# Patient Record
Sex: FEMALE | Race: WHITE | NOT HISPANIC OR LATINO | Employment: UNEMPLOYED | URBAN - METROPOLITAN AREA
[De-identification: names, ages, dates, MRNs, and addresses within clinical notes are randomized per-mention and may not be internally consistent; named-entity substitution may affect disease eponyms.]

---

## 2017-01-28 ENCOUNTER — ALLSCRIPTS OFFICE VISIT (OUTPATIENT)
Dept: OTHER | Facility: OTHER | Age: 37
End: 2017-01-28

## 2017-01-30 ENCOUNTER — ALLSCRIPTS OFFICE VISIT (OUTPATIENT)
Dept: OTHER | Facility: OTHER | Age: 37
End: 2017-01-30

## 2017-09-25 ENCOUNTER — GENERIC CONVERSION - ENCOUNTER (OUTPATIENT)
Dept: OTHER | Facility: OTHER | Age: 37
End: 2017-09-25

## 2017-10-27 ENCOUNTER — GENERIC CONVERSION - ENCOUNTER (OUTPATIENT)
Dept: OTHER | Facility: OTHER | Age: 37
End: 2017-10-27

## 2017-11-01 ENCOUNTER — ALLSCRIPTS OFFICE VISIT (OUTPATIENT)
Dept: OTHER | Facility: OTHER | Age: 37
End: 2017-11-01

## 2017-11-01 ENCOUNTER — GENERIC CONVERSION - ENCOUNTER (OUTPATIENT)
Dept: OTHER | Facility: OTHER | Age: 37
End: 2017-11-01

## 2017-11-04 ENCOUNTER — ALLSCRIPTS OFFICE VISIT (OUTPATIENT)
Dept: OTHER | Facility: OTHER | Age: 37
End: 2017-11-04

## 2017-11-04 DIAGNOSIS — Z01.419 ENCOUNTER FOR GYNECOLOGICAL EXAMINATION WITHOUT ABNORMAL FINDING: ICD-10-CM

## 2017-11-04 PROCEDURE — 87624 HPV HI-RISK TYP POOLED RSLT: CPT | Performed by: PHYSICIAN ASSISTANT

## 2017-11-04 PROCEDURE — G0145 SCR C/V CYTO,THINLAYER,RESCR: HCPCS | Performed by: PHYSICIAN ASSISTANT

## 2017-11-07 ENCOUNTER — GENERIC CONVERSION - ENCOUNTER (OUTPATIENT)
Dept: OTHER | Facility: OTHER | Age: 37
End: 2017-11-07

## 2017-11-07 NOTE — PROGRESS NOTES
Assessment  1  Encounter for routine gynecological examination (V72 31) (Z01 419)    Plan   · (1) THIN PREP PAP WITH IMAGING; Status:Active; Requested LZO:[id number];    Perform:[facility]; TKW:43JJM0460; Ordered;For:Encounter for routine gynecological examination; Ordered By:Jadyn Guevara;  Maturation index required? : No  : 10/04/2017  HPV? : if ASCUS   · Levothyroxine Sodium 112 MCG Oral Tablet; TAKE 1 TABLET DAILY AS  DIRECTED   Rx By: Killian Foster; Dispense: 30 Days ; #:30 Tablet; Refill: 5;For: Hypothyroidism; HEIDI = N; Verified Transmission to [address] [place] [address]; Last Updated By: System, [employer]; 11/4/2017 11:53:36 AM    Discussion/Summary  healthy adult female Pap test was done today      Chief Complaint  Pt here for annual pap and pelvic  Pt's last pap was 6 years ago  Pt had history of one abnormal pap test  Pt's period are irregular  History of Present Illness  GYN HM, Adult Female [facility]: The patient is being seen for a gynecology evaluation  The last health maintenance visit was 6 year(s) ago  Social History: Household members include 1 daughter(s)  She is unmarried  Work status: working full time  She reports being in recovery from alcohol abuse  She has never used illicit drugs  General Health: The patient's health since the last visit is described as good  She has regular dental visits  -- She denies vision problems  -- She denies hearing loss  Immunizations status: up to date  Lifestyle:  She consumes a diverse and healthy diet  -- She does not have any weight concerns  -- She exercises regularly  -- She does not use tobacco -- She denies alcohol use  Reproductive health: the patient is premenopausal--   she reports no menstrual problems  -- pregnancy history: G 1P 1  Screening: cancer screening reviewed and updated  metabolic screening reviewed and updated  risk screening reviewed and updated        Review of Systems  no pelvic pain,-- no pelvic pressure,-- no vaginal pain,-- no vaginal discharge,-- no vaginal itching,-- no vaginal lump or mass,-- no vaginal odor,-- no nonmenstrual bleeding,-- no dysuria,-- no bladder pain,-- no burning sensation during urination,-- no change in urinary frequency,-- no feelings of urinary urgency,-- no flank pain,-- no abnormal urethral discharge-- and-- urine is not foul-smelling  Active Problems  1  Alcohol dependence in remission (303 93) (F10 21)   2  Allergy (995 3) (T78 40XA)   3  Anxiety (300 00) (F41 9)   4  Asthma (493 90) (J45 909)   5  Asthma flare (493 92) (J45 901)   6  Asthmatic bronchitis, unspecified asthma severity, with acute exacerbation (493 92)   (J45 901)   7  Crohn disease (555 9) (K50 90)   8  Depression (311) (F32 9)   9  Eczema (692 9) (L30 9)   10  Hypothyroidism (244 9) (E03 9)   11  Obesity (278 00) (E66 9)   12  Seasonal allergies (477 9) (J30 2)   13  URTI (acute upper respiratory infection) (465 9) (J06 9)    Family History   · Family history of Colon cancer   · Family history of hypertension (V17 49) (Z82 49)   · Family history of malignant neoplasm of kidney (V16 51) (Z80 51)   · Family history of Colon cancer   · Family history of hypertension (V17 49) (Z82 49)   · Family history of malignant neoplasm of kidney (V16 51) (Z80 51)   · Family history of Colon cancer   · Family history of malignant neoplasm of kidney (V16 51) (Z80 51)    Social History   · Former smoker (V15 82) (N57 531)  The social history was reviewed and updated today  The social history was reviewed and is unchanged  Current Meds   1  Albuterol Sulfate Powder; USE AS DIRECTED; Therapy: 11TND5376 to (Evaluate:40Omp0131); Last Rx:06Rdb7545 Ordered   2  Azithromycin 250 MG Oral Tablet; TAKE 2 TABLETS ON DAY 1 THEN TAKE 1 TABLET A   DAY FOR 4 DAYS; Therapy: 13DZJ1358 to (Last Rx:01Nov2017)  Requested for: 31GLK3930 Ordered   3  Benadryl 25 MG CAPS; Therapy: (Recorded:24Jun2016) to Recorded   4   Benzonatate 200 MG Oral Capsule; TAKE 1 CAPSULE 3 TIMES DAILY AS NEEDED; Therapy: 11HLP4539 to (Evaluate:18Nov2017)  Requested for: 07AST8080; Last   Rx:01Nov2017 Ordered   5  Betamethasone Sod Phos & Acet 6 (3-3) MG/ML Injection Suspension; INJECT 1  ML   Intramuscular I; To Be Done: 40SCR9037; Status: HOLD FOR - Administration Ordered   6  Breo Ellipta 200-25 MCG/INH Inhalation Aerosol Powder Breath Activated; one puff q   day; Therapy: 40TIE0676 to (Last Rx:01Nov2017)  Requested for: 96LFJ6192 Ordered   7  Claritin CAPS; Therapy: (Recorded:24Jun2016) to Recorded   8  Fluticasone Propionate 50 MCG/ACT Nasal Suspension; USE 2 SPRAYS IN EACH   NOSTRIL ONCE DAILY; Therapy: 10WEJ9349 to (Last Rx:29Mar2016)  Requested for: 52KCF9981 Ordered   9  Levothyroxine Sodium 112 MCG Oral Tablet; TAKE 1 TABLET DAILY AS DIRECTED; Therapy: 94GIF5224 to (Evaluate:70Otm5501)  Requested for: 98GMZ4357; Last   Rx:19Jan2017 Ordered   10  Montelukast Sodium 10 MG Oral Tablet; take 1 tablet by mouth daily as directed; Therapy: 73FHE4132 to (Evaluate:21Jan2018)  Requested for: 64YJL2267; Last    Rx:90Qsl0039 Ordered   11  PredniSONE 10 MG Oral Tablet; 60mg po q day x2, 50mg po q day x2, 40mg po q day x    2, 30mg po q day x2, 20mg po q day x 2, 10mg po q day x 2; Therapy: 83YKT2872 to (Evaluate:97Gee8628)  Requested for: 63CVZ2589; Last    Rx:01Nov2017 Ordered   12  Valerian Root CAPS; Therapy: (Recorded:24Jun2016) to Recorded   13  Ventolin  (90 Base) MCG/ACT Inhalation Aerosol Solution; inhale 1 to 2 puffs by    mouth every 4 to 6 hours if needed; Therapy: 97WDZ8860 to (Emory Jaime)  Requested for: 25Oct2016; Last    Rx:25Oct2016 Ordered   14  Viibryd 40 MG Oral Tablet; TAKE 1 TABLET DAILT; Therapy: 20Mvr5407 to (Last Rx:03Oct2017)  Requested for: 45UON6800 Ordered   15  Vivitrol 380 MG Intramuscular Suspension Reconstituted; INJECT INTRAMUSCULARLY    AS DIRECTED; Therapy: 68FWB1964 to Recorded    Allergies  1  Penicillins    Vitals   Recorded: 43ZUL9014 11:14AM   Heart Rate 76   Respiration 18   Systolic 216, LUE, Sitting   Diastolic 80, LUE, Sitting   Height 5 ft 3 in   Weight 197 lb    BMI Calculated 34 9   BSA Calculated 1 92   O2 Saturation 98     Physical Exam    Constitutional   General appearance: No acute distress, well appearing and well nourished  Pulmonary   Respiratory effort: No increased work of breathing or signs of respiratory distress  Auscultation of lungs: Clear to auscultation  Cardiovascular   Auscultation of heart: Normal rate and rhythm, normal S1 and S2, no murmurs  Peripheral vascular exam: Normal pulses Throughout  Genitourinary   External genitalia: Normal and no lesions appreciated  Vagina: Normal, no lesions or dryness appreciated  Urethra: Normal     Urethral meatus: Normal     Bladder: Normal, soft, non-tender and no prolapse or masses appreciated  Cervix: Normal, no palpable masses  Uterus: Normal, non-tender, not enlarged, and no palpable masses  Adnexa/parametria: Normal, non-tender and no fullness or masses appreciated  Chest   Breasts: Normal and no dimpling or skin changes noted  Future Appointments    Date/Time Provider Specialty Site   11/07/2017 03:30 PM CINDI Alexis   Internal Medicine [facility]     Signatures   Electronically signed by : Carla López, [facility]; Nov 5 2017  4:33PM EST                       (Author)    Electronically signed by : CINDI Lim ; Nov 6 2017  7:32AM EST                       (Author)

## 2017-11-08 ENCOUNTER — LAB REQUISITION (OUTPATIENT)
Dept: LAB | Facility: HOSPITAL | Age: 37
End: 2017-11-08
Payer: COMMERCIAL

## 2017-11-08 DIAGNOSIS — Z01.419 ENCOUNTER FOR GYNECOLOGICAL EXAMINATION WITHOUT ABNORMAL FINDING: ICD-10-CM

## 2017-11-16 LAB — HPV RRNA GENITAL QL NAA+PROBE: ABNORMAL

## 2017-11-17 LAB
LAB AP GYN PRIMARY INTERPRETATION: NORMAL
LAB AP LMP: NORMAL
Lab: NORMAL
PATH INTERP SPEC-IMP: NORMAL

## 2017-11-20 ENCOUNTER — GENERIC CONVERSION - ENCOUNTER (OUTPATIENT)
Dept: OTHER | Facility: OTHER | Age: 37
End: 2017-11-20

## 2017-11-24 ENCOUNTER — OFFICE VISIT (OUTPATIENT)
Dept: URGENT CARE | Facility: CLINIC | Age: 37
End: 2017-11-24
Payer: COMMERCIAL

## 2017-11-24 PROCEDURE — 99203 OFFICE O/P NEW LOW 30 MIN: CPT

## 2017-11-29 NOTE — PROGRESS NOTES
Assessment  1  Abdominal pain (649 87) (R10 9)    Discussion/Summary  Discussion Summary:   Patient referred to ER for further evaluation  Medication Side Effects Reviewed: Possible side effects of new medications were reviewed with the patient/guardian today  Understands and agrees with treatment plan: The treatment plan was reviewed with the patient/guardian  The patient/guardian understands and agrees with the treatment plan   Counseling Documentation With Imm: The patient was counseled regarding instructions for management  Chief Complaint    1  Abdominal Pain  Chief Complaint Free Text Note Form: C/O lower abdominal pain after eating a fruit salad yesterday  Pt states that the pain is intermittent and feels like a twisting sensation in the bowel  Pt denies any diarrhea, nausea, vomiting or fever  Pt has h/o Crohns but has not had a flare up for several years  She did have a bowel obstruction 5 years ago  History of Present Illness  HPI: Started with lower abd pain yesterday at 11 AM  Pain is getting worse  No fever, chills, N/V/D  Hx of bowel obstruction 5 yrs ago  Hospital Based Practices Required Assessment:  Pain Assessment  the patient states they have pain  The pain is located in the lower abdomen  The patient describes the pain as twisting  (on a scale of 0 to 10, the patient rates the pain at 2, at times ranging as high as 8 )  Abuse And Domestic Violence Screen   Yes, the patient is safe at home  -- The patient states no one is hurting them  Depression And Suicide Screen  No, the patient has not had thoughts of hurting themself  No, the patient has not felt depressed in the past 7 days  Abdominal Pain: Alvah Sever presents with complaints of abdominal pain  Associated symptoms include no nausea,-- no vomiting,-- no diarrhea,-- no jaundice,-- no melena,-- no bloody stool,-- no flatulence,-- no dysuria,-- no hematuria-- and-- no dark urine        Review of Systems  Focused-Female: Constitutional: no fever-- and-- no chills  ENT: no sore throat,-- no hearing loss-- and-- no hoarseness  Cardiovascular: no chest pain  Respiratory: no cough  Musculoskeletal: no myalgias  Integumentary: no rashes  Neurological: no headache-- and-- no dizziness  ROS Reviewed:   ROS reviewed  Active Problems    1  Alcohol dependence in remission (303 93) (F10 21)   2  Allergy (995 3) (T78 40XA)   3  Anxiety (300 00) (F41 9)   4  Asthma (493 90) (J45 909)   5  Asthma flare (493 92) (J45 901)   6  Asthmatic bronchitis, unspecified asthma severity, with acute exacerbation (493 92) (J45 901)   7  Crohn disease (555 9) (K50 90)   8  Depression (311) (F32 9)   9  Eczema (692 9) (L30 9)   10  Encounter for routine gynecological examination (V72 31) (Z01 419)   11  Hypothyroidism (244 9) (E03 9)   12  Obesity (278 00) (E66 9)   13  Seasonal allergies (477 9) (J30 2)   14  URTI (acute upper respiratory infection) (465 9) (J06 9)    Past Medical History  Active Problems And Past Medical History Reviewed: The active problems and past medical history were reviewed and updated today  Family History  Mother    1  Family history of Colon cancer   2  Family history of hypertension (V17 49) (Z82 49)   3  Family history of malignant neoplasm of kidney (V16 51) (Z80 51)  Father    4  Family history of Colon cancer   5  Family history of hypertension (V17 49) (Z82 49)   6  Family history of malignant neoplasm of kidney (V16 51) (Z80 51)  Grandparent    7  Family history of Colon cancer   8  Family history of malignant neoplasm of kidney (V16 51) (Z80 51)    Social History   · Always uses seat belt   · Caffeine use (V49 89) (F15 90)   · Exercise frequency (times/week)   · Former smoker (K67 22) (F35 259)   · No alcohol use  Social History Reviewed: The social history was reviewed and updated today  Surgical History    1  Denied: History Of Prior Surgery    Current Meds   1   Albuterol Sulfate Powder; USE AS DIRECTED; Therapy: 53IUE0253 to (Evaluate:20Aug2016); Last Rx:30Bys9290 Ordered   2  Benadryl 25 MG CAPS; Therapy: (Recorded:24Jun2016) to Recorded   3  Benzonatate 200 MG Oral Capsule; TAKE 1 CAPSULE 3 TIMES DAILY AS NEEDED; Therapy: 94HTU0253 to (Evaluate:18Nov2017)  Requested for: 71TSR4584; Last Rx:01Nov2017 Ordered   4  Betamethasone Sod Phos & Acet 6 (3-3) MG/ML Injection Suspension; INJECT 1  ML Intramuscular I; To Be Done: 01IPV3678; Status: HOLD FOR - Administration Ordered   5  Breo Ellipta 200-25 MCG/INH Inhalation Aerosol Powder Breath Activated; one puff q day; Therapy: 14KOK2654 to (Last Rx:01Nov2017)  Requested for: 69FFB1525 Ordered   6  Claritin CAPS; Therapy: (Recorded:24Jun2016) to Recorded   7  Fluticasone Propionate 50 MCG/ACT Nasal Suspension; USE 2 SPRAYS IN EACH NOSTRIL ONCE DAILY; Therapy: 61OZX7785 to (Last Rx:29Mar2016)  Requested for: 45QYM2759 Ordered   8  Levothyroxine Sodium 112 MCG Oral Tablet; TAKE 1 TABLET DAILY AS DIRECTED; Therapy: 46ASU5385 to ([facility])  Requested for: 44CVR5820; Last Rx:04Nov2017 Ordered   9  Montelukast Sodium 10 MG Oral Tablet; take 1 tablet by mouth daily as directed; Therapy: 88TDZ3662 to (Evaluate:21Jan2018)  Requested for: 87AMM5050; Last Rx:47Tkg3420 Ordered   10  Valerian Root CAPS; Therapy: (Recorded:24Jun2016) to Recorded   11  Ventolin  (90 Base) MCG/ACT Inhalation Aerosol Solution; inhale 1 to 2 puffs by  mouth every 4 to 6 hours if needed; Therapy: 51HFX0116 to (Iris Pisano)  Requested for: 25Oct2016; Last  Rx:25Oct2016 Ordered   12  Viibryd 40 MG Oral Tablet; TAKE 1 TABLET DAILT; Therapy: 37Nph7719 to (Last Rx:03Oct2017)  Requested for: 71JXJ2229 Ordered  Medication List Reviewed: The medication list was reviewed and updated today  Allergies    1   Penicillins    Vitals  Signs   Recorded: 18DOS8688 10:27AM   Temperature: 97 6 F  Heart Rate: 105  Respiration: 18  Systolic: 086  Diastolic: 78  Height: 5 ft 3 in  Weight: 198 lb 8 0 oz  BMI Calculated: 35 16  BSA Calculated: 1 93  O2 Saturation: 100  Pain Scale: 2    Physical Exam   Constitutional  General appearance: No acute distress, well appearing and well nourished  Eyes  Conjunctiva and lids: No swelling, erythema or discharge  Ears, Nose, Mouth, and Throat  External inspection of ears and nose: Normal    Pulmonary  Respiratory effort: No increased work of breathing or signs of respiratory distress  Abdomen lower quadrant abd pain R>L with rebound, negative psoas  Lymphatic  Palpation of lymph nodes in neck: No lymphadenopathy  Musculoskeletal  Gait and station: Normal    Skin  Skin and subcutaneous tissue: Normal without rashes or lesions     Psychiatric  Mood and affect: Normal        Signatures   Electronically signed by : ZACHARY Lewis; Nov 24 2017 10:49AM EST                       (Author)    Electronically signed by : STEVEN Mcadams ; Nov 28 2017  2:19PM EST                       (Co-author)

## 2017-11-30 ENCOUNTER — GENERIC CONVERSION - ENCOUNTER (OUTPATIENT)
Dept: FAMILY MEDICINE CLINIC | Facility: CLINIC | Age: 37
End: 2017-11-30

## 2017-12-13 ENCOUNTER — ALLSCRIPTS OFFICE VISIT (OUTPATIENT)
Dept: OTHER | Facility: OTHER | Age: 37
End: 2017-12-13

## 2017-12-29 ENCOUNTER — GENERIC CONVERSION - ENCOUNTER (OUTPATIENT)
Dept: FAMILY MEDICINE CLINIC | Facility: CLINIC | Age: 37
End: 2017-12-29

## 2018-01-09 NOTE — PROGRESS NOTES
Chief Complaint  Pt was given Vivitrol injection, left gluteal region  Active Problems    1  Alcohol dependence in remission (303 93) (F10 21)   2  Allergy (995 3) (T78 40XA)   3  Anxiety (300 00) (F41 9)   4  Asthma (493 90) (J45 909)   5  Asthmatic bronchitis, unspecified asthma severity, with acute exacerbation (493 92)   (J45 901)   6  Crohn disease (555 9) (K50 90)   7  Depression (311) (F32 9)   8  Eczema (692 9) (L30 9)   9  Hypothyroidism (244 9) (E03 9)   10  Obesity (278 00) (E66 9)   11  Seasonal allergies (477 9) (J30 2)    Current Meds   1  Albuterol Sulfate Powder; USE AS DIRECTED; Therapy: 13KTI2578 to (Evaluate:23Szt9879); Last Rx:65Ybs0971 Ordered   2  Benadryl 25 MG CAPS; Therapy: (Recorded:24Jun2016) to Recorded   3  Claritin CAPS; Therapy: (Recorded:24Jun2016) to Recorded   4  Disulfiram 250 MG Oral Tablet; TAKE 1 TABLET DAILY AS DIRECTED; Therapy: 43RKF9013 to (Evaluate:61Xhb4523)  Requested for: 48YYA8867; Last   Rx:64Pyi3200 Ordered   5  Fluticasone Propionate 50 MCG/ACT Nasal Suspension; USE 2 SPRAYS IN EACH   NOSTRIL ONCE DAILY; Therapy: 24ITX4792 to (Last Rx:29Mar2016)  Requested for: 95AJD7843 Ordered   6  HydrOXYzine Pamoate 25 MG Oral Capsule; TAKE 1 CAPSULE 3 TIMES DAILY AS   NEEDED FOR ANXIETY; Therapy: 65Swi0397 to (Meghan Quezada)  Requested for: 97Zlh8663; Last   Rx:61Wml2387; Status: ACTIVE - Transmit to Pharmacy - Awaiting Verification   Ordered   7  Levothyroxine Sodium 112 MCG Oral Tablet; TAKE 1 TABLET DAILY AS DIRECTED; Therapy: 94FLH9493 to (Evaluate:92Nxq3066)  Requested for: 60BMW1876; Last   Rx:19Jan2017 Ordered   8  Montelukast Sodium 10 MG Oral Tablet; TAKE 1 TABLET DAILY AS DIRECTED; Therapy: 53Zqq8846 to (Evaluate:76Vtm6270)  Requested for: 28Jun2016; Last   Rx:28Jun2016 Ordered   9  ProAir  (90 Base) MCG/ACT Inhalation Aerosol Solution; INHALE 1 TO 2 PUFFS   EVERY 4 TO 6 HOURS AS NEEDED;    Therapy: 35Vjo2858 to (Last UF:59KWQ7013) Requested for: 71QHF4332 Ordered   10  Symbicort 160-4 5 MCG/ACT Inhalation Aerosol; INHALE 2 PUFFS TWICE DAILY  RINSE MOUTH AFTER USE; Therapy: 02Qzm6484 to (Last Rx:19Jan2017)  Requested for: 31IEM2400 Ordered   11  Valerian Root CAPS; Therapy: (Recorded:24Jun2016) to Recorded   12  Ventolin  (90 Base) MCG/ACT Inhalation Aerosol Solution; inhale 1 to 2 puffs by    mouth every 4 to 6 hours if needed; Therapy: 47SES2139 to (Annia Doss)  Requested for: 25Oct2016; Last    Rx:25Oct2016 Ordered   13  Viibryd 40 MG Oral Tablet; TAKE 1 TABLET DAILT; Therapy: 45Lxu0443 to (Last BR:14ETG2197)  Requested for: 76PFG1978 Ordered    Allergies    1   Penicillins    Signatures   Electronically signed by : Elizabeth Curtis, [facility]; Feb 1 2017  3:37PM EST                       (Author)    Electronically signed by : CINDI Diez ; Feb 1 2017  3:49PM EST                       (Author)

## 2018-01-12 NOTE — MISCELLANEOUS
Message  Pt was given Phenergan with Codeine for cough and cold symptoms  Active Problems   1  Alcohol dependence in remission (303 93) (F10 21)  2  Allergy (995 3) (T78 40XA)  3  Anxiety (300 00) (F41 9)  4  Asthma (493 90) (J45 909)  5  Asthmatic bronchitis, unspecified asthma severity, with acute exacerbation (493 92)   (J45 901)  6  Crohn disease (555 9) (K50 90)  7  Depression (311) (F32 9)  8  Eczema (692 9) (L30 9)  9  Hypothyroidism (244 9) (E03 9)  10  Obesity (278 00) (E66 9)  11  Seasonal allergies (477 9) (J30 2)  12  URTI (acute upper respiratory infection) (465 9) (J06 9)    Current Meds  1  Advair Diskus 250-50 MCG/DOSE Inhalation Aerosol Powder Breath Activated; INHALE   1 PUFF TWICE DAILY  RINSE MOUTH AFTER USE; Therapy: 69MJK9095 to (Evaluate:18Mku9283)  Requested for: 38EXZ1740; Last   Rx:96Zsp4970 Ordered  2  Albuterol Sulfate Powder; USE AS DIRECTED; Therapy: 35BSZ0281 to (Evaluate:85Das9390); Last Rx:60Icp8947 Ordered  3  Azithromycin 250 MG Oral Tablet; TAKE 2 TABLETS ON DAY 1 THEN TAKE 1 TABLET A   DAY FOR 4 DAYS; Therapy: 30TRK2457 to (Last Rx:02Oct2017)  Requested for: 96SNJ5927 Ordered  4  Benadryl 25 MG CAPS (DiphenhydrAMINE HCl); Therapy: (Recorded:79Vcv6018) to Recorded  5  Claritin CAPS; Therapy: (Recorded:07Eqf2870) to Recorded  6  Disulfiram 250 MG Oral Tablet (Antabuse); TAKE 1 TABLET DAILY AS DIRECTED; Therapy: 10XEY8191 to (Evaluate:37Don5780)  Requested for: 31HBZ8450; Last   Rx:94Kcs2046 Ordered  7  Fluticasone Propionate 50 MCG/ACT Nasal Suspension (Flonase); USE 2 SPRAYS IN   EACH NOSTRIL ONCE DAILY; Therapy: 46BTJ6953 to (Last Rx:29Mar2016)  Requested for: 48PRN0719 Ordered  8  HydrOXYzine Pamoate 25 MG Oral Capsule; TAKE 1 CAPSULE 3 TIMES DAILY AS   NEEDED FOR ANXIETY; Therapy: 74Xyk3607 to (Brandy Landers)  Requested for: 05Ajg4631; Last   Rx:01Auw3383; Status: ACTIVE - Transmit to [place] Verification   Ordered  9   Levothyroxine Sodium 112 MCG Oral Tablet; TAKE 1 TABLET DAILY AS DIRECTED; Therapy: 90PUL3533 to (Evaluate:28Eqf1823)  Requested for: 74TGW6196; Last   Rx:19Jan2017 Ordered  10  Montelukast Sodium 10 MG Oral Tablet (Singulair); take 1 tablet by mouth daily as    directed; Therapy: 71SZJ9559 to (Evaluate:21Jan2018)  Requested for: 00KCY9701; Last    Rx:60Nba9086 Ordered  11  ProAir  (90 Base) MCG/ACT Inhalation Aerosol Solution; INHALE 1 TO 2 PUFFS    EVERY 4 TO 6 HOURS AS NEEDED; Therapy: 08Zok7049 to (Last Rx:85Uwb5619)  Requested for: 99Xaj4918 Ordered  12  Symbicort 160-4 5 MCG/ACT Inhalation Aerosol; INHALE 2 PUFFS TWICE DAILY  RINSE MOUTH AFTER USE; Therapy: 49Zsk9069 to (Last Rx:59Oqu6302)  Requested for: 62Tmb8775 Ordered  13  Valerian Root CAPS; Therapy: (Recorded:24Jun2016) to Recorded  14  Ventolin  (90 Base) MCG/ACT Inhalation Aerosol Solution; inhale 1 to 2 puffs by    mouth every 4 to 6 hours if needed; Therapy: 56JIM2015 to (Juan Carlos Yoo)  Requested for: 25Oct2016; Last    Rx:25Oct2016 Ordered  15  Viibryd 40 MG Oral Tablet; TAKE 1 TABLET DAILT; Therapy: 84Mdt8561 to (Last Rx:03Oct2017)  Requested for: 03Oct2017 Ordered    Allergies   1  Penicillins    Signatures   Electronically signed by :  Suzie Almodovar, ; Oct 27 2017 12:03PM EST                       (Author)    Electronically signed by : Khari Coker, [facility]; Oct 27 2017 12:17PM EST                       (Author)

## 2018-01-12 NOTE — RESULT NOTES
Verified Results  (1) THIN PREP PAP WITH IMAGING 60AVT9694 03:04PM Heath Chowdhury     Test Name Result Flag Reference   LAB AP CASE REPORT (Report)     Gynecologic Cytology Report            Case: [id number]                  Authorizing Provider: Melvina Lee PA-C   Collected:      11/04/2017           First Screen:     JO Villalobos Received:      11/09/2017 5427        Pathologist:      Monserrat Bedoya MD                             Specimen:  LIQUID-BASED PAP, SCREENING, Cervix   HPV HIGH RISK RESULT (Report)     HPV, High Risk: HPV POS, HPV16 NEG, HPV18 NEG      Other High Risk HPV Positive, HPV 16 Negative, HPV 18 Negative  Specimen is positive for the DNA of any one of, or combination of the following high risk HPV types: 36,69,97,81,90,42,80,28,85,03,90,83  HPV types 16 and 18 DNA were undetectable or below the pre-set threshold  deskwolf FDA approved Joey 4800 is utilized with strict adherence to the manufacturers instruction  manual to test for the presence of High-Risk HPV DNA, as well as HPV 16 and HPV 18  This instrument  has been validated by our laboratory and/or by the   A negative result does not preclude the presence of HPV infection because results depend on adequate  specimen collection, absence of inhibitors and sufficient DNA to be detected  Additionally, HPV negative  results are not intended to prevent women from proceeding to colposcopy if clinically warranted  Positive HPV test results indicate the presence of any one or more of the high risk types, but since patients  are often co-infected with low-risk types it does not rule out the presence of low-risk types in patients  with mixed infections     LAB AP GYN PRIMARY INTERPRETATION      Epithelial cell abnormality  Electronically signed by Monserrat Bedoya MD on 11/17/2017 at 3:04 PM   LAB AP GYN INTERPRETATION      Atypical squamous cells of undetermined significance   LAB AP GYN SPECIMEN ADEQUACY Satisfactory for evaluation  Endocervical/transformation zone component present  LAB AP GYN NOTE      Interpretation performed at [facility], [address], [address] [facility]  LAB AP GYN ADDITIONAL INFORMATION (Report)     [employer]'s FDA approved ,  and ThinPrep Imaging System are   utilized with strict adherence to the 's instruction manual to   prepare gynecologic and non-gynecologic cytology specimens for the   production of ThinPrep slides as well as for gynecologic ThinPrep imaging  These processes have been validated by our laboratory and/or by the     The Pap test is not a diagnostic procedure and should not be used as the   sole means to detect cervical cancer  It is only a screening procedure to   aid in the detection of cervical cancer and its precursors  Both   false-negative and false-positive results have been experienced  Your   patient's test result should be interpreted in this context together with   the history and clinical findings     LAB AP LMP 10/4/2017

## 2018-01-12 NOTE — MISCELLANEOUS
ATTN:  Efrain Boone        Dear Ms Frankey Chantal,        I am writing to inform you that Nena Holland was precribed Phenergan with Codeine for cough and cold symptoms as needed  Sincerely,        Mack Morales PA-C      Electronically signed by: Jerod Mendez   Oct 27 2017 12:21PM EST

## 2018-01-13 VITALS
RESPIRATION RATE: 18 BRPM | TEMPERATURE: 98.8 F | HEART RATE: 94 BPM | WEIGHT: 200 LBS | BODY MASS INDEX: 35.44 KG/M2 | HEIGHT: 63 IN | DIASTOLIC BLOOD PRESSURE: 90 MMHG | SYSTOLIC BLOOD PRESSURE: 140 MMHG

## 2018-01-13 VITALS
OXYGEN SATURATION: 98 % | HEIGHT: 63 IN | SYSTOLIC BLOOD PRESSURE: 114 MMHG | RESPIRATION RATE: 18 BRPM | BODY MASS INDEX: 34.91 KG/M2 | HEART RATE: 76 BPM | WEIGHT: 197 LBS | DIASTOLIC BLOOD PRESSURE: 80 MMHG

## 2018-01-13 NOTE — MISCELLANEOUS
September 25, 2017    ATTN: Rob Reese    Dear Ms Gabe Myers am writing to inform you that Mey Darryl is taking over the counter Mucinex as needed      Sincerely,      Saskia Michel PA-C      Electronically signed by:Leida  [facility]   Sep 25 2017  1:20PM EST

## 2018-01-22 VITALS
OXYGEN SATURATION: 98 % | DIASTOLIC BLOOD PRESSURE: 68 MMHG | RESPIRATION RATE: 18 BRPM | HEART RATE: 97 BPM | BODY MASS INDEX: 35.17 KG/M2 | HEIGHT: 63 IN | WEIGHT: 198.5 LBS | SYSTOLIC BLOOD PRESSURE: 110 MMHG | TEMPERATURE: 98 F

## 2018-01-22 VITALS
BODY MASS INDEX: 34.02 KG/M2 | SYSTOLIC BLOOD PRESSURE: 124 MMHG | DIASTOLIC BLOOD PRESSURE: 80 MMHG | OXYGEN SATURATION: 98 % | WEIGHT: 192 LBS | RESPIRATION RATE: 18 BRPM | TEMPERATURE: 98.4 F | HEART RATE: 89 BPM | HEIGHT: 63 IN

## 2018-01-23 NOTE — MISCELLANEOUS
Assessment    1  Bowel obstruction (560 9) (K56 609)   2  Crohn disease (555 9) (K50 90)   3  Depression (311) (F32 9)   4  Stricture of bowel (560 9) (K56 699)   5  Weight loss (783 21) (R63 4)    Plan  Anxiety, Depression    · Viibryd 40 MG Oral Tablet; TAKE 1 TABLET DAILT   Rx By: Leanne Vance; Dispense: 0 Days ; #:30 Tablet; Refill: 1; For: Anxiety, Depression; HEIDI = N; Verified Transmission to [address] [place] [address]; Last Updated By: SystemLaFourchette; 12/13/2017 12:09:10 PM    Discussion/Summary  Discussion Summary:   Doing better  Continue current meds and keep f/u with sx and GI  Recommend pt adding supplements to build up her strength for sx  Keep scheduled f/u  Medication SE Review and Pt Understands Tx: Possible side effects of new medications were reviewed with the patient/guardian today  The treatment plan was reviewed with the patient/guardian  The patient/guardian understands and agrees with the treatment plan      Chief Complaint  Chief Complaint Free Text Note Form: Patient is being seen today for a SHALA  History of Present Illness  TCM Communication [facility]: The patient is being contacted for follow-up after hospitalization and 12-7-2017  She was hospitalized [facility]  The date of admission: 11-24-17, date of discharge: 11-26-17  Diagnosis: PARTIAL SMALL BOWEL OBSTRUCTION  She was discharged to home  Communication performed and completed by Jamil Payton   HPI: WF with known Crohn's for several years, presents for brief admission due to SBO/Ileitis and similar portion of the bowel as previous obstruction years ago  Improved with conservative treatment and coarse uncomplicated  D/c and had OP colonoscopy and reports having a stricture along with a colon polyp at the same site  Bx done and results pending  Doing ok  Not back to prior baseline  Able to eat at times and other times, has loss of appetite  No n/v and passing stool and gas  Intermittent low grade pain  No fever or chills  No new issues  Awaiting decision about colonoscopy and polyp removal vs open colectomy  Review of Systems  Complete-Female:   Constitutional: feeling poorly and feeling tired, but no fever and no chills  Cardiovascular: no chest pain, no intermittent leg claudication, no palpitations and no lower extremity edema  Respiratory: no shortness of breath, no cough, no orthopnea, no wheezing, no shortness of breath during exertion and no PND  Gastrointestinal: no abdominal pain, no nausea, no constipation and no diarrhea  Genitourinary: no dysuria  Musculoskeletal: No complaints of arthralgias, no myalgias, no joint swelling or stiffness, no limb pain or swelling  Integumentary: No complaints of skin rash or lesions, no itching, no skin wounds, no breast pain or lump  Neurological: no headache, no confusion and no convulsions  Psychiatric: anxiety and depression, but not suicidal and no sleep disturbances  Endocrine: No complaints of proptosis, no hot flashes, no muscle weakness, no deepening of the voice, no feelings of weakness  Hematologic/Lymphatic: No complaints of swollen glands, no swollen glands in the neck, does not bleed easily, does not bruise easily  Active Problems    1  Alcohol dependence in remission (303 93) (F10 21)   2  Allergy (995 3) (T78 40XA)   3  Anxiety (300 00) (F41 9)   4  Asthma (493 90) (J45 909)   5  Asthma flare (493 92) (J45 901)   6  Asthmatic bronchitis, unspecified asthma severity, with acute exacerbation (493 92)   (J45 901)   7  Crohn disease (555 9) (K50 90)   8  Depression (311) (F32 9)   9  Eczema (692 9) (L30 9)   10  Encounter for routine gynecological examination (V72 31) (Z01 419)   11  Hypothyroidism (244 9) (E03 9)   12  Obesity (278 00) (E66 9)   13  Seasonal allergies (477 9) (J30 2)   14  URTI (acute upper respiratory infection) (465 9) (J06 9)    Past Medical History    1   History of abdominal pain (V13 89) (A43 389)    Surgical History    1  Denied: History Of Prior Surgery  Surgical History Reviewed: The surgical history was reviewed and updated today  Family History  Mother    1  Family history of Colon cancer   2  Family history of hypertension (V17 49) (Z82 49)   3  Family history of malignant neoplasm of kidney (V16 51) (Z80 51)  Father    4  Family history of Colon cancer   5  Family history of hypertension (V17 49) (Z82 49)   6  Family history of malignant neoplasm of kidney (V16 51) (Z80 51)  Grandparent    7  Family history of Colon cancer   8  Family history of malignant neoplasm of kidney (V16 51) (Z80 51)  Family History Reviewed: The family history was reviewed and updated today  Social History    · Always uses seat belt   · Caffeine use (V49 89) (F15 90)   · Exercise frequency (times/week)   · Former smoker (O47 28) (F19 849)   · No alcohol use  Social History Reviewed: The social history was reviewed and updated today  The social history was reviewed and is unchanged  Current Meds   1  Albuterol Sulfate Powder; USE AS DIRECTED; Therapy: 29PBQ7062 to (Evaluate:60Jeh0202); Last Rx:31Kfn8473 Ordered   2  Benadryl 25 MG CAPS; Therapy: (Recorded:24Jun2016) to Recorded   3  Betamethasone Sod Phos & Acet 6 (3-3) MG/ML Injection Suspension; INJECT 1  ML   Intramuscular I; To Be Done: 65IMF0586; Status: HOLD FOR - Administration Ordered   4  Breo Ellipta 200-25 MCG/INH Inhalation Aerosol Powder Breath Activated; one puff q day; Therapy: 63XHS6375 to (Last Rx:01Nov2017)  Requested for: 81JME7715 Ordered   5  Claritin CAPS; Therapy: (Recorded:24Jun2016) to Recorded   6  Fluticasone Propionate 50 MCG/ACT Nasal Suspension; USE 2 SPRAYS IN EACH   NOSTRIL ONCE DAILY; Therapy: 68BZY5340 to (Last Rx:29Mar2016)  Requested for: 29Mar2016 Ordered   7  Levothyroxine Sodium 112 MCG Oral Tablet; TAKE 1 TABLET DAILY AS DIRECTED;    Therapy: 38COQ9542 to (Alyssa Peppers) Requested for: 66LSL4180; Last   FZ:53BPN6166 Ordered   8  Montelukast Sodium 10 MG Oral Tablet; take 1 tablet by mouth daily as directed; Therapy: 76ASH8328 to (Evaluate:21Jan2018)  Requested for: 54NTR9309; Last   Rx:90Lqw2948 Ordered   9  Valerian Root CAPS; Therapy: (Recorded:24Jun2016) to Recorded   10  Ventolin  (90 Base) MCG/ACT Inhalation Aerosol Solution; inhale 1 to 2 puffs by    mouth every 4 to 6 hours if needed; Therapy: 27VPZ8763 to (Oral Hale)  Requested for: 25Oct2016; Last    Rx:25Oct2016 Ordered   11  Viibryd 40 MG Oral Tablet; TAKE 1 TABLET DAILT; Therapy: 87Nbc6158 to (Last Rx:03Oct2017)  Requested for: 14PPE8028 Ordered  Medication List Reviewed: The medication list was reviewed and updated today  Allergies    1  Penicillins    Vitals  Signs   Recorded: 45Vyx7616 11:26AM   Temperature: 97 8 F, Tympanic  Heart Rate: 87  Respiration: 16  Systolic: 274, Sitting  Diastolic: 82, Sitting  Height: 5 ft 3 in  Weight: 193 lb 4 0 oz  BMI Calculated: 34 23  BSA Calculated: 1 91  O2 Saturation: 99    Physical Exam    Constitutional   General appearance: No acute distress, well appearing and well nourished  Eyes   Conjunctiva and lids: No swelling, erythema or discharge  Pupils and irises: Equal, round and reactive to light  Ears, Nose, Mouth, and Throat   Oropharynx: Normal with no erythema, edema, exudate or lesions  Pulmonary   Respiratory effort: No increased work of breathing or signs of respiratory distress  Auscultation of lungs: Clear to auscultation  Cardiovascular   Auscultation of heart: Normal rate and rhythm, normal S1 and S2, without murmurs  Examination of extremities for edema and/or varicosities: Normal     Abdomen   Abdomen: Abnormal   Soft/ND with decrease BS  No bruits and thrills  No guarding or rebound  Liver and spleen: No hepatomegaly or splenomegaly  Lymphatic   Palpation of lymph nodes in neck: No lymphadenopathy  Musculoskeletal   Gait and station: Normal     Psychiatric   Orientation to person, place, and time: Normal     Mood and affect: Normal          Signatures   Electronically signed by : CINDI Titus ; Dec 13 2017 12:26PM EST                       (Author)

## 2018-01-24 VITALS
HEIGHT: 63 IN | OXYGEN SATURATION: 99 % | SYSTOLIC BLOOD PRESSURE: 118 MMHG | TEMPERATURE: 97.8 F | WEIGHT: 193.25 LBS | BODY MASS INDEX: 34.24 KG/M2 | DIASTOLIC BLOOD PRESSURE: 82 MMHG | HEART RATE: 87 BPM | RESPIRATION RATE: 16 BRPM

## 2018-03-05 DIAGNOSIS — J45.909 UNCOMPLICATED ASTHMA, UNSPECIFIED ASTHMA SEVERITY, UNSPECIFIED WHETHER PERSISTENT: Primary | ICD-10-CM

## 2018-03-05 RX ORDER — ALBUTEROL SULFATE 90 UG/1
1-2 AEROSOL, METERED RESPIRATORY (INHALATION) EVERY 4 HOURS PRN
Qty: 1 INHALER | Refills: 5 | Status: SHIPPED | OUTPATIENT
Start: 2018-03-05 | End: 2018-07-27 | Stop reason: SDUPTHER

## 2018-03-05 RX ORDER — MONTELUKAST SODIUM 10 MG/1
10 TABLET ORAL DAILY
Qty: 30 TABLET | Refills: 5 | Status: SHIPPED | OUTPATIENT
Start: 2018-03-05 | End: 2018-08-19 | Stop reason: SDUPTHER

## 2018-03-05 RX ORDER — MONTELUKAST SODIUM 10 MG/1
1 TABLET ORAL DAILY
COMMUNITY
Start: 2015-08-27 | End: 2018-03-05 | Stop reason: SDUPTHER

## 2018-03-05 RX ORDER — ALBUTEROL SULFATE 90 UG/1
1-2 AEROSOL, METERED RESPIRATORY (INHALATION)
COMMUNITY
Start: 2016-10-25 | End: 2018-03-05 | Stop reason: SDUPTHER

## 2018-03-07 NOTE — PROGRESS NOTES
To whom it may concern,   Please be aware that the pt was seen in the office today for an acute exercebation of her asthma  She previously took phernegan elixir that was left over from a prior incidence  Today, she received  a Duoneb treatment and  betamethsone injection in the office  She was prescribed oral abx, prednisone, and tessalon pereles  A script was sent in for a change over to [facility]  Pt is to return in one week  Sincerely,  MIKI Baldwin DO      Electronically signed Rubio HARRIS    Nov 1 2017  3:57PM EST

## 2018-03-10 DIAGNOSIS — F33.41 RECURRENT MAJOR DEPRESSIVE DISORDER, IN PARTIAL REMISSION (HCC): Primary | ICD-10-CM

## 2018-03-12 DIAGNOSIS — F33.41 RECURRENT MAJOR DEPRESSIVE DISORDER, IN PARTIAL REMISSION (HCC): ICD-10-CM

## 2018-03-12 RX ORDER — VILAZODONE HYDROCHLORIDE 40 MG/1
40 TABLET ORAL DAILY
Qty: 30 TABLET | Refills: 0 | Status: SHIPPED | OUTPATIENT
Start: 2018-03-12 | End: 2018-05-28 | Stop reason: SDUPTHER

## 2018-03-12 RX ORDER — VILAZODONE HYDROCHLORIDE 40 MG/1
TABLET ORAL
Qty: 30 TABLET | Refills: 1 | Status: SHIPPED | OUTPATIENT
Start: 2018-03-12 | End: 2018-03-12 | Stop reason: SDUPTHER

## 2018-04-24 LAB
ADEQUACY: (HISTORICAL): ABNORMAL
DIAGNOSIS (HISTORICAL): ABNORMAL
ELECTRONICALLY SIGNED BY (HISTORICAL): ABNORMAL
HPV HIGH RISK (HISTORICAL): POSITIVE
NOTE: (HISTORICAL): ABNORMAL
NOTE: (HISTORICAL): ABNORMAL
PATHOLOGIST PROVIDED ICD10 (HISTORICAL): ABNORMAL
PERF. INST. (HISTORICAL): ABNORMAL
RECOMMENDATION (HISTORICAL): ABNORMAL

## 2018-05-20 LAB
INTERPRETATION (HISTORICAL): NORMAL
QAUNTIFERON NIL VALUE (HISTORICAL): 0.02 IU/ML
QFT TB AG MINUS NIL VALUE (HISTORICAL): 0.02 IU/ML
QUANTIFERON CRITERIA (HISTORICAL): NORMAL
QUANTIFERON MITOGEN VALUE (HISTORICAL): 7.14 IU/ML
QUANTIFERON TB AG VALUE (HISTORICAL): 0.04 IU/ML
QUANTIFERON TB GOLD (HISTORICAL): NEGATIVE
QUANTIFERON TB GOLD (HISTORICAL): NORMAL

## 2018-05-28 DIAGNOSIS — F33.41 RECURRENT MAJOR DEPRESSIVE DISORDER, IN PARTIAL REMISSION (HCC): ICD-10-CM

## 2018-05-28 DIAGNOSIS — J45.40 MODERATE PERSISTENT ASTHMA WITHOUT COMPLICATION: Primary | ICD-10-CM

## 2018-05-29 RX ORDER — VILAZODONE HYDROCHLORIDE 40 MG/1
TABLET ORAL
Qty: 30 TABLET | Refills: 0 | Status: SHIPPED | OUTPATIENT
Start: 2018-05-29 | End: 2018-06-27 | Stop reason: SDUPTHER

## 2018-06-27 DIAGNOSIS — F33.41 RECURRENT MAJOR DEPRESSIVE DISORDER, IN PARTIAL REMISSION (HCC): ICD-10-CM

## 2018-06-27 DIAGNOSIS — E03.9 HYPOTHYROIDISM, UNSPECIFIED TYPE: Primary | ICD-10-CM

## 2018-06-27 RX ORDER — VILAZODONE HYDROCHLORIDE 40 MG/1
40 TABLET ORAL DAILY
Qty: 30 TABLET | Refills: 0 | Status: SHIPPED | OUTPATIENT
Start: 2018-06-27 | End: 2018-07-27 | Stop reason: SDUPTHER

## 2018-06-27 RX ORDER — LEVOTHYROXINE SODIUM 112 UG/1
112 TABLET ORAL DAILY
Qty: 30 TABLET | Refills: 5 | Status: SHIPPED | OUTPATIENT
Start: 2018-06-27 | End: 2018-12-19 | Stop reason: SDUPTHER

## 2018-06-27 RX ORDER — LEVOTHYROXINE SODIUM 112 UG/1
1 TABLET ORAL DAILY
COMMUNITY
Start: 2015-03-26 | End: 2018-06-27 | Stop reason: SDUPTHER

## 2018-07-27 DIAGNOSIS — F33.41 RECURRENT MAJOR DEPRESSIVE DISORDER, IN PARTIAL REMISSION (HCC): ICD-10-CM

## 2018-07-27 DIAGNOSIS — J45.40 MODERATE PERSISTENT ASTHMA WITHOUT COMPLICATION: ICD-10-CM

## 2018-07-27 DIAGNOSIS — J45.909 UNCOMPLICATED ASTHMA, UNSPECIFIED ASTHMA SEVERITY, UNSPECIFIED WHETHER PERSISTENT: ICD-10-CM

## 2018-07-27 RX ORDER — FLUTICASONE FUROATE AND VILANTEROL 200; 25 UG/1; UG/1
1 POWDER RESPIRATORY (INHALATION) DAILY
Qty: 1 INHALER | Refills: 2 | Status: SHIPPED | OUTPATIENT
Start: 2018-07-27 | End: 2018-11-25 | Stop reason: SDUPTHER

## 2018-07-27 RX ORDER — VILAZODONE HYDROCHLORIDE 40 MG/1
40 TABLET ORAL DAILY
Qty: 30 TABLET | Refills: 2 | Status: SHIPPED | OUTPATIENT
Start: 2018-07-27 | End: 2018-08-27 | Stop reason: SDUPTHER

## 2018-07-27 RX ORDER — ALBUTEROL SULFATE 90 UG/1
1-2 AEROSOL, METERED RESPIRATORY (INHALATION) EVERY 4 HOURS PRN
Qty: 1 INHALER | Refills: 2 | Status: SHIPPED | OUTPATIENT
Start: 2018-07-27 | End: 2018-11-25 | Stop reason: SDUPTHER

## 2018-07-27 RX ORDER — VILAZODONE HYDROCHLORIDE 40 MG/1
TABLET ORAL
Qty: 30 TABLET | Refills: 0 | Status: SHIPPED | OUTPATIENT
Start: 2018-07-27 | End: 2018-07-27 | Stop reason: SDUPTHER

## 2018-08-19 DIAGNOSIS — J45.909 UNCOMPLICATED ASTHMA, UNSPECIFIED ASTHMA SEVERITY, UNSPECIFIED WHETHER PERSISTENT: ICD-10-CM

## 2018-08-20 RX ORDER — MONTELUKAST SODIUM 10 MG/1
TABLET ORAL
Qty: 30 TABLET | Refills: 5 | Status: SHIPPED | OUTPATIENT
Start: 2018-08-20 | End: 2019-02-28 | Stop reason: SDUPTHER

## 2018-08-27 DIAGNOSIS — F33.41 RECURRENT MAJOR DEPRESSIVE DISORDER, IN PARTIAL REMISSION (HCC): ICD-10-CM

## 2018-08-27 RX ORDER — VILAZODONE HYDROCHLORIDE 40 MG/1
40 TABLET ORAL DAILY
Qty: 30 TABLET | Refills: 3 | Status: SHIPPED | OUTPATIENT
Start: 2018-08-27 | End: 2018-12-19 | Stop reason: SDUPTHER

## 2018-11-25 DIAGNOSIS — J45.40 MODERATE PERSISTENT ASTHMA WITHOUT COMPLICATION: ICD-10-CM

## 2018-11-25 DIAGNOSIS — J45.909 UNCOMPLICATED ASTHMA, UNSPECIFIED ASTHMA SEVERITY, UNSPECIFIED WHETHER PERSISTENT: ICD-10-CM

## 2018-12-19 DIAGNOSIS — F33.41 RECURRENT MAJOR DEPRESSIVE DISORDER, IN PARTIAL REMISSION (HCC): ICD-10-CM

## 2018-12-19 DIAGNOSIS — E03.9 HYPOTHYROIDISM, UNSPECIFIED TYPE: ICD-10-CM

## 2018-12-19 RX ORDER — VILAZODONE HYDROCHLORIDE 40 MG/1
TABLET ORAL
Qty: 30 TABLET | Refills: 3 | Status: SHIPPED | OUTPATIENT
Start: 2018-12-19 | End: 2019-05-08 | Stop reason: SDUPTHER

## 2018-12-19 RX ORDER — LEVOTHYROXINE SODIUM 112 MCG
TABLET ORAL
Qty: 30 TABLET | Refills: 5 | Status: SHIPPED | OUTPATIENT
Start: 2018-12-19 | End: 2019-07-14 | Stop reason: SDUPTHER

## 2019-01-23 DIAGNOSIS — R05.9 COUGH: Primary | ICD-10-CM

## 2019-01-23 RX ORDER — GUAIFENESIN AND CODEINE PHOSPHATE 100; 10 MG/5ML; MG/5ML
5 SOLUTION ORAL 3 TIMES DAILY PRN
Qty: 120 ML | Refills: 0 | Status: SHIPPED | OUTPATIENT
Start: 2019-01-23 | End: 2019-10-04

## 2019-02-01 ENCOUNTER — TELEPHONE (OUTPATIENT)
Dept: INTERNAL MEDICINE CLINIC | Facility: CLINIC | Age: 39
End: 2019-02-01

## 2019-02-01 ENCOUNTER — HOSPITAL ENCOUNTER (OUTPATIENT)
Dept: RADIOLOGY | Facility: HOSPITAL | Age: 39
Discharge: HOME | End: 2019-02-01
Payer: COMMERCIAL

## 2019-02-01 DIAGNOSIS — R05.3 PERSISTENT COUGH FOR 3 WEEKS OR LONGER: Primary | ICD-10-CM

## 2019-02-01 DIAGNOSIS — R05.3 PERSISTENT COUGH FOR 3 WEEKS OR LONGER: ICD-10-CM

## 2019-02-01 PROCEDURE — 71046 X-RAY EXAM CHEST 2 VIEWS: CPT

## 2019-02-01 NOTE — TELEPHONE ENCOUNTER
Pt called, c/o constant cough, up at night, using rescue inhaler, not helping, asking for c-xray, will you order?

## 2019-02-28 DIAGNOSIS — J45.909 UNCOMPLICATED ASTHMA, UNSPECIFIED ASTHMA SEVERITY, UNSPECIFIED WHETHER PERSISTENT: ICD-10-CM

## 2019-02-28 RX ORDER — MONTELUKAST SODIUM 10 MG/1
TABLET ORAL
Qty: 30 TABLET | Refills: 5 | Status: SHIPPED | OUTPATIENT
Start: 2019-02-28 | End: 2019-09-16 | Stop reason: SDUPTHER

## 2019-05-08 DIAGNOSIS — F33.41 RECURRENT MAJOR DEPRESSIVE DISORDER, IN PARTIAL REMISSION (HCC): ICD-10-CM

## 2019-05-08 RX ORDER — VILAZODONE HYDROCHLORIDE 40 MG/1
TABLET ORAL
Qty: 30 TABLET | Refills: 3 | Status: SHIPPED | OUTPATIENT
Start: 2019-05-08 | End: 2019-10-04 | Stop reason: SDUPTHER

## 2019-07-14 DIAGNOSIS — E03.9 HYPOTHYROIDISM, UNSPECIFIED TYPE: ICD-10-CM

## 2019-07-15 RX ORDER — LEVOTHYROXINE SODIUM 112 UG/1
TABLET ORAL
Qty: 30 TABLET | Refills: 5 | Status: SHIPPED | OUTPATIENT
Start: 2019-07-15 | End: 2020-01-16 | Stop reason: SDUPTHER

## 2019-08-20 ENCOUNTER — TELEPHONE (OUTPATIENT)
Dept: INTERNAL MEDICINE CLINIC | Facility: CLINIC | Age: 39
End: 2019-08-20

## 2019-09-09 ENCOUNTER — OFFICE VISIT (OUTPATIENT)
Dept: SURGERY | Facility: CLINIC | Age: 39
End: 2019-09-09
Payer: COMMERCIAL

## 2019-09-09 VITALS
HEART RATE: 90 BPM | TEMPERATURE: 98.8 F | HEIGHT: 63 IN | SYSTOLIC BLOOD PRESSURE: 130 MMHG | RESPIRATION RATE: 18 BRPM | DIASTOLIC BLOOD PRESSURE: 84 MMHG | BODY MASS INDEX: 36.14 KG/M2 | WEIGHT: 204 LBS

## 2019-09-09 DIAGNOSIS — K42.9 UMBILICAL HERNIA WITHOUT OBSTRUCTION AND WITHOUT GANGRENE: Primary | ICD-10-CM

## 2019-09-09 PROCEDURE — 99202 OFFICE O/P NEW SF 15 MIN: CPT | Performed by: SPECIALIST

## 2019-09-09 RX ORDER — FLUTICASONE PROPIONATE 50 MCG
2 SPRAY, SUSPENSION (ML) NASAL DAILY
COMMUNITY
Start: 2016-03-29 | End: 2020-11-10

## 2019-09-09 RX ORDER — BACITRACIN 500 UNIT/G
OINTMENT (GRAM) TOPICAL
COMMUNITY
End: 2019-10-04

## 2019-09-09 RX ORDER — CETIRIZINE HYDROCHLORIDE 10 MG/1
10 TABLET ORAL DAILY
COMMUNITY
End: 2022-02-11 | Stop reason: HOSPADM

## 2019-09-09 RX ORDER — DIPHENHYDRAMINE HCL 25 MG
CAPSULE ORAL
COMMUNITY
End: 2022-02-11 | Stop reason: HOSPADM

## 2019-09-09 NOTE — ASSESSMENT & PLAN NOTE
The patient is a 58-year-old white female with underlying issues with seasonal allergies, who noted pain and a small lump at her umbilicus during a fit of coughing and sneezing  The patient denies any change in her bowel habits  She is having discomfort related to this small bulge and which intermittently appears with straining, and she seeks an opinion regarding the repair of this newly diagnosed umbilical hernia  On exam a small pea-sized lump is noted with cough at the umbilical ring, this reduces spontaneously  The fascial defect is small area and then the tip of the examining finger  She does have reproducible tenderness with examination  As discussed with the patient, this symptomatic umbilical hernia is not at this point dangerous, as it appears quite unlikely to be large enough to admit any bowel  However, it is likely to continue to be tender, and will over time and large  The patient is entertaining having an umbilical hernia repair, however, as she is symptomatic with seasonal allergies at this point, he it might be better to wait until the winter to pursue this  The patient appears comfortable with watching and waiting, and will return to schedule surgery for a time that is convenient for her

## 2019-09-09 NOTE — PROGRESS NOTES
Assessment/Plan:    Umbilical hernia without obstruction and without gangrene  The patient is a 24-year-old white female with underlying issues with seasonal allergies, who noted pain and a small lump at her umbilicus during a fit of coughing and sneezing  The patient denies any change in her bowel habits  She is having discomfort related to this small bulge and which intermittently appears with straining, and she seeks an opinion regarding the repair of this newly diagnosed umbilical hernia  On exam a small pea-sized lump is noted with cough at the umbilical ring, this reduces spontaneously  The fascial defect is small area and then the tip of the examining finger  She does have reproducible tenderness with examination  As discussed with the patient, this symptomatic umbilical hernia is not at this point dangerous, as it appears quite unlikely to be large enough to admit any bowel  However, it is likely to continue to be tender, and will over time and large  The patient is entertaining having an umbilical hernia repair, however, as she is symptomatic with seasonal allergies at this point, he it might be better to wait until the winter to pursue this  The patient appears comfortable with watching and waiting, and will return to schedule surgery for a time that is convenient for her  There are no diagnoses linked to this encounter  Subjective:      Patient ID: Kevin Pennington is a 35 y o  female  The patient is a 24-year-old white female with a newly diagnosed umbilical hernia, seeking an opinion regarding the timing of an elective repair  The following portions of the patient's history were reviewed and updated as appropriate: allergies, current medications, past family history, past medical history, past social history, past surgical history and problem list     Review of Systems   Constitutional: Negative for chills and fever  HENT: Positive for postnasal drip and sneezing  Negative for trouble swallowing  Eyes:        Symptoms related to seasonal allergies   Respiratory: Positive for cough  Negative for shortness of breath and wheezing  Cardiovascular: Negative for chest pain and palpitations  Gastrointestinal: Negative for abdominal distention, constipation, diarrhea and nausea  Has a diagnosis of ileocecal stricture due to Crohn's disease   Genitourinary: Negative for difficulty urinating  Musculoskeletal: Negative  Neurological: Negative  Hematological: Negative  Psychiatric/Behavioral: The patient is nervous/anxious  All other systems reviewed and are negative  Objective:      /84 (BP Location: Left arm, Patient Position: Sitting, Cuff Size: Standard)   Pulse 90   Temp 98 8 °F (37 1 °C) (Tympanic)   Resp 18   Ht 5' 3" (1 6 m)   Wt 92 5 kg (204 lb)   BMI 36 14 kg/m²          Physical Exam   Constitutional: She is oriented to person, place, and time  She appears well-developed and well-nourished  HENT:   Head: Normocephalic and atraumatic  Eyes: Pupils are equal, round, and reactive to light  Left eye exhibits no discharge  No scleral icterus  Neck: Neck supple  Cardiovascular: Normal rate, regular rhythm and normal heart sounds  Pulmonary/Chest: Effort normal  She has no wheezes  She has no rales  Abdominal: Soft  She exhibits no mass  Tenderness: Tender at umbilical ring  A hernia (Small umbilical hernia noted with cough) is present  Genitourinary:   Genitourinary Comments: Deferred   Musculoskeletal: She exhibits no edema  Neurological: She is alert and oriented to person, place, and time  Skin: Skin is warm and dry  Psychiatric: She has a normal mood and affect

## 2019-09-16 DIAGNOSIS — J45.909 UNCOMPLICATED ASTHMA, UNSPECIFIED ASTHMA SEVERITY, UNSPECIFIED WHETHER PERSISTENT: ICD-10-CM

## 2019-09-16 RX ORDER — MONTELUKAST SODIUM 10 MG/1
TABLET ORAL
Qty: 30 TABLET | Refills: 5 | Status: SHIPPED | OUTPATIENT
Start: 2019-09-16 | End: 2020-02-26 | Stop reason: SDUPTHER

## 2019-10-04 ENCOUNTER — OFFICE VISIT (OUTPATIENT)
Dept: INTERNAL MEDICINE CLINIC | Facility: CLINIC | Age: 39
End: 2019-10-04
Payer: COMMERCIAL

## 2019-10-04 VITALS
DIASTOLIC BLOOD PRESSURE: 80 MMHG | BODY MASS INDEX: 36.75 KG/M2 | HEIGHT: 63 IN | HEART RATE: 96 BPM | SYSTOLIC BLOOD PRESSURE: 130 MMHG | OXYGEN SATURATION: 97 % | WEIGHT: 207.44 LBS | TEMPERATURE: 98.4 F

## 2019-10-04 DIAGNOSIS — F33.41 RECURRENT MAJOR DEPRESSIVE DISORDER, IN PARTIAL REMISSION (HCC): Primary | ICD-10-CM

## 2019-10-04 DIAGNOSIS — Z13.220 SCREENING FOR HYPERLIPIDEMIA: ICD-10-CM

## 2019-10-04 DIAGNOSIS — J45.40 MODERATE PERSISTENT ASTHMA WITHOUT COMPLICATION: ICD-10-CM

## 2019-10-04 DIAGNOSIS — E55.9 VITAMIN D DEFICIENCY: ICD-10-CM

## 2019-10-04 DIAGNOSIS — E03.9 HYPOTHYROIDISM, UNSPECIFIED TYPE: ICD-10-CM

## 2019-10-04 DIAGNOSIS — Z13.1 SCREENING FOR DIABETES MELLITUS: ICD-10-CM

## 2019-10-04 DIAGNOSIS — Z12.31 ENCOUNTER FOR SCREENING MAMMOGRAM FOR MALIGNANT NEOPLASM OF BREAST: ICD-10-CM

## 2019-10-04 DIAGNOSIS — Z13.0 SCREENING FOR DEFICIENCY ANEMIA: ICD-10-CM

## 2019-10-04 DIAGNOSIS — F41.1 GAD (GENERALIZED ANXIETY DISORDER): ICD-10-CM

## 2019-10-04 PROCEDURE — 99214 OFFICE O/P EST MOD 30 MIN: CPT | Performed by: PHYSICIAN ASSISTANT

## 2019-10-04 RX ORDER — VILAZODONE HYDROCHLORIDE 40 MG/1
40 TABLET ORAL DAILY
Qty: 30 TABLET | Refills: 3 | Status: SHIPPED | OUTPATIENT
Start: 2019-10-04 | End: 2020-03-24 | Stop reason: SDUPTHER

## 2019-10-04 RX ORDER — CLONAZEPAM 0.5 MG/1
0.5 TABLET ORAL 2 TIMES DAILY
Qty: 60 TABLET | Refills: 0 | Status: SHIPPED | OUTPATIENT
Start: 2019-10-04 | End: 2020-01-16 | Stop reason: SDUPTHER

## 2019-10-04 RX ORDER — BUDESONIDE AND FORMOTEROL FUMARATE DIHYDRATE 160; 4.5 UG/1; UG/1
2 AEROSOL RESPIRATORY (INHALATION) 2 TIMES DAILY
Qty: 1 INHALER | Refills: 2 | Status: SHIPPED | OUTPATIENT
Start: 2019-10-04 | End: 2020-04-03 | Stop reason: SDUPTHER

## 2019-10-04 NOTE — PROGRESS NOTES
Assessment/Plan:  Problem List Items Addressed This Visit        Respiratory    Moderate persistent asthma without complication     Will try symbicort in place of breo as this is likely more cost effective  Relevant Medications    budesonide-formoterol (SYMBICORT) 160-4 5 mcg/act inhaler       Other    Recurrent major depressive disorder, in partial remission (HCC) - Primary    Relevant Medications    vilazodone (VIIBRYD) 40 mg tablet    JOSE (generalized anxiety disorder)     Start low dose klonopin  Patient was informed that this medicine has an abuse potential and may be habit forming  We discussed that this may also cause drowsiness  The medication should not be combined with alcohol  The patient was informed not to operate machinery while taking this medication and should not drive until they know how they respond to the medication  The patient verbalized understanding of this  Relevant Medications    vilazodone (VIIBRYD) 40 mg tablet    clonazePAM (KlonoPIN) 0 5 mg tablet      Other Visit Diagnoses     Vitamin D deficiency        Relevant Orders    Vitamin D 25 hydroxy    Hypothyroidism, unspecified type        Relevant Orders    TSH, 3rd generation    Screening for hyperlipidemia        Relevant Orders    Lipid Panel with Direct LDL reflex    Screening for diabetes mellitus        Relevant Orders    Comprehensive metabolic panel    Screening for deficiency anemia        Relevant Orders    CBC and differential    Encounter for screening mammogram for malignant neoplasm of breast        Relevant Orders    Mammo screening bilateral w 3d & cad           Diagnoses and all orders for this visit:    Recurrent major depressive disorder, in partial remission (HCC)  -     vilazodone (VIIBRYD) 40 mg tablet;  Take 1 tablet (40 mg total) by mouth daily    Vitamin D deficiency  -     Vitamin D 25 hydroxy    Hypothyroidism, unspecified type  -     TSH, 3rd generation    Screening for hyperlipidemia  - Lipid Panel with Direct LDL reflex    Screening for diabetes mellitus  -     Comprehensive metabolic panel    Screening for deficiency anemia  -     CBC and differential    Encounter for screening mammogram for malignant neoplasm of breast  -     Mammo screening bilateral w 3d & cad; Future    JOSE (generalized anxiety disorder)  -     clonazePAM (KlonoPIN) 0 5 mg tablet; Take 1 tablet (0 5 mg total) by mouth 2 (two) times a day    Moderate persistent asthma without complication  -     budesonide-formoterol (SYMBICORT) 160-4 5 mcg/act inhaler; Inhale 2 puffs 2 (two) times a day Rinse mouth after use  Other orders  -     Cancel: Lipid Panel with Direct LDL reflex        JOSE (generalized anxiety disorder)  Start low dose klonopin  Patient was informed that this medicine has an abuse potential and may be habit forming  We discussed that this may also cause drowsiness  The medication should not be combined with alcohol  The patient was informed not to operate machinery while taking this medication and should not drive until they know how they respond to the medication  The patient verbalized understanding of this  Moderate persistent asthma without complication  Will try symbicort in place of breo as this is likely more cost effective  Subjective:      Patient ID: Vidal Garcia is a 35 y o  female  Pt presents for routine visit  She is doing well overall  She notes a slight worsening of her asthma symptoms  She had been on breo but this was too expensive so she has not had any maintenance inhalers  She was on symbicort previously with good success  She is noting increased anxiety related to stressors at work  She previously tried buspar and hydroxyzine without relief  I am agreeable to trying low dose benzodiazepine  She does have a hx of alcohol abuse  We discussed the risk for switching addictions  We discussed that these cannot be used with alcohol  She has been sober nearly 1 year     She continues to feel Viibryd is beneficial for her depressive symptoms  She is due for labs and mammogram        The following portions of the patient's history were reviewed and updated as appropriate:   She has no past medical history on file  ,  does not have any pertinent problems on file  ,   has a past surgical history that includes No past surgeries  ,  family history includes Colon cancer in her family, father, and mother; Hypertension in her father and mother; Kidney cancer in her family, father, and mother  ,   reports that she has quit smoking  She has never used smokeless tobacco  She reports that she does not drink alcohol  Her drug history is not on file  ,  is allergic to penicillins     Current Outpatient Medications   Medication Sig Dispense Refill    cetirizine (ZyrTEC) 10 mg tablet Take 10 mg by mouth daily      diphenhydrAMINE (BENADRYL ALLERGY) 25 mg capsule Take by mouth      fluticasone (FLONASE) 50 mcg/act nasal spray 2 sprays into each nostril daily      levothyroxine 112 mcg tablet take 1 tablet by mouth once daily 30 tablet 5    montelukast (SINGULAIR) 10 mg tablet take 1 tablet by mouth once daily 30 tablet 5    VENTOLIN  (90 Base) MCG/ACT inhaler INHALE 1-2 PUFFS EVERY 4 HOURS AS NEEDED FOR WHEEZING 18 g 2    vilazodone (VIIBRYD) 40 mg tablet Take 1 tablet (40 mg total) by mouth daily 30 tablet 3    budesonide-formoterol (SYMBICORT) 160-4 5 mcg/act inhaler Inhale 2 puffs 2 (two) times a day Rinse mouth after use  1 Inhaler 2    clonazePAM (KlonoPIN) 0 5 mg tablet Take 1 tablet (0 5 mg total) by mouth 2 (two) times a day 60 tablet 0     No current facility-administered medications for this visit  Review of Systems   Constitutional: Negative for chills and fever  HENT: Negative for congestion, ear pain, hearing loss, postnasal drip, rhinorrhea, sinus pressure, sinus pain, sore throat and trouble swallowing  Eyes: Negative for pain and visual disturbance     Respiratory: Negative for cough, chest tightness, shortness of breath and wheezing  Cardiovascular: Negative  Negative for chest pain, palpitations and leg swelling  Gastrointestinal: Negative for abdominal pain, blood in stool, constipation, diarrhea, nausea and vomiting  Endocrine: Negative for cold intolerance, heat intolerance, polydipsia, polyphagia and polyuria  Genitourinary: Negative for difficulty urinating, dysuria, flank pain and urgency  Musculoskeletal: Negative for arthralgias, back pain, gait problem and myalgias  Skin: Negative for rash  Allergic/Immunologic: Negative  Neurological: Negative for dizziness, weakness, light-headedness and headaches  Hematological: Negative  Psychiatric/Behavioral: Negative for behavioral problems, dysphoric mood and sleep disturbance  The patient is not nervous/anxious  PHQ-9 Depression Screening    PHQ-9:    Frequency of the following problems over the past two weeks:       Little interest or pleasure in doing things:  0 - not at all  Feeling down, depressed, or hopeless:  0 - not at all  PHQ-2 Score:  0          Objective:  Vitals:    10/04/19 1548   BP: 130/80   Pulse: 96   Temp: 98 4 °F (36 9 °C)   SpO2: 97%   Weight: 94 1 kg (207 lb 7 oz)   Height: 5' 3" (1 6 m)     Body mass index is 36 75 kg/m²  Physical Exam   Constitutional: She is oriented to person, place, and time  She appears well-developed and well-nourished  No distress  HENT:   Head: Normocephalic and atraumatic  Right Ear: External ear normal    Left Ear: External ear normal    Nose: Nose normal    Mouth/Throat: Oropharynx is clear and moist  No oropharyngeal exudate  Eyes: Pupils are equal, round, and reactive to light  Conjunctivae and EOM are normal  Right eye exhibits no discharge  Left eye exhibits no discharge  No scleral icterus  Neck: Normal range of motion  Neck supple  No thyromegaly present     Cardiovascular: Normal rate, regular rhythm and normal heart sounds  Exam reveals no gallop and no friction rub  No murmur heard  Pulmonary/Chest: Effort normal and breath sounds normal  No respiratory distress  She has no wheezes  She has no rales  Abdominal: Soft  Bowel sounds are normal  She exhibits no distension  There is no tenderness  Musculoskeletal: Normal range of motion  She exhibits no edema, tenderness or deformity  Neurological: She is alert and oriented to person, place, and time  No cranial nerve deficit  Skin: Skin is warm and dry  She is not diaphoretic  Psychiatric: She has a normal mood and affect  Her behavior is normal  Judgment and thought content normal        BMI Counseling: Body mass index is 36 75 kg/m²  The BMI is above normal  Nutrition recommendations include reducing portion sizes

## 2019-10-04 NOTE — ASSESSMENT & PLAN NOTE
Start low dose klonopin  Patient was informed that this medicine has an abuse potential and may be habit forming  We discussed that this may also cause drowsiness  The medication should not be combined with alcohol  The patient was informed not to operate machinery while taking this medication and should not drive until they know how they respond to the medication  The patient verbalized understanding of this

## 2019-10-23 ENCOUNTER — HOSPITAL ENCOUNTER (EMERGENCY)
Facility: HOSPITAL | Age: 39
Discharge: HOME | End: 2019-10-23
Attending: EMERGENCY MEDICINE
Payer: COMMERCIAL

## 2019-10-23 VITALS
OXYGEN SATURATION: 97 % | RESPIRATION RATE: 18 BRPM | BODY MASS INDEX: 35.77 KG/M2 | HEART RATE: 86 BPM | DIASTOLIC BLOOD PRESSURE: 82 MMHG | WEIGHT: 201.94 LBS | SYSTOLIC BLOOD PRESSURE: 133 MMHG

## 2019-10-23 DIAGNOSIS — R19.7 DIARRHEA: ICD-10-CM

## 2019-10-23 DIAGNOSIS — R11.2 NAUSEA AND VOMITING: Primary | ICD-10-CM

## 2019-10-23 DIAGNOSIS — R25.2 MUSCLE CRAMPING: ICD-10-CM

## 2019-10-23 DIAGNOSIS — R79.89 ELEVATED TSH: ICD-10-CM

## 2019-10-23 LAB
ANION GAP SERPL CALCULATED.3IONS-SCNC: 9 MMOL/L (ref 4–13)
ATRIAL RATE: 82 BPM
BASOPHILS # BLD AUTO: 0.05 THOUSANDS/ΜL (ref 0–0.1)
BASOPHILS NFR BLD AUTO: 1 % (ref 0–1)
BUN SERPL-MCNC: 10 MG/DL (ref 5–25)
CA-I BLD-SCNC: 1.11 MMOL/L (ref 1.12–1.32)
CALCIUM SERPL-MCNC: 8.9 MG/DL (ref 8.3–10.1)
CHLORIDE SERPL-SCNC: 96 MMOL/L (ref 100–108)
CO2 SERPL-SCNC: 29 MMOL/L (ref 21–32)
CREAT SERPL-MCNC: 0.89 MG/DL (ref 0.6–1.3)
EOSINOPHIL # BLD AUTO: 0.16 THOUSAND/ΜL (ref 0–0.61)
EOSINOPHIL NFR BLD AUTO: 2 % (ref 0–6)
ERYTHROCYTE [DISTWIDTH] IN BLOOD BY AUTOMATED COUNT: 11.4 % (ref 11.6–15.1)
EXT PREG TEST URINE: NEGATIVE
EXT. CONTROL ED NAV: NORMAL
GFR SERPL CREATININE-BSD FRML MDRD: 81 ML/MIN/1.73SQ M
GLUCOSE SERPL-MCNC: 109 MG/DL (ref 65–140)
HCT VFR BLD AUTO: 39.8 % (ref 34.8–46.1)
HGB BLD-MCNC: 13.9 G/DL (ref 11.5–15.4)
IMM GRANULOCYTES # BLD AUTO: 0.03 THOUSAND/UL (ref 0–0.2)
IMM GRANULOCYTES NFR BLD AUTO: 0 % (ref 0–2)
LYMPHOCYTES # BLD AUTO: 1.31 THOUSANDS/ΜL (ref 0.6–4.47)
LYMPHOCYTES NFR BLD AUTO: 17 % (ref 14–44)
MAGNESIUM SERPL-MCNC: 1.5 MG/DL (ref 1.6–2.6)
MCH RBC QN AUTO: 36.4 PG (ref 26.8–34.3)
MCHC RBC AUTO-ENTMCNC: 34.9 G/DL (ref 31.4–37.4)
MCV RBC AUTO: 104 FL (ref 82–98)
MONOCYTES # BLD AUTO: 0.46 THOUSAND/ΜL (ref 0.17–1.22)
MONOCYTES NFR BLD AUTO: 6 % (ref 4–12)
NEUTROPHILS # BLD AUTO: 5.79 THOUSANDS/ΜL (ref 1.85–7.62)
NEUTS SEG NFR BLD AUTO: 74 % (ref 43–75)
NRBC BLD AUTO-RTO: 0 /100 WBCS
P AXIS: 38 DEGREES
PLATELET # BLD AUTO: 267 THOUSANDS/UL (ref 149–390)
PMV BLD AUTO: 8.6 FL (ref 8.9–12.7)
POTASSIUM SERPL-SCNC: 3.5 MMOL/L (ref 3.5–5.3)
PR INTERVAL: 142 MS
QRS AXIS: 4 DEGREES
QRSD INTERVAL: 96 MS
QT INTERVAL: 430 MS
QTC INTERVAL: 502 MS
RBC # BLD AUTO: 3.82 MILLION/UL (ref 3.81–5.12)
SODIUM SERPL-SCNC: 134 MMOL/L (ref 136–145)
T WAVE AXIS: 22 DEGREES
T4 FREE SERPL-MCNC: 1.07 NG/DL (ref 0.76–1.46)
TSH SERPL DL<=0.05 MIU/L-ACNC: 5.67 UIU/ML (ref 0.36–3.74)
VENTRICULAR RATE: 82 BPM
WBC # BLD AUTO: 7.8 THOUSAND/UL (ref 4.31–10.16)

## 2019-10-23 PROCEDURE — 80048 BASIC METABOLIC PNL TOTAL CA: CPT | Performed by: EMERGENCY MEDICINE

## 2019-10-23 PROCEDURE — 82330 ASSAY OF CALCIUM: CPT | Performed by: EMERGENCY MEDICINE

## 2019-10-23 PROCEDURE — 93010 ELECTROCARDIOGRAM REPORT: CPT | Performed by: INTERNAL MEDICINE

## 2019-10-23 PROCEDURE — 99282 EMERGENCY DEPT VISIT SF MDM: CPT | Performed by: EMERGENCY MEDICINE

## 2019-10-23 PROCEDURE — 85025 COMPLETE CBC W/AUTO DIFF WBC: CPT | Performed by: EMERGENCY MEDICINE

## 2019-10-23 PROCEDURE — 84439 ASSAY OF FREE THYROXINE: CPT | Performed by: EMERGENCY MEDICINE

## 2019-10-23 PROCEDURE — 36415 COLL VENOUS BLD VENIPUNCTURE: CPT | Performed by: EMERGENCY MEDICINE

## 2019-10-23 PROCEDURE — 96361 HYDRATE IV INFUSION ADD-ON: CPT

## 2019-10-23 PROCEDURE — 96360 HYDRATION IV INFUSION INIT: CPT

## 2019-10-23 PROCEDURE — 83735 ASSAY OF MAGNESIUM: CPT | Performed by: EMERGENCY MEDICINE

## 2019-10-23 PROCEDURE — 81025 URINE PREGNANCY TEST: CPT | Performed by: EMERGENCY MEDICINE

## 2019-10-23 PROCEDURE — 93005 ELECTROCARDIOGRAM TRACING: CPT

## 2019-10-23 PROCEDURE — 99284 EMERGENCY DEPT VISIT MOD MDM: CPT

## 2019-10-23 PROCEDURE — 84443 ASSAY THYROID STIM HORMONE: CPT | Performed by: EMERGENCY MEDICINE

## 2019-10-23 RX ADMIN — SODIUM CHLORIDE 1000 ML: 0.9 INJECTION, SOLUTION INTRAVENOUS at 14:57

## 2019-10-23 NOTE — ED NOTES
Patient states that her jaw still hurts but believes that her abdominal cramping was due to dehydration  Patient states that she feels much better with the liter of fluids  Provider notified       Claudis Moritz, RN  10/23/19 7675

## 2019-10-23 NOTE — ED NOTES
Patient encouraged to provide urine  Urine specimen cup at bedside  Fluids running        Nick Ruiz RN  10/23/19 9307

## 2019-10-23 NOTE — ED PROVIDER NOTES
History  Chief Complaint   Patient presents with    Jaw Pain     pt reports having the stomach flu over the weekend consisting of nvd  pt  now c/o having lock jaw and cramping throughout her body (neck and stomach)     This is a 50-year-old female with a history of asthma who presents with cramping  The patient states that on Monday, she started experiencing profuse nonbloody, nonbilious vomiting throughout the day  She did have 1 episode yesterday  She also had 2 episodes of nonbloody, nonmelanotic diarrhea this morning  The patient works as a nurse anesthetist but denies any sick contacts with patient's with nausea/vomiting and diarrhea  The patient states that she was attempting to drink plenty of fluids and Gatorade, but was having difficulty due to the vomiting  Today, she noticed intermittent cramping in her jaw, tongue, and muscles throughout her body  She presents the emergency department for evaluation  Denies fever/chills, lightheadedness/dizziness, numbness/weakness, headache, change in vision, URI symptoms, neck pain, chest pain, palpitations, shortness of breath, cough, back pain, flank pain, abdominal pain,  hematochezia, melena, dysuria, hematuria, abnormal vaginal discharge/bleeding  Prior to Admission Medications   Prescriptions Last Dose Informant Patient Reported? Taking? VENTOLIN  (90 Base) MCG/ACT inhaler  Self No Yes   Sig: INHALE 1-2 PUFFS EVERY 4 HOURS AS NEEDED FOR WHEEZING   budesonide-formoterol (SYMBICORT) 160-4 5 mcg/act inhaler   No Yes   Sig: Inhale 2 puffs 2 (two) times a day Rinse mouth after use     cetirizine (ZyrTEC) 10 mg tablet  Self Yes Yes   Sig: Take 10 mg by mouth daily   clonazePAM (KlonoPIN) 0 5 mg tablet   No Yes   Sig: Take 1 tablet (0 5 mg total) by mouth 2 (two) times a day   diphenhydrAMINE (BENADRYL ALLERGY) 25 mg capsule   Yes Yes   Sig: Take by mouth   fluticasone (FLONASE) 50 mcg/act nasal spray   Yes Yes   Si sprays into each nostril daily   levothyroxine 112 mcg tablet  Self No Yes   Sig: take 1 tablet by mouth once daily   montelukast (SINGULAIR) 10 mg tablet   No Yes   Sig: take 1 tablet by mouth once daily   vilazodone (VIIBRYD) 40 mg tablet   No Yes   Sig: Take 1 tablet (40 mg total) by mouth daily      Facility-Administered Medications: None       Past Medical History:   Diagnosis Date    Anxiety     Asthma     Hypothyroidism        Past Surgical History:   Procedure Laterality Date    NO PAST SURGERIES         Family History   Problem Relation Age of Onset    Colon cancer Mother     Kidney cancer Mother     Hypertension Mother     Colon cancer Father     Kidney cancer Father     Hypertension Father     Colon cancer Family     Kidney cancer Family      I have reviewed and agree with the history as documented  Social History     Tobacco Use    Smoking status: Former Smoker    Smokeless tobacco: Never Used   Substance Use Topics    Alcohol use: Never     Frequency: Never     Comment: No alcohol use - As per Allscripts     Drug use: Not on file        Review of Systems   Constitutional: Negative for chills and fever  HENT: Negative for rhinorrhea, sore throat and trouble swallowing  Eyes: Negative for photophobia and visual disturbance  Respiratory: Negative for cough, chest tightness and shortness of breath  Cardiovascular: Negative for chest pain, palpitations and leg swelling  Gastrointestinal: Positive for diarrhea, nausea and vomiting  Negative for abdominal pain and blood in stool  Endocrine: Negative for polyuria  Genitourinary: Negative for dysuria, flank pain, hematuria, vaginal bleeding and vaginal discharge  Musculoskeletal: Negative for back pain and neck pain  Cramping   Skin: Negative for color change and rash  Allergic/Immunologic: Negative for immunocompromised state  Neurological: Negative for dizziness, weakness, light-headedness, numbness and headaches     All other systems reviewed and are negative  Physical Exam  Physical Exam   Constitutional: Vital signs are normal  She appears well-developed  She is cooperative  No distress  HENT:   Mouth/Throat: Uvula is midline, oropharynx is clear and moist and mucous membranes are normal    Eyes: Pupils are equal, round, and reactive to light  Conjunctivae and EOM are normal    Neck: Trachea normal  No thyroid mass and no thyromegaly present  Cardiovascular: Normal rate, regular rhythm, normal heart sounds, intact distal pulses and normal pulses  No murmur heard  Pulmonary/Chest: Effort normal and breath sounds normal    Abdominal: Soft  Normal appearance and bowel sounds are normal  There is no tenderness  There is no rebound, no guarding and no CVA tenderness  Neurological: She is alert  Skin: Skin is warm, dry and intact  Psychiatric: She has a normal mood and affect   Her speech is normal and behavior is normal  Thought content normal        Vital Signs  ED Triage Vitals [10/23/19 1427]   Temp Pulse Respirations Blood Pressure SpO2   -- 93 16 129/84 97 %      Temp src Heart Rate Source Patient Position - Orthostatic VS BP Location FiO2 (%)   -- Monitor Sitting Right arm --      Pain Score       --           Vitals:    10/23/19 1530 10/23/19 1545 10/23/19 1600 10/23/19 1657   BP: 130/86 147/97 140/90 133/82   Pulse: 91 90 87 86   Patient Position - Orthostatic VS: Sitting Sitting Sitting Sitting         Visual Acuity      ED Medications  Medications   sodium chloride 0 9 % bolus 1,000 mL (0 mL Intravenous Stopped 10/23/19 1656)       Diagnostic Studies  Results Reviewed     Procedure Component Value Units Date/Time    POCT pregnancy, urine [[id number]]  (Normal) Resulted:  10/23/19 1545    Lab Status:  Final result Updated:  10/23/19 1545     EXT PREG TEST UR (Ref: Negative) negative     Control valid    Basic metabolic panel [[id number]]  (Abnormal) Collected:  10/23/19 1449    Lab Status:  Final result Specimen:  Blood from Arm, Right Updated:  10/23/19 1521     Sodium 134 mmol/L      Potassium 3 5 mmol/L      Chloride 96 mmol/L      CO2 29 mmol/L      ANION GAP 9 mmol/L      BUN 10 mg/dL      Creatinine 0 89 mg/dL      Glucose 109 mg/dL      Calcium 8 9 mg/dL      eGFR 81 ml/min/1 73sq m     Narrative:       [place] guidelines for Chronic Kidney Disease (CKD):     Stage 1 with normal or high GFR (GFR > 90 mL/min/1 73 square meters)    Stage 2 Mild CKD (GFR = 60-89 mL/min/1 73 square meters)    Stage 3A Moderate CKD (GFR = 45-59 mL/min/1 73 square meters)    Stage 3B Moderate CKD (GFR = 30-44 mL/min/1 73 square meters)    Stage 4 Severe CKD (GFR = 15-29 mL/min/1 73 square meters)    Stage 5 End Stage CKD (GFR <15 mL/min/1 73 square meters)  Note: GFR calculation is accurate only with a steady state creatinine    Magnesium [[id number]]  (Abnormal) Collected:  10/23/19 1449    Lab Status:  Final result Specimen:  Blood from Arm, Right Updated:  10/23/19 1521     Magnesium 1 5 mg/dL     TSH, 3rd generation with Free T4 reflex [[id number]]  (Abnormal) Collected:  10/23/19 1449    Lab Status:  Final result Specimen:  Blood from Arm, Right Updated:  10/23/19 1521     TSH 3RD GENERATON 5 671 uIU/mL     Narrative:       Patients undergoing fluorescein dye angiography may retain small amounts of fluorescein in the body for 48-72 hours post procedure  Samples containing fluorescein can produce falsely depressed TSH values  If the patient had this procedure,a specimen should be resubmitted post fluorescein clearance  T4, free [id number] Collected:  10/23/19 1449    Lab Status:   In process Specimen:  Blood from Arm, Right Updated:  10/23/19 1521    CBC and differential [[id number]]  (Abnormal) Collected:  10/23/19 1449    Lab Status:  Final result Specimen:  Blood from Arm, Right Updated:  10/23/19 1456     WBC 7 80 Thousand/uL      RBC 3 82 Million/uL      Hemoglobin 13 9 g/dL      Hematocrit 39 8 %      MCV 104 fL      MCH 36 4 pg      MCHC 34 9 g/dL      RDW 11 4 %      MPV 8 6 fL      Platelets 494 Thousands/uL      nRBC 0 /100 WBCs      Neutrophils Relative 74 %      Immat GRANS % 0 %      Lymphocytes Relative 17 %      Monocytes Relative 6 %      Eosinophils Relative 2 %      Basophils Relative 1 %      Neutrophils Absolute 5 79 Thousands/µL      Immature Grans Absolute 0 03 Thousand/uL      Lymphocytes Absolute 1 31 Thousands/µL      Monocytes Absolute 0 46 Thousand/µL      Eosinophils Absolute 0 16 Thousand/µL      Basophils Absolute 0 05 Thousands/µL     Calcium, ionized [[id number]] Collected:  10/23/19 1449    Lab Status: In process Specimen:  Blood from Arm, Right Updated:  10/23/19 1452                 No orders to display              Procedures  ECG 12 Lead Documentation Only  Date/Time: 10/23/2019 3:22 PM  Performed by: Alka Darnell MD  Authorized by: Alka Darnell MD     ECG reviewed by me, the ED Provider: yes    Patient location:  ED  Previous ECG:     Previous ECG:  Compared to current    Comparison ECG info:  12/12/15    Similarity:  Changes noted    Comparison to cardiac monitor: Yes    Interpretation:     Interpretation: abnormal    Rate:     ECG rate:  82    ECG rate assessment: normal    Rhythm:     Rhythm: sinus rhythm    Ectopy:     Ectopy: none    QRS:     QRS axis:  Normal    QRS intervals:  Normal  Conduction:     Conduction: normal    ST segments:     ST segments:  Normal  T waves:     T waves: normal    Other findings:     Other findings: prolonged qTc interval    Comments:      Axis normalized  QT still prolonged as seen in previous  ED Course                               MDM  Number of Diagnoses or Management Options  Diagnosis management comments: This is a well-appearing 44-year-old female presents with cramping after episodes of nausea/vomiting and diarrhea  Plan to replete fluids  Check electrolytes  EKG  Urine pregnancy    Disposition pending results  Disposition  Final diagnoses:   Nausea and vomiting   Diarrhea   Elevated TSH   Muscle cramping     Time reflects when diagnosis was documented in both MDM as applicable and the Disposition within this note     Time User Action Codes Description Comment    10/23/2019  4:35 PM Behzadramsey Davis P Add [R11 2] Nausea and vomiting     10/23/2019  4:35 PM Iveth Soulier Add [R19 7] Diarrhea     10/23/2019  4:35 PM Iveth Soulier Add [R79 89] Elevated TSH     10/23/2019  4:36 PM Iveth Soulier Add [R25 2] Muscle cramping       ED Disposition     ED Disposition Condition Date/Time Comment    Discharge Stable Wed Oct 23, 2019  4:35 PM Rosy Albrecht discharge to home/self care              Follow-up Information     Follow up With Specialties Details Why Contact Info Additional Information    Joss Mazariegos DO Internal Medicine Schedule an appointment as soon as possible for a visit  for follow up of tsh [address] [facility] [address] [place] [address] Emergency Department Emergency Medicine Go to  If symptoms worsen [facility] [address] [phone] [place] [facility], [address], [place], [address] [facility], [address]          Discharge Medication List as of 10/23/2019  4:36 PM      CONTINUE these medications which have NOT CHANGED    Details   budesonide-formoterol (SYMBICORT) 160-4 5 mcg/act inhaler Inhale 2 puffs 2 (two) times a day Rinse mouth after use , Starting Fri 10/4/2019, Normal      cetirizine (ZyrTEC) 10 mg tablet Take 10 mg by mouth daily, Historical Med      clonazePAM (KlonoPIN) 0 5 mg tablet Take 1 tablet (0 5 mg total) by mouth 2 (two) times a day, Starting Fri 10/4/2019, Normal      diphenhydrAMINE (BENADRYL ALLERGY) 25 mg capsule Take by mouth, Historical Med      fluticasone (FLONASE) 50 mcg/act nasal spray 2 sprays into each nostril daily, Starting Tue 3/29/2016, Historical Med      levothyroxine 112 mcg tablet take 1 tablet by mouth once daily, Normal      montelukast (SINGULAIR) 10 mg tablet take 1 tablet by mouth once daily, Normal      VENTOLIN  (90 Base) MCG/ACT inhaler INHALE 1-2 PUFFS EVERY 4 HOURS AS NEEDED FOR WHEEZING, Normal      vilazodone (VIIBRYD) 40 mg tablet Take 1 tablet (40 mg total) by mouth daily, Starting Fri 10/4/2019, Normal           No discharge procedures on file      ED Provider  Electronically Signed by           Coni Bray MD  10/23/19 8647

## 2020-01-13 ENCOUNTER — HOSPITAL ENCOUNTER (EMERGENCY)
Facility: HOSPITAL | Age: 40
Discharge: HOME | End: 2020-01-13
Attending: EMERGENCY MEDICINE
Payer: COMMERCIAL

## 2020-01-13 VITALS
HEART RATE: 98 BPM | DIASTOLIC BLOOD PRESSURE: 94 MMHG | BODY MASS INDEX: 34.72 KG/M2 | RESPIRATION RATE: 18 BRPM | TEMPERATURE: 97.4 F | SYSTOLIC BLOOD PRESSURE: 161 MMHG | OXYGEN SATURATION: 95 % | WEIGHT: 196 LBS

## 2020-01-13 DIAGNOSIS — S61.451A DOG BITE OF RIGHT HAND, INITIAL ENCOUNTER: ICD-10-CM

## 2020-01-13 DIAGNOSIS — S16.1XXA STRAIN OF NECK MUSCLE, INITIAL ENCOUNTER: Primary | ICD-10-CM
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
## 2020-01-13 PROCEDURE — 99283 EMERGENCY DEPT VISIT LOW MDM: CPT

## 2020-01-13 PROCEDURE — 99284 EMERGENCY DEPT VISIT MOD MDM: CPT | Performed by: PHYSICIAN ASSISTANT

## 2020-01-13 RX ORDER — CYCLOBENZAPRINE HCL 10 MG
10 TABLET ORAL 2 TIMES DAILY PRN
Qty: 10 TABLET | Refills: 0 | Status: SHIPPED | OUTPATIENT
Start: 2020-01-13 | End: 2020-07-13

## 2020-01-13 RX ORDER — NAPROXEN 500 MG/1
500 TABLET ORAL 2 TIMES DAILY WITH MEALS
Qty: 14 TABLET | Refills: 0 | Status: SHIPPED | OUTPATIENT
Start: 2020-01-13 | End: 2020-07-13

## 2020-01-13 NOTE — ED PROVIDER NOTES
History  Chief Complaint   Patient presents with    Dog Bite    Neck Pain     DF is a 34 y/o female with PMH significant for anxiety who presents to the emergency department for complaint of neck pain and dog bite occurring a few days ago  Patient states she was outside walking when she noticed a dog that was on least and loose, she when out to pet the dog and the dog bit her on the right thumb  She states that when the dog bit her she suddenly turned her head to the right and experienced pain  She states that the dog's owner then came out of the house and began yelling at her and threatening her for touching his dogs  She states that she has been having increased anxiety since this incident  She has not taken any over-the-counter medications for pain  She has applied warm compresses to the neck area without relief  She denies other trauma or injury  Patient states she was bit by a dog in the past when she was a child however does not know if she received the rabies vaccination series  She states that the owner told her he believes the dog has been vaccinated against rabies  She states that the police are scheduled to follow-up with the owner to definitively find out about the dog's vaccination status  She admits to accompanying nausea  She denies chest pain, shortness of breath, abdominal pain, lightheadedness/dizziness, headache, weakness, fatigue, fever, chills, neck stiffness, back pain  She denies erythema, edema, or pain around bite wound  Prior to Admission Medications   Prescriptions Last Dose Informant Patient Reported? Taking? VENTOLIN  (90 Base) MCG/ACT inhaler  Self No No   Sig: INHALE 1-2 PUFFS EVERY 4 HOURS AS NEEDED FOR WHEEZING   budesonide-formoterol (SYMBICORT) 160-4 5 mcg/act inhaler   No No   Sig: Inhale 2 puffs 2 (two) times a day Rinse mouth after use     cetirizine (ZyrTEC) 10 mg tablet  Self Yes No   Sig: Take 10 mg by mouth daily   clonazePAM (KlonoPIN) 0 5 mg tablet More than a month at Unknown time  No No   Sig: Take 1 tablet (0 5 mg total) by mouth 2 (two) times a day   diphenhydrAMINE (BENADRYL ALLERGY) 25 mg capsule   Yes No   Sig: Take by mouth   fluticasone (FLONASE) 50 mcg/act nasal spray   Yes No   Si sprays into each nostril daily   levothyroxine 112 mcg tablet  Self No No   Sig: take 1 tablet by mouth once daily   montelukast (SINGULAIR) 10 mg tablet   No No   Sig: take 1 tablet by mouth once daily   vilazodone (VIIBRYD) 40 mg tablet   No No   Sig: Take 1 tablet (40 mg total) by mouth daily      Facility-Administered Medications: None       Past Medical History:   Diagnosis Date    Anxiety     Asthma     Hypothyroidism        Past Surgical History:   Procedure Laterality Date    NO PAST SURGERIES         Family History   Problem Relation Age of Onset    Colon cancer Mother     Kidney cancer Mother     Hypertension Mother     Colon cancer Father     Kidney cancer Father     Hypertension Father     Colon cancer Family     Kidney cancer Family      I have reviewed and agree with the history as documented  Social History     Tobacco Use    Smoking status: Former Smoker    Smokeless tobacco: Never Used   Substance Use Topics    Alcohol use: Never     Frequency: Never     Comment: No alcohol use - As per Allscripts     Drug use: Not on file        Review of Systems   Constitutional: Negative for activity change, appetite change, chills, fatigue and fever  Eyes: Negative for photophobia and visual disturbance  Respiratory: Negative for cough, choking, chest tightness, shortness of breath, wheezing and stridor  Cardiovascular: Negative for chest pain and palpitations  Gastrointestinal: Positive for nausea  Negative for abdominal distention, abdominal pain, constipation, diarrhea and vomiting  Musculoskeletal: Positive for neck pain  Negative for arthralgias, back pain, gait problem, joint swelling, myalgias and neck stiffness     Skin: Positive for wound  Negative for color change, pallor and rash  Allergic/Immunologic: Negative for immunocompromised state  Neurological: Negative for dizziness, tremors, seizures, syncope, weakness, light-headedness, numbness and headaches  Hematological: Negative for adenopathy  All other systems reviewed and are negative  Physical Exam  Physical Exam   Constitutional: She is oriented to person, place, and time  She appears well-developed and well-nourished  She is cooperative  Non-toxic appearance  No distress  HENT:   Head: Normocephalic and atraumatic  Eyes: Pupils are equal, round, and reactive to light  Conjunctivae, EOM and lids are normal    Neck: Neck supple  No spinous process tenderness and no muscular tenderness present  No neck rigidity  Decreased range of motion present  No edema and no erythema present  Restricted ROM with right and left rotation, flexion and extension    Cardiovascular: Normal rate, regular rhythm, S1 normal, S2 normal, normal heart sounds and intact distal pulses  Exam reveals no decreased pulses  No murmur heard  Pulmonary/Chest: Effort normal and breath sounds normal  No stridor  No tachypnea  No respiratory distress  She has no decreased breath sounds  She has no wheezes  She has no rhonchi  Musculoskeletal: She exhibits no edema, tenderness or deformity  Right hand: She exhibits laceration  She exhibits normal range of motion, no tenderness, no bony tenderness, normal capillary refill and no deformity  Normal sensation noted  Normal strength noted  Hands:  0 5cm superficial laceration to right thumb with surrounding erythema, edema, induration, fluctuance   Lymphadenopathy:     She has no cervical adenopathy  Neurological: She is alert and oriented to person, place, and time  She has normal strength  She displays no tremor  No cranial nerve deficit or sensory deficit  She displays a negative Romberg sign   She displays no seizure activity  Coordination and gait normal  GCS eye subscore is 4  GCS verbal subscore is 5  GCS motor subscore is 6  Skin: Skin is warm  Capillary refill takes less than 2 seconds  No abrasion, no bruising, no lesion, no petechiae and no rash noted  No erythema  No pallor  Vitals reviewed  Vital Signs  ED Triage Vitals [01/13/20 0904]   Temperature Pulse Respirations Blood Pressure SpO2   (!) 97 4 °F (36 3 °C) 98 18 161/94 95 %      Temp Source Heart Rate Source Patient Position - Orthostatic VS BP Location FiO2 (%)   Temporal Monitor Sitting Left arm --      Pain Score       7           Vitals:    01/13/20 0904   BP: 161/94   Pulse: 98   Patient Position - Orthostatic VS: Sitting         Visual Acuity      ED Medications  Medications - No data to display    Diagnostic Studies  Results Reviewed     None                 No orders to display              Procedures  Procedures         ED Course                               MDM  Number of Diagnoses or Management Options  Dog bite of right hand, initial encounter:   Strain of neck muscle, initial encounter:   Diagnosis management comments: 34 y/o female with past medical history significant for anxiety who presents for complaint of neck pain and dog bite occurring a few days ago  On exam, well-appearing female, no acute distress, nontoxic appearance, afebrile, BP elevated but vital signs otherwise stable, AAOx3, small 0 5 cm superficial linear laceration located on the right thumb without surrounding erythema, edema, fluctuance, or induration; range of motion, sensation, capillary refill, and pulses intact in bilateral upper extremities; decreased ROM with neck flexion, extension, left and right rotation, and lateral flexion; no spinous process tenderness or muscular tenderness in the cervical region; no decreased adventitious lung sounds, exam otherwise unremarkable    Consider acute cervical strain due to mechanism of injury and timing of symptoms   Will rx naproxen and flexeril  Discussed adverse s/e of flexeril, no driving or operation of machinery with medication  Discussed other supportive care measures including application of heating pad, ice, gentle manual massage, rest, no heavy lifting, hydration  Patient has hx of anxiety and denies having medications at home for prn use  Instructed f/u with PCP for further eval if symptoms due not improve  Patient states police are investigating dog's vaccination status against rabies  Instructed patient to return to ER for initiation of rabies vaccination series if she finds out that dog is not vaccinated for rabies  I discussed emergency department return parameters  I answered any and all questions the patient had regarding emergency department course of evaluation and treatment  The patient verbalized understanding of and agreement with plan  Amount and/or Complexity of Data Reviewed  Decide to obtain previous medical records or to obtain history from someone other than the patient: yes  Obtain history from someone other than the patient: yes  Review and summarize past medical records: yes  Discuss the patient with other providers: yes    Patient Progress  Patient progress: stable        Disposition  Final diagnoses:   Strain of neck muscle, initial encounter   Dog bite of right hand, initial encounter     Time reflects when diagnosis was documented in both MDM as applicable and the Disposition within this note     Time User Action Codes Description Comment    1/13/2020  9:19 AM Alverta Linen Add [S16  1XXA] Strain of neck muscle, initial encounter     1/13/2020  9:19 AM Alverta Linen Add [C80 496Y,  W54  0XXA] Dog bite of right hand, initial encounter       ED Disposition     ED Disposition Condition Date/Time Comment    Discharge Stable Mon Jan 13, 2020  9:19 AM Gulshan Burrell discharge to home/self care              Follow-up Information     Follow up With Specialties Details Why Contact Info Marcos Alaniz,  Internal Medicine Schedule an appointment as soon as possible for a visit in 3 days For further evaluation [address] [facility] [address]  Emergency Department Emergency Medicine Go to  If symptoms worsen [address] [facility] [address] [phone]          Discharge Medication List as of 1/13/2020  9:21 AM      START taking these medications    Details   cyclobenzaprine (FLEXERIL) 10 mg tablet Take 1 tablet (10 mg total) by mouth 2 (two) times a day as needed for muscle spasms for up to 5 days, Starting Mon 1/13/2020, Until Sat 1/18/2020, Print      naproxen (NAPROSYN) 500 mg tablet Take 1 tablet (500 mg total) by mouth 2 (two) times a day with meals for 7 days, Starting Mon 1/13/2020, Until Mon 1/20/2020, Print         CONTINUE these medications which have NOT CHANGED    Details   budesonide-formoterol (SYMBICORT) 160-4 5 mcg/act inhaler Inhale 2 puffs 2 (two) times a day Rinse mouth after use , Starting Fri 10/4/2019, Normal      cetirizine (ZyrTEC) 10 mg tablet Take 10 mg by mouth daily, Historical Med      clonazePAM (KlonoPIN) 0 5 mg tablet Take 1 tablet (0 5 mg total) by mouth 2 (two) times a day, Starting Fri 10/4/2019, Normal      diphenhydrAMINE (BENADRYL ALLERGY) 25 mg capsule Take by mouth, Historical Med      fluticasone (FLONASE) 50 mcg/act nasal spray 2 sprays into each nostril daily, Starting Tue 3/29/2016, Historical Med      levothyroxine 112 mcg tablet take 1 tablet by mouth once daily, Normal      montelukast (SINGULAIR) 10 mg tablet take 1 tablet by mouth once daily, Normal      VENTOLIN  (90 Base) MCG/ACT inhaler INHALE 1-2 PUFFS EVERY 4 HOURS AS NEEDED FOR WHEEZING, Normal      vilazodone (VIIBRYD) 40 mg tablet Take 1 tablet (40 mg total) by mouth daily, Starting Fri 10/4/2019, Normal           No discharge procedures on file      ED Provider  Electronically Signed by Bessy Olivares PA-C  01/13/20 6773

## 2020-01-16 ENCOUNTER — OFFICE VISIT (OUTPATIENT)
Dept: INTERNAL MEDICINE CLINIC | Facility: CLINIC | Age: 40
End: 2020-01-16
Payer: COMMERCIAL

## 2020-01-16 VITALS
WEIGHT: 198 LBS | SYSTOLIC BLOOD PRESSURE: 118 MMHG | DIASTOLIC BLOOD PRESSURE: 78 MMHG | HEART RATE: 86 BPM | TEMPERATURE: 97.9 F | RESPIRATION RATE: 16 BRPM | BODY MASS INDEX: 35.08 KG/M2 | OXYGEN SATURATION: 99 % | HEIGHT: 63 IN

## 2020-01-16 DIAGNOSIS — E03.9 HYPOTHYROIDISM, UNSPECIFIED TYPE: ICD-10-CM

## 2020-01-16 DIAGNOSIS — F41.1 GAD (GENERALIZED ANXIETY DISORDER): ICD-10-CM

## 2020-01-16 DIAGNOSIS — T14.8XXA ANIMAL BITE: ICD-10-CM

## 2020-01-16 DIAGNOSIS — F33.41 RECURRENT MAJOR DEPRESSIVE DISORDER, IN PARTIAL REMISSION (HCC): Primary | ICD-10-CM

## 2020-01-16 PROCEDURE — 99214 OFFICE O/P EST MOD 30 MIN: CPT | Performed by: PHYSICIAN ASSISTANT

## 2020-01-16 PROCEDURE — 1036F TOBACCO NON-USER: CPT | Performed by: PHYSICIAN ASSISTANT

## 2020-01-16 PROCEDURE — 90471 IMMUNIZATION ADMIN: CPT | Performed by: PHYSICIAN ASSISTANT

## 2020-01-16 PROCEDURE — 90715 TDAP VACCINE 7 YRS/> IM: CPT | Performed by: PHYSICIAN ASSISTANT

## 2020-01-16 PROCEDURE — 3008F BODY MASS INDEX DOCD: CPT | Performed by: PHYSICIAN ASSISTANT

## 2020-01-16 RX ORDER — ARIPIPRAZOLE 5 MG/1
5 TABLET ORAL DAILY
Qty: 30 TABLET | Refills: 2 | Status: SHIPPED | OUTPATIENT
Start: 2020-01-16 | End: 2020-07-13

## 2020-01-16 RX ORDER — LEVOTHYROXINE SODIUM 112 UG/1
112 TABLET ORAL DAILY
Qty: 30 TABLET | Refills: 5 | Status: SHIPPED | OUTPATIENT
Start: 2020-01-16 | End: 2020-01-29 | Stop reason: SDUPTHER

## 2020-01-16 RX ORDER — CLONAZEPAM 0.5 MG/1
0.5 TABLET ORAL 2 TIMES DAILY
Qty: 60 TABLET | Refills: 0 | Status: SHIPPED | OUTPATIENT
Start: 2020-01-16 | End: 2020-04-03 | Stop reason: SDUPTHER

## 2020-01-16 NOTE — ASSESSMENT & PLAN NOTE
Refill klonopin  Patient was informed that this medicine has an abuse potential and may be habit forming  We discussed that this may also cause drowsiness  The medication should not be combined with alcohol  The patient was informed not to operate machinery while taking this medication and should not drive until they know how they respond to the medication  The patient verbalized understanding of this

## 2020-01-16 NOTE — PROGRESS NOTES
Assessment/Plan:  Problem List Items Addressed This Visit        Other    JOSE (generalized anxiety disorder)    Relevant Medications    ARIPiprazole (ABILIFY) 5 mg tablet    clonazePAM (KlonoPIN) 0 5 mg tablet      Other Visit Diagnoses     Animal bite    -  Primary    Relevant Orders    TDAP VACCINE GREATER THAN OR EQUAL TO 6YO IM (Completed)    Hypothyroidism, unspecified type        Relevant Orders    TSH, 3rd generation with Free T4 reflex           Diagnoses and all orders for this visit:    Animal bite  -     TDAP VACCINE GREATER THAN OR EQUAL TO 6YO IM    JOSE (generalized anxiety disorder)  -     ARIPiprazole (ABILIFY) 5 mg tablet; Take 1 tablet (5 mg total) by mouth daily  -     clonazePAM (KlonoPIN) 0 5 mg tablet; Take 1 tablet (0 5 mg total) by mouth 2 (two) times a day    Hypothyroidism, unspecified type  -     TSH, 3rd generation with Free T4 reflex; Future        No problem-specific Assessment & Plan notes found for this encounter  Subjective:      Patient ID: Maty Roy is a 35 y o  female  Pt presents for routine visit  She was walking her dog this week and was bit by another neighborhood dog and needs an updated tetanus vaccine  She notes increased anxiety surrounding the holidays  She has had little interest or motivation and is also short fused and irritable  She has been compliant with her medications  She admits to drinking over gricelda but has not had any since  The following portions of the patient's history were reviewed and updated as appropriate:   She has a past medical history of Anxiety, Asthma, and Hypothyroidism  ,  does not have any pertinent problems on file  ,   has a past surgical history that includes No past surgeries  ,  family history includes Colon cancer in her family, father, and mother; Hypertension in her father and mother; Kidney cancer in her family, father, and mother  ,   reports that she has quit smoking   She has never used smokeless tobacco  She reports that she does not drink alcohol  Her drug history is not on file  ,  is allergic to penicillins     Current Outpatient Medications   Medication Sig Dispense Refill    budesonide-formoterol (SYMBICORT) 160-4 5 mcg/act inhaler Inhale 2 puffs 2 (two) times a day Rinse mouth after use  1 Inhaler 2    cetirizine (ZyrTEC) 10 mg tablet Take 10 mg by mouth daily      diphenhydrAMINE (BENADRYL ALLERGY) 25 mg capsule Take by mouth      fluticasone (FLONASE) 50 mcg/act nasal spray 2 sprays into each nostril daily      levothyroxine 112 mcg tablet Take 1 tablet (112 mcg total) by mouth daily 30 tablet 5    montelukast (SINGULAIR) 10 mg tablet take 1 tablet by mouth once daily 30 tablet 5    naproxen (NAPROSYN) 500 mg tablet Take 1 tablet (500 mg total) by mouth 2 (two) times a day with meals for 7 days 14 tablet 0    VENTOLIN  (90 Base) MCG/ACT inhaler INHALE 1-2 PUFFS EVERY 4 HOURS AS NEEDED FOR WHEEZING 18 g 2    vilazodone (VIIBRYD) 40 mg tablet Take 1 tablet (40 mg total) by mouth daily 30 tablet 3    ARIPiprazole (ABILIFY) 5 mg tablet Take 1 tablet (5 mg total) by mouth daily 30 tablet 2    clonazePAM (KlonoPIN) 0 5 mg tablet Take 1 tablet (0 5 mg total) by mouth 2 (two) times a day 60 tablet 0    cyclobenzaprine (FLEXERIL) 10 mg tablet Take 1 tablet (10 mg total) by mouth 2 (two) times a day as needed for muscle spasms for up to 5 days (Patient not taking: Reported on 1/16/2020) 10 tablet 0     No current facility-administered medications for this visit  Review of Systems   Constitutional: Negative for chills and fever  HENT: Negative for congestion, ear pain, hearing loss, postnasal drip, rhinorrhea, sinus pressure, sinus pain, sore throat and trouble swallowing  Eyes: Negative for pain and visual disturbance  Respiratory: Negative for cough, chest tightness, shortness of breath and wheezing  Cardiovascular: Negative  Negative for chest pain, palpitations and leg swelling  Gastrointestinal: Negative for abdominal pain, blood in stool, constipation, diarrhea, nausea and vomiting  Endocrine: Negative for cold intolerance, heat intolerance, polydipsia, polyphagia and polyuria  Genitourinary: Negative for difficulty urinating, dysuria, flank pain and urgency  Musculoskeletal: Negative for arthralgias, back pain, gait problem and myalgias  Skin: Negative for rash  Allergic/Immunologic: Negative  Neurological: Negative for dizziness, weakness, light-headedness and headaches  Hematological: Negative  Psychiatric/Behavioral: Positive for dysphoric mood  Negative for behavioral problems and sleep disturbance  The patient is not nervous/anxious  PHQ-9 Depression Screening    PHQ-9:    Frequency of the following problems over the past two weeks:               Objective:  Vitals:    01/16/20 1531   BP: 118/78   Pulse: 86   Resp: 16   Temp: 97 9 °F (36 6 °C)   TempSrc: Tympanic   SpO2: 99%   Weight: 89 8 kg (198 lb)   Height: 5' 3" (1 6 m)     Body mass index is 35 07 kg/m²  Physical Exam   Constitutional: She is oriented to person, place, and time  She appears well-developed and well-nourished  No distress  HENT:   Head: Normocephalic and atraumatic  Right Ear: External ear normal    Left Ear: External ear normal    Nose: Nose normal    Mouth/Throat: Oropharynx is clear and moist  No oropharyngeal exudate  Eyes: Pupils are equal, round, and reactive to light  Conjunctivae and EOM are normal  Right eye exhibits no discharge  Left eye exhibits no discharge  No scleral icterus  Neck: Normal range of motion  Neck supple  No thyromegaly present  Cardiovascular: Normal rate, regular rhythm and normal heart sounds  Exam reveals no gallop and no friction rub  No murmur heard  Pulmonary/Chest: Effort normal and breath sounds normal  No respiratory distress  She has no wheezes  She has no rales  Abdominal: Soft   Bowel sounds are normal  She exhibits no distension  There is no tenderness  Musculoskeletal: Normal range of motion  She exhibits no edema, tenderness or deformity  Neurological: She is alert and oriented to person, place, and time  No cranial nerve deficit  Skin: Skin is warm and dry  She is not diaphoretic  Psychiatric: Her behavior is normal  Judgment and thought content normal  Her mood appears anxious  Her affect is labile  She exhibits a depressed mood  BMI Counseling: Body mass index is 35 07 kg/m²  The BMI is above normal  Nutrition recommendations include decreasing portion sizes, consuming healthier snacks and limiting drinks that contain sugar

## 2020-01-16 NOTE — ASSESSMENT & PLAN NOTE
Continue viibryd at present dose  Augment with abilify  Directions for use and possible side effects discussed and patient verbalized understanding of these

## 2020-01-28 ENCOUNTER — APPOINTMENT (OUTPATIENT)
Dept: LAB | Facility: HOSPITAL | Age: 40
End: 2020-01-28
Payer: COMMERCIAL

## 2020-01-28 DIAGNOSIS — E55.9 VITAMIN D DEFICIENCY: Primary | ICD-10-CM

## 2020-01-28 DIAGNOSIS — E03.9 HYPOTHYROIDISM, UNSPECIFIED TYPE: ICD-10-CM

## 2020-01-28 LAB
25(OH)D3 SERPL-MCNC: 8.4 NG/ML (ref 30–100)
ALBUMIN SERPL BCP-MCNC: 4.5 G/DL (ref 3.5–5.7)
ALP SERPL-CCNC: 52 U/L (ref 40–150)
ALT SERPL W P-5'-P-CCNC: 43 U/L (ref 7–52)
ANION GAP SERPL CALCULATED.3IONS-SCNC: 15 MMOL/L (ref 4–13)
AST SERPL W P-5'-P-CCNC: 53 U/L (ref 13–39)
BASOPHILS # BLD AUTO: 0.1 THOUSANDS/ΜL (ref 0–0.1)
BASOPHILS NFR BLD AUTO: 2 % (ref 0–2)
BILIRUB SERPL-MCNC: 1 MG/DL (ref 0.2–1)
BUN SERPL-MCNC: 6 MG/DL (ref 7–25)
CALCIUM SERPL-MCNC: 8.6 MG/DL (ref 8.6–10.5)
CHLORIDE SERPL-SCNC: 99 MMOL/L (ref 98–107)
CHOLEST SERPL-MCNC: 148 MG/DL (ref 0–200)
CO2 SERPL-SCNC: 24 MMOL/L (ref 21–31)
CREAT SERPL-MCNC: 0.69 MG/DL (ref 0.6–1.2)
EOSINOPHIL # BLD AUTO: 0.1 THOUSAND/ΜL (ref 0–0.61)
EOSINOPHIL NFR BLD AUTO: 2 % (ref 0–5)
ERYTHROCYTE [DISTWIDTH] IN BLOOD BY AUTOMATED COUNT: 14.1 % (ref 11.5–14.5)
GFR SERPL CREATININE-BSD FRML MDRD: 109 ML/MIN/1.73SQ M
GLUCOSE P FAST SERPL-MCNC: 119 MG/DL (ref 65–99)
HCT VFR BLD AUTO: 42.5 % (ref 42–47)
HDLC SERPL-MCNC: 62 MG/DL
HGB BLD-MCNC: 14.1 G/DL (ref 12–16)
LDLC SERPL CALC-MCNC: 22 MG/DL (ref 0–100)
LYMPHOCYTES # BLD AUTO: 1.5 THOUSANDS/ΜL (ref 0.6–4.47)
LYMPHOCYTES NFR BLD AUTO: 28 % (ref 21–51)
MCH RBC QN AUTO: 34.4 PG (ref 26–34)
MCHC RBC AUTO-ENTMCNC: 33.2 G/DL (ref 31–37)
MCV RBC AUTO: 104 FL (ref 81–99)
MONOCYTES # BLD AUTO: 0.4 THOUSAND/ΜL (ref 0.17–1.22)
MONOCYTES NFR BLD AUTO: 8 % (ref 2–12)
NEUTROPHILS # BLD AUTO: 3.1 THOUSANDS/ΜL (ref 1.4–6.5)
NEUTS SEG NFR BLD AUTO: 59 % (ref 42–75)
PLATELET # BLD AUTO: 331 THOUSANDS/UL (ref 149–390)
PMV BLD AUTO: 6.7 FL (ref 8.6–11.7)
POTASSIUM SERPL-SCNC: 3.4 MMOL/L (ref 3.5–5.5)
PROT SERPL-MCNC: 7.5 G/DL (ref 6.4–8.9)
RBC # BLD AUTO: 4.1 MILLION/UL (ref 3.9–5.2)
SODIUM SERPL-SCNC: 138 MMOL/L (ref 134–143)
T4 FREE SERPL-MCNC: 1.24 NG/DL (ref 0.76–1.46)
TRIGL SERPL-MCNC: 322 MG/DL (ref 44–166)
TSH SERPL DL<=0.05 MIU/L-ACNC: 0.21 UIU/ML (ref 0.45–5.33)
WBC # BLD AUTO: 5.3 THOUSAND/UL (ref 4.8–10.8)

## 2020-01-28 PROCEDURE — 84439 ASSAY OF FREE THYROXINE: CPT

## 2020-01-28 PROCEDURE — 85025 COMPLETE CBC W/AUTO DIFF WBC: CPT | Performed by: PHYSICIAN ASSISTANT

## 2020-01-28 PROCEDURE — 36415 COLL VENOUS BLD VENIPUNCTURE: CPT

## 2020-01-28 PROCEDURE — 80061 LIPID PANEL: CPT | Performed by: PHYSICIAN ASSISTANT

## 2020-01-28 PROCEDURE — 80053 COMPREHEN METABOLIC PANEL: CPT | Performed by: PHYSICIAN ASSISTANT

## 2020-01-28 PROCEDURE — 82306 VITAMIN D 25 HYDROXY: CPT | Performed by: PHYSICIAN ASSISTANT

## 2020-01-28 PROCEDURE — 84443 ASSAY THYROID STIM HORMONE: CPT

## 2020-01-28 RX ORDER — ERGOCALCIFEROL 1.25 MG/1
50000 CAPSULE ORAL WEEKLY
Qty: 4 CAPSULE | Refills: 3 | Status: SHIPPED | OUTPATIENT
Start: 2020-01-28 | End: 2020-05-05 | Stop reason: SDUPTHER

## 2020-01-29 DIAGNOSIS — E03.9 HYPOTHYROIDISM, UNSPECIFIED TYPE: ICD-10-CM

## 2020-01-29 RX ORDER — LEVOTHYROXINE SODIUM 0.1 MG/1
100 TABLET ORAL DAILY
Qty: 30 TABLET | Refills: 5 | Status: SHIPPED | OUTPATIENT
Start: 2020-01-29 | End: 2020-07-16 | Stop reason: SDUPTHER

## 2020-02-12 ENCOUNTER — TELEPHONE (OUTPATIENT)
Dept: PSYCHIATRY | Facility: CLINIC | Age: 40
End: 2020-02-12

## 2020-02-12 ENCOUNTER — SOCIAL WORK (OUTPATIENT)
Dept: BEHAVIORAL/MENTAL HEALTH CLINIC | Facility: CLINIC | Age: 40
End: 2020-02-12
Payer: COMMERCIAL

## 2020-02-12 DIAGNOSIS — F33.41 RECURRENT MAJOR DEPRESSIVE DISORDER, IN PARTIAL REMISSION (HCC): ICD-10-CM

## 2020-02-12 DIAGNOSIS — F10.21 ALCOHOL DEPENDENCE IN REMISSION (HCC): ICD-10-CM

## 2020-02-12 DIAGNOSIS — F41.1 GAD (GENERALIZED ANXIETY DISORDER): Primary | ICD-10-CM

## 2020-02-12 PROCEDURE — 90791 PSYCH DIAGNOSTIC EVALUATION: CPT | Performed by: SOCIAL WORKER
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
## 2020-02-12 NOTE — TELEPHONE ENCOUNTER
Behavorial Health Outpatient Intake Questions    Referred by:    Please advised interviewee that they need to answer all questions truthfully to allow for best care and any misrepresentations of information may affect their ability to be seen at this clinic   => Was this discussed? Yes     [facility] -     Presenting Problem (in patient's words): HX ALCOHOLISM, PAST HX REHAB 2013    Has the patient ever seen or is currently seeing a psychiatrist? Yes   If yes who/when? 2015 MAY RELAPSED, REHAB AGAIN SEPT 2015 (SOBER DATE)  [address] [place] [id number]    If seen as outpatient, have they been seen here (and by whom)? If not seen here, which provider(s) did the patient see and for how long? Has the patient ever seen or currently see a therapist? Yes If yes who/when? 2019    Has a member of the patient's family been in therapy here? No  If yes, with whom? Has the patient been hospitalized for mental health? No   If yes, how long ago was last hospitalization and where was it? Substance Abuse: There is a documented history of alcohol abuse confirmed by the patient  Does the patient have ICM or CTT? No    Is the patient taking injectable psychiatric medications? No    => If yes, patient cannot be seen here  Communications  Are there any developmental disabilities? No    Does the patient have hearing impairment? No       History-    Has the patient served in the [place] [address]? No    If yes, have you had combat services? No    Was the patient activated into federal active duty as a member of the [employer] or reserve? No    Legal History-     Does the patient have any history of arrests, senior care/[facility] time, or DUIs? No  If Yes-  1) What types of charges? 2) When were they last incarcerated? 3) Are they currently on parole or probation? Minor Child-    Who has custody of the child? Is there a custody agreement?      If there is a custody agreement remind parent that they must bring a copy to the first appt or they will not be seen  Intake Team, please check with provider before scheduling if flags come up such as:  - complex case  - legal history (other than DUI)  - communication barrier concerns are present  - if, in your judgment, this needs further review    ACCEPTED as a patient Yes  => Appointment Date: 02/12/2020 W/ [facility]    Referred Elsewhere? No    Name of Insurance Co:  Insurance ID#  [employer] #  If ins is primary or secondary  If patient is a minor, parents information such as Name, D  O B of guarantor

## 2020-02-24 ENCOUNTER — TELEPHONE (OUTPATIENT)
Dept: INTERNAL MEDICINE CLINIC | Facility: CLINIC | Age: 40
End: 2020-02-24

## 2020-02-24 NOTE — TELEPHONE ENCOUNTER
Pt called  Pt is feeling very sick since taking Abilify  Pt has lost weight,  vomiting all the time, she can't eat and not sleeping   Please advise

## 2020-02-24 NOTE — TELEPHONE ENCOUNTER
Please have her discontinue  Does she want to try an alternative or take a break until she feels better?

## 2020-02-26 DIAGNOSIS — J45.909 UNCOMPLICATED ASTHMA, UNSPECIFIED ASTHMA SEVERITY, UNSPECIFIED WHETHER PERSISTENT: ICD-10-CM

## 2020-02-26 RX ORDER — MONTELUKAST SODIUM 10 MG/1
10 TABLET ORAL DAILY
Qty: 30 TABLET | Refills: 5 | Status: SHIPPED | OUTPATIENT
Start: 2020-02-26 | End: 2020-07-16 | Stop reason: SDUPTHER

## 2020-02-28 ENCOUNTER — SOCIAL WORK (OUTPATIENT)
Dept: BEHAVIORAL/MENTAL HEALTH CLINIC | Facility: CLINIC | Age: 40
End: 2020-02-28
Payer: COMMERCIAL

## 2020-02-28 DIAGNOSIS — F41.1 GAD (GENERALIZED ANXIETY DISORDER): Primary | ICD-10-CM

## 2020-02-28 DIAGNOSIS — F33.41 RECURRENT MAJOR DEPRESSIVE DISORDER, IN PARTIAL REMISSION (HCC): ICD-10-CM

## 2020-02-28 DIAGNOSIS — F10.21 ALCOHOL DEPENDENCE IN REMISSION (HCC): ICD-10-CM

## 2020-02-28 PROCEDURE — 90834 PSYTX W PT 45 MINUTES: CPT | Performed by: SOCIAL WORKER

## 2020-02-28 NOTE — BH TREATMENT PLAN
Arlet Magallanes  1980       Date of Initial Treatment Plan: 02/28/2020   Date of Current Treatment Plan: 02/28/20    Treatment Plan Number 1     Strengths/Personal Resources for Self Care: Strong 12-step support; Willingness to work on herself and her anxiety; Stable employment that she loves; Reliable (always on time or early); Being a good mother is a priorty  Diagnosis:   1  JOSE (generalized anxiety disorder)     2  Recurrent major depressive disorder, in partial remission ([facility] )     3  Alcohol dependence in remission [facility]         Area of Needs: Anxiety management and continued sobriety  Long Term Goal 1: I want to manage my anxiety     Target Date: June 27, 2020  Treatment Plan Expiration Date: August 26, 2020  Completion Date: to be determined         Short Term Objectives for Goal 1:      1  Geraldine Hassan will identify triggers and prompting events that increase symptoms of anxiety  2  Marina will maintain continued recovery from alcohol dependence    * She will continue to use 12-step supports  * Marina will develop and maintain a relapse prevention plan  3  Marina will learn and exhibit understanding of a minimum of three distress tolerance skills to assist with symptom reduction    * In addition to distraction, Marina will increase her physical activity and healthy nutrition   4  Geraldine Hassan will learn and exhibit understanding of effective communication skills (using a DBT-informed perspective)  * Marina has identified that she tends to vacillate between being "too nice" and being terse  She will practice DBT skills for communication  5  Marina will identify and maintain personal limits and boundaries in relationships with coworkers, family, and friends  Any boundary crossings will be discussed in therapy sessions  6  Geraldine Hassan will maintain a level of anxiety that does not surpass a 3/10 on most days  Incidents that surpass this limit will be process in therapy sessions         GOAL 1: Modality: Individual 1-2x per month   Completion Date to be determined and The person(s) responsible for carrying out the plan is  Marina (client) and Zaira Can (clinician)    Clinician will use client-centered therapy, mindfulness-based strategies, DBT-informed skills and solution-focused therapy to address Marina's symptoms of anxiety  Dottie Reyna will practice skills between sessions and will report back, during subsequent sessions regarding successes and barriers  Behavioral Health Treatment Plan [facility]: Diagnosis and Treatment Plan explained to Latricia Kennedy relates understanding diagnosis and is agreeable to Treatment Plan         Client Comments : Please share your thoughts, feelings, need and/or experiences regarding your treatment plan: "I'm all in "

## 2020-02-28 NOTE — PSYCH
Psychotherapy Provided: Individual Psychotherapy 45 minutes     Length of time in session: 45 minutes, follow up in May (Elliott Cornell will remain on the cancellation wait list for a sooner appointment)     Goals addressed in session: Goal 1     Pain:      moderate to severe-- anxiety related to recent loss of friend    Current suicide risk : Low     DATA: Met with Marina for scheduled individual session  Marina spent time talking about her reaction to a friend of hers who recently completed suicide  She states that she went to meetings with this person  She states that a friend of hers called her to tell her about his death  She states that she called out of work, because she was very distraught about the death  She states that she was nervous to go back to work, because she was afraid that she would be reprimanded about being off of work  Instead, she states that she got a call from her boss, who expressed support for her and asked what she needed  She told him that she has support from friends and from her sponsor  She will be attending the viewing tomorrow, with a group of friends  She reports that she did have thoughts of drinking but did not entertain the thought  She acknowledges that, if she started to drink, she would completely binge  She states she has no desire to return to using alcohol, as she knows that she is not able to manage her drinking and does not "want to live that life "    We spent some time talking about the days that Elliott Cornell was out of work and the concern that her boss had for her wellbeing  She states that the anxiety about needing to come to therapy is difficult for her, and she states that she has some feelings of resentment and anger about them accusing her of drinking  She states that she is open to having them test her, and we discussed this as being one of the recommendations that will come from this assessment  We spoke about Marina's goals for ongoing therapy   We spent some time during this session creating a new treatment plan  She plans to return for regular sessions to help her learn skills to manage her anxiety  She is open to practicing mindfulness-based strategies and using DBT-informed techniques/skills to help her learn how to better manage her moods and her relationships  ASSESSMENT: Yancy Smith presents with a somewhat anxious mood  Her affect is normal and mood-congruent  Yancy mSith exhibits early signs of positive therapeutic rapport with this clinician  Marina appears to have normal insight and judgment  Yancy Smith continues to exhibit willingness to work on treatment goals and objectives  PLAN: Yancy Smith will return in May for the next scheduled session; however, she will remain on the cancellation wait list for a sooner appointment  Between sessions, Yancy Smith will have a conversation with her boss about expectations regarding time off at work, continue to maintain her sobriety with 12-step support, and will report back during the next session re: successes and barriers  At the next session, this clinician will use client-centered therapy, mindfulness-based strategies, DBT-informed skills, Motivational Interviewing and solution-focused therapy to address her mood regulation and anxiety management, in an effort to assist Marina with meeting treatment goals  Behavioral Health Treatment Plan [facility]: Diagnosis and Treatment Plan explained to Sanjukristal Clark relates understanding diagnosis and is agreeable to Treatment Plan   Yes

## 2020-03-24 DIAGNOSIS — F33.41 RECURRENT MAJOR DEPRESSIVE DISORDER, IN PARTIAL REMISSION (HCC): ICD-10-CM

## 2020-03-24 RX ORDER — VILAZODONE HYDROCHLORIDE 40 MG/1
40 TABLET ORAL DAILY
Qty: 30 TABLET | Refills: 5 | Status: SHIPPED | OUTPATIENT
Start: 2020-03-24 | End: 2020-10-07

## 2020-04-01 ENCOUNTER — TELEMEDICINE (OUTPATIENT)
Dept: BEHAVIORAL/MENTAL HEALTH CLINIC | Facility: CLINIC | Age: 40
End: 2020-04-01
Payer: COMMERCIAL

## 2020-04-01 DIAGNOSIS — F33.41 RECURRENT MAJOR DEPRESSIVE DISORDER, IN PARTIAL REMISSION (HCC): Primary | ICD-10-CM

## 2020-04-01 DIAGNOSIS — F41.1 GAD (GENERALIZED ANXIETY DISORDER): ICD-10-CM

## 2020-04-01 DIAGNOSIS — F10.21 ALCOHOL DEPENDENCE IN REMISSION (HCC): ICD-10-CM

## 2020-04-01 PROCEDURE — 90832 PSYTX W PT 30 MINUTES: CPT | Performed by: SOCIAL WORKER

## 2020-04-03 DIAGNOSIS — F41.1 GAD (GENERALIZED ANXIETY DISORDER): ICD-10-CM

## 2020-04-03 DIAGNOSIS — J45.40 MODERATE PERSISTENT ASTHMA WITHOUT COMPLICATION: ICD-10-CM

## 2020-04-03 RX ORDER — BUDESONIDE AND FORMOTEROL FUMARATE DIHYDRATE 160; 4.5 UG/1; UG/1
2 AEROSOL RESPIRATORY (INHALATION) 2 TIMES DAILY
Qty: 1 INHALER | Refills: 2 | Status: SHIPPED | OUTPATIENT
Start: 2020-04-03 | End: 2020-11-10 | Stop reason: SDUPTHER

## 2020-04-03 RX ORDER — CLONAZEPAM 0.5 MG/1
0.5 TABLET ORAL 2 TIMES DAILY
Qty: 60 TABLET | Refills: 0 | Status: SHIPPED | OUTPATIENT
Start: 2020-04-03 | End: 2020-05-04 | Stop reason: SDUPTHER

## 2020-04-03 RX ORDER — BUDESONIDE AND FORMOTEROL FUMARATE DIHYDRATE 160; 4.5 UG/1; UG/1
2 AEROSOL RESPIRATORY (INHALATION) 2 TIMES DAILY
Qty: 1 INHALER | Refills: 2 | Status: CANCELLED | OUTPATIENT
Start: 2020-04-03

## 2020-04-03 RX ORDER — CLONAZEPAM 0.5 MG/1
0.5 TABLET ORAL 2 TIMES DAILY
Qty: 60 TABLET | Refills: 0 | Status: CANCELLED | OUTPATIENT
Start: 2020-04-03

## 2020-05-04 DIAGNOSIS — F41.1 GAD (GENERALIZED ANXIETY DISORDER): ICD-10-CM

## 2020-05-04 RX ORDER — CLONAZEPAM 0.5 MG/1
0.5 TABLET ORAL 2 TIMES DAILY
Qty: 60 TABLET | Refills: 0 | Status: SHIPPED | OUTPATIENT
Start: 2020-05-04 | End: 2020-06-16 | Stop reason: SDUPTHER

## 2020-05-05 DIAGNOSIS — E55.9 VITAMIN D DEFICIENCY: ICD-10-CM

## 2020-05-05 RX ORDER — ERGOCALCIFEROL 1.25 MG/1
50000 CAPSULE ORAL WEEKLY
Qty: 4 CAPSULE | Refills: 5 | Status: SHIPPED | OUTPATIENT
Start: 2020-05-05 | End: 2022-02-11 | Stop reason: HOSPADM

## 2020-05-26 ENCOUNTER — TELEMEDICINE (OUTPATIENT)
Dept: BEHAVIORAL/MENTAL HEALTH CLINIC | Facility: CLINIC | Age: 40
End: 2020-05-26
Payer: COMMERCIAL

## 2020-05-26 DIAGNOSIS — F10.21 ALCOHOL DEPENDENCE IN REMISSION (HCC): ICD-10-CM

## 2020-05-26 DIAGNOSIS — F41.1 GAD (GENERALIZED ANXIETY DISORDER): Primary | ICD-10-CM

## 2020-05-26 DIAGNOSIS — F33.41 RECURRENT MAJOR DEPRESSIVE DISORDER, IN PARTIAL REMISSION (HCC): ICD-10-CM

## 2020-05-26 PROCEDURE — 90834 PSYTX W PT 45 MINUTES: CPT | Performed by: SOCIAL WORKER

## 2020-06-16 DIAGNOSIS — F41.1 GAD (GENERALIZED ANXIETY DISORDER): ICD-10-CM

## 2020-06-16 RX ORDER — CLONAZEPAM 0.5 MG/1
0.5 TABLET ORAL 2 TIMES DAILY
Qty: 60 TABLET | Refills: 0 | Status: SHIPPED | OUTPATIENT
Start: 2020-06-16 | End: 2020-07-16 | Stop reason: SDUPTHER

## 2020-07-13 ENCOUNTER — APPOINTMENT (EMERGENCY)
Dept: RADIOLOGY | Facility: HOSPITAL | Age: 40
DRG: 387 | End: 2020-07-13
Payer: COMMERCIAL

## 2020-07-13 ENCOUNTER — APPOINTMENT (INPATIENT)
Dept: CT IMAGING | Facility: HOSPITAL | Age: 40
DRG: 387 | End: 2020-07-13
Payer: COMMERCIAL

## 2020-07-13 ENCOUNTER — HOSPITAL ENCOUNTER (INPATIENT)
Facility: HOSPITAL | Age: 40
LOS: 1 days | Discharge: HOME | DRG: 387 | End: 2020-07-14
Attending: EMERGENCY MEDICINE | Admitting: INTERNAL MEDICINE
Payer: COMMERCIAL

## 2020-07-13 DIAGNOSIS — A41.9 SEPSIS (HCC): Primary | ICD-10-CM

## 2020-07-13 DIAGNOSIS — K50.119 CROHN'S DISEASE OF COLON WITH COMPLICATION (HCC): ICD-10-CM

## 2020-07-13 DIAGNOSIS — R19.7 DIARRHEA: ICD-10-CM

## 2020-07-13 DIAGNOSIS — E86.0 DEHYDRATION: ICD-10-CM

## 2020-07-13 PROBLEM — K50.00 TERMINAL ILEITIS WITHOUT COMPLICATION (HCC): Status: ACTIVE | Noted: 2020-07-13

## 2020-07-13 LAB
ALBUMIN SERPL BCP-MCNC: 4.4 G/DL (ref 3.5–5.7)
ALP SERPL-CCNC: 39 U/L (ref 40–150)
ALT SERPL W P-5'-P-CCNC: 15 U/L (ref 7–52)
ANION GAP SERPL CALCULATED.3IONS-SCNC: 16 MMOL/L (ref 4–13)
AST SERPL W P-5'-P-CCNC: 24 U/L (ref 13–39)
BACTERIA UR QL AUTO: ABNORMAL /HPF
BASOPHILS # BLD AUTO: 0.1 THOUSANDS/ΜL (ref 0–0.1)
BASOPHILS NFR BLD AUTO: 1 % (ref 0–2)
BILIRUB SERPL-MCNC: 1.6 MG/DL (ref 0.2–1)
BILIRUB UR QL STRIP: NEGATIVE
BUN SERPL-MCNC: 15 MG/DL (ref 7–25)
CALCIUM SERPL-MCNC: 8.2 MG/DL (ref 8.6–10.5)
CHLORIDE SERPL-SCNC: 94 MMOL/L (ref 98–107)
CLARITY UR: CLEAR
CO2 SERPL-SCNC: 24 MMOL/L (ref 21–31)
COLOR UR: ABNORMAL
CREAT SERPL-MCNC: 0.93 MG/DL (ref 0.6–1.2)
CRP SERPL QL: <5 MG/L
EOSINOPHIL # BLD AUTO: 0 THOUSAND/ΜL (ref 0–0.61)
EOSINOPHIL NFR BLD AUTO: 0 % (ref 0–5)
ERYTHROCYTE [DISTWIDTH] IN BLOOD BY AUTOMATED COUNT: 14.8 % (ref 11.5–14.5)
ERYTHROCYTE [SEDIMENTATION RATE] IN BLOOD: 2 MM/HOUR (ref 0–20)
FLUAV RNA NPH QL NAA+PROBE: NORMAL
FLUBV RNA NPH QL NAA+PROBE: NORMAL
GFR SERPL CREATININE-BSD FRML MDRD: 77 ML/MIN/1.73SQ M
GLUCOSE SERPL-MCNC: 133 MG/DL (ref 65–99)
GLUCOSE UR STRIP-MCNC: NEGATIVE MG/DL
HCT VFR BLD AUTO: 44.3 % (ref 42–47)
HGB BLD-MCNC: 15.6 G/DL (ref 12–16)
HGB UR QL STRIP.AUTO: ABNORMAL
KETONES UR STRIP-MCNC: NEGATIVE MG/DL
LACTATE SERPL-SCNC: 4.7 MMOL/L (ref 0.5–2)
LACTATE SERPL-SCNC: 5.1 MMOL/L (ref 0.5–2)
LEUKOCYTE ESTERASE UR QL STRIP: NEGATIVE
LIPASE SERPL-CCNC: 55 U/L (ref 11–82)
LYMPHOCYTES # BLD AUTO: 2.4 THOUSANDS/ΜL (ref 0.6–4.47)
LYMPHOCYTES NFR BLD AUTO: 28 % (ref 21–51)
MAGNESIUM SERPL-MCNC: 1.9 MG/DL (ref 1.9–2.7)
MCH RBC QN AUTO: 35.8 PG (ref 26–34)
MCHC RBC AUTO-ENTMCNC: 35.3 G/DL (ref 31–37)
MCV RBC AUTO: 102 FL (ref 81–99)
MONOCYTES # BLD AUTO: 0.6 THOUSAND/ΜL (ref 0.17–1.22)
MONOCYTES NFR BLD AUTO: 7 % (ref 2–12)
NEUTROPHILS # BLD AUTO: 5.5 THOUSANDS/ΜL (ref 1.4–6.5)
NEUTS SEG NFR BLD AUTO: 64 % (ref 42–75)
NITRITE UR QL STRIP: NEGATIVE
NON-SQ EPI CELLS URNS QL MICRO: ABNORMAL /HPF
PH UR STRIP.AUTO: 5.5 [PH]
PLATELET # BLD AUTO: 337 THOUSANDS/UL (ref 149–390)
PMV BLD AUTO: 6.5 FL (ref 8.6–11.7)
POTASSIUM SERPL-SCNC: 3.9 MMOL/L (ref 3.5–5.5)
PROCALCITONIN SERPL-MCNC: <0.05 NG/ML
PROT SERPL-MCNC: 7.7 G/DL (ref 6.4–8.9)
PROT UR STRIP-MCNC: NEGATIVE MG/DL
RBC # BLD AUTO: 4.36 MILLION/UL (ref 3.9–5.2)
RBC #/AREA URNS AUTO: ABNORMAL /HPF
RSV RNA NPH QL NAA+PROBE: NORMAL
SARS-COV-2 RNA RESP QL NAA+PROBE: NEGATIVE
SODIUM SERPL-SCNC: 134 MMOL/L (ref 134–143)
SP GR UR STRIP.AUTO: <=1.005 (ref 1–1.03)
UROBILINOGEN UR QL STRIP.AUTO: 0.2 E.U./DL
WBC # BLD AUTO: 8.6 THOUSAND/UL (ref 4.8–10.8)
WBC #/AREA URNS AUTO: ABNORMAL /HPF

## 2020-07-13 PROCEDURE — 94640 AIRWAY INHALATION TREATMENT: CPT

## 2020-07-13 PROCEDURE — 80053 COMPREHEN METABOLIC PANEL: CPT | Performed by: EMERGENCY MEDICINE

## 2020-07-13 PROCEDURE — 96375 TX/PRO/DX INJ NEW DRUG ADDON: CPT

## 2020-07-13 PROCEDURE — 83735 ASSAY OF MAGNESIUM: CPT | Performed by: EMERGENCY MEDICINE

## 2020-07-13 PROCEDURE — 94760 N-INVAS EAR/PLS OXIMETRY 1: CPT

## 2020-07-13 PROCEDURE — 96361 HYDRATE IV INFUSION ADD-ON: CPT

## 2020-07-13 PROCEDURE — 71045 X-RAY EXAM CHEST 1 VIEW: CPT

## 2020-07-13 PROCEDURE — 81001 URINALYSIS AUTO W/SCOPE: CPT | Performed by: INTERNAL MEDICINE

## 2020-07-13 PROCEDURE — 36415 COLL VENOUS BLD VENIPUNCTURE: CPT | Performed by: EMERGENCY MEDICINE

## 2020-07-13 PROCEDURE — 99285 EMERGENCY DEPT VISIT HI MDM: CPT

## 2020-07-13 PROCEDURE — 85652 RBC SED RATE AUTOMATED: CPT | Performed by: EMERGENCY MEDICINE

## 2020-07-13 PROCEDURE — 84145 PROCALCITONIN (PCT): CPT | Performed by: EMERGENCY MEDICINE

## 2020-07-13 PROCEDURE — 99223 1ST HOSP IP/OBS HIGH 75: CPT | Performed by: INTERNAL MEDICINE

## 2020-07-13 PROCEDURE — 87040 BLOOD CULTURE FOR BACTERIA: CPT | Performed by: EMERGENCY MEDICINE

## 2020-07-13 PROCEDURE — 96374 THER/PROPH/DIAG INJ IV PUSH: CPT

## 2020-07-13 PROCEDURE — 83605 ASSAY OF LACTIC ACID: CPT | Performed by: EMERGENCY MEDICINE

## 2020-07-13 PROCEDURE — 87631 RESP VIRUS 3-5 TARGETS: CPT | Performed by: EMERGENCY MEDICINE

## 2020-07-13 PROCEDURE — 74177 CT ABD & PELVIS W/CONTRAST: CPT

## 2020-07-13 PROCEDURE — 83690 ASSAY OF LIPASE: CPT | Performed by: EMERGENCY MEDICINE

## 2020-07-13 PROCEDURE — 87493 C DIFF AMPLIFIED PROBE: CPT | Performed by: INTERNAL MEDICINE

## 2020-07-13 PROCEDURE — 86140 C-REACTIVE PROTEIN: CPT | Performed by: EMERGENCY MEDICINE

## 2020-07-13 PROCEDURE — 87635 SARS-COV-2 COVID-19 AMP PRB: CPT | Performed by: EMERGENCY MEDICINE

## 2020-07-13 PROCEDURE — 85025 COMPLETE CBC W/AUTO DIFF WBC: CPT | Performed by: EMERGENCY MEDICINE

## 2020-07-13 PROCEDURE — 99285 EMERGENCY DEPT VISIT HI MDM: CPT | Performed by: EMERGENCY MEDICINE

## 2020-07-13 PROCEDURE — 87505 NFCT AGENT DETECTION GI: CPT | Performed by: INTERNAL MEDICINE

## 2020-07-13 PROCEDURE — 71260 CT THORAX DX C+: CPT

## 2020-07-13 RX ORDER — METHYLPREDNISOLONE SODIUM SUCCINATE 40 MG/ML
20 INJECTION, POWDER, LYOPHILIZED, FOR SOLUTION INTRAMUSCULAR; INTRAVENOUS EVERY 8 HOURS SCHEDULED
Status: DISCONTINUED | OUTPATIENT
Start: 2020-07-13 | End: 2020-07-13

## 2020-07-13 RX ORDER — IPRATROPIUM BROMIDE AND ALBUTEROL SULFATE 2.5; .5 MG/3ML; MG/3ML
3 SOLUTION RESPIRATORY (INHALATION)
Status: DISCONTINUED | OUTPATIENT
Start: 2020-07-13 | End: 2020-07-13

## 2020-07-13 RX ORDER — SODIUM CHLORIDE 9 MG/ML
125 INJECTION, SOLUTION INTRAVENOUS ONCE
Status: COMPLETED | OUTPATIENT
Start: 2020-07-13 | End: 2020-07-13

## 2020-07-13 RX ORDER — BUDESONIDE AND FORMOTEROL FUMARATE DIHYDRATE 160; 4.5 UG/1; UG/1
2 AEROSOL RESPIRATORY (INHALATION) 2 TIMES DAILY
Status: DISCONTINUED | OUTPATIENT
Start: 2020-07-13 | End: 2020-07-14 | Stop reason: HOSPADM

## 2020-07-13 RX ORDER — VILAZODONE HYDROCHLORIDE 40 MG/1
40 TABLET ORAL DAILY
Status: DISCONTINUED | OUTPATIENT
Start: 2020-07-13 | End: 2020-07-14 | Stop reason: HOSPADM

## 2020-07-13 RX ORDER — METHOCARBAMOL 500 MG/1
500 TABLET, FILM COATED ORAL EVERY 6 HOURS PRN
Status: DISCONTINUED | OUTPATIENT
Start: 2020-07-13 | End: 2020-07-13

## 2020-07-13 RX ORDER — METHYLPREDNISOLONE SODIUM SUCCINATE 40 MG/ML
20 INJECTION, POWDER, LYOPHILIZED, FOR SOLUTION INTRAMUSCULAR; INTRAVENOUS EVERY 8 HOURS SCHEDULED
Status: DISCONTINUED | OUTPATIENT
Start: 2020-07-14 | End: 2020-07-14 | Stop reason: HOSPADM

## 2020-07-13 RX ORDER — CIPROFLOXACIN 2 MG/ML
400 INJECTION, SOLUTION INTRAVENOUS EVERY 12 HOURS
Status: DISCONTINUED | OUTPATIENT
Start: 2020-07-13 | End: 2020-07-14 | Stop reason: HOSPADM

## 2020-07-13 RX ORDER — ONDANSETRON 2 MG/ML
4 INJECTION INTRAMUSCULAR; INTRAVENOUS EVERY 6 HOURS PRN
Status: DISCONTINUED | OUTPATIENT
Start: 2020-07-13 | End: 2020-07-14 | Stop reason: HOSPADM

## 2020-07-13 RX ORDER — DEXAMETHASONE SODIUM PHOSPHATE 10 MG/ML
10 INJECTION, SOLUTION INTRAMUSCULAR; INTRAVENOUS ONCE
Status: COMPLETED | OUTPATIENT
Start: 2020-07-13 | End: 2020-07-13

## 2020-07-13 RX ORDER — CEFEPIME HYDROCHLORIDE 2 G/50ML
2000 INJECTION, SOLUTION INTRAVENOUS ONCE
Status: COMPLETED | OUTPATIENT
Start: 2020-07-13 | End: 2020-07-13

## 2020-07-13 RX ORDER — ALBUTEROL SULFATE 90 UG/1
2 AEROSOL, METERED RESPIRATORY (INHALATION) EVERY 4 HOURS PRN
Status: DISCONTINUED | OUTPATIENT
Start: 2020-07-13 | End: 2020-07-14 | Stop reason: HOSPADM

## 2020-07-13 RX ORDER — ONDANSETRON 2 MG/ML
4 INJECTION INTRAMUSCULAR; INTRAVENOUS ONCE
Status: COMPLETED | OUTPATIENT
Start: 2020-07-13 | End: 2020-07-13

## 2020-07-13 RX ORDER — LEVOTHYROXINE SODIUM 0.1 MG/1
100 TABLET ORAL DAILY
Status: DISCONTINUED | OUTPATIENT
Start: 2020-07-13 | End: 2020-07-14

## 2020-07-13 RX ORDER — LEVALBUTEROL 1.25 MG/.5ML
1.25 SOLUTION, CONCENTRATE RESPIRATORY (INHALATION)
Status: DISCONTINUED | OUTPATIENT
Start: 2020-07-13 | End: 2020-07-13

## 2020-07-13 RX ORDER — ALBUTEROL SULFATE 2.5 MG/3ML
2.5 SOLUTION RESPIRATORY (INHALATION) EVERY 4 HOURS PRN
Status: DISCONTINUED | OUTPATIENT
Start: 2020-07-13 | End: 2020-07-13

## 2020-07-13 RX ORDER — HYDROCODONE BITARTRATE AND ACETAMINOPHEN 5; 325 MG/1; MG/1
1 TABLET ORAL EVERY 6 HOURS PRN
Status: DISCONTINUED | OUTPATIENT
Start: 2020-07-13 | End: 2020-07-14 | Stop reason: HOSPADM

## 2020-07-13 RX ORDER — CLONAZEPAM 0.5 MG/1
0.5 TABLET ORAL 2 TIMES DAILY PRN
Status: DISCONTINUED | OUTPATIENT
Start: 2020-07-13 | End: 2020-07-14 | Stop reason: HOSPADM

## 2020-07-13 RX ORDER — MONTELUKAST SODIUM 10 MG/1
10 TABLET ORAL DAILY
Status: DISCONTINUED | OUTPATIENT
Start: 2020-07-13 | End: 2020-07-14 | Stop reason: HOSPADM

## 2020-07-13 RX ADMIN — SODIUM CHLORIDE 1000 ML: 0.9 INJECTION, SOLUTION INTRAVENOUS at 09:07

## 2020-07-13 RX ADMIN — VILAZODONE HYDROCHLORIDE 40 MG: 40 TABLET ORAL at 16:27

## 2020-07-13 RX ADMIN — SODIUM CHLORIDE 125 ML/HR: 0.9 INJECTION, SOLUTION INTRAVENOUS at 16:28

## 2020-07-13 RX ADMIN — CEFEPIME HYDROCHLORIDE 2000 MG: 2 INJECTION, SOLUTION INTRAVENOUS at 10:27

## 2020-07-13 RX ADMIN — METRONIDAZOLE 500 MG: 500 INJECTION, SOLUTION INTRAVENOUS at 21:28

## 2020-07-13 RX ADMIN — ONDANSETRON 4 MG: 2 INJECTION INTRAMUSCULAR; INTRAVENOUS at 09:04

## 2020-07-13 RX ADMIN — IOHEXOL 100 ML: 350 INJECTION, SOLUTION INTRAVENOUS at 12:10

## 2020-07-13 RX ADMIN — METRONIDAZOLE 500 MG: 500 INJECTION, SOLUTION INTRAVENOUS at 14:27

## 2020-07-13 RX ADMIN — SODIUM CHLORIDE, SODIUM LACTATE, POTASSIUM CHLORIDE, AND CALCIUM CHLORIDE 1000 ML: .6; .31; .03; .02 INJECTION, SOLUTION INTRAVENOUS at 10:26

## 2020-07-13 RX ADMIN — MONTELUKAST SODIUM 10 MG: 10 TABLET, COATED ORAL at 19:51

## 2020-07-13 RX ADMIN — DEXAMETHASONE SODIUM PHOSPHATE 10 MG: 10 INJECTION, SOLUTION INTRAMUSCULAR; INTRAVENOUS at 10:01

## 2020-07-13 RX ADMIN — IOHEXOL 50 ML: 240 INJECTION, SOLUTION INTRATHECAL; INTRAVASCULAR; INTRAVENOUS; ORAL at 10:30

## 2020-07-13 RX ADMIN — HYDROCODONE BITARTRATE AND ACETAMINOPHEN 1 TABLET: 5; 325 TABLET ORAL at 19:51

## 2020-07-13 RX ADMIN — CIPROFLOXACIN 400 MG: 2 INJECTION, SOLUTION INTRAVENOUS at 16:24

## 2020-07-13 RX ADMIN — IPRATROPIUM BROMIDE AND ALBUTEROL SULFATE 3 ML: 2.5; .5 SOLUTION RESPIRATORY (INHALATION) at 10:01

## 2020-07-13 RX ADMIN — IPRATROPIUM BROMIDE AND ALBUTEROL SULFATE 3 ML: 2.5; .5 SOLUTION RESPIRATORY (INHALATION) at 14:14

## 2020-07-13 RX ADMIN — BUDESONIDE AND FORMOTEROL FUMARATE DIHYDRATE 2 PUFF: 160; 4.5 AEROSOL RESPIRATORY (INHALATION) at 16:25

## 2020-07-13 NOTE — H&P
H&P- Salma Gates 1980, 38 y o  female MRN: [id number]    Unit/Bed#: -01 Encounter: [id number]    Primary Care Provider: En Carson DO   Date and time admitted to hospital: 7/13/2020  8:09 AM        * Terminal ileitis without complication ([facility] )  Assessment & Plan  · CT scan of the abdomen/pelvis shows findings consistent with colitis/ileitis  · Placed on clear liquid diet  · IV antibiotics with Cipro/Flagyl  · Will check stool studies including C diff  · IV fluids  · Gastroenterology consult  · Monitor labs    Crohn's disease of colon with complication [facility])  Assessment & Plan  · Patient with history of Crohn's disease  · Currently with colitis/ileitis  · Continue treatment as above  · Gastroenterology consult    Hypothyroidism  Assessment & Plan  · Continue levothyroxine  · Will follow-up TSH level    Moderate persistent asthma without complication  Assessment & Plan  · DuoNebs  · Albuterol p r n  JOSE (generalized anxiety disorder)  Assessment & Plan  · Klonopin p r n  VTE Prophylaxis: Early aggressive ambulation  Code Status:  Full code  POLST: There is no POLST form on file for this patient (pre-hospital)  Discussion with family:  No family available at bedside during my evaluation    Anticipated Length of Stay:  Patient will be admitted on an Inpatient basis with an anticipated length of stay of  > 2 midnights  Justification for Hospital Stay:  Colitis    Chief Complaint:   Nausea/vomiting/diarrhea    History of Present Illness:    Salma Gates is a 38 y o  female who presents with vomiting  Patient states that she has a history of Crohn's disease and has chronic diarrhea  Over the last few days patient has noticed worsening diarrhea  Also with nausea/vomiting  Vomit is nonbloody  No blood in stool  Denies any fever  Does have chills  Patient does work in the hospital and therefore could potentially be at risk for C diff  Patient denies any recent antibiotic use    Last colonoscopy was approximately 3 years ago  Mild abdominal pain mainly in right lower quadrant which is about 4/10 at worse  Nonradiating  Worse with vomiting  Review of Systems:  Review of Systems   Constitutional: Positive for appetite change, chills and fatigue  Gastrointestinal: Positive for abdominal pain, diarrhea, nausea and vomiting  Neurological: Positive for weakness  All other systems reviewed and are negative  Past Medical and Surgical History:   Past Medical History:   Diagnosis Date    Anxiety     Asthma     Crohn disease [facility]     Depression     Hypothyroidism        Past Surgical History:   Procedure Laterality Date    NO PAST SURGERIES         Meds/Allergies:  Prior to Admission medications    Medication Sig Start Date End Date Taking? Authorizing Provider   budesonide-formoterol (SYMBICORT) 160-4 5 mcg/act inhaler Inhale 2 puffs 2 (two) times a day Rinse mouth after use   4/3/20   Jadyn Guevara PA-C   cetirizine (ZyrTEC) 10 mg tablet Take 10 mg by mouth daily    Historical Provider, MD   clonazePAM (KlonoPIN) 0 5 mg tablet Take 1 tablet (0 5 mg total) by mouth 2 (two) times a day 6/16/20   Estefanía Gutierrez DO   diphenhydrAMINE (BENADRYL ALLERGY) 25 mg capsule Take by mouth    Historical Provider, MD   ergocalciferol (VITAMIN D2) 50,000 units Take 1 capsule (50,000 Units total) by mouth once a week 5/5/20   Darinel Munson PA-C   fluticasone (FLONASE) 50 mcg/act nasal spray 2 sprays into each nostril daily 3/29/16   Historical Provider, MD   levothyroxine 100 mcg tablet Take 1 tablet (100 mcg total) by mouth daily 1/29/20   Jadyn Guevara PA-C   montelukast (SINGULAIR) 10 mg tablet Take 1 tablet (10 mg total) by mouth daily 2/26/20   Estefanía Gutierrez DO   VENTOLIN  (90 Base) MCG/ACT inhaler INHALE 1-2 PUFFS EVERY 4 HOURS AS NEEDED FOR WHEEZING 11/26/18   Jadyn Guevara PA-C   vilazodone (Viibryd) 40 mg tablet Take 1 tablet (40 mg total) by mouth daily 3/24/20   Jadyn GLORIA Guevara   ARIPiprazole (ABILIFY) 5 mg tablet Take 1 tablet (5 mg total) by mouth daily 20  Jadyn GLORIA Guevara   cyclobenzaprine (FLEXERIL) 10 mg tablet Take 1 tablet (10 mg total) by mouth 2 (two) times a day as needed for muscle spasms for up to 5 days  Patient not taking: Reported on 2020  Randolph David PA-C   naproxen (NAPROSYN) 500 mg tablet Take 1 tablet (500 mg total) by mouth 2 (two) times a day with meals for 7 days 20  Randolph David PA-C     I have reviewed home medications with patient personally  Allergies: Allergies   Allergen Reactions    Penicillins Hives and Rash     HIVES, SWELLS, riley ancef x 1 dose         Social History:  Marital Status:    Occupation:  Works as a nurse anesthetist  Patient Pre-hospital Living Situation:  Lives with family  Patient Pre-hospital Level of Mobility:  Ambulatory  Patient Pre-hospital Diet Restrictions:  None  Substance Use History:     Social History     Substance and Sexual Activity   Alcohol Use Never    Frequency: Never    Comment: No alcohol use - As per [employer]      Social History     Tobacco Use   Smoking Status Former Smoker    Last attempt to quit: 2018    Years since quittin 0   Smokeless Tobacco Never Used     Social History     Substance and Sexual Activity   Drug Use Never       Family History:  I have reviewed the patients family history    Physical Exam:   Vitals:   Blood Pressure: (!) 143/101 (20 1138)  Pulse: 104 (20 1138)  Temperature: 97 5 °F (36 4 °C) (20 113)  Temp Source: Temporal (20 113)  Respirations: 16 (20 113)  Height: 5' 3" (160 cm) (20 113)  Weight - Scale: 88 6 kg (195 lb 5 2 oz) (20 113)  SpO2: 97 % (20 113)    Physical Exam   Constitutional: She appears well-developed  No distress  HENT:   Head: Normocephalic and atraumatic     Eyes: Conjunctivae and EOM are normal    Neck: Normal range of motion  Neck supple  Cardiovascular: Normal rate and regular rhythm  Pulmonary/Chest: Effort normal  No respiratory distress  Abdominal: Soft  She exhibits no distension  There is tenderness  Musculoskeletal: Normal range of motion  She exhibits no edema  Neurological: She is alert  No cranial nerve deficit  Skin: Skin is warm and dry  Psychiatric: She has a normal mood and affect  Her behavior is normal        Additional Data:   Lab Results: I have personally reviewed pertinent reports  Results from last 7 days   Lab Units 07/13/20  0854   WBC Thousand/uL 8 60   RBC Million/uL 4 36   HEMOGLOBIN g/dL 15 6   HEMATOCRIT % 44 3   PLATELETS Thousands/uL 337   NEUTROS PCT % 64   LYMPHS PCT % 28   MONOS PCT % 7   EOS PCT % 0     Results from last 7 days   Lab Units 07/13/20  0854   SODIUM mmol/L 134   POTASSIUM mmol/L 3 9   CHLORIDE mmol/L 94*   CO2 mmol/L 24   BUN mg/dL 15   CREATININE mg/dL 0 93   CALCIUM mg/dL 8 2*   ALK PHOS U/L 39*   ALT U/L 15   AST U/L 24                     Imaging: I have personally reviewed pertinent reports  CT chest abdomen pelvis w contrast   Final Result by Nick Parikh MD (07/13 3287)   1  No evidence of acute intrathoracic inflammatory process  2   Mild circumferential bowel wall thickening of the terminal ileum and throughout the colon suspicious for terminal ileitis/colitis  No evidence of abscess formation or fistula  3   Hepatic steatosis  Workstation performed: [id number]         XR chest 1 view portable   Final Result by Radha Luna MD (07/13 5865)      No acute cardiopulmonary disease  Workstation performed: [id number]             Epic Records Reviewed: Yes     ** Please Note: This note has been constructed using a voice recognition system   **

## 2020-07-13 NOTE — ED PROVIDER NOTES
History  Chief Complaint   Patient presents with    Shortness of Breath     states it started this morning, has asthma used inhaler multiple times today, had relief without mask on     Diarrhea     states that this started on friday, 6x every day, no blood or mucus, abdominal cramping- feels like her crohns     Weakness - Generalized     3 days of malaise, chills  2 days of vomiting nbnb and diarrhea non-bloody  No abdo pain, doesn't feel like a crohn's flare  This morning felt sob  No chest pain  No sore throat, cogh, rhinorrhea, rash, headache, neck stiffness, urinary symp  Works in healthcare  Prior to Admission Medications   Prescriptions Last Dose Informant Patient Reported? Taking? VENTOLIN  (90 Base) MCG/ACT inhaler  Self No No   Sig: INHALE 1-2 PUFFS EVERY 4 HOURS AS NEEDED FOR WHEEZING   budesonide-formoterol (SYMBICORT) 160-4 5 mcg/act inhaler   No No   Sig: Inhale 2 puffs 2 (two) times a day Rinse mouth after use     cetirizine (ZyrTEC) 10 mg tablet  Self Yes No   Sig: Take 10 mg by mouth daily   diphenhydrAMINE (BENADRYL ALLERGY) 25 mg capsule   Yes No   Sig: Take by mouth   ergocalciferol (VITAMIN D2) 50,000 units   No No   Sig: Take 1 capsule (50,000 Units total) by mouth once a week   fluticasone (FLONASE) 50 mcg/act nasal spray   Yes No   Si sprays into each nostril daily   vilazodone (Viibryd) 40 mg tablet   No No   Sig: Take 1 tablet (40 mg total) by mouth daily      Facility-Administered Medications: None       Past Medical History:   Diagnosis Date    Anxiety     Asthma     Crohn disease ([address] )     Depression     Hypothyroidism        Past Surgical History:   Procedure Laterality Date    NO PAST SURGERIES         Family History   Problem Relation Age of Onset    Colon cancer Mother     Kidney cancer Mother     Hypertension Mother     Colon cancer Father     Kidney cancer Father     Hypertension Father     Colon cancer Family     Kidney cancer Family      I have reviewed and agree with the history as documented  E-Cigarette/Vaping     E-Cigarette/Vaping Substances     Social History     Tobacco Use    Smoking status: Former Smoker     Last attempt to quit: 2018     Years since quittin 0    Smokeless tobacco: Never Used   Substance Use Topics    Alcohol use: Never     Frequency: Never     Comment: No alcohol use - As per Allscripts     Drug use: Never       Review of Systems   Constitutional: Positive for chills and fatigue  Negative for fever  HENT: Negative for rhinorrhea  Eyes: Negative for visual disturbance  Respiratory: Positive for shortness of breath  Cardiovascular: Negative for chest pain  Gastrointestinal: Positive for diarrhea, nausea and vomiting  Negative for abdominal distention, abdominal pain, blood in stool, constipation and rectal pain  Endocrine: Negative for polydipsia  Genitourinary: Negative for dysuria, frequency and hematuria  Musculoskeletal: Negative for neck stiffness  Skin: Negative for rash  Allergic/Immunologic: Negative for immunocompromised state  Neurological: Negative for speech difficulty, weakness and numbness  Psychiatric/Behavioral: Negative for suicidal ideas  Physical Exam  Physical Exam   Constitutional: She is oriented to person, place, and time  She appears well-nourished  No distress  HENT:   Head: Normocephalic and atraumatic  Mouth/Throat: No oropharyngeal exudate  Coated tongue   Eyes: Conjunctivae and EOM are normal    Neck: Normal range of motion  Neck supple  Cardiovascular: Regular rhythm and normal heart sounds  Pulmonary/Chest: Effort normal and breath sounds normal    Abdominal: Soft  Bowel sounds are normal  She exhibits no mass  There is no tenderness  There is no guarding  Musculoskeletal: She exhibits no edema  Neurological: She is alert and oriented to person, place, and time  Skin: Skin is warm and dry     Psychiatric: She has a normal mood and affect  Vital Signs  ED Triage Vitals [07/13/20 0811]   Temperature Pulse Respirations Blood Pressure SpO2   (!) 97 °F (36 1 °C) 102 16 (!) 147/105 98 %      Temp Source Heart Rate Source Patient Position - Orthostatic VS BP Location FiO2 (%)   Temporal Monitor Sitting Left arm --      Pain Score       No Pain           Vitals:    07/13/20 1414 07/13/20 1459 07/14/20 0018 07/14/20 0735   BP:  137/91 (!) 144/102 144/90   Pulse: 98 103 97 89   Patient Position - Orthostatic VS:  Lying Lying Lying         Visual Acuity      ED Medications  Medications   sodium chloride 0 9 % bolus 1,000 mL (1,000 mL Intravenous New Bag 7/13/20 0907)   ondansetron (ZOFRAN) injection 4 mg (4 mg Intravenous Given 7/13/20 0904)   dexamethasone (PF) (DECADRON) injection 10 mg (10 mg Intravenous Given 7/13/20 1001)   lactated ringers bolus 1,000 mL (1,000 mL Intravenous New Bag 7/13/20 1026)   cefepime (MAXIPIME) IVPB (premix) 2,000 mg 50 mL (2,000 mg Intravenous New Bag 7/13/20 1027)   iohexol (OMNIPAQUE) 240 MG/ML solution 50 mL (50 mL Oral Given 7/13/20 1030)   iohexol (OMNIPAQUE) 350 MG/ML injection (MULTI-DOSE) 100 mL (100 mL Intravenous Given 7/13/20 1210)   sodium chloride 0 9 % infusion (125 mL/hr Intravenous New Bag 7/13/20 1628)       Diagnostic Studies  Results Reviewed     Procedure Component Value Units Date/Time    Blood culture #1 [[id number]] Collected:  07/13/20 0859    Lab Status:  Final result Specimen:  Blood from Arm, Left Updated:  07/18/20 1301     Blood Culture No Growth After 5 Days  Blood culture #2 [[id number]] Collected:  07/13/20 0854    Lab Status:  Final result Specimen:  Blood from Arm, Right Updated:  07/18/20 1301     Blood Culture No Growth After 5 Days      UA w Reflex to Microscopic w Reflex to Culture [[id number]]  (Abnormal) Collected:  07/13/20 1603    Lab Status:  Final result Specimen:  Urine, Clean Catch Updated:  07/13/20 1632     Color, UA Straw     Clarity, UA Clear     Specific [place], UA <=1 005     pH, UA 5 5     Leukocytes, UA Negative     Nitrite, UA Negative     Protein, UA Negative mg/dl      Glucose, UA Negative mg/dl      Ketones, UA Negative mg/dl      Urobilinogen, UA 0 2 E U /dl      Bilirubin, UA Negative     Blood, UA Trace-Intact    Procalcitonin [[id number]]  (Normal) Collected:  07/13/20 1021    Lab Status:  Final result Specimen:  Blood from Arm, Right Updated:  07/13/20 1605     Procalcitonin <0 05 ng/ml     Lactic acid 2 Hours [[id number]]  (Abnormal) Collected:  07/13/20 1102    Lab Status:  Final result Specimen:  Blood from Arm, Left Updated:  07/13/20 1131     LACTIC ACID 4 7 mmol/L     Narrative:       Result may be elevated if tourniquet was used during collection  Sedimentation rate, automated [[id number]]  (Normal) Collected:  07/13/20 0854    Lab Status:  Final result Specimen:  Blood from Arm, Right Updated:  07/13/20 0951     Sed Rate 2 mm/hour     Influenza A/B and RSV PCR [[id number]]  (Normal) Collected:  07/13/20 0859    Lab Status:  Final result Specimen:  Nasopharyngeal Swab Updated:  07/13/20 0943     INFLUENZA A PCR None Detected     INFLUENZA B PCR None Detected     RSV PCR None Detected    Lactic acid [[id number]]  (Abnormal) Collected:  07/13/20 0854    Lab Status:  Final result Specimen:  Blood from Arm, Right Updated:  07/13/20 0938     LACTIC ACID 5 1 mmol/L     Narrative:       Result may be elevated if tourniquet was used during collection      Comprehensive metabolic panel [[id number]]  (Abnormal) Collected:  07/13/20 0854    Lab Status:  Final result Specimen:  Blood from Arm, Right Updated:  07/13/20 0934     Sodium 134 mmol/L      Potassium 3 9 mmol/L      Chloride 94 mmol/L      CO2 24 mmol/L      ANION GAP 16 mmol/L      BUN 15 mg/dL      Creatinine 0 93 mg/dL      Glucose 133 mg/dL      Calcium 8 2 mg/dL      AST 24 U/L      ALT 15 U/L      Alkaline Phosphatase 39 U/L      Total Protein 7 7 g/dL      Albumin 4 4 g/dL      Total Bilirubin 1 60 mg/dL eGFR 77 ml/min/1 73sq m     Narrative:       [place] guidelines for Chronic Kidney Disease (CKD):     Stage 1 with normal or high GFR (GFR > 90 mL/min/1 73 square meters)    Stage 2 Mild CKD (GFR = 60-89 mL/min/1 73 square meters)    Stage 3A Moderate CKD (GFR = 45-59 mL/min/1 73 square meters)    Stage 3B Moderate CKD (GFR = 30-44 mL/min/1 73 square meters)    Stage 4 Severe CKD (GFR = 15-29 mL/min/1 73 square meters)    Stage 5 End Stage CKD (GFR <15 mL/min/1 73 square meters)  Note: GFR calculation is accurate only with a steady state creatinine    C-reactive protein [[id number]]  (Normal) Collected:  07/13/20 0854    Lab Status:  Final result Specimen:  Blood from Arm, Right Updated:  07/13/20 0934     CRP <5 0 mg/L     Lipase [[id number]]  (Normal) Collected:  07/13/20 0854    Lab Status:  Final result Specimen:  Blood from Arm, Right Updated:  07/13/20 0934     Lipase 55 u/L     Magnesium [[id number]]  (Normal) Collected:  07/13/20 0854    Lab Status:  Final result Specimen:  Blood from Arm, Right Updated:  07/13/20 0934     Magnesium 1 9 mg/dL     Novel Coronavirus (Covid-19),PCR [facility] [[id number]]  (Normal) Collected:  07/13/20 0825    Lab Status:  Final result Specimen:  Nares from Nose Updated:  07/13/20 0929     SARS-CoV-2 Negative    Narrative: The specimen collection materials, transport medium, and/or testing methodology utilized in the production of these test results have been proven to be reliable in a limited validation with an abbreviated program under the Emergency Utilization Authorization provided by the FDA  Testing reported as "Presumptive positive" will be confirmed with secondary testing with a reference laboratory to ensure result accuracy  Clinical caution and judgement should be used with the interpretation of these results with consideration of the clinical impression and other laboratory testing    Testing reported as "Positive" or "Negative" has been proven to be accurate according to standard laboratory validation requirements  All testing is performed with control materials showing appropriate reactivity at standard intervals  CBC and differential [[id number]]  (Abnormal) Collected:  07/13/20 0854    Lab Status:  Final result Specimen:  Blood from Arm, Right Updated:  07/13/20 0912     WBC 8 60 Thousand/uL      RBC 4 36 Million/uL      Hemoglobin 15 6 g/dL      Hematocrit 44 3 %       fL      MCH 35 8 pg      MCHC 35 3 g/dL      RDW 14 8 %      MPV 6 5 fL      Platelets 905 Thousands/uL      Neutrophils Relative 64 %      Lymphocytes Relative 28 %      Monocytes Relative 7 %      Eosinophils Relative 0 %      Basophils Relative 1 %      Neutrophils Absolute 5 50 Thousands/µL      Lymphocytes Absolute 2 40 Thousands/µL      Monocytes Absolute 0 60 Thousand/µL      Eosinophils Absolute 0 00 Thousand/µL      Basophils Absolute 0 10 Thousands/µL                  CT chest abdomen pelvis w contrast   Final Result by Kade King MD (07/13 7374)   1  No evidence of acute intrathoracic inflammatory process  2   Mild circumferential bowel wall thickening of the terminal ileum and throughout the colon suspicious for terminal ileitis/colitis  No evidence of abscess formation or fistula  3   Hepatic steatosis  Workstation performed: [id number]         XR chest 1 view portable   Final Result by Refugio Lozano MD (07/13 0827)      No acute cardiopulmonary disease  Workstation performed: [id number]                    Procedures  Procedures         ED Course                                             MDM  Number of Diagnoses or Management Options  Dehydration:   Diarrhea:   Sepsis [facility]):   Diagnosis management comments: Malaise, chills, nausea vomiting diarrhea  Will hydrate, check labs, check covid/flu   Abdo exam nontender, less likely a crohn's flare      ------------    Note elevated lactate no focus yet, ua and ctap still pending accepted fany cefepime and 30 bolus        Disposition  Final diagnoses:   Sepsis ([facility] )   Dehydration   Diarrhea     Time reflects when diagnosis was documented in both MDM as applicable and the Disposition within this note     Time User Action Codes Description Comment    7/13/2020 10:34 AM Cornelia Mora Add [A41 9] Sepsis ([facility] )     7/13/2020 10:34 AM Cornelia Mora Add [E86 0] Dehydration     7/13/2020 10:34 AM Cornelia Mora Add [R19 7] Diarrhea     7/14/2020 12:28 PM Greg Frances Add [P49 884] Crohn's disease of colon with complication [facility])       ED Disposition     ED Disposition Condition Date/Time Comment    Admit Stable Mon Jul 13, 2020 10:33 AM Case was discussed with rita and the patient's admission status was agreed to be Admission Status: inpatient status to the service of Dr Wes Pardo   Follow-up Information     Follow up With Specialties Details Why Ho Matthews MD Gastroenterology Follow up in 2 week(s)  [address] [facility]  [phone]            Discharge Medication List as of 7/14/2020  1:19 PM      START taking these medications    Details   predniSONE 10 mg tablet Multiple Dosages:Starting Tue 7/14/2020, Last dose on Thu 7/16/2020, THEN Starting Fri 7/17/2020, Last dose on Sun 7/19/2020, THEN Starting Mon 7/20/2020, Last dose on Wed 7/22/2020, THEN Starting Thu 7/23/2020, Last dose on Sat 7/25/2020, [address] Sun 7/26/2020, Last dose on Tue 7/28/2020, THEN Starting Wed 7/29/2020, Last dose on Fri 7/31/2020, THEN Starting Sat 8/1/2020, Last dose on Mon 8/3/2020Take 6 tablets (60 mg total) by mouth daily for 3 days, THEN 5 tablets (50 mg total) daily fo r 3 days, THEN 4 tablets (40 mg total) daily for 3 days, THEN 3 tablets (30 mg total) daily for 3 days, THEN 3 tablets (30 mg total) daily for 3 days, THEN 2 tablets (20 mg total) daily for 3 days, THEN 1 tablet (10 mg total) daily for 3 days  , Normal         CONTINUE these medications which have NOT CHANGED    Details   budesonide-formoterol (SYMBICORT) 160-4 5 mcg/act inhaler Inhale 2 puffs 2 (two) times a day Rinse mouth after use , Starting Fri 4/3/2020, Normal      cetirizine (ZyrTEC) 10 mg tablet Take 10 mg by mouth daily, Historical Med      diphenhydrAMINE (BENADRYL ALLERGY) 25 mg capsule Take by mouth, Historical Med      ergocalciferol (VITAMIN D2) 50,000 units Take 1 capsule (50,000 Units total) by mouth once a week, Starting Tue 5/5/2020, Normal      fluticasone (FLONASE) 50 mcg/act nasal spray 2 sprays into each nostril daily, Starting Tue 3/29/2016, Historical Med      VENTOLIN  (90 Base) MCG/ACT inhaler INHALE 1-2 PUFFS EVERY 4 HOURS AS NEEDED FOR WHEEZING, Normal      vilazodone (Viibryd) 40 mg tablet Take 1 tablet (40 mg total) by mouth daily, Starting Tue 3/24/2020, Normal      clonazePAM (KlonoPIN) 0 5 mg tablet Take 1 tablet (0 5 mg total) by mouth 2 (two) times a day, Starting Tue 6/16/2020, Normal      levothyroxine 100 mcg tablet Take 1 tablet (100 mcg total) by mouth daily, Starting Wed 1/29/2020, Normal      montelukast (SINGULAIR) 10 mg tablet Take 1 tablet (10 mg total) by mouth daily, Starting Wed 2/26/2020, Normal           Outpatient Discharge Orders   Activity as tolerated     Call provider for:  persistent nausea or vomiting     Call provider for:  severe uncontrolled pain     Call provider for:  difficulty breathing, headache or visual disturbances     Call provider for:  persistent dizziness or light-headedness       PDMP Review       Value Time User    PDMP Reviewed  Yes 7/16/2020  2:57 PM Katie Hauser DO          ED Provider  Electronically Signed by           Melvin Austin MD  07/21/20 4577

## 2020-07-13 NOTE — RESPIRATORY THERAPY NOTE
RT Protocol Note  Mercedes Varela 38 y o  female MRN: [id number]  Unit/Bed#: -01 Encounter: [id number]    Assessment    Principal Problem:    Terminal ileitis without complication ([facility] )  Active Problems:    JOSE (generalized anxiety disorder)    Moderate persistent asthma without complication    Hypothyroidism    Crohn's disease of colon with complication (HCC)      Home Pulmonary Medications:  Albuterol MDI PRN  Home Devices/Therapy: (P) Other (Comment)(Albuterol MDI PRN)    Past Medical History:   Diagnosis Date    Anxiety     Asthma     Crohn disease ([place] [address] )     Depression     Hypothyroidism      Social History     Socioeconomic History    Marital status:      Spouse name: None    Number of children: None    Years of education: None    Highest education level: None   Occupational History    None   Social Needs    Financial resource strain: None    Food insecurity:     Worry: None     Inability: None    Transportation needs:     Medical: None     Non-medical: None   Tobacco Use    Smoking status: Former Smoker     Last attempt to quit: 2018     Years since quittin 0    Smokeless tobacco: Never Used   Substance and Sexual Activity    Alcohol use: Never     Frequency: Never     Comment: No alcohol use - As per [employer]scripts     Drug use: Never    Sexual activity: Not Currently   Lifestyle    Physical activity:     Days per week: None     Minutes per session: None    Stress: None   Relationships    Social connections:     Talks on phone: None     Gets together: None     Attends Worship service: None     Active member of club or organization: None     Attends meetings of clubs or organizations: None     Relationship status: None    Intimate partner violence:     Fear of current or ex partner: None     Emotionally abused: None     Physically abused: None     Forced sexual activity: None   Other Topics Concern    None   Social History Narrative    Always uses seat belt Exercise frequency (times/week)    Caffeine use       Subjective         Objective    Physical Exam:   Assessment Type: (P) Pre-treatment  General Appearance: (P) Alert, Awake  Respiratory Pattern: (P) Normal  Chest Assessment: (P) Chest expansion symmetrical  Bilateral Breath Sounds: (P) Clear  Cough: (P) None  O2 Device: (P) RA    Vitals:  Blood pressure (!) 143/101, pulse (P) 98, temperature 97 5 °F (36 4 °C), temperature source Temporal, resp  rate (P) 16, height 5' 3" (1 6 m), weight 88 6 kg (195 lb 5 2 oz), last menstrual period 06/21/2020, SpO2 98 %  Imaging and other studies: I have personally reviewed pertinent reports  O2 Device: (P) RA     Plan    Respiratory Plan: (P) Home Bronchodilator Patient pathway, Discontinue Protocol        Resp Comments: Pt  assessed  Pt  stated that she is not coughing up any sputum and is slightly SOB  Pt  does not wear O2 or wear BiPap/CPAP @ home  Pt  uses an Albuterol MDI PRN @ home  Nebulizer treatment administered, as ordered  Will change Q 6 hour UDN to a PRN MDI

## 2020-07-13 NOTE — PLAN OF CARE
Problem: Potential for Falls  Goal: Patient will remain free of falls  Description  INTERVENTIONS:  - Assess patient frequently for physical needs  -  Identify cognitive and physical deficits and behaviors that affect risk of falls  -  [place] fall precautions as indicated by assessment   - Educate patient/family on patient safety including physical limitations  - Instruct patient to call for assistance with activity based on assessment  - Modify environment to reduce risk of injury  - Consider OT/PT consult to assist with strengthening/mobility  Outcome: Progressing     Problem: INFECTION - ADULT  Goal: Absence or prevention of progression during hospitalization  Description  INTERVENTIONS:  - Assess and monitor for signs and symptoms of infection  - Monitor lab/diagnostic results  - Monitor all insertion sites, i e  indwelling lines, tubes, and drains  - Monitor endotracheal if appropriate and nasal secretions for changes in amount and color  - [place] appropriate cooling/warming therapies per order  - Administer medications as ordered  - Instruct and encourage patient and family to use good hand hygiene technique  - Identify and instruct in appropriate isolation precautions for identified infection/condition  Outcome: Progressing     Problem: SAFETY ADULT  Goal: Patient will remain free of falls  Description  INTERVENTIONS:  - Assess patient frequently for physical needs  -  Identify cognitive and physical deficits and behaviors that affect risk of falls    -  [place] fall precautions as indicated by assessment   - Educate patient/family on patient safety including physical limitations  - Instruct patient to call for assistance with activity based on assessment  - Modify environment to reduce risk of injury  - Consider OT/PT consult to assist with strengthening/mobility  Outcome: Progressing     Problem: DISCHARGE PLANNING  Goal: Discharge to home or other facility with appropriate resources  Description  INTERVENTIONS:  - Identify barriers to discharge w/patient and caregiver  - Arrange for needed discharge resources and transportation as appropriate  - Identify discharge learning needs (meds, wound care, etc )  -     - Refer to Case Management Department for coordinating discharge planning if the patient needs post-hospital services based on physician/advanced practitioner order or complex needs related to functional status, cognitive ability, or social support system   Outcome: Progressing     Problem: Knowledge Deficit  Goal: Patient/family/caregiver demonstrates understanding of disease process, treatment plan, medications, and discharge instructions  Description  Complete learning assessment and assess knowledge base    Interventions:  - Provide teaching at level of understanding  - Provide teaching via preferred learning methods  Outcome: Progressing     Problem: RESPIRATORY - ADULT  Goal: Achieves optimal ventilation and oxygenation  Description  INTERVENTIONS:  - Assess for changes in respiratory status  - Assess for changes in mentation and behavior  - Position to facilitate oxygenation and minimize respiratory effort  - Oxygen administered by appropriate delivery if ordered  - Initiate smoking cessation education as indicated  - Encourage broncho-pulmonary hygiene including cough, deep breathe, Incentive Spirometry  - Assess the need for suctioning and aspirate as needed  - Assess and instruct to report SOB or any respiratory difficulty  - Respiratory Therapy support as indicated  Outcome: Progressing     Problem: GASTROINTESTINAL - ADULT  Goal: Minimal or absence of nausea and/or vomiting  Description  INTERVENTIONS:  - Administer IV fluids if ordered to ensure adequate hydration  - Maintain NPO status until nausea and vomiting are resolved  - Nasogastric tube if ordered  - Administer ordered antiemetic medications as needed  - Provide nonpharmacologic comfort measures as appropriate  - Advance diet as tolerated, if ordered  - Consider nutrition services referral to assist patient with adequate nutrition and appropriate food choices  Outcome: Progressing  Goal: Maintains or returns to baseline bowel function  Description  INTERVENTIONS:  - Assess bowel function  - Encourage oral fluids to ensure adequate hydration  - Administer IV fluids if ordered to ensure adequate hydration  - Administer ordered medications as needed  - Encourage mobilization and activity  - Consider nutritional services referral to assist patient with adequate nutrition and appropriate food choices  Outcome: Progressing  Goal: Maintains adequate nutritional intake  Description  INTERVENTIONS:  - Monitor percentage of each meal consumed  - Identify factors contributing to decreased intake, treat as appropriate  - Assist with meals as needed  - Monitor I&O, weight, and lab values if indicated  - Obtain nutrition services referral as needed  Outcome: Progressing

## 2020-07-13 NOTE — CONSULTS
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     History of Present Illness   Physician Requesting Consult: Alia Boone MD  Reason for Consult / Principal Problem:  Nausea of vomiting and abdominal pain, history of Crohn's  Hx and PE limited by:   HPI: Kwame Hernandez is a 38y o  year old female who presents with nausea and vomiting, associated with abdominal pain and increased diarrhea over the past few weeks  The nausea and vomiting started suddenly while the patient was at work today  She works as a nurse anesthetist   Her Crohn's disease was 1st diagnosed in 2013  She had a small bowel obstruction then and had another one 3 years later  She did not require surgery  Both were treated with intravenous steroids  The patient never started a biologic  She has known Crohn's ileitis  She has no history of colitis  She quit smoking 2 years ago  She did take a course of Naprosyn approximately 3-4 months ago  She just tested COVID negative  She was given a dose of IV Decadron 10 mg in the emergency room today  Her last colonoscopy was 3 years ago  She has no history of any surgery      Consults  Current Facility-Administered Medications   Medication Dose Route Frequency Provider Last Rate Last Dose    albuterol (PROVENTIL HFA,VENTOLIN HFA) inhaler 2 puff  2 puff Inhalation Q4H PRN Alia Boone MD        budesonide-formoterol [facility]) 160-4 5 mcg/act inhaler 2 puff  2 puff Inhalation BID Darinel Ansari MD   2 puff at 07/13/20 1625    ciprofloxacin (CIPRO) IVPB (premix) 400 mg 200 mL  400 mg Intravenous Q12H Darinel Ansari  mL/hr at 07/13/20 1624 400 mg at 07/13/20 1624    clonazePAM (KlonoPIN) tablet 0 5 mg  0 5 mg Oral BID PRN Darinel Ansari MD        levothyroxine tablet 100 mcg  100 mcg Oral Daily Darinel Ansari MD        [START ON 7/14/2020] methylPREDNISolone sodium succinate (Solu-MEDROL) injection 20 mg  20 mg Intravenous [facility] Darinel Ansari MD        metroNIDAZOLE (FLAGYL) IVPB (premix) 500 mg 100 mL  500 mg Intravenous Andrey Lucero  mL/hr at 07/13/20 1427 500 mg at 07/13/20 1427    montelukast (SINGULAIR) tablet 10 mg  10 mg Oral Daily Darinel Ansari MD        ondansetron [facility]) injection 4 mg  4 mg Intravenous Q6H PRN Darinel Ansari MD        vilazodonlorena Elers) tablet 40 mg  40 mg Oral Daily Darinel Ansari MD   40 mg at 07/13/20 1627     Review of Systems   Constitutional: Positive for activity change, appetite change, fatigue and unexpected weight change  HENT: Negative  Eyes: Negative  Respiratory: Positive for wheezing  Cardiovascular: Negative  Gastrointestinal: Positive for abdominal pain, diarrhea, nausea and vomiting  Endocrine: Negative  Genitourinary: Negative  Musculoskeletal: Negative  Skin: Negative  Allergic/Immunologic: Negative  Neurological: Negative  Hematological: Negative  Psychiatric/Behavioral: Negative          Historical Information   Past Medical History:   Diagnosis Date    Anxiety     Asthma     Crohn disease ([facility] [address] )     Depression     Hypothyroidism      Past Surgical History:   Procedure Laterality Date    NO PAST SURGERIES       Social History   Social History     Substance and Sexual Activity   Alcohol Use Never    Frequency: Never    Comment: No alcohol use - As per [employer]      Social History     Substance and Sexual Activity   Drug Use Never     E-Cigarette/Vaping     E-Cigarette/Vaping Substances     Social History     Tobacco Use   Smoking Status Former Smoker    Last attempt to quit: 2018    Years since quittin 0   Smokeless Tobacco Never Used     Family History: non-contributory    Meds/Allergies   all current active meds have been reviewed    Allergies   Allergen Reactions    Penicillins Hives and Rash     HIVES, SWELLS, riley ancef x 1 dose       Vitals:    20 1033 20 1138 20 1414 20 1459   BP: 134/89 (!) 143/101  137/91   BP Location: Left arm Left arm  Left arm   Pulse: 104 104 98 103   Resp: 16 16 16 18   Temp: 97 6 °F (36 4 °C) 97 5 °F (36 4 °C)  (!) 97 °F (36 1 °C)   TempSrc: Temporal Temporal  Temporal   SpO2: 98% 97% 98% 98%   Weight:  88 6 kg (195 lb 5 2 oz)     Height:  5' 3" (1 6 m)         Objective     No intake or output data in the 24 hours ending 20 1742    Invasive Devices:   Peripheral IV 20 Right Antecubital (Active)   Site Assessment Clean;Dry; Intact 2020 12:00 PM   Dressing Type Transparent 2020 12:00 PM   Line Status Infusing 2020 12:00 PM   Dressing Status Clean;Dry; Intact 2020 12:00 PM       Physical Exam   Constitutional: She is oriented to person, place, and time  She appears well-developed and well-nourished  HENT:   Head: Normocephalic and atraumatic  Neck: Neck supple  Cardiovascular: Normal rate and regular rhythm  Pulmonary/Chest: Effort normal and breath sounds normal    Abdominal: Soft  Bowel sounds are normal    Neurological: She is alert and oriented to person, place, and time  Skin: Skin is warm and dry  Psychiatric: She has a normal mood and affect  Her behavior is normal        Lab Results: I have personally reviewed pertinent reports  Imaging Studies: I have personally reviewed pertinent reports  EKG, Pathology, and Other Studies: I have personally reviewed pertinent reports        VTE Prophylaxis: Reason for no pharmacologic prophylaxis Ambulatory    Counseling / Coordination of Care  Total floor / unit time spent today 70 minutes  Greater than 50% of total time was spent with the patient and / or family counseling and / or coordination of care  A description of the counseling / coordination of care:  Case discussed with patient and hospital physician

## 2020-07-13 NOTE — ASSESSMENT & PLAN NOTE
· Patient with history of Crohn's disease  · Currently with colitis/ileitis  · Continue treatment as above  · Gastroenterology consult

## 2020-07-13 NOTE — ASSESSMENT & PLAN NOTE
· CT scan of the abdomen/pelvis shows findings consistent with colitis/ileitis  · Placed on clear liquid diet  · IV antibiotics with Cipro/Flagyl  · Will check stool studies including C diff  · IV fluids  · Gastroenterology consult  · Monitor labs

## 2020-07-13 NOTE — PLAN OF CARE
Problem: Potential for Falls  Goal: Patient will remain free of falls  Description  INTERVENTIONS:  - Assess patient frequently for physical needs  -  Identify cognitive and physical deficits and behaviors that affect risk of falls  -  [place] fall precautions as indicated by assessment   - Educate patient/family on patient safety including physical limitations  - Instruct patient to call for assistance with activity based on assessment  - Modify environment to reduce risk of injury  - Consider OT/PT consult to assist with strengthening/mobility  7/13/2020 1406 by Bety Wolff RN  Outcome: Progressing  7/13/2020 1406 by Bety Wolff RN  Outcome: Progressing     Problem: INFECTION - ADULT  Goal: Absence or prevention of progression during hospitalization  Description  INTERVENTIONS:  - Assess and monitor for signs and symptoms of infection  - Monitor lab/diagnostic results  - Monitor all insertion sites, i e  indwelling lines, tubes, and drains  - Monitor endotracheal if appropriate and nasal secretions for changes in amount and color  - [place] appropriate cooling/warming therapies per order  - Administer medications as ordered  - Instruct and encourage patient and family to use good hand hygiene technique  - Identify and instruct in appropriate isolation precautions for identified infection/condition  7/13/2020 1406 by Bety Wolff RN  Outcome: Progressing  7/13/2020 1406 by Bety Wolff RN  Outcome: Progressing     Problem: SAFETY ADULT  Goal: Patient will remain free of falls  Description  INTERVENTIONS:  - Assess patient frequently for physical needs  -  Identify cognitive and physical deficits and behaviors that affect risk of falls    -  [place] fall precautions as indicated by assessment   - Educate patient/family on patient safety including physical limitations  - Instruct patient to call for assistance with activity based on assessment  - Modify environment to reduce risk of injury  - Consider OT/PT consult to assist with strengthening/mobility  7/13/2020 1406 by Raymond Gomez RN  Outcome: Progressing  7/13/2020 1406 by Raymond Gomez RN  Outcome: Progressing     Problem: DISCHARGE PLANNING  Goal: Discharge to home or other facility with appropriate resources  Description  INTERVENTIONS:  - Identify barriers to discharge w/patient and caregiver  - Arrange for needed discharge resources and transportation as appropriate  - Identify discharge learning needs (meds, wound care, etc )  -     - Refer to Case Management Department for coordinating discharge planning if the patient needs post-hospital services based on physician/advanced practitioner order or complex needs related to functional status, cognitive ability, or social support system   7/13/2020 1406 by Raymond Gomez RN  Outcome: Progressing  7/13/2020 1406 by Raymond Gomez RN  Outcome: Progressing     Problem: Knowledge Deficit  Goal: Patient/family/caregiver demonstrates understanding of disease process, treatment plan, medications, and discharge instructions  Description  Complete learning assessment and assess knowledge base    Interventions:  - Provide teaching at level of understanding  - Provide teaching via preferred learning methods  7/13/2020 1406 by Raymond Gomez RN  Outcome: Progressing  7/13/2020 1406 by Raymond Gomez RN  Outcome: Progressing     Problem: RESPIRATORY - ADULT  Goal: Achieves optimal ventilation and oxygenation  Description  INTERVENTIONS:  - Assess for changes in respiratory status  - Assess for changes in mentation and behavior  - Position to facilitate oxygenation and minimize respiratory effort  - Oxygen administered by appropriate delivery if ordered  - Initiate smoking cessation education as indicated  - Encourage broncho-pulmonary hygiene including cough, deep breathe, Incentive Spirometry  - Assess the need for suctioning and aspirate as needed  - Assess and instruct to report SOB or any respiratory difficulty  - Respiratory Therapy support as indicated  7/13/2020 1406 by Sreedhar Rascon RN  Outcome: Progressing  7/13/2020 1406 by Sreedhar Rascon RN  Outcome: Progressing     Problem: GASTROINTESTINAL - ADULT  Goal: Minimal or absence of nausea and/or vomiting  Description  INTERVENTIONS:  - Administer IV fluids if ordered to ensure adequate hydration  - Maintain NPO status until nausea and vomiting are resolved  - Nasogastric tube if ordered  - Administer ordered antiemetic medications as needed  - Provide nonpharmacologic comfort measures as appropriate  - Advance diet as tolerated, if ordered  - Consider nutrition services referral to assist patient with adequate nutrition and appropriate food choices  7/13/2020 1406 by Sreedhar Rascon RN  Outcome: Progressing  7/13/2020 1406 by Sreedhar Rascon RN  Outcome: Progressing  Goal: Maintains or returns to baseline bowel function  Description  INTERVENTIONS:  - Assess bowel function  - Encourage oral fluids to ensure adequate hydration  - Administer IV fluids if ordered to ensure adequate hydration  - Administer ordered medications as needed  - Encourage mobilization and activity  - Consider nutritional services referral to assist patient with adequate nutrition and appropriate food choices  7/13/2020 1406 by Sreedhar Rascon RN  Outcome: Progressing  7/13/2020 1406 by Sreedhar Rascon RN  Outcome: Progressing  Goal: Maintains adequate nutritional intake  Description  INTERVENTIONS:  - Monitor percentage of each meal consumed  - Identify factors contributing to decreased intake, treat as appropriate  - Assist with meals as needed  - Monitor I&O, weight, and lab values if indicated  - Obtain nutrition services referral as needed  7/13/2020 1406 by Sreedhar Rascon RN  Outcome: Progressing  7/13/2020 1406 by Sreedhar Rascon RN  Outcome: Progressing

## 2020-07-14 VITALS
OXYGEN SATURATION: 97 % | RESPIRATION RATE: 18 BRPM | TEMPERATURE: 96.8 F | WEIGHT: 195.33 LBS | BODY MASS INDEX: 34.61 KG/M2 | SYSTOLIC BLOOD PRESSURE: 144 MMHG | HEIGHT: 63 IN | HEART RATE: 89 BPM | DIASTOLIC BLOOD PRESSURE: 90 MMHG

## 2020-07-14 LAB
ANION GAP SERPL CALCULATED.3IONS-SCNC: 18 MMOL/L (ref 4–13)
BASOPHILS # BLD AUTO: 0 THOUSANDS/ΜL (ref 0–0.1)
BASOPHILS NFR BLD AUTO: 0 % (ref 0–2)
BUN SERPL-MCNC: 7 MG/DL (ref 7–25)
C DIFF TOX GENS STL QL NAA+PROBE: NEGATIVE
CALCIUM SERPL-MCNC: 8 MG/DL (ref 8.6–10.5)
CAMPYLOBACTER DNA SPEC NAA+PROBE: NORMAL
CHLORIDE SERPL-SCNC: 97 MMOL/L (ref 98–107)
CO2 SERPL-SCNC: 20 MMOL/L (ref 21–31)
CREAT SERPL-MCNC: 0.87 MG/DL (ref 0.6–1.2)
EOSINOPHIL # BLD AUTO: 0 THOUSAND/ΜL (ref 0–0.61)
EOSINOPHIL NFR BLD AUTO: 0 % (ref 0–5)
ERYTHROCYTE [DISTWIDTH] IN BLOOD BY AUTOMATED COUNT: 14.8 % (ref 11.5–14.5)
GFR SERPL CREATININE-BSD FRML MDRD: 83 ML/MIN/1.73SQ M
GLUCOSE SERPL-MCNC: 133 MG/DL (ref 65–99)
HCT VFR BLD AUTO: 38.4 % (ref 42–47)
HGB BLD-MCNC: 13.6 G/DL (ref 12–16)
LYMPHOCYTES # BLD AUTO: 0.7 THOUSANDS/ΜL (ref 0.6–4.47)
LYMPHOCYTES NFR BLD AUTO: 8 % (ref 21–51)
MCH RBC QN AUTO: 35.7 PG (ref 26–34)
MCHC RBC AUTO-ENTMCNC: 35.5 G/DL (ref 31–37)
MCV RBC AUTO: 101 FL (ref 81–99)
MONOCYTES # BLD AUTO: 0.3 THOUSAND/ΜL (ref 0.17–1.22)
MONOCYTES NFR BLD AUTO: 4 % (ref 2–12)
NEUTROPHILS # BLD AUTO: 7.4 THOUSANDS/ΜL (ref 1.4–6.5)
NEUTS SEG NFR BLD AUTO: 88 % (ref 42–75)
PLATELET # BLD AUTO: 270 THOUSANDS/UL (ref 149–390)
PMV BLD AUTO: 6.6 FL (ref 8.6–11.7)
POTASSIUM SERPL-SCNC: 3.5 MMOL/L (ref 3.5–5.5)
RBC # BLD AUTO: 3.82 MILLION/UL (ref 3.9–5.2)
SALMONELLA DNA SPEC QL NAA+PROBE: NORMAL
SHIGA TOXIN STX GENE SPEC NAA+PROBE: NORMAL
SHIGELLA DNA SPEC QL NAA+PROBE: NORMAL
SODIUM SERPL-SCNC: 135 MMOL/L (ref 134–143)
TSH SERPL DL<=0.05 MIU/L-ACNC: 2.01 UIU/ML (ref 0.45–5.33)
WBC # BLD AUTO: 8.4 THOUSAND/UL (ref 4.8–10.8)

## 2020-07-14 PROCEDURE — 80048 BASIC METABOLIC PNL TOTAL CA: CPT | Performed by: INTERNAL MEDICINE

## 2020-07-14 PROCEDURE — 84443 ASSAY THYROID STIM HORMONE: CPT | Performed by: INTERNAL MEDICINE

## 2020-07-14 PROCEDURE — 86480 TB TEST CELL IMMUN MEASURE: CPT | Performed by: INTERNAL MEDICINE

## 2020-07-14 PROCEDURE — 80074 ACUTE HEPATITIS PANEL: CPT | Performed by: INTERNAL MEDICINE

## 2020-07-14 PROCEDURE — 99239 HOSP IP/OBS DSCHRG MGMT >30: CPT | Performed by: INTERNAL MEDICINE

## 2020-07-14 PROCEDURE — 85025 COMPLETE CBC W/AUTO DIFF WBC: CPT | Performed by: INTERNAL MEDICINE

## 2020-07-14 RX ORDER — LEVOTHYROXINE SODIUM 0.1 MG/1
100 TABLET ORAL DAILY
Status: DISCONTINUED | OUTPATIENT
Start: 2020-07-15 | End: 2020-07-14 | Stop reason: HOSPADM

## 2020-07-14 RX ORDER — PREDNISONE 10 MG/1
TABLET ORAL
Qty: 72 TABLET | Refills: 0 | Status: SHIPPED | OUTPATIENT
Start: 2020-07-14 | End: 2020-08-04

## 2020-07-14 RX ADMIN — CIPROFLOXACIN 400 MG: 2 INJECTION, SOLUTION INTRAVENOUS at 01:09

## 2020-07-14 RX ADMIN — HYDROCODONE BITARTRATE AND ACETAMINOPHEN 1 TABLET: 5; 325 TABLET ORAL at 06:49

## 2020-07-14 RX ADMIN — BUDESONIDE AND FORMOTEROL FUMARATE DIHYDRATE 2 PUFF: 160; 4.5 AEROSOL RESPIRATORY (INHALATION) at 11:04

## 2020-07-14 RX ADMIN — VILAZODONE HYDROCHLORIDE 40 MG: 40 TABLET ORAL at 11:04

## 2020-07-14 RX ADMIN — METRONIDAZOLE 500 MG: 500 INJECTION, SOLUTION INTRAVENOUS at 05:41

## 2020-07-14 RX ADMIN — METHYLPREDNISOLONE SODIUM SUCCINATE 20 MG: 40 INJECTION, POWDER, FOR SOLUTION INTRAMUSCULAR; INTRAVENOUS at 05:41

## 2020-07-14 RX ADMIN — LEVOTHYROXINE SODIUM 100 MCG: 100 TABLET ORAL at 10:58

## 2020-07-14 NOTE — DISCHARGE SUMMARY
Discharge- Tamiko Garcia 1980, 38 y o  female MRN: [id number]    Unit/Bed#: -01 Encounter: [id number]    Primary Care Provider: Yadiel Odonnell DO   Date and time admitted to hospital: 7/13/2020  8:09 AM      * [facility]  Assessment & Plan  · GI input appreciated  · Patient gradually improving  · Per GI recommendations will discharge patient on prednisone taper  · No need for antibiotics as patient has been afebrile  · Stool studies have been negative  · COVID negative  · Follow-up with GI as outpatient in 2 weeks    Crohn's disease of colon with complication ([facility] )  Assessment & Plan  · Will discharge on prednisone taper  · Stool studies negative  · Follow-up with GI as outpatient    Hypothyroidism  Assessment & Plan  · Continue levothyroxine  · TSH within normal limits    Moderate persistent asthma without complication  Assessment & Plan  · Continue home medications    JOSE (generalized anxiety disorder)  Assessment & Plan  · Continue home medications      Discharging Physician / Practitioner: America Boone MD  PCP: Yadiel Odonnell DO  Admission Date:   Admission Orders (From admission, onward)     Ordered        07/13/20 1034  Inpatient Admission  Once                   Discharge Date: 07/14/20    Resolved Problems  Date Reviewed: 1/16/2020    None          Consultations During Hospital Stay:  · GI    Procedures Performed:   · None    Significant Findings / Test Results:   · Crohn's flare    Incidental Findings:   · None     Test Results Pending at Discharge (will require follow up): · None     Outpatient Tests Requested:  · Routine labs with PCP    Complications:     None    Reason for Admission:  Crohn's flare    Hospital Course:     Tamiko Garcia is a 38 y o  female patient who originally presented to the hospital on 7/13/2020 due to diarrhea/vomiting  Patient was found to have terminal ileitis and after GI evaluation thought to have Crohn's flare    Patient was started on antibiotics for the possibility of infection on admission  Stool studies were negative  GI recommended starting IV steroids  Patient clinically improved and was discharged on oral steroid taper  Advised to follow up with GI as outpatient to have biologic started for her Crohn's disease  Please see above list of diagnoses and related plan for additional information  Condition at Discharge: stable     Discharge Day Visit / Exam:     Subjective:  No complaints at this time    Vitals: Blood Pressure: 144/90 (07/14/20 0735)  Pulse: 89 (07/14/20 0735)  Temperature: (!) 96 8 °F (36 °C) (07/14/20 0735)  Temp Source: Temporal (07/14/20 0735)  Respirations: 18 (07/14/20 0735)  Height: 5' 3" (160 cm) (07/13/20 1138)  Weight - Scale: 88 6 kg (195 lb 5 2 oz) (07/13/20 1138)  SpO2: 97 % (07/14/20 0735)     Exam:   Physical Exam   Constitutional: She appears well-developed  No distress  HENT:   Head: Normocephalic and atraumatic  Eyes: Conjunctivae and EOM are normal    Neck: Normal range of motion  Neck supple  Cardiovascular: Normal rate and regular rhythm  Pulmonary/Chest: Effort normal  No respiratory distress  Abdominal: Soft  She exhibits no distension  There is no tenderness  Musculoskeletal: Normal range of motion  She exhibits no edema  Neurological: She is alert  No cranial nerve deficit  Skin: Skin is warm and dry  Psychiatric: She has a normal mood and affect  Her behavior is normal        Discussion with Family:  No family at bedside during my evaluation    Discharge instructions/Information to patient and family:   See after visit summary for information provided to patient and family  Provisions for Follow-Up Care:  See after visit summary for information related to follow-up care and any pertinent home health orders        Disposition:     Home    For Discharges to [address] [facility]:   · Not Applicable to this Patient - Not Applicable to this Patient    Planned Readmission:    no     Discharge Statement:  I spent 35 minutes discharging the patient  This time was spent on the day of discharge  I had direct contact with the patient on the day of discharge  Greater than 50% of the total time was spent examining patient, answering all patient questions, arranging and discussing plan of care with patient as well as directly providing post-discharge instructions  Additional time then spent on discharge activities  Discharge Medications:  See after visit summary for reconciled discharge medications provided to patient and family        ** Please Note: This note has been constructed using a voice recognition system **

## 2020-07-14 NOTE — NURSING NOTE
Patient medically cleared to be DC home today, IV removed, DC instructions reviewed and given to patient  Patient instructed to continue to take all med's as prescribed, follow up with all MD appointment's and educated on infection prevention: proper hand hygiene, cover cough and sneeze into upper arm, avoid any one who is sick, stay home if sick and wear mask when out in public, pt verbalized understanding

## 2020-07-14 NOTE — UTILIZATION REVIEW
Initial Clinical Review    Admission: Date/Time/Statement: Admission Orders (From admission, onward)     Ordered        07/13/20 1034  Inpatient Admission  Once                   Orders Placed This Encounter   Procedures    Inpatient Admission     Standing Status:   Standing     Number of Occurrences:   1     Order Specific Question:   Admitting Physician     Answer:   Mary Jane Traylor     Order Specific Question:   Level of Care     Answer:   Med Surg [16]     Order Specific Question:   Estimated length of stay     Answer:   More than 2 Midnights     Order Specific Question:   Certification     Answer:   I certify that inpatient services are medically necessary for this patient for a duration of greater than two midnights  See H&P and MD Progress Notes for additional information about the patient's course of treatment  ED Arrival Information     Expected Arrival Acuity Means of Arrival Escorted By Service Admission Type    - 7/13/2020 08:05 Urgent Walk-In Self Hospitalist Urgent    Arrival Complaint    shortness of breath vomiting diarrhea weakness        Chief Complaint   Patient presents with    Shortness of Breath     states it started this morning, has asthma used inhaler multiple times today, had relief without mask on     Diarrhea     states that this started on friday, 6x every day, no blood or mucus, abdominal cramping- feels like her crohns     Weakness - Generalized     Assessment/Plan: 39 yo fem w/hx crohn's, hypothyroid, asthma, anxiety to ED from home admitted as inpatient due to terminal ileitis  Presented w/a few days of worsening diarrhea, chills, nausea, and nonbloody vomiting  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  C/o 4/10 RLQ abd pain  Exam reveals abd tenderness  Imaging shows ileitis/colitis  GI consulted, IVF, IV steroids, double IV antbx in progress, checking c  Diff      7/13 PM: had liquid brown stool, sent for c  Diff  IVF continued   PO pain meds given with improvement in abd pain  per GI: this is an acute crohn's flare with ileitis  Continue IV steroid and consider biologic agent in the future  ED Triage Vitals [07/13/20 0811]   Temperature Pulse Respirations Blood Pressure SpO2   (!) 97 °F (36 1 °C) 102 16 (!) 147/105 98 %      Temp Source Heart Rate Source Patient Position - Orthostatic VS BP Location FiO2 (%)   Temporal Monitor Sitting Left arm --      Pain Score       No Pain        Wt Readings from Last 1 Encounters:   07/13/20 88 6 kg (195 lb 5 2 oz)     Additional Vital Signs:   Date/Time  Temp  Pulse  Resp  BP  SpO2  O2 Device  Patient Position - Orthostatic VS   07/14/20 0735  96 8 °F (36 °C)Abnormal   89  18  144/90  97 %  None (Room air)  Lying   07/14/20 0018  97 5 °F (36 4 °C)  97  18  144/102Abnormal   97 %  None (Room air)  Lying   07/13/20 1951  --  --  --  --  --  None (Room air)  --   07/13/20 1459  97 °F (36 1 °C)Abnormal   103  18  137/91  98 %  None (Room air)  Lying   07/13/20 1414  --  98  16  --  98 %  None (Room air)  --   07/13/20 1138  97 5 °F (36 4 °C)  104  16  143/101Abnormal   97 %  None (Room air)  Sitting   07/13/20 1033  97 6 °F (36 4 °C)  104  16  134/89  98 %  None (Room air)  Sitting       Pertinent Labs/Diagnostic Test Results:   No EKg  CT chest abdomen pelvis w contrast   Final Result by Ahsan Davis MD (07/13 0017)   1  No evidence of acute intrathoracic inflammatory process  2   Mild circumferential bowel wall thickening of the terminal ileum and throughout the colon suspicious for terminal ileitis/colitis  No evidence of abscess formation or fistula  3   Hepatic steatosis  XR chest 1 view portable   Final Result by Adela Connolly MD (07/13 5106)       No acute cardiopulmonary disease         Results from last 7 days   Lab Units 07/13/20  0825   SARS-COV-2  Negative     Results from last 7 days   Lab Units 07/14/20  0627 07/13/20  0854   WBC Thousand/uL 8 40 8 60   HEMOGLOBIN g/dL 13 6 15 6   HEMATOCRIT % 38 4* 44 3   PLATELETS Thousands/uL 270 337   NEUTROS ABS Thousands/µL 7 40* 5 50         Results from last 7 days   Lab Units 07/14/20  0627 07/13/20  0854   SODIUM mmol/L 135 134   POTASSIUM mmol/L 3 5 3 9   CHLORIDE mmol/L 97* 94*   CO2 mmol/L 20* 24   ANION GAP mmol/L 18* 16*   BUN mg/dL 7 15   CREATININE mg/dL 0 87 0 93   EGFR ml/min/1 73sq m 83 77   CALCIUM mg/dL 8 0* 8 2*   MAGNESIUM mg/dL  --  1 9     Results from last 7 days   Lab Units 07/13/20  0854   AST U/L 24   ALT U/L 15   ALK PHOS U/L 39*   TOTAL PROTEIN g/dL 7 7   ALBUMIN g/dL 4 4   TOTAL BILIRUBIN mg/dL 1 60*         Results from last 7 days   Lab Units 07/14/20  0627 07/13/20  0854   GLUCOSE RANDOM mg/dL 133* 133*       Results from last 7 days   Lab Units 07/14/20  0627   TSH 3RD GENERATON uIU/mL 2 010     Results from last 7 days   Lab Units 07/13/20  1021   PROCALCITONIN ng/ml <0 05     Results from last 7 days   Lab Units 07/13/20  1102 07/13/20  0854   LACTIC ACID mmol/L 4 7* 5 1*     Results from last 7 days   Lab Units 07/13/20  0854   LIPASE u/L 55     Results from last 7 days   Lab Units 07/13/20  0854   CRP mg/L <5 0   SED RATE mm/hour 2         Results from last 7 days   Lab Units 07/13/20  1603   CLARITY UA  Clear   COLOR UA  Straw   SPEC GRAV UA  <=1 005*   PH UA  5 5   GLUCOSE UA mg/dl Negative   KETONES UA mg/dl Negative   BLOOD UA  Trace-Intact*   PROTEIN UA mg/dl Negative   NITRITE UA  Negative   BILIRUBIN UA  Negative   UROBILINOGEN UA E U /dl 0 2   LEUKOCYTES UA  Negative   WBC UA /hpf None Seen   RBC UA /hpf 0-1*   BACTERIA UA /hpf Occasional   EPITHELIAL CELLS WET PREP /hpf Occasional     Results from last 7 days   Lab Units 07/13/20  0859   INFLUENZA A PCR  None Detected   INFLUENZA B PCR  None Detected   RSV PCR  None Detected     Results from last 7 days   Lab Units 07/13/20  1840   C DIFF TOXIN B  Negative     Results from last 7 days   Lab Units 07/13/20  1840   SALMONELLA SP PCR  None Detected   SHIGELLA SP/ENTEROINVASIVE E  COLI (EIEC)  None Detected   CAMPYLOBACTER SP (JEJUNI AND COLI)  None Detected   SHIGA TOXIN 1/SHIGA TOXIN 2  None Detected         Results from last 7 days   Lab Units 07/13/20  0859 07/13/20  0854   BLOOD CULTURE  Received in Microbiology Lab  Culture in Progress  Received in Microbiology Lab  Culture in Progress       ED Treatment:   Medication Administration from 07/13/2020 0805 to 07/13/2020 1138       Date/Time Order Dose Route Action     07/13/2020 0907 sodium chloride 0 9 % bolus 1,000 mL 1,000 mL Intravenous New Bag     07/13/2020 0904 ondansetron (ZOFRAN) injection 4 mg 4 mg Intravenous Given     07/13/2020 1001 dexamethasone (PF) (DECADRON) injection 10 mg 10 mg Intravenous Given     07/13/2020 1001 ipratropium-albuterol (DUO-NEB) 0 5-2 5 mg/3 mL inhalation solution 3 mL 3 mL Nebulization Given     07/13/2020 1026 lactated ringers bolus 1,000 mL 1,000 mL Intravenous New Bag     07/13/2020 1027 cefepime (MAXIPIME) IVPB (premix) 2,000 mg 50 mL 2,000 mg Intravenous New Bag        Past Medical History:   Diagnosis Date    Anxiety     Asthma     Crohn disease ([address] )     Depression     Hypothyroidism      Present on Admission:   Moderate persistent asthma without complication   JOSE (generalized anxiety disorder)   Hypothyroidism      Admitting Diagnosis:   Crohn's flare, terminal ileitis  Shortness of breath [R06 02]  Diarrhea [R19 7]  Dehydration [E86 0]    Age/Sex: 38 y o  female  Admission Orders:  Scheduled Medications:  Medications:  budesonide-formoterol 2 puff Inhalation BID   ciprofloxacin 400 mg Intravenous Q12H   [START ON 7/15/2020] levothyroxine 100 mcg Oral Daily   methylPREDNISolone sodium succinate 20 mg Intravenous Q8H [facility]   metroNIDAZOLE 500 mg Intravenous Q8H   montelukast 10 mg Oral Daily   vilazodone 40 mg Oral Daily     Continuous IV Infusions: none   PRN Meds:  albuterol 2 puff Inhalation Q4H PRN   clonazePAM 0 5 mg Oral BID PRN   HYDROcodone-acetaminophen  X 1 7/13, x 1 7/14 1 tablet Oral Q6H PRN   ondansetron 4 mg Intravenous Q6H PRN     Check hepatitis panel, tb quantiferon, o&p  Surgical soft diet    IP CONSULT TO GASTROENTEROLOGY    Network Utilization Review Department  [email or web address]  ATTENTION: Please call with any questions or concerns to [phone] and carefully listen to the prompts so that you are directed to the right person  All voicemails are confidential   Brittaney Aly all requests for admission clinical reviews, approved or denied determinations and any other requests to dedicated fax number below belonging to the campus where the patient is receiving treatment   List of dedicated fax numbers for the Facilities:  [address] DENIALS (Administrative/Medical Necessity) [phone]   [address] (Maternity/NICU/Pediatrics) [phone]   Jone Baptise [phone]   Areta Sell [phone]   Alaina Pitch [phone]   [place] [phone]   1205 [facility] 1525 [facility] [phone]   [facility]  [phone]   [address] [facility] [address] [facility]  2401 [facility] 1000 [address] [facility] [phone]

## 2020-07-14 NOTE — ASSESSMENT & PLAN NOTE
· GI input appreciated  · Patient gradually improving  · Per GI recommendations will discharge patient on prednisone taper  · No need for antibiotics as patient has been afebrile  · Stool studies have been negative  · COVID negative  · Follow-up with GI as outpatient in 2 weeks

## 2020-07-14 NOTE — DISCHARGE INSTR - AVS FIRST PAGE
Dear Dylan Ellison,     It was our pleasure to care for you here at [facility]  It is our hope that we were always able to exceed the expected standards for your care during your stay  You were hospitalized due to Crohn's Disease flare with Ileitis  You were cared for on the Medical floor by Emory Keene MD with the [facility] who covers for your primary care physician (PCP), Radha Soto DO, while you were hospitalized  If you have any questions or concerns related to this hospitalization, you may contact us at [phone]  For follow up as well as any medication refills, we recommend that you follow up with your primary care physician  A registered nurse will reach out to you by phone within a few days after your discharge to answer any additional questions that you may have after going home  However, at this time we provide for you here, the most important instructions / recommendations at discharge:     · Notable Medication Adjustments -   · Prednisone taper  · Testing Required after Discharge -   · Routine labs with your PCP  · Important follow up information -   · Follow up with GI in 2 weeks  · Other Instructions -   · Please see discharge instructions  · Please review this entire after visit summary as additional general instructions including medication list, appointments, activity, diet, any pertinent wound care, and other additional recommendations from your care team that may be provided for you        Sincerely,     Emory Keene MD

## 2020-07-14 NOTE — PLAN OF CARE
Problem: Potential for Falls  Goal: Patient will remain free of falls  Description  INTERVENTIONS:  - Assess patient frequently for physical needs  -  Identify cognitive and physical deficits and behaviors that affect risk of falls  -  [place] fall precautions as indicated by assessment   - Educate patient/family on patient safety including physical limitations  - Instruct patient to call for assistance with activity based on assessment  - Modify environment to reduce risk of injury  - Consider OT/PT consult to assist with strengthening/mobility  Outcome: Progressing     Problem: INFECTION - ADULT  Goal: Absence or prevention of progression during hospitalization  Description  INTERVENTIONS:  - Assess and monitor for signs and symptoms of infection  - Monitor lab/diagnostic results  - Monitor all insertion sites, i e  indwelling lines, tubes, and drains  - Monitor endotracheal if appropriate and nasal secretions for changes in amount and color  - [place] appropriate cooling/warming therapies per order  - Administer medications as ordered  - Instruct and encourage patient and family to use good hand hygiene technique  - Identify and instruct in appropriate isolation precautions for identified infection/condition  Outcome: Progressing     Problem: SAFETY ADULT  Goal: Patient will remain free of falls  Description  INTERVENTIONS:  - Assess patient frequently for physical needs  -  Identify cognitive and physical deficits and behaviors that affect risk of falls    -  [place] fall precautions as indicated by assessment   - Educate patient/family on patient safety including physical limitations  - Instruct patient to call for assistance with activity based on assessment  - Modify environment to reduce risk of injury  - Consider OT/PT consult to assist with strengthening/mobility  Outcome: Progressing     Problem: DISCHARGE PLANNING  Goal: Discharge to home or other facility with appropriate resources  Description  INTERVENTIONS:  - Identify barriers to discharge w/patient and caregiver  - Arrange for needed discharge resources and transportation as appropriate  - Identify discharge learning needs (meds, wound care, etc )  -     - Refer to Case Management Department for coordinating discharge planning if the patient needs post-hospital services based on physician/advanced practitioner order or complex needs related to functional status, cognitive ability, or social support system   Outcome: Progressing     Problem: Knowledge Deficit  Goal: Patient/family/caregiver demonstrates understanding of disease process, treatment plan, medications, and discharge instructions  Description  Complete learning assessment and assess knowledge base    Interventions:  - Provide teaching at level of understanding  - Provide teaching via preferred learning methods  Outcome: Progressing     Problem: RESPIRATORY - ADULT  Goal: Achieves optimal ventilation and oxygenation  Description  INTERVENTIONS:  - Assess for changes in respiratory status  - Assess for changes in mentation and behavior  - Position to facilitate oxygenation and minimize respiratory effort  - Oxygen administered by appropriate delivery if ordered  - Initiate smoking cessation education as indicated  - Encourage broncho-pulmonary hygiene including cough, deep breathe, Incentive Spirometry  - Assess the need for suctioning and aspirate as needed  - Assess and instruct to report SOB or any respiratory difficulty  - Respiratory Therapy support as indicated  Outcome: Progressing     Problem: GASTROINTESTINAL - ADULT  Goal: Minimal or absence of nausea and/or vomiting  Description  INTERVENTIONS:  - Administer IV fluids if ordered to ensure adequate hydration  - Maintain NPO status until nausea and vomiting are resolved  - Nasogastric tube if ordered  - Administer ordered antiemetic medications as needed  - Provide nonpharmacologic comfort measures as appropriate  - Advance diet as tolerated, if ordered  - Consider nutrition services referral to assist patient with adequate nutrition and appropriate food choices  Outcome: Progressing  Goal: Maintains or returns to baseline bowel function  Description  INTERVENTIONS:  - Assess bowel function  - Encourage oral fluids to ensure adequate hydration  - Administer IV fluids if ordered to ensure adequate hydration  - Administer ordered medications as needed  - Encourage mobilization and activity  - Consider nutritional services referral to assist patient with adequate nutrition and appropriate food choices  Outcome: Progressing  Goal: Maintains adequate nutritional intake  Description  INTERVENTIONS:  - Monitor percentage of each meal consumed  - Identify factors contributing to decreased intake, treat as appropriate  - Assist with meals as needed  - Monitor I&O, weight, and lab values if indicated  - Obtain nutrition services referral as needed  Outcome: Progressing

## 2020-07-14 NOTE — SOCIAL WORK
Chart reviewed by cm, assessment completed, pt is independent  and drives, she lives with her 15 yr old daughter who is on vacation with her parents in a 2 story home with also a basement, 12-14 steps  br on the 1st & 2nd floor, pt has hx of depression and anxiety, pt denies smoking and alcohol, pt has rx plan at [facility], pt has hx of hhc, no Dme equipment, pt denies any d/c needs, d/c order written, pt drove her and hse plans to drive herself home, doctor and nurse are aware, pt is in agreement with the d/c and d/c plan home  Patient/caregiver received discharge checklist   Content reviewed  Patient/caregiver encouraged to participate in discharge plan of care prior to discharge home   Patient/caregiver received discharge checklist   Content reviewed  Patient/caregiver encouraged to participate in discharge plan of care prior to discharge home  CM reviewed d/c planning process including the following: identifying help at home, patient preference for d/c planning needs, availability of treatment team to discuss questions or concerns patient and/or family may have regarding understanding medications and recognizing signs and symptoms once discharged  CM also encouraged patient to follow up with all recommended appointments after discharge  Patient advised of importance for patient and family to participate in managing patients medical well being

## 2020-07-15 ENCOUNTER — TRANSITIONAL CARE MANAGEMENT (OUTPATIENT)
Dept: INTERNAL MEDICINE CLINIC | Facility: CLINIC | Age: 40
End: 2020-07-15

## 2020-07-15 LAB
GAMMA INTERFERON BACKGROUND BLD IA-ACNC: 0.02 IU/ML
HAV IGM SER QL: NORMAL
HBV CORE IGM SER QL: NORMAL
HBV SURFACE AG SER QL: NORMAL
HCV AB SER QL: NORMAL
M TB IFN-G BLD-IMP: NEGATIVE
M TB IFN-G CD4+ BCKGRND COR BLD-ACNC: -0.01 IU/ML
M TB IFN-G CD4+ BCKGRND COR BLD-ACNC: -0.01 IU/ML
MITOGEN IGNF BCKGRD COR BLD-ACNC: 1.26 IU/ML

## 2020-07-16 DIAGNOSIS — J45.909 UNCOMPLICATED ASTHMA, UNSPECIFIED ASTHMA SEVERITY, UNSPECIFIED WHETHER PERSISTENT: ICD-10-CM

## 2020-07-16 DIAGNOSIS — F41.1 GAD (GENERALIZED ANXIETY DISORDER): ICD-10-CM

## 2020-07-16 DIAGNOSIS — E03.9 HYPOTHYROIDISM, UNSPECIFIED TYPE: ICD-10-CM

## 2020-07-16 RX ORDER — CLONAZEPAM 0.5 MG/1
0.5 TABLET ORAL 2 TIMES DAILY
Qty: 60 TABLET | Refills: 0 | Status: SHIPPED | OUTPATIENT
Start: 2020-07-16 | End: 2020-09-16 | Stop reason: SDUPTHER

## 2020-07-16 RX ORDER — MONTELUKAST SODIUM 10 MG/1
10 TABLET ORAL DAILY
Qty: 30 TABLET | Refills: 5 | Status: SHIPPED | OUTPATIENT
Start: 2020-07-16 | End: 2021-01-08 | Stop reason: SDUPTHER

## 2020-07-16 RX ORDER — LEVOTHYROXINE SODIUM 0.1 MG/1
100 TABLET ORAL DAILY
Qty: 30 TABLET | Refills: 5 | Status: SHIPPED | OUTPATIENT
Start: 2020-07-16 | End: 2020-11-10

## 2020-07-16 NOTE — TELEPHONE ENCOUNTER
Scheduled Medication Review:  Pt's scheduled medication use was reviewed by myself/staff via the [employer] website  Pt's use has been found to be appropriate w/o any concerns for misuse by the patient  Pt's current conditions require continued scheduled medication use at this time  Future review for continued appropriate medication use and misuse will continue

## 2020-07-18 LAB
BACTERIA BLD CULT: NORMAL
BACTERIA BLD CULT: NORMAL

## 2020-07-20 ENCOUNTER — OFFICE VISIT (OUTPATIENT)
Dept: INTERNAL MEDICINE CLINIC | Facility: CLINIC | Age: 40
End: 2020-07-20
Payer: COMMERCIAL

## 2020-07-20 VITALS
WEIGHT: 196.25 LBS | OXYGEN SATURATION: 98 % | TEMPERATURE: 99.2 F | HEIGHT: 63 IN | SYSTOLIC BLOOD PRESSURE: 100 MMHG | BODY MASS INDEX: 34.77 KG/M2 | DIASTOLIC BLOOD PRESSURE: 60 MMHG | HEART RATE: 67 BPM

## 2020-07-20 DIAGNOSIS — R73.09 ELEVATED GLUCOSE: ICD-10-CM

## 2020-07-20 DIAGNOSIS — E83.51 HYPOCALCEMIA: ICD-10-CM

## 2020-07-20 DIAGNOSIS — K50.00 TERMINAL ILEITIS WITHOUT COMPLICATION (HCC): Primary | ICD-10-CM

## 2020-07-20 DIAGNOSIS — K50.119 CROHN'S DISEASE OF COLON WITH COMPLICATION (HCC): ICD-10-CM

## 2020-07-20 DIAGNOSIS — J45.40 MODERATE PERSISTENT ASTHMA WITHOUT COMPLICATION: ICD-10-CM

## 2020-07-20 DIAGNOSIS — D75.89 MACROCYTOSIS WITHOUT ANEMIA: ICD-10-CM

## 2020-07-20 PROCEDURE — 1111F DSCHRG MED/CURRENT MED MERGE: CPT | Performed by: INTERNAL MEDICINE

## 2020-07-20 PROCEDURE — 3008F BODY MASS INDEX DOCD: CPT | Performed by: PHYSICIAN ASSISTANT

## 2020-07-20 PROCEDURE — 99496 TRANSJ CARE MGMT HIGH F2F 7D: CPT | Performed by: INTERNAL MEDICINE

## 2020-07-20 NOTE — PROGRESS NOTES
Assessment/Plan:     No problem-specific Assessment & Plan notes found for this encounter  Diagnoses and all orders for this visit:    Terminal ileitis without complication ([facility] [address] )  -     Comprehensive metabolic panel; Future  -     Ambulatory referral to Nutrition Services; Future    Crohn's disease of colon with complication ([facility] [address] )  -     Ambulatory referral to Nutrition Services; Future    Moderate persistent asthma without complication    Elevated glucose  -     Hemoglobin A1C; Future  -     Ambulatory referral to Nutrition Services; Future    Hypocalcemia  -     Calcium; Future  -     Calcium, ionized; Future  -     Comprehensive metabolic panel; Future  -     Ambulatory referral to Nutrition Services; Future    Macrocytosis without anemia  -     Vitamin B12; Future  -     Comprehensive metabolic panel; Future  -     Folate; Future  -     Ambulatory referral to Nutrition Services; Future     A/P: Stable and pt having trouble with her diet that is contributing to her flare  Pt sticks to the diet, but over does the volumes, or goes off her diet  Discussed the labs and will recheck the b12/folate and calcium  Feel the sugars are most likely due to the steroids, but will check a HgA1c  Discussed up coming injection to start biologicals and agree that this is probably her best course of action  Will refer to dietary for some guidance for her diet  Will hold on increasing the steroids back to 60 since she will be getting the infusions in two days  BP is a little low, but pt is otherwise asymptomatic  Recommended she increase her po fluids given the diarrhea and the hot weather  RTC one month for f/u and routine  Subjective:     Patient ID: Chris Ocasio is a 38 y o  female  WF with PMH of Crohn's,  Presents for a short admission after acute onset of n/v  W/u revealed terminal ileitis and flare of her Crohn's  Treated conservatively with IV steroids and d/c   Since d/c, doing not much better, but  no more n/v, but continued diarrhea w/o blood    No fever or chills  Tolerating the meds  Diet increasing and appetite improving and trying to watch her diet, but is confused on what she can and can't eat    No new c/o's  Labs at d/c with elevated MCV, elevated sugars, and low calcium  Asthma I ok  Review of Systems   Constitutional: Positive for activity change and appetite change  Negative for chills, diaphoresis, fatigue and fever  Respiratory: Negative for cough, chest tightness, shortness of breath and wheezing  Cardiovascular: Negative for chest pain, palpitations and leg swelling  Gastrointestinal: Positive for abdominal pain and diarrhea  Negative for abdominal distention, anal bleeding, blood in stool, constipation, nausea, rectal pain and vomiting  Genitourinary: Negative for difficulty urinating, dysuria and frequency  Musculoskeletal: Negative for arthralgias, gait problem and myalgias  Neurological: Negative for dizziness, seizures, syncope, weakness, light-headedness and headaches  Psychiatric/Behavioral: Negative for confusion and dysphoric mood  The patient is not nervous/anxious  Objective:     Physical Exam   Constitutional: She is oriented to person, place, and time  She appears well-developed and well-nourished  No distress  HENT:   Head: Normocephalic and atraumatic  Mouth/Throat: Oropharynx is clear and moist    Eyes: Pupils are equal, round, and reactive to light  Conjunctivae and EOM are normal    Cardiovascular: Normal rate, regular rhythm and normal heart sounds  Pulmonary/Chest: Effort normal and breath sounds normal  No respiratory distress  She has no wheezes  She has no rales  Abdominal: Soft  She exhibits no distension and no mass  There is no guarding  Decreased and occasional tinkling  Musculoskeletal: She exhibits no edema  Neurological: She is alert and oriented to person, place, and time  Psychiatric: She has a normal mood and affect   Her behavior is normal  Judgment and thought content normal    Nursing note and vitals reviewed  Vitals:    07/20/20 1433   BP: 100/60   Pulse: 67   Temp: 99 2 °F (37 3 °C)   SpO2: 98%   Weight: 89 kg (196 lb 4 oz)   Height: 5' 3" (1 6 m)       Transitional Care Management Review:  Tunde Khoury is a 38 y o  female here for TCM follow up  During the TCM phone call patient stated:    TCM Call (since 6/19/2020)     Date and time call was made  7/15/2020  3:20 PM    Hospital care reviewed  Records reviewed    Patient was hospitialized at  [address] [place] [address]    Date of Admission  07/13/20    Date of discharge  07/14/20    Diagnosis  Terminal ileitis without complication     Disposition  Home    Were the patients medications reviewed and updated  Yes    Current Symptoms  None      TCM Call (since 6/19/2020)     Post hospital issues  None    Should patient be enrolled in anticoag monitoring? No    Scheduled for follow up? Yes    Did you obtain your prescribed medications  Yes    Do you need help managing your prescriptions or medications  No    Is transportation to your appointment needed  No    I have advised the patient to call PCP with any new or worsening symptoms  Anlge kelsey    Counseling  Patient    Counseling topics  Diagnostic results; Prognosis; Activities of daily living; Importance of RX compliance; patient and family education; instructions for management;  Risk factor reduction          Mayra Chadwick DO

## 2020-07-20 NOTE — PATIENT INSTRUCTIONS
Crohn Disease   WHAT YOU NEED TO KNOW:   What is Crohn disease? Crohn disease is an inflammatory disease of the digestive system  Crohn disease causes the lining of your intestines to become inflamed  The lining of your mouth, esophagus, or stomach may also be affected by Crohn disease  What causes Crohn disease? It is not known exactly what causes Crohn disease  A family history of Crohn disease increases your risk  Your immune system may overreact to bacteria or a virus in the digestive tract and cause inflammation and injury  Smoking also increases your risk for Crohn disease  What are the signs and symptoms of Crohn disease? You may have different symptoms at different times  Your symptoms may come and go with quiet and active periods  Over time, active periods may occur more often and symptoms may be more severe  The most common signs and symptoms include the following:  · Cramping pain on the lower right side of your abdomen    · Diarrhea that may be dark or tar-colored, or blood in your bowel movements    · Fever    · More tired than usual    · Loss of appetite, losing weight without trying, or slow growth in children    · Nausea  How is Crohn disease diagnosed? · Blood tests  may be needed to check for infection or health problems caused by Crohn disease, such as low iron levels  · A bowel movement sample  may show if bacteria are causing your illness  · A colonoscopy  is a test that is done to look at your colon  A tube with a light on the end will be put into your anus, and then moved forward into your colon  · A barium enema  is an x-ray of the colon  A tube is put into your anus, and a liquid called barium is put through the tube  Barium is used so that your healthcare provider can see your colon better  · A barium swallow  is an x-ray of your throat and esophagus  This test may also be called a barium esophagram  You will drink a thick liquid called barium   Barium helps your esophagus and stomach show up better on x-rays  Follow the instructions of your healthcare provider before and after the test     · An endoscopy  is a test that uses a scope to see the inside of your digestive tract, including the esophagus and stomach  Samples may be taken from your digestive tract and sent to a lab for tests  Bleeding may also be treated during an endoscopy  · MRI or CT  pictures may be taken of your digestive system and other organs  You may be given contrast liquid to help the pictures show up better  Tell the healthcare provider if you have ever had an allergic reaction to contrast liquid  Do not enter the MRI room with anything metal  Metal can cause serious injury  Tell the healthcare provider if you have any metal in or on your body  · An ultrasound  is a test that uses sound waves to look at pictures of your digestive system  How is Crohn disease treated? · Medicines  may be used to decrease inflammation in your digestive tract  You may need antibiotics to treat or prevent an infection and antidiarrheal medicine to decrease diarrhea  Immunosuppressants may also be given to slow your immune system  · Surgery  may be needed to decrease your symptoms or to correct problems such as blockage or bleeding  Your healthcare provider may remove the diseased part of your intestines and reconnect the healthy parts  You may also need to have a colostomy  How can I manage Crohn disease? · Do not smoke  Nicotine and other chemicals in cigarettes and cigars can cause lung damage and increase your risk for Crohn disease  Ask your healthcare provider for information if you currently smoke and need help to quit  E-cigarettes or smokeless tobacco still contain nicotine  Talk to your healthcare provider before you use these products  · Take your medicines exactly as directed  This may help to keep your disease in remission (no symptoms)       · Keep a record of everything you eat and drink   Include any symptoms the food or drink causes or makes worse  You may need to avoid certain foods  Dairy, alcohol, hot spices, and high-fiber foods are common examples of foods that may worsen your symptoms  Your healthcare provider may recommend that you take vitamins or minerals  Always ask your healthcare provider before you take vitamins or nutritional supplements  When should I seek immediate care? · You suddenly have trouble breathing  · You vomit blood, or your vomit looks like coffee grounds  · You have a fast heart rate, fast breathing, or are too dizzy to stand  · You have severe pain in your stomach  When should I contact my healthcare provider? · You have tar-colored bowel movements or you see blood in your bowel movements  · You have a fever or chills  · The pain in your abdomen does not go away or gets worse after you take medicine  · Your abdomen is swollen  · You are losing weight without trying  · You have questions or concerns about your condition or care  CARE AGREEMENT:   You have the right to help plan your care  Learn about your health condition and how it may be treated  Discuss treatment options with your caregivers to decide what care you want to receive  You always have the right to refuse treatment  The above information is an  only  It is not intended as medical advice for individual conditions or treatments  Talk to your doctor, nurse or pharmacist before following any medical regimen to see if it is safe and effective for you  © 2017 [employer] Information is for End User's use only and may not be sold, redistributed or otherwise used for commercial purposes  All illustrations and images included in CareNotes® are the copyrighted property of [employer]  or [employer]

## 2020-07-21 ENCOUNTER — DOCUMENTATION (OUTPATIENT)
Dept: BEHAVIORAL/MENTAL HEALTH CLINIC | Facility: CLINIC | Age: 40
End: 2020-07-21

## 2020-07-21 NOTE — PROGRESS NOTES
No show/No call  Attempted outreach call to client  Mailbox full  Sent SMS message with my direct office phone number  Sent letter to inform of next appointment and asking for call back to discuss in-office versus virtual session preference (see letter in Letters section of clinical record)

## 2020-07-29 ENCOUNTER — TELEPHONE (OUTPATIENT)
Dept: INTERNAL MEDICINE CLINIC | Facility: CLINIC | Age: 40
End: 2020-07-29

## 2020-07-29 NOTE — TELEPHONE ENCOUNTER
----- Message from Quyen Hamilton DO sent at 7/20/2020  2:57 PM EDT -----  Callpt in one week and get update on her condition

## 2020-08-03 ENCOUNTER — TELEPHONE (OUTPATIENT)
Dept: BEHAVIORAL/MENTAL HEALTH CLINIC | Facility: CLINIC | Age: 40
End: 2020-08-03

## 2020-08-03 NOTE — TELEPHONE ENCOUNTER
Call to client to confirm appointment for tomorrow  She states that she has been diagnosed with Crohn's Disease  She reports that she is able to attend tomorrow's session via the Teams platform  We will discuss her health-related issues during the session tomorrow  She states that she is now on a very restrictive diet

## 2020-08-18 ENCOUNTER — TELEPHONE (OUTPATIENT)
Dept: BEHAVIORAL/MENTAL HEALTH CLINIC | Facility: CLINIC | Age: 40
End: 2020-08-18

## 2020-08-18 NOTE — TELEPHONE ENCOUNTER
T/C to [facility]  [facility] on [facility]  Her appointment was not able to be confirmed yesterday, so I called again  She did not answer  I left a message for her  She did not return the call  This is a no show/no call appointment  I will send a letter to the client to inform her that she has no additional appointments scheduled  If she is interested in returning for treatment, she will need to call and speak directly with this clinician to schedule

## 2020-09-16 DIAGNOSIS — F41.1 GAD (GENERALIZED ANXIETY DISORDER): ICD-10-CM

## 2020-09-16 RX ORDER — CLONAZEPAM 0.5 MG/1
0.5 TABLET ORAL 2 TIMES DAILY
Qty: 60 TABLET | Refills: 0 | Status: SHIPPED | OUTPATIENT
Start: 2020-09-16 | End: 2020-10-26 | Stop reason: SDUPTHER

## 2020-10-07 DIAGNOSIS — F33.41 RECURRENT MAJOR DEPRESSIVE DISORDER, IN PARTIAL REMISSION (HCC): ICD-10-CM

## 2020-10-07 RX ORDER — VILAZODONE HYDROCHLORIDE 40 MG/1
TABLET ORAL
Qty: 30 TABLET | Refills: 5 | Status: SHIPPED | OUTPATIENT
Start: 2020-10-07 | End: 2020-12-01 | Stop reason: SDUPTHER

## 2020-10-26 DIAGNOSIS — F41.1 GAD (GENERALIZED ANXIETY DISORDER): ICD-10-CM

## 2020-10-26 RX ORDER — CLONAZEPAM 0.5 MG/1
0.5 TABLET ORAL 2 TIMES DAILY
Qty: 60 TABLET | Refills: 0 | Status: SHIPPED | OUTPATIENT
Start: 2020-10-26 | End: 2020-12-03 | Stop reason: SDUPTHER

## 2020-11-03 ENCOUNTER — APPOINTMENT (EMERGENCY)
Dept: CT IMAGING | Facility: HOSPITAL | Age: 40
DRG: 897 | End: 2020-11-03
Payer: COMMERCIAL

## 2020-11-03 ENCOUNTER — HOSPITAL ENCOUNTER (INPATIENT)
Facility: HOSPITAL | Age: 40
LOS: 2 days | Discharge: HOME | DRG: 897 | End: 2020-11-05
Attending: FAMILY MEDICINE | Admitting: FAMILY MEDICINE
Payer: COMMERCIAL

## 2020-11-03 DIAGNOSIS — K50.119 CROHN'S DISEASE OF COLON WITH COMPLICATION (HCC): ICD-10-CM

## 2020-11-03 DIAGNOSIS — R79.89 LACTATE BLOOD INCREASED: ICD-10-CM

## 2020-11-03 DIAGNOSIS — F10.239 ALCOHOL WITHDRAWAL (HCC): Primary | ICD-10-CM

## 2020-11-03 PROBLEM — E66.811 CLASS 1 OBESITY IN ADULT: Status: ACTIVE | Noted: 2020-11-03

## 2020-11-03 PROBLEM — R74.02 ELEVATED SERUM LACTATE DEHYDROGENASE: Status: ACTIVE | Noted: 2020-11-03

## 2020-11-03 PROBLEM — E83.42 HYPOMAGNESEMIA: Status: ACTIVE | Noted: 2020-11-03

## 2020-11-03 PROBLEM — R74.01 TRANSAMINITIS: Status: ACTIVE | Noted: 2020-11-03

## 2020-11-03 PROBLEM — E66.9 CLASS 1 OBESITY IN ADULT: Status: ACTIVE | Noted: 2020-11-03

## 2020-11-03 PROBLEM — F10.939 ALCOHOL WITHDRAWAL (HCC): Status: ACTIVE | Noted: 2020-11-03

## 2020-11-03 LAB
ALBUMIN SERPL BCP-MCNC: 4 G/DL (ref 3.5–5.7)
ALP SERPL-CCNC: 61 U/L (ref 40–150)
ALT SERPL W P-5'-P-CCNC: 93 U/L (ref 7–52)
AMMONIA PLAS-SCNC: 39 UMOL/L (ref 25–90)
ANION GAP SERPL CALCULATED.3IONS-SCNC: 17 MMOL/L (ref 4–13)
AST SERPL W P-5'-P-CCNC: 122 U/L (ref 13–39)
BACTERIA UR QL AUTO: NORMAL /HPF
BASOPHILS # BLD AUTO: 0.1 THOUSANDS/ΜL (ref 0–0.1)
BASOPHILS NFR BLD AUTO: 1 % (ref 0–2)
BILIRUB SERPL-MCNC: 1.9 MG/DL (ref 0.2–1)
BILIRUB UR QL STRIP: NEGATIVE
BUN SERPL-MCNC: 4 MG/DL (ref 7–25)
CALCIUM SERPL-MCNC: 7.6 MG/DL (ref 8.6–10.5)
CHLORIDE SERPL-SCNC: 96 MMOL/L (ref 98–107)
CLARITY UR: CLEAR
CO2 SERPL-SCNC: 22 MMOL/L (ref 21–31)
COLOR UR: YELLOW
CREAT SERPL-MCNC: 0.76 MG/DL (ref 0.6–1.2)
EOSINOPHIL # BLD AUTO: 0 THOUSAND/ΜL (ref 0–0.61)
EOSINOPHIL NFR BLD AUTO: 0 % (ref 0–5)
ERYTHROCYTE [DISTWIDTH] IN BLOOD BY AUTOMATED COUNT: 15.1 % (ref 11.5–14.5)
ETHANOL SERPL-MCNC: <10 MG/DL
GFR SERPL CREATININE-BSD FRML MDRD: 98 ML/MIN/1.73SQ M
GLUCOSE SERPL-MCNC: 101 MG/DL (ref 65–99)
GLUCOSE SERPL-MCNC: 85 MG/DL (ref 65–140)
GLUCOSE UR STRIP-MCNC: NEGATIVE MG/DL
HCT VFR BLD AUTO: 36.8 % (ref 42–47)
HGB BLD-MCNC: 12.5 G/DL (ref 12–16)
HGB UR QL STRIP.AUTO: ABNORMAL
KETONES UR STRIP-MCNC: NEGATIVE MG/DL
LACTATE SERPL-SCNC: 3.1 MMOL/L (ref 0.5–2)
LACTATE SERPL-SCNC: 3.1 MMOL/L (ref 0.5–2)
LACTATE SERPL-SCNC: 4.2 MMOL/L (ref 0.5–2)
LACTATE SERPL-SCNC: 5.7 MMOL/L (ref 0.5–2)
LACTATE SERPL-SCNC: 6.9 MMOL/L (ref 0.5–2)
LEUKOCYTE ESTERASE UR QL STRIP: NEGATIVE
LIPASE SERPL-CCNC: 28 U/L (ref 11–82)
LYMPHOCYTES # BLD AUTO: 0.9 THOUSANDS/ΜL (ref 0.6–4.47)
LYMPHOCYTES NFR BLD AUTO: 17 % (ref 21–51)
MACROCYTES BLD QL AUTO: PRESENT
MAGNESIUM SERPL-MCNC: 0.9 MG/DL (ref 1.9–2.7)
MCH RBC QN AUTO: 37.5 PG (ref 26–34)
MCHC RBC AUTO-ENTMCNC: 34 G/DL (ref 31–37)
MCV RBC AUTO: 110 FL (ref 81–99)
MONOCYTES # BLD AUTO: 0.7 THOUSAND/ΜL (ref 0.17–1.22)
MONOCYTES NFR BLD AUTO: 13 % (ref 2–12)
NEUTROPHILS # BLD AUTO: 3.6 THOUSANDS/ΜL (ref 1.4–6.5)
NEUTS SEG NFR BLD AUTO: 68 % (ref 42–75)
NITRITE UR QL STRIP: NEGATIVE
NON-SQ EPI CELLS URNS QL MICRO: NORMAL /HPF
PH UR STRIP.AUTO: 6 [PH]
PHOSPHATE SERPL-MCNC: 4.2 MG/DL (ref 3–5.5)
PLATELET # BLD AUTO: 289 THOUSANDS/UL (ref 149–390)
PLATELET # BLD AUTO: 300 THOUSANDS/UL (ref 149–390)
PLATELET BLD QL SMEAR: ADEQUATE
PMV BLD AUTO: 7 FL (ref 8.6–11.7)
POTASSIUM SERPL-SCNC: 4.1 MMOL/L (ref 3.5–5.5)
PROT SERPL-MCNC: 6.4 G/DL (ref 6.4–8.9)
PROT UR STRIP-MCNC: NEGATIVE MG/DL
RBC # BLD AUTO: 3.34 MILLION/UL (ref 3.9–5.2)
RBC #/AREA URNS AUTO: NORMAL /HPF
RBC MORPH BLD: NORMAL
SODIUM SERPL-SCNC: 135 MMOL/L (ref 134–143)
SP GR UR STRIP.AUTO: 1.01 (ref 1–1.03)
TSH SERPL DL<=0.05 MIU/L-ACNC: 6.61 UIU/ML (ref 0.45–5.33)
UROBILINOGEN UR QL STRIP.AUTO: 0.2 E.U./DL
WBC # BLD AUTO: 5.2 THOUSAND/UL (ref 4.8–10.8)
WBC #/AREA URNS AUTO: NORMAL /HPF

## 2020-11-03 PROCEDURE — 80320 DRUG SCREEN QUANTALCOHOLS: CPT | Performed by: PHYSICIAN ASSISTANT

## 2020-11-03 PROCEDURE — 82948 REAGENT STRIP/BLOOD GLUCOSE: CPT

## 2020-11-03 PROCEDURE — 81001 URINALYSIS AUTO W/SCOPE: CPT | Performed by: PHYSICIAN ASSISTANT

## 2020-11-03 PROCEDURE — 80053 COMPREHEN METABOLIC PANEL: CPT | Performed by: PHYSICIAN ASSISTANT

## 2020-11-03 PROCEDURE — 84100 ASSAY OF PHOSPHORUS: CPT | Performed by: FAMILY MEDICINE

## 2020-11-03 PROCEDURE — G0480 DRUG TEST DEF 1-7 CLASSES: HCPCS | Performed by: PHYSICIAN ASSISTANT

## 2020-11-03 PROCEDURE — 96361 HYDRATE IV INFUSION ADD-ON: CPT

## 2020-11-03 PROCEDURE — 74177 CT ABD & PELVIS W/CONTRAST: CPT

## 2020-11-03 PROCEDURE — 36415 COLL VENOUS BLD VENIPUNCTURE: CPT | Performed by: PHYSICIAN ASSISTANT

## 2020-11-03 PROCEDURE — G1004 CDSM NDSC: HCPCS

## 2020-11-03 PROCEDURE — 99223 1ST HOSP IP/OBS HIGH 75: CPT | Performed by: FAMILY MEDICINE

## 2020-11-03 PROCEDURE — 83690 ASSAY OF LIPASE: CPT | Performed by: PHYSICIAN ASSISTANT

## 2020-11-03 PROCEDURE — 85025 COMPLETE CBC W/AUTO DIFF WBC: CPT | Performed by: PHYSICIAN ASSISTANT

## 2020-11-03 PROCEDURE — 96365 THER/PROPH/DIAG IV INF INIT: CPT

## 2020-11-03 PROCEDURE — 99285 EMERGENCY DEPT VISIT HI MDM: CPT | Performed by: PHYSICIAN ASSISTANT

## 2020-11-03 PROCEDURE — 83605 ASSAY OF LACTIC ACID: CPT | Performed by: FAMILY MEDICINE

## 2020-11-03 PROCEDURE — 83605 ASSAY OF LACTIC ACID: CPT | Performed by: PHYSICIAN ASSISTANT

## 2020-11-03 PROCEDURE — 99285 EMERGENCY DEPT VISIT HI MDM: CPT

## 2020-11-03 PROCEDURE — 85049 AUTOMATED PLATELET COUNT: CPT | Performed by: FAMILY MEDICINE

## 2020-11-03 PROCEDURE — 82140 ASSAY OF AMMONIA: CPT | Performed by: PHYSICIAN ASSISTANT

## 2020-11-03 PROCEDURE — 83735 ASSAY OF MAGNESIUM: CPT | Performed by: PHYSICIAN ASSISTANT

## 2020-11-03 PROCEDURE — 84443 ASSAY THYROID STIM HORMONE: CPT | Performed by: PHYSICIAN ASSISTANT

## 2020-11-03 PROCEDURE — 96367 TX/PROPH/DG ADDL SEQ IV INF: CPT

## 2020-11-03 RX ORDER — MAGNESIUM SULFATE HEPTAHYDRATE 40 MG/ML
2 INJECTION, SOLUTION INTRAVENOUS ONCE
Status: COMPLETED | OUTPATIENT
Start: 2020-11-03 | End: 2020-11-03

## 2020-11-03 RX ORDER — VILAZODONE HYDROCHLORIDE 40 MG/1
40 TABLET ORAL DAILY
Status: DISCONTINUED | OUTPATIENT
Start: 2020-11-04 | End: 2020-11-05 | Stop reason: HOSPADM

## 2020-11-03 RX ORDER — DIPHENHYDRAMINE HCL 25 MG
25 TABLET ORAL EVERY 6 HOURS PRN
Status: DISCONTINUED | OUTPATIENT
Start: 2020-11-03 | End: 2020-11-05 | Stop reason: HOSPADM

## 2020-11-03 RX ORDER — FLUTICASONE PROPIONATE 50 MCG
2 SPRAY, SUSPENSION (ML) NASAL DAILY
Status: DISCONTINUED | OUTPATIENT
Start: 2020-11-04 | End: 2020-11-05 | Stop reason: HOSPADM

## 2020-11-03 RX ORDER — ALPRAZOLAM 0.5 MG/1
0.5 TABLET ORAL ONCE
Status: COMPLETED | OUTPATIENT
Start: 2020-11-03 | End: 2020-11-03

## 2020-11-03 RX ORDER — LORAZEPAM 1 MG/1
2 TABLET ORAL ONCE
Status: COMPLETED | OUTPATIENT
Start: 2020-11-03 | End: 2020-11-03

## 2020-11-03 RX ORDER — ALBUTEROL SULFATE 90 UG/1
2 AEROSOL, METERED RESPIRATORY (INHALATION) ONCE
Status: COMPLETED | OUTPATIENT
Start: 2020-11-03 | End: 2020-11-03

## 2020-11-03 RX ORDER — THIAMINE MONONITRATE (VIT B1) 100 MG
100 TABLET ORAL DAILY
Status: DISCONTINUED | OUTPATIENT
Start: 2020-11-04 | End: 2020-11-05 | Stop reason: HOSPADM

## 2020-11-03 RX ORDER — ONDANSETRON 2 MG/ML
1 INJECTION INTRAMUSCULAR; INTRAVENOUS ONCE
Status: COMPLETED | OUTPATIENT
Start: 2020-11-03 | End: 2020-11-03

## 2020-11-03 RX ORDER — SODIUM CHLORIDE 9 MG/ML
125 INJECTION, SOLUTION INTRAVENOUS CONTINUOUS
Status: DISCONTINUED | OUTPATIENT
Start: 2020-11-03 | End: 2020-11-05 | Stop reason: HOSPADM

## 2020-11-03 RX ORDER — CLONAZEPAM 0.5 MG/1
0.5 TABLET ORAL 2 TIMES DAILY PRN
Status: DISCONTINUED | OUTPATIENT
Start: 2020-11-03 | End: 2020-11-05 | Stop reason: HOSPADM

## 2020-11-03 RX ORDER — FOLIC ACID 1 MG/1
1 TABLET ORAL DAILY
Status: DISCONTINUED | OUTPATIENT
Start: 2020-11-04 | End: 2020-11-05 | Stop reason: HOSPADM

## 2020-11-03 RX ORDER — LEVOTHYROXINE SODIUM 0.1 MG/1
100 TABLET ORAL
Status: DISCONTINUED | OUTPATIENT
Start: 2020-11-04 | End: 2020-11-05 | Stop reason: HOSPADM

## 2020-11-03 RX ORDER — ALBUTEROL SULFATE 90 UG/1
2 AEROSOL, METERED RESPIRATORY (INHALATION) EVERY 4 HOURS PRN
Status: DISCONTINUED | OUTPATIENT
Start: 2020-11-03 | End: 2020-11-05 | Stop reason: HOSPADM

## 2020-11-03 RX ORDER — ONDANSETRON 2 MG/ML
4 INJECTION INTRAMUSCULAR; INTRAVENOUS EVERY 6 HOURS PRN
Status: DISCONTINUED | OUTPATIENT
Start: 2020-11-03 | End: 2020-11-05 | Stop reason: HOSPADM

## 2020-11-03 RX ORDER — BUDESONIDE AND FORMOTEROL FUMARATE DIHYDRATE 160; 4.5 UG/1; UG/1
2 AEROSOL RESPIRATORY (INHALATION) 2 TIMES DAILY
Status: DISCONTINUED | OUTPATIENT
Start: 2020-11-03 | End: 2020-11-05 | Stop reason: HOSPADM

## 2020-11-03 RX ORDER — SODIUM CHLORIDE, SODIUM GLUCONATE, SODIUM ACETATE, POTASSIUM CHLORIDE, MAGNESIUM CHLORIDE, SODIUM PHOSPHATE, DIBASIC, AND POTASSIUM PHOSPHATE .53; .5; .37; .037; .03; .012; .00082 G/100ML; G/100ML; G/100ML; G/100ML; G/100ML; G/100ML; G/100ML
2000 INJECTION, SOLUTION INTRAVENOUS ONCE
Status: COMPLETED | OUTPATIENT
Start: 2020-11-03 | End: 2020-11-03

## 2020-11-03 RX ORDER — MONTELUKAST SODIUM 10 MG/1
10 TABLET ORAL DAILY
Status: DISCONTINUED | OUTPATIENT
Start: 2020-11-04 | End: 2020-11-05 | Stop reason: HOSPADM

## 2020-11-03 RX ORDER — LORATADINE 10 MG/1
10 TABLET ORAL DAILY
Status: DISCONTINUED | OUTPATIENT
Start: 2020-11-04 | End: 2020-11-05 | Stop reason: HOSPADM

## 2020-11-03 RX ORDER — CALCIUM GLUCONATE 20 MG/ML
1 INJECTION, SOLUTION INTRAVENOUS ONCE
Status: COMPLETED | OUTPATIENT
Start: 2020-11-03 | End: 2020-11-03

## 2020-11-03 RX ORDER — CEFEPIME HYDROCHLORIDE 2 G/50ML
2000 INJECTION, SOLUTION INTRAVENOUS ONCE
Status: COMPLETED | OUTPATIENT
Start: 2020-11-03 | End: 2020-11-03

## 2020-11-03 RX ORDER — DIAZEPAM 5 MG/1
10 TABLET ORAL ONCE
Status: COMPLETED | OUTPATIENT
Start: 2020-11-03 | End: 2020-11-03

## 2020-11-03 RX ADMIN — LORAZEPAM 2 MG: 1 TABLET ORAL at 20:54

## 2020-11-03 RX ADMIN — CEFEPIME HYDROCHLORIDE 2000 MG: 2 INJECTION, SOLUTION INTRAVENOUS at 14:05

## 2020-11-03 RX ADMIN — ALBUTEROL SULFATE 2 PUFF: 90 AEROSOL, METERED RESPIRATORY (INHALATION) at 13:36

## 2020-11-03 RX ADMIN — IOHEXOL 100 ML: 350 INJECTION, SOLUTION INTRAVENOUS at 14:25

## 2020-11-03 RX ADMIN — ALPRAZOLAM 0.5 MG: 0.5 TABLET ORAL at 13:34

## 2020-11-03 RX ADMIN — SODIUM CHLORIDE 1000 ML: 0.9 INJECTION, SOLUTION INTRAVENOUS at 13:18

## 2020-11-03 RX ADMIN — SODIUM CHLORIDE, SODIUM GLUCONATE, SODIUM ACETATE, POTASSIUM CHLORIDE, MAGNESIUM CHLORIDE, SODIUM PHOSPHATE, DIBASIC, AND POTASSIUM PHOSPHATE 2000 ML: .53; .5; .37; .037; .03; .012; .00082 INJECTION, SOLUTION INTRAVENOUS at 14:12

## 2020-11-03 RX ADMIN — SODIUM CHLORIDE 1000 ML: 0.9 INJECTION, SOLUTION INTRAVENOUS at 21:55

## 2020-11-03 RX ADMIN — THIAMINE HYDROCHLORIDE 100 MG: 100 INJECTION, SOLUTION INTRAMUSCULAR; INTRAVENOUS at 15:56

## 2020-11-03 RX ADMIN — SODIUM CHLORIDE 1000 ML: 0.9 INJECTION, SOLUTION INTRAVENOUS at 18:53

## 2020-11-03 RX ADMIN — ONDANSETRON 4 MG: 2 INJECTION INTRAMUSCULAR; INTRAVENOUS at 20:19

## 2020-11-03 RX ADMIN — CALCIUM GLUCONATE 1 G: 20 INJECTION, SOLUTION INTRAVENOUS at 14:42

## 2020-11-03 RX ADMIN — PHENOBARBITAL SODIUM 260 MG: 65 INJECTION INTRAMUSCULAR; INTRAVENOUS at 15:16

## 2020-11-03 RX ADMIN — DIPHENHYDRAMINE HCL 25 MG: 25 TABLET ORAL at 18:41

## 2020-11-03 RX ADMIN — BUDESONIDE AND FORMOTEROL FUMARATE DIHYDRATE 2 PUFF: 160; 4.5 AEROSOL RESPIRATORY (INHALATION) at 18:41

## 2020-11-03 RX ADMIN — SODIUM CHLORIDE 125 ML/HR: 0.9 INJECTION, SOLUTION INTRAVENOUS at 17:22

## 2020-11-03 RX ADMIN — SODIUM CHLORIDE 125 ML/HR: 0.9 INJECTION, SOLUTION INTRAVENOUS at 20:10

## 2020-11-03 RX ADMIN — MAGNESIUM SULFATE IN WATER 2 G: 40 INJECTION, SOLUTION INTRAVENOUS at 16:01

## 2020-11-03 RX ADMIN — DIAZEPAM 10 MG: 5 TABLET ORAL at 15:21

## 2020-11-03 RX ADMIN — SODIUM CHLORIDE 1000 ML: 0.9 INJECTION, SOLUTION INTRAVENOUS at 17:23

## 2020-11-03 RX ADMIN — FOLIC ACID 1 MG: 5 INJECTION, SOLUTION INTRAMUSCULAR; INTRAVENOUS; SUBCUTANEOUS at 16:34

## 2020-11-03 RX ADMIN — MAGNESIUM SULFATE IN WATER 2 G: 40 INJECTION, SOLUTION INTRAVENOUS at 18:11

## 2020-11-04 LAB
ALBUMIN SERPL BCP-MCNC: 3.7 G/DL (ref 3.5–5.7)
ALP SERPL-CCNC: 56 U/L (ref 40–150)
ALT SERPL W P-5'-P-CCNC: 70 U/L (ref 7–52)
ANION GAP SERPL CALCULATED.3IONS-SCNC: 9 MMOL/L (ref 4–13)
ANISOCYTOSIS BLD QL SMEAR: PRESENT
AST SERPL W P-5'-P-CCNC: 98 U/L (ref 13–39)
BASOPHILS # BLD AUTO: 0.1 THOUSANDS/ΜL (ref 0–0.1)
BASOPHILS NFR BLD AUTO: 1 % (ref 0–2)
BILIRUB SERPL-MCNC: 2.8 MG/DL (ref 0.2–1)
BUN SERPL-MCNC: 3 MG/DL (ref 7–25)
CALCIUM SERPL-MCNC: 6.8 MG/DL (ref 8.6–10.5)
CHLORIDE SERPL-SCNC: 102 MMOL/L (ref 98–107)
CO2 SERPL-SCNC: 25 MMOL/L (ref 21–31)
CREAT SERPL-MCNC: 0.66 MG/DL (ref 0.6–1.2)
EOSINOPHIL # BLD AUTO: 0.2 THOUSAND/ΜL (ref 0–0.61)
EOSINOPHIL NFR BLD AUTO: 4 % (ref 0–5)
ERYTHROCYTE [DISTWIDTH] IN BLOOD BY AUTOMATED COUNT: 15.2 % (ref 11.5–14.5)
GFR SERPL CREATININE-BSD FRML MDRD: 110 ML/MIN/1.73SQ M
GLUCOSE SERPL-MCNC: 87 MG/DL (ref 65–99)
HCT VFR BLD AUTO: 35.4 % (ref 42–47)
HGB BLD-MCNC: 12.3 G/DL (ref 12–16)
INR PPP: 1.07 (ref 0.84–1.19)
LACTATE SERPL-SCNC: 1.7 MMOL/L (ref 0.5–2)
LACTATE SERPL-SCNC: 2.1 MMOL/L (ref 0.5–2)
LACTATE SERPL-SCNC: 2.5 MMOL/L (ref 0.5–2)
LACTATE SERPL-SCNC: 2.8 MMOL/L (ref 0.5–2)
LACTATE SERPL-SCNC: 3.3 MMOL/L (ref 0.5–2)
LYMPHOCYTES # BLD AUTO: 1 THOUSANDS/ΜL (ref 0.6–4.47)
LYMPHOCYTES NFR BLD AUTO: 26 % (ref 21–51)
MACROCYTES BLD QL AUTO: PRESENT
MAGNESIUM SERPL-MCNC: 1.9 MG/DL (ref 1.9–2.7)
MCH RBC QN AUTO: 38 PG (ref 26–34)
MCHC RBC AUTO-ENTMCNC: 34.6 G/DL (ref 31–37)
MCV RBC AUTO: 110 FL (ref 81–99)
MONOCYTES # BLD AUTO: 0.5 THOUSAND/ΜL (ref 0.17–1.22)
MONOCYTES NFR BLD AUTO: 13 % (ref 2–12)
NEUTROPHILS # BLD AUTO: 2.1 THOUSANDS/ΜL (ref 1.4–6.5)
NEUTS SEG NFR BLD AUTO: 56 % (ref 42–75)
PHOSPHATE SERPL-MCNC: 3.6 MG/DL (ref 3–5.5)
PLATELET # BLD AUTO: 295 THOUSANDS/UL (ref 149–390)
PLATELET BLD QL SMEAR: ADEQUATE
PMV BLD AUTO: 7.2 FL (ref 8.6–11.7)
POTASSIUM SERPL-SCNC: 3.8 MMOL/L (ref 3.5–5.5)
PROT SERPL-MCNC: 6 G/DL (ref 6.4–8.9)
PROTHROMBIN TIME: 13.8 SECONDS (ref 11.6–14.5)
RBC # BLD AUTO: 3.22 MILLION/UL (ref 3.9–5.2)
RBC MORPH BLD: NORMAL
SODIUM SERPL-SCNC: 136 MMOL/L (ref 134–143)
WBC # BLD AUTO: 3.9 THOUSAND/UL (ref 4.8–10.8)

## 2020-11-04 PROCEDURE — 97166 OT EVAL MOD COMPLEX 45 MIN: CPT

## 2020-11-04 PROCEDURE — 85610 PROTHROMBIN TIME: CPT | Performed by: FAMILY MEDICINE

## 2020-11-04 PROCEDURE — 83605 ASSAY OF LACTIC ACID: CPT | Performed by: FAMILY MEDICINE

## 2020-11-04 PROCEDURE — 97163 PT EVAL HIGH COMPLEX 45 MIN: CPT

## 2020-11-04 PROCEDURE — 99233 SBSQ HOSP IP/OBS HIGH 50: CPT | Performed by: FAMILY MEDICINE

## 2020-11-04 PROCEDURE — 85025 COMPLETE CBC W/AUTO DIFF WBC: CPT | Performed by: FAMILY MEDICINE

## 2020-11-04 PROCEDURE — 83735 ASSAY OF MAGNESIUM: CPT | Performed by: FAMILY MEDICINE

## 2020-11-04 PROCEDURE — 80053 COMPREHEN METABOLIC PANEL: CPT | Performed by: FAMILY MEDICINE

## 2020-11-04 PROCEDURE — 84100 ASSAY OF PHOSPHORUS: CPT | Performed by: FAMILY MEDICINE

## 2020-11-04 RX ORDER — MAGNESIUM SULFATE HEPTAHYDRATE 40 MG/ML
2 INJECTION, SOLUTION INTRAVENOUS ONCE
Status: COMPLETED | OUTPATIENT
Start: 2020-11-04 | End: 2020-11-04

## 2020-11-04 RX ADMIN — MAGNESIUM SULFATE IN WATER 2 G: 40 INJECTION, SOLUTION INTRAVENOUS at 09:43

## 2020-11-04 RX ADMIN — Medication 1 TABLET: at 09:40

## 2020-11-04 RX ADMIN — FOLIC ACID 1 MG: 1 TABLET ORAL at 09:40

## 2020-11-04 RX ADMIN — VILAZODONE HYDROCHLORIDE 40 MG: 40 TABLET ORAL at 09:44

## 2020-11-04 RX ADMIN — ENOXAPARIN SODIUM 40 MG: 40 INJECTION SUBCUTANEOUS at 09:41

## 2020-11-04 RX ADMIN — SODIUM CHLORIDE 1000 ML: 0.9 INJECTION, SOLUTION INTRAVENOUS at 10:25

## 2020-11-04 RX ADMIN — CLONAZEPAM 0.5 MG: 0.5 TABLET ORAL at 09:45

## 2020-11-04 RX ADMIN — FLUTICASONE PROPIONATE 2 SPRAY: 50 SPRAY, METERED NASAL at 09:46

## 2020-11-04 RX ADMIN — DIPHENHYDRAMINE HCL 25 MG: 25 TABLET ORAL at 16:20

## 2020-11-04 RX ADMIN — BUDESONIDE AND FORMOTEROL FUMARATE DIHYDRATE 2 PUFF: 160; 4.5 AEROSOL RESPIRATORY (INHALATION) at 09:45

## 2020-11-04 RX ADMIN — SODIUM CHLORIDE 125 ML/HR: 0.9 INJECTION, SOLUTION INTRAVENOUS at 05:32

## 2020-11-04 RX ADMIN — DIPHENHYDRAMINE HCL 25 MG: 25 TABLET ORAL at 07:34

## 2020-11-04 RX ADMIN — MONTELUKAST SODIUM 10 MG: 10 TABLET, COATED ORAL at 09:40

## 2020-11-04 RX ADMIN — BUDESONIDE AND FORMOTEROL FUMARATE DIHYDRATE 2 PUFF: 160; 4.5 AEROSOL RESPIRATORY (INHALATION) at 18:39

## 2020-11-04 RX ADMIN — LEVOTHYROXINE SODIUM 100 MCG: 100 TABLET ORAL at 05:33

## 2020-11-04 RX ADMIN — THIAMINE HCL TAB 100 MG 100 MG: 100 TAB at 09:40

## 2020-11-04 RX ADMIN — CLONAZEPAM 0.5 MG: 0.5 TABLET ORAL at 21:57

## 2020-11-04 RX ADMIN — LORATADINE 10 MG: 10 TABLET ORAL at 09:41

## 2020-11-04 RX ADMIN — SODIUM CHLORIDE 500 ML: 0.9 INJECTION, SOLUTION INTRAVENOUS at 03:00

## 2020-11-05 VITALS
TEMPERATURE: 97.3 F | DIASTOLIC BLOOD PRESSURE: 73 MMHG | BODY MASS INDEX: 34.36 KG/M2 | WEIGHT: 193.89 LBS | OXYGEN SATURATION: 98 % | RESPIRATION RATE: 18 BRPM | HEIGHT: 63 IN | SYSTOLIC BLOOD PRESSURE: 126 MMHG | HEART RATE: 71 BPM

## 2020-11-05 PROBLEM — E43 SEVERE PROTEIN-CALORIE MALNUTRITION (HCC): Status: ACTIVE | Noted: 2020-11-05

## 2020-11-05 PROCEDURE — 99239 HOSP IP/OBS DSCHRG MGMT >30: CPT | Performed by: FAMILY MEDICINE

## 2020-11-05 RX ADMIN — LEVOTHYROXINE SODIUM 100 MCG: 100 TABLET ORAL at 05:33

## 2020-11-05 RX ADMIN — FOLIC ACID 1 MG: 1 TABLET ORAL at 08:26

## 2020-11-05 RX ADMIN — Medication 1 TABLET: at 08:26

## 2020-11-05 RX ADMIN — MONTELUKAST SODIUM 10 MG: 10 TABLET, COATED ORAL at 08:26

## 2020-11-05 RX ADMIN — BUDESONIDE AND FORMOTEROL FUMARATE DIHYDRATE 2 PUFF: 160; 4.5 AEROSOL RESPIRATORY (INHALATION) at 08:30

## 2020-11-05 RX ADMIN — VILAZODONE HYDROCHLORIDE 40 MG: 40 TABLET ORAL at 08:30

## 2020-11-05 RX ADMIN — LORATADINE 10 MG: 10 TABLET ORAL at 08:26

## 2020-11-05 RX ADMIN — CLONAZEPAM 0.5 MG: 0.5 TABLET ORAL at 10:15

## 2020-11-05 RX ADMIN — FLUTICASONE PROPIONATE 2 SPRAY: 50 SPRAY, METERED NASAL at 08:30

## 2020-11-05 RX ADMIN — ENOXAPARIN SODIUM 40 MG: 40 INJECTION SUBCUTANEOUS at 08:26

## 2020-11-05 RX ADMIN — DIPHENHYDRAMINE HCL 25 MG: 25 TABLET ORAL at 08:30

## 2020-11-05 RX ADMIN — THIAMINE HCL TAB 100 MG 100 MG: 100 TAB at 08:26

## 2020-11-06 ENCOUNTER — TRANSITIONAL CARE MANAGEMENT (OUTPATIENT)
Dept: INTERNAL MEDICINE CLINIC | Facility: CLINIC | Age: 40
End: 2020-11-06

## 2020-11-10 ENCOUNTER — OFFICE VISIT (OUTPATIENT)
Dept: INTERNAL MEDICINE CLINIC | Facility: CLINIC | Age: 40
End: 2020-11-10
Payer: COMMERCIAL

## 2020-11-10 VITALS
HEART RATE: 96 BPM | TEMPERATURE: 98.3 F | SYSTOLIC BLOOD PRESSURE: 130 MMHG | BODY MASS INDEX: 34.73 KG/M2 | HEIGHT: 63 IN | OXYGEN SATURATION: 97 % | DIASTOLIC BLOOD PRESSURE: 98 MMHG | WEIGHT: 196 LBS | RESPIRATION RATE: 18 BRPM

## 2020-11-10 DIAGNOSIS — D75.89 MACROCYTOSIS WITHOUT ANEMIA: ICD-10-CM

## 2020-11-10 DIAGNOSIS — E03.9 HYPOTHYROIDISM, UNSPECIFIED TYPE: ICD-10-CM

## 2020-11-10 DIAGNOSIS — F33.41 RECURRENT MAJOR DEPRESSIVE DISORDER, IN PARTIAL REMISSION (HCC): ICD-10-CM

## 2020-11-10 DIAGNOSIS — E83.42 HYPOMAGNESEMIA: ICD-10-CM

## 2020-11-10 DIAGNOSIS — J45.909 UNCOMPLICATED ASTHMA, UNSPECIFIED ASTHMA SEVERITY, UNSPECIFIED WHETHER PERSISTENT: ICD-10-CM

## 2020-11-10 DIAGNOSIS — R73.09 ELEVATED GLUCOSE: ICD-10-CM

## 2020-11-10 DIAGNOSIS — Z13.31 POSITIVE DEPRESSION SCREENING: ICD-10-CM

## 2020-11-10 DIAGNOSIS — F10.239 ALCOHOL WITHDRAWAL SYNDROME WITH COMPLICATION (HCC): Primary | ICD-10-CM

## 2020-11-10 DIAGNOSIS — F41.8 INSOMNIA SECONDARY TO DEPRESSION WITH ANXIETY: ICD-10-CM

## 2020-11-10 DIAGNOSIS — R45.84 ANHEDONIA: ICD-10-CM

## 2020-11-10 DIAGNOSIS — R79.89 LACTATE BLOOD INCREASED: ICD-10-CM

## 2020-11-10 DIAGNOSIS — J45.40 MODERATE PERSISTENT ASTHMA WITHOUT COMPLICATION: ICD-10-CM

## 2020-11-10 DIAGNOSIS — Z23 ENCOUNTER FOR VACCINATION: ICD-10-CM

## 2020-11-10 DIAGNOSIS — E83.51 HYPOCALCEMIA: ICD-10-CM

## 2020-11-10 DIAGNOSIS — K70.0 FATTY LIVER, ALCOHOLIC: ICD-10-CM

## 2020-11-10 DIAGNOSIS — E43 SEVERE PROTEIN-CALORIE MALNUTRITION (HCC): ICD-10-CM

## 2020-11-10 DIAGNOSIS — Z12.31 ENCOUNTER FOR SCREENING MAMMOGRAM FOR MALIGNANT NEOPLASM OF BREAST: ICD-10-CM

## 2020-11-10 DIAGNOSIS — F32.1 MODERATE MAJOR DEPRESSION, SINGLE EPISODE (HCC): ICD-10-CM

## 2020-11-10 DIAGNOSIS — F51.05 INSOMNIA SECONDARY TO DEPRESSION WITH ANXIETY: ICD-10-CM

## 2020-11-10 DIAGNOSIS — K50.119 CROHN'S DISEASE OF COLON WITH COMPLICATION (HCC): ICD-10-CM

## 2020-11-10 DIAGNOSIS — R74.01 TRANSAMINITIS: ICD-10-CM

## 2020-11-10 DIAGNOSIS — F41.8 ANXIETY WITH DEPRESSION: ICD-10-CM

## 2020-11-10 PROBLEM — T14.8XXA ANIMAL BITE: Status: RESOLVED | Noted: 2020-01-16 | Resolved: 2020-11-10

## 2020-11-10 PROCEDURE — 90686 IIV4 VACC NO PRSV 0.5 ML IM: CPT | Performed by: INTERNAL MEDICINE

## 2020-11-10 PROCEDURE — 99496 TRANSJ CARE MGMT HIGH F2F 7D: CPT | Performed by: INTERNAL MEDICINE

## 2020-11-10 PROCEDURE — 90471 IMMUNIZATION ADMIN: CPT | Performed by: INTERNAL MEDICINE

## 2020-11-10 PROCEDURE — 1111F DSCHRG MED/CURRENT MED MERGE: CPT | Performed by: INTERNAL MEDICINE

## 2020-11-10 RX ORDER — MULTIVITAMIN
1 TABLET ORAL DAILY
COMMUNITY

## 2020-11-10 RX ORDER — DISULFIRAM 250 MG/1
250 TABLET ORAL DAILY
Qty: 90 TABLET | Refills: 0 | Status: SHIPPED | OUTPATIENT
Start: 2020-11-10 | End: 2021-04-12 | Stop reason: SDUPTHER

## 2020-11-10 RX ORDER — ALBUTEROL SULFATE 90 UG/1
AEROSOL, METERED RESPIRATORY (INHALATION)
Qty: 18 G | Refills: 2 | Status: SHIPPED | OUTPATIENT
Start: 2020-11-10 | End: 2020-12-01 | Stop reason: SDUPTHER

## 2020-11-10 RX ORDER — LEVOTHYROXINE SODIUM 0.12 MG/1
125 TABLET ORAL
Qty: 90 TABLET | Refills: 3 | Status: SHIPPED | OUTPATIENT
Start: 2020-11-10 | End: 2021-12-10

## 2020-11-10 RX ORDER — ARIPIPRAZOLE 5 MG/1
5 TABLET ORAL EVERY MORNING
Qty: 90 TABLET | Refills: 1 | Status: SHIPPED | OUTPATIENT
Start: 2020-11-10 | End: 2021-04-17 | Stop reason: SDUPTHER

## 2020-11-10 RX ORDER — BUDESONIDE AND FORMOTEROL FUMARATE DIHYDRATE 160; 4.5 UG/1; UG/1
2 AEROSOL RESPIRATORY (INHALATION) 2 TIMES DAILY
Qty: 1 INHALER | Refills: 2 | Status: SHIPPED | OUTPATIENT
Start: 2020-11-10 | End: 2021-04-12 | Stop reason: SDUPTHER

## 2020-12-01 DIAGNOSIS — F33.41 RECURRENT MAJOR DEPRESSIVE DISORDER, IN PARTIAL REMISSION (HCC): ICD-10-CM

## 2020-12-01 DIAGNOSIS — J45.909 UNCOMPLICATED ASTHMA, UNSPECIFIED ASTHMA SEVERITY, UNSPECIFIED WHETHER PERSISTENT: ICD-10-CM

## 2020-12-01 RX ORDER — ALBUTEROL SULFATE 90 UG/1
AEROSOL, METERED RESPIRATORY (INHALATION)
Qty: 18 G | Refills: 2 | Status: SHIPPED | OUTPATIENT
Start: 2020-12-01 | End: 2021-01-08 | Stop reason: SDUPTHER

## 2020-12-01 RX ORDER — VILAZODONE HYDROCHLORIDE 40 MG/1
40 TABLET ORAL DAILY
Qty: 30 TABLET | Refills: 5 | Status: SHIPPED | OUTPATIENT
Start: 2020-12-01 | End: 2021-03-08 | Stop reason: SDUPTHER

## 2020-12-03 DIAGNOSIS — F41.1 GAD (GENERALIZED ANXIETY DISORDER): ICD-10-CM

## 2020-12-03 RX ORDER — CLONAZEPAM 0.5 MG/1
0.5 TABLET ORAL 2 TIMES DAILY PRN
Qty: 60 TABLET | Refills: 0 | Status: SHIPPED | OUTPATIENT
Start: 2020-12-03 | End: 2021-01-08 | Stop reason: SDUPTHER

## 2021-01-08 DIAGNOSIS — J45.909 UNCOMPLICATED ASTHMA, UNSPECIFIED ASTHMA SEVERITY, UNSPECIFIED WHETHER PERSISTENT: ICD-10-CM

## 2021-01-08 DIAGNOSIS — F41.1 GAD (GENERALIZED ANXIETY DISORDER): ICD-10-CM

## 2021-01-08 RX ORDER — MONTELUKAST SODIUM 10 MG/1
10 TABLET ORAL DAILY
Qty: 30 TABLET | Refills: 5 | Status: SHIPPED | OUTPATIENT
Start: 2021-01-08 | End: 2022-02-27 | Stop reason: HOSPADM

## 2021-01-08 RX ORDER — ALBUTEROL SULFATE 90 UG/1
AEROSOL, METERED RESPIRATORY (INHALATION)
Qty: 18 G | Refills: 2 | Status: SHIPPED | OUTPATIENT
Start: 2021-01-08 | End: 2021-01-29 | Stop reason: SDUPTHER

## 2021-01-08 RX ORDER — CLONAZEPAM 0.5 MG/1
0.5 TABLET ORAL 2 TIMES DAILY PRN
Qty: 60 TABLET | Refills: 0 | Status: SHIPPED | OUTPATIENT
Start: 2021-01-08 | End: 2021-02-09 | Stop reason: SDUPTHER

## 2021-01-26 DIAGNOSIS — U07.1 COVID TOES: ICD-10-CM

## 2021-01-26 DIAGNOSIS — J06.9 UPPER RESPIRATORY TRACT INFECTION, UNSPECIFIED TYPE: ICD-10-CM

## 2021-01-26 DIAGNOSIS — Z20.822 CLOSE EXPOSURE TO COVID-19 VIRUS: ICD-10-CM

## 2021-01-26 DIAGNOSIS — R23.8 COVID TOES: ICD-10-CM

## 2021-01-26 DIAGNOSIS — U07.1 COVID TOES: Primary | ICD-10-CM

## 2021-01-26 DIAGNOSIS — R23.8 COVID TOES: Primary | ICD-10-CM

## 2021-01-26 LAB
FLUAV RNA RESP QL NAA+PROBE: NEGATIVE
FLUBV RNA RESP QL NAA+PROBE: NEGATIVE
RSV RNA RESP QL NAA+PROBE: NEGATIVE
SARS-COV-2 RNA RESP QL NAA+PROBE: NEGATIVE

## 2021-01-26 PROCEDURE — 0241U HB NFCT DS VIR RESP RNA 4 TRGT: CPT

## 2021-01-29 DIAGNOSIS — J45.909 UNCOMPLICATED ASTHMA, UNSPECIFIED ASTHMA SEVERITY, UNSPECIFIED WHETHER PERSISTENT: ICD-10-CM

## 2021-01-29 RX ORDER — ALBUTEROL SULFATE 90 UG/1
AEROSOL, METERED RESPIRATORY (INHALATION)
Qty: 18 G | Refills: 2 | Status: SHIPPED | OUTPATIENT
Start: 2021-01-29 | End: 2021-03-08 | Stop reason: SDUPTHER

## 2021-02-09 DIAGNOSIS — F41.1 GAD (GENERALIZED ANXIETY DISORDER): ICD-10-CM

## 2021-02-09 RX ORDER — CLONAZEPAM 0.5 MG/1
0.5 TABLET ORAL 2 TIMES DAILY PRN
Qty: 60 TABLET | Refills: 0 | Status: SHIPPED | OUTPATIENT
Start: 2021-02-09 | End: 2021-03-26 | Stop reason: SDUPTHER

## 2021-03-08 DIAGNOSIS — J45.909 UNCOMPLICATED ASTHMA, UNSPECIFIED ASTHMA SEVERITY, UNSPECIFIED WHETHER PERSISTENT: ICD-10-CM

## 2021-03-08 DIAGNOSIS — F33.41 RECURRENT MAJOR DEPRESSIVE DISORDER, IN PARTIAL REMISSION (HCC): ICD-10-CM

## 2021-03-08 RX ORDER — ALBUTEROL SULFATE 90 UG/1
AEROSOL, METERED RESPIRATORY (INHALATION)
Qty: 18 G | Refills: 5 | Status: SHIPPED | OUTPATIENT
Start: 2021-03-08 | End: 2021-04-12 | Stop reason: SDUPTHER

## 2021-03-08 RX ORDER — VILAZODONE HYDROCHLORIDE 40 MG/1
40 TABLET ORAL DAILY
Qty: 30 TABLET | Refills: 5 | Status: SHIPPED | OUTPATIENT
Start: 2021-03-08 | End: 2021-04-12

## 2021-03-26 ENCOUNTER — TELEPHONE (OUTPATIENT)
Dept: INTERNAL MEDICINE CLINIC | Facility: CLINIC | Age: 41
End: 2021-03-26

## 2021-03-26 DIAGNOSIS — F41.1 GAD (GENERALIZED ANXIETY DISORDER): ICD-10-CM

## 2021-03-26 RX ORDER — CLONAZEPAM 0.5 MG/1
0.5 TABLET ORAL 2 TIMES DAILY PRN
Qty: 60 TABLET | Refills: 0 | Status: SHIPPED | OUTPATIENT
Start: 2021-03-26 | End: 2021-05-04 | Stop reason: SDUPTHER

## 2021-04-12 ENCOUNTER — OFFICE VISIT (OUTPATIENT)
Dept: INTERNAL MEDICINE CLINIC | Facility: CLINIC | Age: 41
End: 2021-04-12
Payer: COMMERCIAL

## 2021-04-12 VITALS
HEIGHT: 63 IN | SYSTOLIC BLOOD PRESSURE: 118 MMHG | DIASTOLIC BLOOD PRESSURE: 80 MMHG | TEMPERATURE: 97.4 F | BODY MASS INDEX: 37.03 KG/M2 | OXYGEN SATURATION: 98 % | WEIGHT: 209 LBS | HEART RATE: 74 BPM

## 2021-04-12 DIAGNOSIS — Z12.31 ENCOUNTER FOR SCREENING MAMMOGRAM FOR MALIGNANT NEOPLASM OF BREAST: ICD-10-CM

## 2021-04-12 DIAGNOSIS — R73.09 ELEVATED GLUCOSE: ICD-10-CM

## 2021-04-12 DIAGNOSIS — E43 SEVERE PROTEIN-CALORIE MALNUTRITION (HCC): ICD-10-CM

## 2021-04-12 DIAGNOSIS — E83.51 HYPOCALCEMIA: ICD-10-CM

## 2021-04-12 DIAGNOSIS — Z13.31 POSITIVE DEPRESSION SCREENING: ICD-10-CM

## 2021-04-12 DIAGNOSIS — E83.42 HYPOMAGNESEMIA: ICD-10-CM

## 2021-04-12 DIAGNOSIS — K70.0 FATTY LIVER, ALCOHOLIC: ICD-10-CM

## 2021-04-12 DIAGNOSIS — E03.9 HYPOTHYROIDISM, UNSPECIFIED TYPE: ICD-10-CM

## 2021-04-12 DIAGNOSIS — F41.1 GAD (GENERALIZED ANXIETY DISORDER): ICD-10-CM

## 2021-04-12 DIAGNOSIS — Z13.31 NEGATIVE DEPRESSION SCREENING: ICD-10-CM

## 2021-04-12 DIAGNOSIS — R74.01 TRANSAMINITIS: ICD-10-CM

## 2021-04-12 DIAGNOSIS — Z11.4 SCREENING FOR HIV (HUMAN IMMUNODEFICIENCY VIRUS): ICD-10-CM

## 2021-04-12 DIAGNOSIS — F33.41 RECURRENT MAJOR DEPRESSIVE DISORDER, IN PARTIAL REMISSION (HCC): ICD-10-CM

## 2021-04-12 DIAGNOSIS — Z13.1 SCREENING FOR DIABETES MELLITUS (DM): ICD-10-CM

## 2021-04-12 DIAGNOSIS — F10.21 ALCOHOL DEPENDENCE IN REMISSION (HCC): ICD-10-CM

## 2021-04-12 DIAGNOSIS — F10.239 ALCOHOL WITHDRAWAL SYNDROME WITH COMPLICATION (HCC): ICD-10-CM

## 2021-04-12 DIAGNOSIS — F41.8 ANXIETY WITH DEPRESSION: ICD-10-CM

## 2021-04-12 DIAGNOSIS — Z13.0 SCREENING FOR DEFICIENCY ANEMIA: ICD-10-CM

## 2021-04-12 DIAGNOSIS — Z02.83 EMPLOYMENT-RELATED DRUG TESTING, ENCOUNTER FOR: ICD-10-CM

## 2021-04-12 DIAGNOSIS — Z13.220 SCREENING FOR LIPID DISORDERS: ICD-10-CM

## 2021-04-12 DIAGNOSIS — J45.909 UNCOMPLICATED ASTHMA, UNSPECIFIED ASTHMA SEVERITY, UNSPECIFIED WHETHER PERSISTENT: ICD-10-CM

## 2021-04-12 DIAGNOSIS — Z00.00 MEDICARE ANNUAL WELLNESS VISIT, INITIAL: ICD-10-CM

## 2021-04-12 DIAGNOSIS — F32.1 MODERATE MAJOR DEPRESSION, SINGLE EPISODE (HCC): ICD-10-CM

## 2021-04-12 DIAGNOSIS — J45.40 MODERATE PERSISTENT ASTHMA WITHOUT COMPLICATION: ICD-10-CM

## 2021-04-12 DIAGNOSIS — K50.00 TERMINAL ILEITIS WITHOUT COMPLICATION (HCC): ICD-10-CM

## 2021-04-12 DIAGNOSIS — F41.8 INSOMNIA SECONDARY TO DEPRESSION WITH ANXIETY: ICD-10-CM

## 2021-04-12 DIAGNOSIS — D75.89 MACROCYTOSIS WITHOUT ANEMIA: ICD-10-CM

## 2021-04-12 DIAGNOSIS — R79.89 LACTATE BLOOD INCREASED: ICD-10-CM

## 2021-04-12 DIAGNOSIS — Z23 ENCOUNTER FOR VACCINATION: ICD-10-CM

## 2021-04-12 DIAGNOSIS — R45.84 ANHEDONIA: ICD-10-CM

## 2021-04-12 DIAGNOSIS — F51.05 INSOMNIA SECONDARY TO DEPRESSION WITH ANXIETY: ICD-10-CM

## 2021-04-12 DIAGNOSIS — K50.119 CROHN'S DISEASE OF COLON WITH COMPLICATION (HCC): Primary | ICD-10-CM

## 2021-04-12 PROBLEM — F10.939 ALCOHOL WITHDRAWAL (HCC): Status: RESOLVED | Noted: 2020-11-03 | Resolved: 2021-04-12

## 2021-04-12 PROCEDURE — 3008F BODY MASS INDEX DOCD: CPT | Performed by: INTERNAL MEDICINE

## 2021-04-12 PROCEDURE — 99214 OFFICE O/P EST MOD 30 MIN: CPT | Performed by: INTERNAL MEDICINE

## 2021-04-12 RX ORDER — FLUTICASONE FUROATE AND VILANTEROL 200; 25 UG/1; UG/1
1 POWDER RESPIRATORY (INHALATION) DAILY
Qty: 3 INHALER | Refills: 1 | Status: SHIPPED | OUTPATIENT
Start: 2021-04-12 | End: 2022-02-27 | Stop reason: HOSPADM

## 2021-04-12 RX ORDER — BUDESONIDE AND FORMOTEROL FUMARATE DIHYDRATE 160; 4.5 UG/1; UG/1
2 AEROSOL RESPIRATORY (INHALATION) 2 TIMES DAILY
Qty: 1 INHALER | Refills: 2 | Status: SHIPPED | OUTPATIENT
Start: 2021-04-12 | End: 2021-04-12

## 2021-04-12 RX ORDER — DISULFIRAM 250 MG/1
250 TABLET ORAL DAILY
Qty: 90 TABLET | Refills: 0 | Status: SHIPPED | OUTPATIENT
Start: 2021-04-12 | End: 2022-04-01 | Stop reason: SDUPTHER

## 2021-04-12 RX ORDER — ALBUTEROL SULFATE 90 UG/1
AEROSOL, METERED RESPIRATORY (INHALATION)
Qty: 18 G | Refills: 5 | Status: SHIPPED | OUTPATIENT
Start: 2021-04-12 | End: 2021-10-18

## 2021-04-12 NOTE — PATIENT INSTRUCTIONS
Obesity   AMBULATORY CARE:   Obesity  is when your body mass index (BMI) is greater than 30  Your healthcare provider will use your height and weight to measure your BMI  The risks of obesity include  many health problems, such as injuries or physical disability  You may need tests to check for the following:  · Diabetes    · High blood pressure or high cholesterol    · Heart disease    · Gallbladder or liver disease    · Cancer of the colon, breast, prostate, liver, or kidney    · Sleep apnea    · Arthritis or gout    Seek care immediately if:   · You have a severe headache, confusion, or difficulty speaking  · You have weakness on one side of your body  · You have chest pain, sweating, or shortness of breath  Contact your healthcare provider if:   · You have symptoms of gallbladder or liver disease, such as pain in your upper abdomen  · You have knee or hip pain and discomfort while walking  · You have symptoms of diabetes, such as intense hunger and thirst, and frequent urination  · You have symptoms of sleep apnea, such as snoring or daytime sleepiness  · You have questions or concerns about your condition or care  Treatment for obesity  focuses on helping you lose weight to improve your health  Even a small decrease in BMI can reduce the risk for many health problems  Your healthcare provider will help you set a weight-loss goal   · Lifestyle changes  are the first step in treating obesity  These include making healthy food choices and getting regular physical activity  Your healthcare provider may suggest a weight-loss program that involves coaching, education, and therapy  · Medicine  may help you lose weight when it is used with a healthy diet and physical activity  · Surgery  can help you lose weight if you are very obese and have other health problems  There are several types of weight-loss surgery  Ask your healthcare provider for more information      Be successful losing weight:   · Set small, realistic goals  An example of a small goal is to walk for 20 minutes 5 days a week  Anther goal is to lose 5% of your body weight  · Tell friends, family members, and coworkers about your goals  and ask for their support  Ask a friend to lose weight with you, or join a weight-loss support group  · Identify foods or triggers that may cause you to overeat , and find ways to avoid them  Remove tempting high-calorie foods from your home and workplace  Place a bowl of fresh fruit on your kitchen counter  If stress causes you to eat, then find other ways to cope with stress  · Keep a diary to track what you eat and drink  Also write down how many minutes of physical activity you do each day  Weigh yourself once a week and record it in your diary  Eating changes: You will need to eat 500 to 1,000 fewer calories each day than you currently eat to lose 1 to 2 pounds a week  The following changes will help you cut calories:  · Eat smaller portions  Use small plates, no larger than 9 inches in diameter  Fill your plate half full of fruits and vegetables  Measure your food using measuring cups until you know what a serving size looks like  · Eat 3 meals and 1 or 2 snacks each day  Plan your meals in advance  Anni Modi and eat at home most of the time  Eat slowly  Do not skip meals  Skipping meals can lead to overeating later in the day  This can make it harder for you to lose weight  Talk with a dietitian to help you make a meal plan and schedule that is right for you  · Eat fruits and vegetables at every meal   They are low in calories and high in fiber, which makes you feel full  Do not add butter, margarine, or cream sauce to vegetables  Use herbs to season steamed vegetables  · Eat less fat and fewer fried foods  Eat more baked or grilled chicken and fish  These protein sources are lower in calories and fat than red meat  Limit fast food   Dress your salads with olive oil and vinegar instead of bottled dressing  · Limit the amount of sugar you eat  Do not drink sugary beverages  Limit alcohol  Activity changes:  Physical activity is good for your body in many ways  It helps you burn calories and build strong muscles  It decreases stress and depression, and improves your mood  It can also help you sleep better  Talk to your healthcare provider before you begin an exercise program   · Exercise for at least 30 minutes 5 days a week  Start slowly  Set aside time each day for physical activity that you enjoy and that is convenient for you  It is best to do both weight training and an activity that increases your heart rate, such as walking, bicycling, or swimming  · Find ways to be more active  Do yard work and housecleaning  Walk up the stairs instead of using elevators  Spend your leisure time going to events that require walking, such as outdoor festivals or fairs  This extra physical activity can help you lose weight and keep it off  Follow up with your healthcare provider as directed: You may need to meet with a dietitian  Write down your questions so you remember to ask them during your visits  © Copyright [address] [place] [address] Drive Information is for End User's use only and may not be sold, redistributed or otherwise used for commercial purposes  All illustrations and images included in CareNotes® are the copyrighted property of [employer] D A M [employer] Inc  or [facility] Elyssa estephania   The above information is an  only  It is not intended as medical advice for individual conditions or treatments  Talk to your doctor, nurse or pharmacist before following any medical regimen to see if it is safe and effective for you  Low Fat Diet   AMBULATORY CARE:   A low-fat diet  is an eating plan that is low in total fat, unhealthy fat, and cholesterol  You may need to follow a low-fat diet if you have trouble digesting or absorbing fat   You may also need to follow this diet if you have high cholesterol  You can also lower your cholesterol by increasing the amount of fiber in your diet  Soluble fiber is a type of fiber that helps to decrease cholesterol levels  Different types of fat in food:   · Limit unhealthy fats  A diet that is high in cholesterol, saturated fat, and trans fat may cause unhealthy cholesterol levels  Unhealthy cholesterol levels increase your risk of heart disease  ? Cholesterol:  Limit intake of cholesterol to less than 200 mg per day  Cholesterol is found in meat, eggs, and dairy  ? Saturated fat:  Limit saturated fat to less than 7% of your total daily calories  Ask your dietitian how many calories you need each day  Saturated fat is found in butter, cheese, ice cream, whole milk, and palm oil  Saturated fat is also found in meat, such as beef, pork, chicken skin, and processed meats  Processed meats include sausage, hot dogs, and bologna  ? Trans fat:  Avoid trans fat as much as possible  Trans fat is used in fried and baked foods  Foods that say trans fat free on the label may still have up to 0 5 grams of trans fat per serving  · Include healthy fats  Replace foods that are high in saturated and trans fat with foods high in healthy fats  This may help to decrease high cholesterol levels  ? Monounsaturated fats: These are found in avocados, nuts, and vegetable oils, such as olive, canola, and sunflower oil  ? Polyunsaturated fats: These can be found in vegetable oils, such as soybean or corn oil  Omega-3 fats can help to decrease the risk of heart disease  Omega-3 fats are found in fish, such as salmon, herring, trout, and tuna  Omega-3 fats can also be found in plant foods, such as walnuts, flaxseed, soybeans, and canola oil  Foods to limit or avoid:   · Grains:      ? Snacks that are made with partially hydrogenated oils, such as chips, regular crackers, and butter-flavored popcorn    ?  High-fat baked goods, such as biscuits, croissants, doughnuts, pies, cookies, and pastries    · Dairy:      ? Whole milk, 2% milk, and yogurt and ice cream made with whole milk    ? Half and half creamer, heavy cream, and whipping cream    ? Cheese, cream cheese, and sour cream    · Meats and proteins:      ? High-fat cuts of meat (T-bone steak, regular hamburger, and ribs)    ? Fried meat, poultry (turkey and chicken), and fish    ? Poultry (chicken and turkey) with skin    ? Cold cuts (salami or bologna), hot dogs, terrazas, and sausage    ? Whole eggs and egg yolks    · Vegetables and fruits with added fat:      ? Fried vegetables or vegetables in butter or high-fat sauces, such as cream or cheese sauces    ? Fried fruit or fruit served with butter or cream    · Fats:      ? Butter, stick margarine, and shortening    ? Coconut, palm oil, and palm kernel oil    Foods to include:   · Grains:      ? Whole-grain breads, cereals, pasta, and brown rice    ? Low-fat crackers and pretzels    · Vegetables and fruits:      ? Fresh, frozen, or canned vegetables (no salt or low-sodium)    ? Fresh, frozen, dried, or canned fruit (canned in light syrup or fruit juice)    ? Avocado    · Low-fat dairy products:      ? Nonfat (skim) or 1% milk    ? Nonfat or low-fat cheese, yogurt, and cottage cheese    · Meats and proteins:      ? Chicken or turkey with no skin    ? Baked or broiled fish    ? Lean beef and pork (loin, round, extra lean hamburger)    ? Beans and peas, unsalted nuts, soy products    ? Egg whites and substitutes    ? Seeds and nuts    · Fats:      ? Unsaturated oil, such as canola, olive, peanut, soybean, or sunflower oil    ? Soft or liquid margarine and vegetable oil spread    ? Low-fat salad dressing    Other ways to decrease fat:   · Read food labels before you buy foods  Choose foods that have less than 30% of calories from fat  Choose low-fat or fat-free dairy products  Remember that fat free does not mean calorie free   These foods still contain calories, and too many calories can lead to weight gain  · Trim fat from meat and avoid fried food  Trim all visible fat from meat before you cook it  Remove the skin from poultry  Do not storey meat, fish, or poultry  Bake, roast, boil, or broil these foods instead  Avoid fried foods  Eat a baked potato instead of [place] fries  Steam vegetables instead of sautéing them in butter  · Add less fat to foods  Use imitation terrazas bits on salads and baked potatoes instead of regular terrazas bits  Use fat-free or low-fat salad dressings instead of regular dressings  Use low-fat or nonfat butter-flavored topping instead of regular butter or margarine on popcorn and other foods  Ways to decrease fat in recipes:  Replace high-fat ingredients with low-fat or nonfat ones  This may cause baked goods to be drier than usual  You may need to use nonfat cooking spray on pans to prevent food from sticking  You also may need to change the amount of other ingredients, such as water, in the recipe  Try the following:  · Use low-fat or light margarine instead of regular margarine or shortening  · Use lean ground turkey breast or chicken, or lean ground beef (less than 5% fat) instead of hamburger  · Add 1 teaspoon of canola oil to 8 ounces of skim milk instead of using cream or half and half  · Use grated zucchini, carrots, or apples in breads instead of coconut  · Use blenderized, low-fat cottage cheese, plain tofu, or low-fat ricotta cheese instead of cream cheese  · Use 1 egg white and 1 teaspoon of canola oil, or use ¼ cup (2 ounces) of fat-free egg substitute instead of a whole egg  · Replace half of the oil that is called for in a recipe with applesauce when you bake  Use 3 tablespoons of cocoa powder and 1 tablespoon of canola oil instead of a square of baking chocolate  How to increase fiber:  Eat enough high-fiber foods to get 20 to 30 grams of fiber every day   Slowly increase your fiber intake to avoid stomach cramps, gas, and other problems  · Eat 3 ounces of whole-grain foods each day  An ounce is about 1 slice of bread  Eat whole-grain breads, such as whole-wheat bread  Whole wheat, whole-wheat flour, or other whole grains should be listed as the first ingredient on the food label  Replace white flour with whole-grain flour or use half of each in recipes  Whole-grain flour is heavier than white flour, so you may have to add more yeast or baking powder  · Eat a high-fiber cereal for breakfast   Oatmeal is a good source of soluble fiber  Look for cereals that have bran or fiber in the name  Choose whole-grain products, such as brown rice, barley, and whole-wheat pasta  · Eat more beans, peas, and lentils  For example, add beans to soups or salads  Eat at least 5 cups of fruits and vegetables each day  Eat fruits and vegetables with the peel because the peel is high in fiber  © Copyright [address] Information is for End User's use only and may not be sold, redistributed or otherwise used for commercial purposes  All illustrations and images included in CareNotes® are the copyrighted property of A D A M , Inc  or [facility]  The above information is an  only  It is not intended as medical advice for individual conditions or treatments  Talk to your doctor, nurse or pharmacist before following any medical regimen to see if it is safe and effective for you  Heart Healthy Diet   AMBULATORY CARE:   A heart healthy diet  is an eating plan low in unhealthy fats and sodium (salt)  The plan is high in healthy fats and fiber  A heart healthy diet helps improve your cholesterol levels and lowers your risk for heart disease and stroke  A dietitian will teach you how to read and understand food labels  Heart healthy diet guidelines to follow:   · Choose foods that contain healthy fats  ? Unsaturated fats  include monounsaturated and polyunsaturated fats  Unsaturated fat is found in foods such as soybean, canola, olive, corn, and safflower oils  It is also found in soft tub margarine that is made with liquid vegetable oil  ? Omega-3 fat  is found in certain fish, such as salmon, tuna, and trout, and in walnuts and flaxseed  Eat fish high in omega-3 fats at least 2 times a week  · Get 20 to 30 grams of fiber each day  Fruits, vegetables, whole-grain foods, and legumes (cooked beans) are good sources of fiber  · Limit or do not have unhealthy fats  ? Cholesterol  is found in animal foods, such as eggs and lobster, and in dairy products made from whole milk  Limit cholesterol to less than 200 mg each day  ? Saturated fat  is found in meats, such as terrazas and hamburger  It is also found in chicken or turkey skin, whole milk, and butter  Limit saturated fat to less than 7% of your total daily calories  ? Trans fat  is found in packaged foods, such as potato chips and cookies  It is also in hard margarine, some fried foods, and shortening  Do not eat foods that contain trans fats  · Limit sodium as directed  You may be told to limit sodium to 2,000 to 2,300 mg each day  Choose low-sodium or no-salt-added foods  Add little or no salt to food you prepare  Use herbs and spices in place of salt  Include the following in your heart healthy plan:  Ask your dietitian or healthcare provider how many servings to have from each of the following food groups:  · Grains:      ? Whole-wheat breads, cereals, and pastas, and brown rice    ? Low-fat, low-sodium crackers and chips    · Vegetables:      ? Broccoli, green beans, green peas, and spinach    ? Collards, kale, and lima beans    ? Carrots, sweet potatoes, tomatoes, and peppers    ? Canned vegetables with no salt added    · Fruits:      ? Bananas, peaches, pears, and pineapple    ? Grapes, raisins, and dates    ?  Oranges, tangerines, grapefruit, orange juice, and grapefruit juice    ? Apricots, mangoes, melons, and papaya    ? Raspberries and strawberries    ? Canned fruit with no added sugar    · Low-fat dairy:      ? Nonfat (skim) milk, 1% milk, and low-fat almond, cashew, or soy milks fortified with calcium    ? Low-fat cheese, regular or frozen yogurt, and cottage cheese    · Meats and proteins:      ? Lean cuts of beef and pork (loin, leg, round), skinless chicken and turkey    ? Legumes, soy products, egg whites, or nuts    Limit or do not include the following in your heart healthy plan:   · Unhealthy fats and oils:      ? Whole or 2% milk, cream cheese, sour cream, or cheese    ? High-fat cuts of beef (T-bone steaks, ribs), chicken or turkey with skin, and organ meats such as liver    ? Butter, stick margarine, shortening, and cooking oils such as coconut or palm oil    · Foods and liquids high in sodium:      ? Packaged foods, such as frozen dinners, cookies, macaroni and cheese, and cereals with more than 300 mg of sodium per serving    ? Vegetables with added sodium, such as instant potatoes, vegetables with added sauces, or regular canned vegetables    ? Cured or smoked meats, such as hot dogs, terrazas, and sausage    ? High-sodium ketchup, barbecue sauce, salad dressing, pickles, olives, soy sauce, or miso    · Foods and liquids high in sugar:      ? Candy, cake, cookies, pies, or doughnuts    ? Soft drinks (soda), sports drinks, or sweetened tea    ? Canned or dry mixes for cakes, soups, sauces, or gravies    Other healthy heart guidelines:   · Do not smoke  Nicotine and other chemicals in cigarettes and cigars can cause lung and heart damage  Ask your healthcare provider for information if you currently smoke and need help to quit  E-cigarettes or smokeless tobacco still contain nicotine  Talk to your healthcare provider before you use these products  · Limit or do not drink alcohol as directed  Alcohol can damage your heart and raise your blood pressure   Your healthcare provider may give you specific daily and weekly limits  The general recommended limit is 1 drink a day for women 21 or older and for men 72 or older  Do not have more than 3 drinks in a day or 7 in a week  The recommended limit is 2 drinks a day for men 24to 59years of age  Do not have more than 4 drinks in a day or 14 in a week  A drink of alcohol is 12 ounces of beer, 5 ounces of wine, or 1½ ounces of liquor  · Exercise regularly  Exercise can help you maintain a healthy weight and improve your blood pressure and cholesterol levels  Regular exercise can also decrease your risk for heart problems  Ask your healthcare provider about the best exercise plan for you  Do not start an exercise program without asking your healthcare provider  Follow up with your doctor or cardiologist as directed:  Write down your questions so you remember to ask them during your visits  © Copyright [facility] [address] Information is for End User's use only and may not be sold, redistributed or otherwise used for commercial purposes  All illustrations and images included in CareNotes® are the copyrighted property of [employer] M , Inc  or [facility] Elyssa Mack   The above information is an  only  It is not intended as medical advice for individual conditions or treatments  Talk to your doctor, nurse or pharmacist before following any medical regimen to see if it is safe and effective for you

## 2021-04-12 NOTE — PROGRESS NOTES
BMI Counseling: Body mass index is 37 02 kg/m²  The BMI is above normal  Nutrition recommendations include decreasing portion sizes and encouraging healthy choices of fruits and vegetables  Exercise recommendations include moderate physical activity 150 minutes/week  No pharmacotherapy was ordered         Assessment/Plan:  Problem List Items Addressed This Visit        Digestive    Terminal ileitis without complication ([facility] [address] )    [facility]'s disease of colon with complication ([facility] ) - Primary    Relevant Medications    disulfiram (ANTABUSE) 250 mg tablet    Other Relevant Orders    CBC and differential    Folate    Magnesium    Vitamin B12       Endocrine    Hypothyroidism    Relevant Medications    disulfiram (ANTABUSE) 250 mg tablet       Respiratory    Moderate persistent asthma without complication    Relevant Medications    albuterol (Ventolin HFA) 90 mcg/act inhaler    disulfiram (ANTABUSE) 250 mg tablet    umeclidinium bromide (INCRUSE ELLIPTA) 62 5 mcg/inh AEPB inhaler    fluticasone-vilanterol (BREO ELLIPTA) 200-25 MCG/INH inhaler       Other    Severe protein-calorie malnutrition (HCC)    Relevant Medications    disulfiram (ANTABUSE) 250 mg tablet    Recurrent major depressive disorder, in partial remission (HCC)    Relevant Medications    disulfiram (ANTABUSE) 250 mg tablet    Other Relevant Orders    TSH, 3rd generation    Hypomagnesemia    Relevant Medications    disulfiram (ANTABUSE) 250 mg tablet    JOSE (generalized anxiety disorder)    Relevant Medications    disulfiram (ANTABUSE) 250 mg tablet    Other Relevant Orders    TSH, 3rd generation    Hemoglobin A1C    Prolactin    Benzodiazepines Conf (GC/MS)    Toxicology screen, urine    Alcohol dependence in remission (HCC)    Relevant Medications    disulfiram (ANTABUSE) 250 mg tablet    Other Relevant Orders    Folate    Vitamin B12      Other Visit Diagnoses     Encounter for screening mammogram for malignant neoplasm of breast        Relevant Medications disulfiram (ANTABUSE) 250 mg tablet    Other Relevant Orders    Mammo screening bilateral w 3d & cad    Negative depression screening        Medicare annual wellness visit, initial        Screening for lipid disorders        Screening for diabetes mellitus (DM)        Screening for deficiency anemia        Employment-related drug testing, encounter for        Relevant Orders    Lipid Panel with Direct LDL reflex    Hemoglobin A1C    Screening for HIV (human immunodeficiency virus)        Relevant Orders    HIV 1/2 Antigen/Antibody (4th Generation) w Reflex [facility]    Uncomplicated asthma, unspecified asthma severity, unspecified whether persistent        Relevant Medications    albuterol (Ventolin HFA) 90 mcg/act inhaler    disulfiram (ANTABUSE) 250 mg tablet    umeclidinium bromide (INCRUSE ELLIPTA) 62 5 mcg/inh AEPB inhaler    fluticasone-vilanterol (BREO ELLIPTA) 200-25 MCG/INH inhaler    Alcohol withdrawal syndrome with complication (HCC)        Relevant Medications    disulfiram (ANTABUSE) 250 mg tablet    Lactate blood increased        Relevant Medications    disulfiram (ANTABUSE) 250 mg tablet    Macrocytosis without anemia        Relevant Medications    disulfiram (ANTABUSE) 250 mg tablet    Hypocalcemia        Relevant Medications    disulfiram (ANTABUSE) 250 mg tablet    Transaminitis        Relevant Medications    disulfiram (ANTABUSE) 250 mg tablet    Positive depression screening        Relevant Medications    disulfiram (ANTABUSE) 250 mg tablet    Elevated glucose        Relevant Medications    disulfiram (ANTABUSE) 250 mg tablet    Fatty liver, alcoholic        Relevant Medications    disulfiram (ANTABUSE) 250 mg tablet    Moderate major depression, single episode (HCC)        Relevant Medications    disulfiram (ANTABUSE) 250 mg tablet    Anxiety with depression        Relevant Medications    disulfiram (ANTABUSE) 250 mg tablet    Insomnia secondary to depression with anxiety        Relevant Medications disulfiram (ANTABUSE) 250 mg tablet    Encounter for vaccination        Relevant Medications    disulfiram (ANTABUSE) 250 mg tablet    Anhedonia        Relevant Medications    disulfiram (ANTABUSE) 250 mg tablet           Diagnoses and all orders for this visit:    Crohn's disease of colon with complication ([place] [address] )  -     CBC and differential; Future  -     Folate; Future  -     Magnesium; Future  -     Vitamin B12; Future  -     disulfiram (ANTABUSE) 250 mg tablet; Take 1 tablet (250 mg total) by mouth daily    Encounter for screening mammogram for malignant neoplasm of breast  -     Mammo screening bilateral w 3d & cad; Future  -     disulfiram (ANTABUSE) 250 mg tablet; Take 1 tablet (250 mg total) by mouth daily    Hypothyroidism, unspecified type  -     disulfiram (ANTABUSE) 250 mg tablet; Take 1 tablet (250 mg total) by mouth daily    Moderate persistent asthma without complication  -     Discontinue: budesonide-formoterol (SYMBICORT) 160-4 5 mcg/act inhaler; Inhale 2 puffs 2 (two) times a day Rinse mouth after use  -     disulfiram (ANTABUSE) 250 mg tablet; Take 1 tablet (250 mg total) by mouth daily  -     umeclidinium bromide (INCRUSE ELLIPTA) 62 5 mcg/inh AEPB inhaler; Inhale 1 puff daily  -     fluticasone-vilanterol (BREO ELLIPTA) 200-25 MCG/INH inhaler; Inhale 1 puff daily Rinse mouth after use  Alcohol dependence in remission ([place] [address] )  -     Folate; Future  -     Vitamin B12; Future    Recurrent major depressive disorder, in partial remission (HCC)  -     TSH, 3rd generation; Future  -     disulfiram (ANTABUSE) 250 mg tablet; Take 1 tablet (250 mg total) by mouth daily    JOSE (generalized anxiety disorder)  -     TSH, 3rd generation; Future  -     Hemoglobin A1C; Future  -     Prolactin; Future  -     Benzodiazepines Conf (GC/MS)  -     Toxicology screen, urine    Hypomagnesemia  -     disulfiram (ANTABUSE) 250 mg tablet;  Take 1 tablet (250 mg total) by mouth daily    Severe protein-calorie malnutrition ([facility] [address] )  -     disulfiram (ANTABUSE) 250 mg tablet; Take 1 tablet (250 mg total) by mouth daily    Terminal ileitis without complication ([facility] )    Negative depression screening    Medicare annual wellness visit, initial    Screening for lipid disorders    Screening for diabetes mellitus (DM)    Screening for deficiency anemia    Employment-related drug testing, encounter for  -     Lipid Panel with Direct LDL reflex; Future  -     Hemoglobin A1C; Future    Screening for HIV (human immunodeficiency virus)  -     HIV 1/2 Antigen/Antibody (4th Generation) w Reflex [facility]; Future    Uncomplicated asthma, unspecified asthma severity, unspecified whether persistent  -     albuterol (Ventolin HFA) 90 mcg/act inhaler; 1-2  Puffs q 4-6 hours PRN  -     disulfiram (ANTABUSE) 250 mg tablet; Take 1 tablet (250 mg total) by mouth daily    Alcohol withdrawal syndrome with complication (HCC)  -     disulfiram (ANTABUSE) 250 mg tablet; Take 1 tablet (250 mg total) by mouth daily    Lactate blood increased  -     disulfiram (ANTABUSE) 250 mg tablet; Take 1 tablet (250 mg total) by mouth daily    Macrocytosis without anemia  -     disulfiram (ANTABUSE) 250 mg tablet; Take 1 tablet (250 mg total) by mouth daily    Hypocalcemia  -     disulfiram (ANTABUSE) 250 mg tablet; Take 1 tablet (250 mg total) by mouth daily    Transaminitis  -     disulfiram (ANTABUSE) 250 mg tablet; Take 1 tablet (250 mg total) by mouth daily    Positive depression screening  -     disulfiram (ANTABUSE) 250 mg tablet; Take 1 tablet (250 mg total) by mouth daily    Elevated glucose  -     disulfiram (ANTABUSE) 250 mg tablet; Take 1 tablet (250 mg total) by mouth daily    Fatty liver, alcoholic  -     disulfiram (ANTABUSE) 250 mg tablet; Take 1 tablet (250 mg total) by mouth daily    Moderate major depression, single episode (HCC)  -     disulfiram (ANTABUSE) 250 mg tablet;  Take 1 tablet (250 mg total) by mouth daily    Anxiety with depression  - disulfiram (ANTABUSE) 250 mg tablet; Take 1 tablet (250 mg total) by mouth daily    Insomnia secondary to depression with anxiety  -     disulfiram (ANTABUSE) 250 mg tablet; Take 1 tablet (250 mg total) by mouth daily    Encounter for vaccination  -     disulfiram (ANTABUSE) 250 mg tablet; Take 1 tablet (250 mg total) by mouth daily    Anhedonia  -     disulfiram (ANTABUSE) 250 mg tablet; Take 1 tablet (250 mg total) by mouth daily    Other orders  -     Cancel: C-reactive protein; Future        No problem-specific Assessment & Plan notes found for this encounter  A/P: Doing better  Will check labs and mammo  RAD not as controlled as we would like  Will add LAMA  Continue current treatment and recommend AA [facility]  RTC four months for routine  Subjective:      Patient ID: Ragena Dandy is a 42 y o  female  WF RTC for f/u Crohn's, asthma, etc  Doing well and no new issues  Feeling better  Psych issues controlled despite being off the SNRI and staying off the ETOH  Still on the antabuse  Back to work   RAD is controlled, but increase  DRAKE use due to mask use  Crohn's is controlled  Due for labs and mammo  The following portions of the patient's history were reviewed and updated as appropriate:   She has a past medical history of Anxiety, Asthma, Crohn disease ([address] ), Depression, Disease of thyroid gland, Hypertension, Hypothyroidism, and Psychiatric disorder  ,  does not have any pertinent problems on file  ,   has a past surgical history that includes No past surgeries  ,  family history includes Colon cancer in her family, father, and mother; Hypertension in her father and mother; Kidney cancer in her family, father, and mother  ,   reports that she quit smoking about 2 years ago  She has never used smokeless tobacco  She reports current alcohol use of about 70 0 standard drinks of alcohol per week  She reports that she does not use drugs  ,  is allergic to penicillins     Current Outpatient Medications Medication Sig Dispense Refill    albuterol (Ventolin HFA) 90 mcg/act inhaler 1-2  Puffs q 4-6 hours PRN 18 g 5    ARIPiprazole (ABILIFY) 5 mg tablet Take 1 tablet (5 mg total) by mouth every morning 90 tablet 1    cetirizine (ZyrTEC) 10 mg tablet Take 10 mg by mouth daily      clonazePAM (KlonoPIN) 0 5 mg tablet Take 1 tablet (0 5 mg total) by mouth 2 (two) times a day as needed (prn) 60 tablet 0    diphenhydrAMINE (BENADRYL ALLERGY) 25 mg capsule Take by mouth      disulfiram (ANTABUSE) 250 mg tablet Take 1 tablet (250 mg total) by mouth daily 90 tablet 0    ergocalciferol (VITAMIN D2) 50,000 units Take 1 capsule (50,000 Units total) by mouth once a week 4 capsule 5    levothyroxine 125 mcg tablet Take 1 tablet (125 mcg total) by mouth daily in the early morning 90 tablet 3    MAGNESIUM OXIDE PO Take by mouth daily      montelukast (SINGULAIR) 10 mg tablet Take 1 tablet (10 mg total) by mouth daily 30 tablet 5    Multiple Vitamin (multivitamin) tablet Take 1 tablet by mouth daily      thiamine 250 MG tablet Take 1 tablet (250 mg total) by mouth daily 90 tablet 0    fluticasone-vilanterol (BREO ELLIPTA) 200-25 MCG/INH inhaler Inhale 1 puff daily Rinse mouth after use  3 Inhaler 1    umeclidinium bromide (INCRUSE ELLIPTA) 62 5 mcg/inh AEPB inhaler Inhale 1 puff daily 3 Inhaler 1     No current facility-administered medications for this visit  Review of Systems   Constitutional: Negative for activity change, chills, diaphoresis, fatigue and fever  HENT: Negative  Eyes: Negative for visual disturbance  Respiratory: Negative for cough, chest tightness, shortness of breath and wheezing  Cardiovascular: Negative for chest pain, palpitations and leg swelling  Gastrointestinal: Negative for abdominal pain, constipation, diarrhea, nausea and vomiting  Endocrine: Negative for cold intolerance and heat intolerance  Genitourinary: Negative for difficulty urinating, dysuria and frequency  Musculoskeletal: Negative for arthralgias, gait problem and myalgias  Neurological: Negative for dizziness, seizures, syncope, weakness, light-headedness and headaches  Psychiatric/Behavioral: Positive for dysphoric mood  Negative for confusion, sleep disturbance and suicidal ideas  The patient is nervous/anxious  PHQ-9 Depression Screening    PHQ-9:   Frequency of the following problems over the past two weeks:            Objective:  Vitals:    04/12/21 1612   BP: 118/80   Pulse: 74   Temp: (!) 97 4 °F (36 3 °C)   SpO2: 98%   Weight: 94 8 kg (209 lb)   Height: 5' 3" (1 6 m)     Body mass index is 37 02 kg/m²  Physical Exam  Vitals signs and nursing note reviewed  Constitutional:       Appearance: Normal appearance  HENT:      Head: Normocephalic and atraumatic  Mouth/Throat:      Mouth: Mucous membranes are moist    Eyes:      Extraocular Movements: Extraocular movements intact  Conjunctiva/sclera: Conjunctivae normal       Pupils: Pupils are equal, round, and reactive to light  Neck:      Musculoskeletal: Neck supple  Vascular: No carotid bruit  Cardiovascular:      Rate and Rhythm: Normal rate and regular rhythm  Heart sounds: Normal heart sounds  Pulmonary:      Effort: Pulmonary effort is normal  No respiratory distress  Breath sounds: Normal breath sounds  No wheezing or rales  Abdominal:      General: Bowel sounds are normal  There is no distension  Palpations: Abdomen is soft  Tenderness: There is no abdominal tenderness  Musculoskeletal:      Right lower leg: No edema  Left lower leg: No edema  Neurological:      General: No focal deficit present  Mental Status: She is alert and oriented to person, place, and time  Mental status is at baseline  Psychiatric:         Mood and Affect: Mood normal          Behavior: Behavior normal          Thought Content:  Thought content normal          Judgment: Judgment normal          BMI Counseling: Body mass index is 37 02 kg/m²  The BMI is above normal  Nutrition recommendations include reducing portion sizes, decreasing overall calorie intake, reducing intake of saturated fat and trans fat and reducing intake of cholesterol  Exercise recommendations include moderate aerobic physical activity for 150 minutes/week

## 2021-04-13 ENCOUNTER — TELEPHONE (OUTPATIENT)
Dept: INTERNAL MEDICINE CLINIC | Facility: CLINIC | Age: 41
End: 2021-04-13

## 2021-04-13 NOTE — TELEPHONE ENCOUNTER
----- Message from Larry Duggan, DO sent at 4/12/2021  4:42 PM EDT -----  Call pt if inhalers too expensive, let us know so we can find a substitute

## 2021-04-17 DIAGNOSIS — E83.51 HYPOCALCEMIA: ICD-10-CM

## 2021-04-17 DIAGNOSIS — R45.84 ANHEDONIA: ICD-10-CM

## 2021-04-17 DIAGNOSIS — R74.01 TRANSAMINITIS: ICD-10-CM

## 2021-04-17 DIAGNOSIS — F10.239 ALCOHOL WITHDRAWAL SYNDROME WITH COMPLICATION (HCC): ICD-10-CM

## 2021-04-17 DIAGNOSIS — Z12.31 ENCOUNTER FOR SCREENING MAMMOGRAM FOR MALIGNANT NEOPLASM OF BREAST: ICD-10-CM

## 2021-04-17 DIAGNOSIS — F32.1 MODERATE MAJOR DEPRESSION, SINGLE EPISODE (HCC): ICD-10-CM

## 2021-04-17 DIAGNOSIS — K70.0 FATTY LIVER, ALCOHOLIC: ICD-10-CM

## 2021-04-17 DIAGNOSIS — R73.09 ELEVATED GLUCOSE: ICD-10-CM

## 2021-04-17 DIAGNOSIS — D75.89 MACROCYTOSIS WITHOUT ANEMIA: ICD-10-CM

## 2021-04-17 DIAGNOSIS — E43 SEVERE PROTEIN-CALORIE MALNUTRITION (HCC): ICD-10-CM

## 2021-04-17 DIAGNOSIS — F41.8 INSOMNIA SECONDARY TO DEPRESSION WITH ANXIETY: ICD-10-CM

## 2021-04-17 DIAGNOSIS — F41.8 ANXIETY WITH DEPRESSION: ICD-10-CM

## 2021-04-17 DIAGNOSIS — Z13.31 POSITIVE DEPRESSION SCREENING: ICD-10-CM

## 2021-04-17 DIAGNOSIS — F51.05 INSOMNIA SECONDARY TO DEPRESSION WITH ANXIETY: ICD-10-CM

## 2021-04-17 DIAGNOSIS — E83.42 HYPOMAGNESEMIA: ICD-10-CM

## 2021-04-17 DIAGNOSIS — R79.89 LACTATE BLOOD INCREASED: ICD-10-CM

## 2021-04-17 DIAGNOSIS — E03.9 HYPOTHYROIDISM, UNSPECIFIED TYPE: ICD-10-CM

## 2021-04-17 DIAGNOSIS — K50.119 CROHN'S DISEASE OF COLON WITH COMPLICATION (HCC): ICD-10-CM

## 2021-04-17 DIAGNOSIS — Z23 ENCOUNTER FOR VACCINATION: ICD-10-CM

## 2021-04-17 DIAGNOSIS — J45.909 UNCOMPLICATED ASTHMA, UNSPECIFIED ASTHMA SEVERITY, UNSPECIFIED WHETHER PERSISTENT: ICD-10-CM

## 2021-04-17 DIAGNOSIS — F33.41 RECURRENT MAJOR DEPRESSIVE DISORDER, IN PARTIAL REMISSION (HCC): ICD-10-CM

## 2021-04-17 DIAGNOSIS — J45.40 MODERATE PERSISTENT ASTHMA WITHOUT COMPLICATION: ICD-10-CM

## 2021-04-17 RX ORDER — ARIPIPRAZOLE 5 MG/1
5 TABLET ORAL EVERY MORNING
Qty: 90 TABLET | Refills: 1 | Status: SHIPPED | OUTPATIENT
Start: 2021-04-17 | End: 2022-02-11 | Stop reason: HOSPADM

## 2021-05-04 DIAGNOSIS — F41.1 GAD (GENERALIZED ANXIETY DISORDER): ICD-10-CM

## 2021-05-04 RX ORDER — CLONAZEPAM 0.5 MG/1
0.5 TABLET ORAL 2 TIMES DAILY PRN
Qty: 60 TABLET | Refills: 0 | Status: SHIPPED | OUTPATIENT
Start: 2021-05-04 | End: 2021-06-04 | Stop reason: SDUPTHER

## 2021-06-04 DIAGNOSIS — F41.1 GAD (GENERALIZED ANXIETY DISORDER): ICD-10-CM

## 2021-06-04 RX ORDER — CLONAZEPAM 0.5 MG/1
0.5 TABLET ORAL 2 TIMES DAILY PRN
Qty: 60 TABLET | Refills: 0 | Status: SHIPPED | OUTPATIENT
Start: 2021-06-04 | End: 2021-07-07 | Stop reason: SDUPTHER

## 2021-07-07 DIAGNOSIS — F41.1 GAD (GENERALIZED ANXIETY DISORDER): ICD-10-CM

## 2021-07-07 RX ORDER — CLONAZEPAM 0.5 MG/1
0.5 TABLET ORAL 2 TIMES DAILY PRN
Qty: 60 TABLET | Refills: 0 | Status: SHIPPED | OUTPATIENT
Start: 2021-07-07 | End: 2021-08-18 | Stop reason: SDUPTHER

## 2021-08-18 DIAGNOSIS — F41.1 GAD (GENERALIZED ANXIETY DISORDER): ICD-10-CM

## 2021-08-18 RX ORDER — CLONAZEPAM 0.5 MG/1
0.5 TABLET ORAL 2 TIMES DAILY PRN
Qty: 60 TABLET | Refills: 0 | Status: SHIPPED | OUTPATIENT
Start: 2021-08-18 | End: 2021-09-29 | Stop reason: SDUPTHER

## 2021-09-29 DIAGNOSIS — F41.1 GAD (GENERALIZED ANXIETY DISORDER): ICD-10-CM

## 2021-09-30 RX ORDER — CLONAZEPAM 0.5 MG/1
0.5 TABLET ORAL 2 TIMES DAILY PRN
Qty: 60 TABLET | Refills: 0 | Status: SHIPPED | OUTPATIENT
Start: 2021-09-30 | End: 2021-11-16 | Stop reason: SDUPTHER

## 2021-10-16 DIAGNOSIS — J45.909 UNCOMPLICATED ASTHMA, UNSPECIFIED ASTHMA SEVERITY, UNSPECIFIED WHETHER PERSISTENT: ICD-10-CM

## 2021-10-18 RX ORDER — ALBUTEROL SULFATE 90 UG/1
AEROSOL, METERED RESPIRATORY (INHALATION)
Qty: 18 G | Refills: 3 | Status: SHIPPED | OUTPATIENT
Start: 2021-10-18 | End: 2022-02-22 | Stop reason: SDUPTHER

## 2021-11-15 DIAGNOSIS — F41.1 GAD (GENERALIZED ANXIETY DISORDER): ICD-10-CM

## 2021-11-16 RX ORDER — CLONAZEPAM 0.5 MG/1
0.5 TABLET ORAL 2 TIMES DAILY PRN
Qty: 60 TABLET | Refills: 0 | Status: SHIPPED | OUTPATIENT
Start: 2021-11-16 | End: 2021-12-22 | Stop reason: SDUPTHER

## 2021-12-10 DIAGNOSIS — J45.40 MODERATE PERSISTENT ASTHMA WITHOUT COMPLICATION: ICD-10-CM

## 2021-12-10 DIAGNOSIS — R74.01 TRANSAMINITIS: ICD-10-CM

## 2021-12-10 DIAGNOSIS — Z23 ENCOUNTER FOR VACCINATION: ICD-10-CM

## 2021-12-10 DIAGNOSIS — F33.41 RECURRENT MAJOR DEPRESSIVE DISORDER, IN PARTIAL REMISSION (HCC): ICD-10-CM

## 2021-12-10 DIAGNOSIS — Z13.31 POSITIVE DEPRESSION SCREENING: ICD-10-CM

## 2021-12-10 DIAGNOSIS — E83.42 HYPOMAGNESEMIA: ICD-10-CM

## 2021-12-10 DIAGNOSIS — J45.909 UNCOMPLICATED ASTHMA, UNSPECIFIED ASTHMA SEVERITY, UNSPECIFIED WHETHER PERSISTENT: ICD-10-CM

## 2021-12-10 DIAGNOSIS — F41.8 ANXIETY WITH DEPRESSION: ICD-10-CM

## 2021-12-10 DIAGNOSIS — R45.84 ANHEDONIA: ICD-10-CM

## 2021-12-10 DIAGNOSIS — F51.05 INSOMNIA SECONDARY TO DEPRESSION WITH ANXIETY: ICD-10-CM

## 2021-12-10 DIAGNOSIS — D75.89 MACROCYTOSIS WITHOUT ANEMIA: ICD-10-CM

## 2021-12-10 DIAGNOSIS — F32.1 MODERATE MAJOR DEPRESSION, SINGLE EPISODE (HCC): ICD-10-CM

## 2021-12-10 DIAGNOSIS — R79.89 LACTATE BLOOD INCREASED: ICD-10-CM

## 2021-12-10 DIAGNOSIS — K50.119 CROHN'S DISEASE OF COLON WITH COMPLICATION (HCC): ICD-10-CM

## 2021-12-10 DIAGNOSIS — E83.51 HYPOCALCEMIA: ICD-10-CM

## 2021-12-10 DIAGNOSIS — F10.239 ALCOHOL WITHDRAWAL SYNDROME WITH COMPLICATION (HCC): ICD-10-CM

## 2021-12-10 DIAGNOSIS — E03.9 HYPOTHYROIDISM, UNSPECIFIED TYPE: ICD-10-CM

## 2021-12-10 DIAGNOSIS — K70.0 FATTY LIVER, ALCOHOLIC: ICD-10-CM

## 2021-12-10 DIAGNOSIS — Z12.31 ENCOUNTER FOR SCREENING MAMMOGRAM FOR MALIGNANT NEOPLASM OF BREAST: ICD-10-CM

## 2021-12-10 DIAGNOSIS — F41.8 INSOMNIA SECONDARY TO DEPRESSION WITH ANXIETY: ICD-10-CM

## 2021-12-10 DIAGNOSIS — R73.09 ELEVATED GLUCOSE: ICD-10-CM

## 2021-12-10 DIAGNOSIS — E43 SEVERE PROTEIN-CALORIE MALNUTRITION (HCC): ICD-10-CM

## 2021-12-10 RX ORDER — LEVOTHYROXINE SODIUM 0.12 MG/1
TABLET ORAL
Qty: 90 TABLET | Refills: 3 | Status: SHIPPED | OUTPATIENT
Start: 2021-12-10 | End: 2022-02-15 | Stop reason: SDUPTHER

## 2021-12-21 DIAGNOSIS — F41.1 GAD (GENERALIZED ANXIETY DISORDER): ICD-10-CM

## 2021-12-22 RX ORDER — CLONAZEPAM 0.5 MG/1
0.5 TABLET ORAL 2 TIMES DAILY PRN
Qty: 60 TABLET | Refills: 0 | Status: SHIPPED | OUTPATIENT
Start: 2021-12-22 | End: 2022-01-25 | Stop reason: SDUPTHER

## 2022-01-03 ENCOUNTER — OFFICE VISIT (OUTPATIENT)
Dept: URGENT CARE | Age: 42
End: 2022-01-03
Payer: COMMERCIAL

## 2022-01-03 VITALS
TEMPERATURE: 97.9 F | BODY MASS INDEX: 40.48 KG/M2 | SYSTOLIC BLOOD PRESSURE: 158 MMHG | WEIGHT: 220 LBS | RESPIRATION RATE: 16 BRPM | HEART RATE: 108 BPM | DIASTOLIC BLOOD PRESSURE: 96 MMHG | HEIGHT: 62 IN | OXYGEN SATURATION: 99 %

## 2022-01-03 DIAGNOSIS — S61.218A LACERATION OF INDEX FINGER, INITIAL ENCOUNTER: Primary | ICD-10-CM

## 2022-01-03 PROCEDURE — G0382 LEV 3 HOSP TYPE B ED VISIT: HCPCS | Performed by: PHYSICIAN ASSISTANT

## 2022-01-03 NOTE — PATIENT INSTRUCTIONS
Keep wound clean dry and covered    In 5-7 days you may soak the wound hydrogen peroxide once or twice daily as directed    Recheck immediately for any redness, swelling, purulent discharge, increasing pain  Go to the emergency room for any worsening symptoms    Change the outer dressing daily      You may change the outer dressing more frequently if it becomes wet or dirty

## 2022-01-03 NOTE — PROGRESS NOTES
[facility]        NAME: Ghada Almeida is a 42 y o  female  : 1980    MRN: [id number]  DATE: January 3, 2022  TIME: 10:24 AM    Assessment and Plan   Laceration of index finger, initial encounter [S61 [id number]]  1  Laceration of index finger, initial encounter           Patient Instructions       Follow up with PCP in 3-5 days  Proceed to  ER if symptoms worsen  Chief Complaint     Chief Complaint   Patient presents with    Finger Laceration         History of Present Illness       Patient here for evaluation a laceration to her left index finger  Patient was using in length this morning accidentally cut the tip of her finger off  She did apply Band-Aids and it has continued to bleed  Review of Systems   Review of Systems   Constitutional: Negative  Musculoskeletal: Negative  Skin: Positive for wound           Current Medications       Current Outpatient Medications:     albuterol (PROVENTIL HFA,VENTOLIN HFA) 90 mcg/act inhaler, inhale 1 to 2 puffs by mouth and INTO THE LUNGS every 4 to 6 hours if needed, Disp: 18 g, Rfl: 3    ARIPiprazole (ABILIFY) 5 mg tablet, Take 1 tablet (5 mg total) by mouth every morning, Disp: 90 tablet, Rfl: 1    cetirizine (ZyrTEC) 10 mg tablet, Take 10 mg by mouth daily, Disp: , Rfl:     clonazePAM (KlonoPIN) 0 5 mg tablet, Take 1 tablet (0 5 mg total) by mouth 2 (two) times a day as needed (prn), Disp: 60 tablet, Rfl: 0    diphenhydrAMINE (BENADRYL ALLERGY) 25 mg capsule, Take by mouth, Disp: , Rfl:     disulfiram (ANTABUSE) 250 mg tablet, Take 1 tablet (250 mg total) by mouth daily, Disp: 90 tablet, Rfl: 0    ergocalciferol (VITAMIN D2) 50,000 units, Take 1 capsule (50,000 Units total) by mouth once a week, Disp: 4 capsule, Rfl: 5    fluticasone-vilanterol (BREO ELLIPTA) 200-25 MCG/INH inhaler, Inhale 1 puff daily Rinse mouth after use , Disp: 3 Inhaler, Rfl: 1    levothyroxine 125 mcg tablet, take 1 tablet by mouth every morning, Disp: 90 tablet, Rfl: 3    MAGNESIUM OXIDE PO, Take by mouth daily, Disp: , Rfl:     montelukast (SINGULAIR) 10 mg tablet, Take 1 tablet (10 mg total) by mouth daily, Disp: 30 tablet, Rfl: 5    Multiple Vitamin (multivitamin) tablet, Take 1 tablet by mouth daily, Disp: , Rfl:     thiamine 250 MG tablet, Take 1 tablet (250 mg total) by mouth daily, Disp: 90 tablet, Rfl: 0    umeclidinium bromide (INCRUSE ELLIPTA) 62 5 mcg/inh AEPB inhaler, Inhale 1 puff daily, Disp: 3 Inhaler, Rfl: 1    Current Allergies     Allergies as of 01/03/2022 - Reviewed 01/03/2022   Allergen Reaction Noted    Penicillins Hives and Rash 04/08/2007            The following portions of the patient's history were reviewed and updated as appropriate: allergies, current medications, past family history, past medical history, past social history, past surgical history and problem list      Past Medical History:   Diagnosis Date    Anxiety     Asthma     Crohn disease ([address] )     Depression     Disease of thyroid gland     Hypertension     Hypothyroidism     Psychiatric disorder        Past Surgical History:   Procedure Laterality Date    NO PAST SURGERIES         Family History   Problem Relation Age of Onset    Colon cancer Mother     Kidney cancer Mother     Hypertension Mother     Colon cancer Father     Kidney cancer Father     Hypertension Father     Colon cancer Family     Kidney cancer Family          Medications have been verified  Objective   /96   Pulse (!) 108   Temp 97 9 °F (36 6 °C)   Resp 16   Ht 5' 2" (1 575 m)   Wt 99 8 kg (220 lb)   SpO2 99%   BMI 40 24 kg/m²   No LMP recorded  Physical Exam     Physical Exam  Vitals and nursing note reviewed  Constitutional:       General: She is not in acute distress  Appearance: Normal appearance  She is well-developed  She is not ill-appearing, toxic-appearing or diaphoretic  HENT:      Head: Normocephalic and atraumatic     Eyes:      Extraocular Movements: Extraocular movements intact  Conjunctiva/sclera: Conjunctivae normal       Pupils: Pupils are equal, round, and reactive to light  Skin:     General: Skin is warm and dry  Findings: No rash  Comments: Avulsion laceration of the left tip of the index finger  No significant deep tissue involvement  Surgicel foam applied as well as a gauze dressing with compression  No foreign bodies  Neurological:      General: No focal deficit present  Mental Status: She is alert and oriented to person, place, and time  Sensory: No sensory deficit  Psychiatric:         Mood and Affect: Mood normal          Behavior: Behavior normal          Thought Content:  Thought content normal          Judgment: Judgment normal

## 2022-01-03 NOTE — LETTER
January 3, 2022     Patient: Cheryl Abraham   YOB: 1980   Date of Visit: 1/3/2022       To Whom It May Concern:    Please excuse Cheryl Abraham 01/03/2022 and 01/04/2022             Sincerely,        Haile Abarca PA-C    CC: No Recipients

## 2022-01-11 ENCOUNTER — NURSE TRIAGE (OUTPATIENT)
Dept: OTHER | Facility: OTHER | Age: 42
End: 2022-01-11

## 2022-01-11 DIAGNOSIS — Z20.822 ENCOUNTER FOR SCREENING LABORATORY TESTING FOR COVID-19 VIRUS: Primary | ICD-10-CM

## 2022-01-11 NOTE — TELEPHONE ENCOUNTER
Reason for Disposition   [1] COVID-19 infection suspected by caller or triager AND [2] mild symptoms (cough, fever, or others) AND [3] negative COVID-19 rapid test    Answer Assessment - Initial Assessment Questions  Were you within 6 feet or less, for up to 15 minutes or more with a person that has a confirmed COVID-19 test?  Her parent both had covid on 12/25/21  What was the date of your exposure? 12/25/21  She developed symptoms 1/6/22  Are you experiencing any symptoms attributed to the virus?  (Assess for SOB, cough, fever, difficulty breathing)   Chills, cough, fatigue, headache, runny nose, nausea,vomiting, diarrhea  HIGH RISK: Do you have any history heart or lung conditions, weakened immune system, diabetes, Asthma, CHF, HIV, COPD, Chemo, renal failure, sickle cell, etc?   Asthma-unstable  PREGNANCY: Are you pregnant or did you recently give birth? Denies  Vaccination status:  Pfizer x 2 doses  Protocols used: CORONAVIRUS (COVID-19) DIAGNOSED OR SUSPECTED-ADULT-[place]      Virtual visit schedule with Dr Debbie Yanes for 1/13/21 at 1:15 pm via [employer]  Covid swab ordered  Covid testing sites reviewed and made aware of early closure today due to inclement weather

## 2022-01-11 NOTE — TELEPHONE ENCOUNTER
Regarding: Covid Symptomatic Headache  ----- Message from [facility] sent at 1/11/2022  8:02 AM EST -----  "I have a stomach ache, nausea, dry heaving, loss of appetite , diarrhea and headache

## 2022-01-12 ENCOUNTER — TELEPHONE (OUTPATIENT)
Dept: INTERNAL MEDICINE CLINIC | Facility: CLINIC | Age: 42
End: 2022-01-12

## 2022-01-12 ENCOUNTER — TELEMEDICINE (OUTPATIENT)
Dept: INTERNAL MEDICINE CLINIC | Facility: CLINIC | Age: 42
End: 2022-01-12
Payer: COMMERCIAL

## 2022-01-12 VITALS — WEIGHT: 220 LBS | TEMPERATURE: 97.9 F | HEART RATE: 90 BPM | HEIGHT: 62 IN | BODY MASS INDEX: 40.48 KG/M2

## 2022-01-12 DIAGNOSIS — Z20.822 CLOSE EXPOSURE TO COVID-19 VIRUS: ICD-10-CM

## 2022-01-12 DIAGNOSIS — J45.40 MODERATE PERSISTENT ASTHMA WITHOUT COMPLICATION: ICD-10-CM

## 2022-01-12 DIAGNOSIS — F10.21 ALCOHOL DEPENDENCE IN REMISSION (HCC): ICD-10-CM

## 2022-01-12 DIAGNOSIS — Z71.89 EDUCATED ABOUT COVID-19 VIRUS INFECTION: ICD-10-CM

## 2022-01-12 DIAGNOSIS — J06.9 UPPER RESPIRATORY TRACT INFECTION, UNSPECIFIED TYPE: Primary | ICD-10-CM

## 2022-01-12 DIAGNOSIS — R11.2 NON-INTRACTABLE VOMITING WITH NAUSEA, UNSPECIFIED VOMITING TYPE: ICD-10-CM

## 2022-01-12 PROCEDURE — 99213 OFFICE O/P EST LOW 20 MIN: CPT | Performed by: INTERNAL MEDICINE

## 2022-01-12 PROCEDURE — 87636 SARSCOV2 & INF A&B AMP PRB: CPT | Performed by: INTERNAL MEDICINE

## 2022-01-12 PROCEDURE — 3008F BODY MASS INDEX DOCD: CPT | Performed by: INTERNAL MEDICINE

## 2022-01-12 RX ORDER — ONDANSETRON 4 MG/1
4 TABLET, FILM COATED ORAL EVERY 8 HOURS PRN
Qty: 20 TABLET | Refills: 0 | Status: SHIPPED | OUTPATIENT
Start: 2022-01-12 | End: 2022-02-27 | Stop reason: HOSPADM

## 2022-01-12 NOTE — PATIENT INSTRUCTIONS
How to Recover from COVID-19 at [address]:   What you need to know about recovering from COVID-19 at home:  COVID-19 can cause a range of symptoms, from mild to severe  If you do not need to be treated in a hospital, you will be given instructions to use at home  You will need to watch for worsening symptoms and seek immediate care if needed  You will also need to stay physically apart from others so you do not spread the virus to anyone  Information about COVID-19 is still being learned  It is not known if a person can be infected with the virus again after recovering from COVID-19  It is also not known if or for how long the virus can continue to be passed to others  If you think someone in your home may be infected,  do the following to protect others:  · If emergency care is needed,  tell the  about the possible infection, or call ahead and tell the emergency department  · Call a healthcare provider  for instructions if symptoms are mild  Anyone who may be infected should not  arrive without calling first  The provider will need to protect staff members and other patients  · The person who may be infected needs to wear a face covering  while getting medical care  This will help lower the risk of infecting others  Coverings are not used for anyone who is younger than 2 years, has breathing problems, or cannot remove it  The provider can give you instructions for anyone who cannot wear a covering  Call your local emergency number (911 in the [address] [place] [address],[address]) or an emergency department if:   · You have trouble breathing or shortness of breath at rest     · You have chest pain or pressure that lasts longer than 5 minutes  · You become confused or hard to wake  · Your lips or face are blue  · You have a fever of 104°F (40°C) or higher  Call your doctor if:   · You have new, returning, or worsening symptoms      · Someone in your home does not  have symptoms of COVID-19 but had close physical contact within 14 days with you  · You have questions or concerns about your condition or care  Self-care:  Mild symptoms may get better on their own  The following may be used to manage your symptoms:  · Decongestants  help reduce nasal congestion and help you breathe more easily  If you take decongestant pills, they may make you feel restless or cause problems with your sleep  Do not use decongestant sprays for more than a few days  · Cough suppressants  help reduce coughing  Ask your healthcare provider which type of cough medicine is best for you  · To soothe a sore throat,  gargle with warm salt water, or use throat lozenges or a throat spray  Your healthcare provider may recommend a cough medicine  Drink more liquids to thin and loosen mucus and to prevent dehydration  Use decongestants or saline drops as directed for nasal congestion  · NSAIDs , such as ibuprofen, help decrease swelling, pain, and fever  NSAIDs can cause stomach bleeding or kidney problems in certain people  If you take blood thinner medicine, always ask your healthcare provider if NSAIDs are safe for you  Always read the medicine label and follow directions  · Acetaminophen  decreases pain and fever  It is available without a doctor's order  Ask how much to take and how often to take it  Follow directions  Read the labels of all other medicines you are using to see if they also contain acetaminophen, or ask your doctor or pharmacist  Acetaminophen can cause liver damage if not taken correctly  Do not use more than 4 grams (4,000 milligrams) total of acetaminophen in one day  Keep others safe while you are recovering at home:  Healthcare providers will give you specific instructions to follow  The following are general guidelines to remind you how to keep others safe until you are well:     · Wash your hands often  Use soap and water as much as possible   You can use hand  that contains alcohol if soap and water are not available  Do not share towels with anyone  If you use paper towels, throw them away in a lined trash can kept in your room or area  Use a covered trash can, if possible  · Cover sneezes and coughs  Turn your face away and cover your mouth and nose with a tissue  Throw the tissue away  Use the bend of your arm if a tissue is not available  Then wash your hands well with soap and water or use hand   · Wear a face covering (mask) around anyone who does not live in your home  A covering will help prevent you from passing the virus to others  It is not known for sure if or for how long the virus can be passed after recovery  Do not  wear a plastic face shield instead of a covering  Use a cloth covering with at least 2 layers  You can also create layers by putting a cloth covering over a disposable non-medical mask  Cover your mouth and your nose  The covering should fit snugly against the bridge of your nose  Securely fasten it under your chin and on the sides of your face  A face covering is not a substitute for other safety measures  Continue social distancing and washing your hands often  · Do not go out of your home unless it is necessary  If possible, ask someone who is not infected to go out for groceries, medicines, and household items  Ask your healthcare provider for other ways to have appointments  Some providers offer phone, video, or other types of appointments  If you need to be seen in person, call ahead to make sure the office will be ready for you  · Do not let anyone into your home, room, or area unless it is necessary  If possible, stay in a separate area or room of your home if you live with others  No one should go into the area or room except to give you care  Only allow medical professionals or other necessary helpers in  Wear a face covering  Remind them to wear face coverings and to wash their hands   If possible, ask someone who is well to care for your baby  You can put breast milk in bottles for the person to use, if needed  Wear a clean face covering if you need to breastfeed or express or pump breast milk  Family members and friends should not visit you  You can visit with others by phone, video chat, e-mail, or similar systems  It is important to stay connected with others in your life while you recover  · Talk to your healthcare provider about your baby  Tell him or her if you have any questions or concerns about caring for or bonding with your baby  He or she will tell you when to bring your baby in for check-ups and vaccines  He or she will also tell you what to do if you think your baby was infected with the coronavirus  · Do not handle live animals unless it is necessary  Until more is known, it is best not to touch, play with, or handle live animals  Some animals, including pets, have been infected with the new coronavirus  Do not handle or care for animals until you are well  Care includes feeding, petting, and cuddling your pet  Do not let your pet lick you or share your food  Ask someone who is not infected to take care of your pet, if possible  If you must care for a pet, wear a face covering  Wash your hands before and after you give care  Talk to your healthcare provider about how to keep a service animal safe, if needed  · Follow directions from your healthcare provider for being around others after you recover  It is not known for sure if or for how long a recovered person can pass the virus to others  Your provider may give you instructions, such as continuing social distancing or wearing a face covering around others  The following are general guidelines for when you can be around others:    ? If you never developed any symptoms,  wait at least 10 days after your positive test  Your provider may want you to have 2 negative tests in a row at least 24 hours apart  This depends on how available testing is in your area  ?  If you did have symptoms,  wait at least 10 days after the symptoms first appeared  Then you will need to have no fever for 24 hours without fever medicine  Most of your symptoms will also need to be gone  A loss of taste or smell may continue for several months  It is considered okay to be around others if this is your only symptom  ? If you were hospitalized for COVID-19 and needed oxygen,  your provider will tell you how long to wait  You may need to wait until 20 days after symptoms appeared  It may be less if you have 2 negative tests in a row at least 24 hours apart  This will depend on how available testing is in your area  Follow up with your doctor as directed:  Write down your questions so you remember to ask them during your visits  For more information:   · Centers for Disease Control and Prevention  [address] , [address]  Phone: 5- 562 - 748-4907  Web Address: [place]    © [facility] 2021 Information is for End User's use only and may not be sold, redistributed or otherwise used for commercial purposes  All illustrations and images included in CareNotes® are the copyrighted property of A D A M , Inc  or [address] [place], [place] [address]estephania   The above information is an  only  It is not intended as medical advice for individual conditions or treatments  Talk to your doctor, nurse or pharmacist before following any medical regimen to see if it is safe and effective for you

## 2022-01-12 NOTE — TELEPHONE ENCOUNTER
Marina called and asked if her daughter has to quarantine from school and marina-(her daughter is not vaccinated-she wears a mask at school-she has been exposed to marina)    thanks

## 2022-01-12 NOTE — PROGRESS NOTES
COVID-19 Outpatient Progress Note    Assessment/Plan:    Problem List Items Addressed This Visit        Respiratory    Moderate persistent asthma without complication    Relevant Orders    Covid/Flu- Office Collect       Other    Alcohol dependence in remission ([facility] [address] )    Relevant Orders    Covid/Flu- Office Collect      Other Visit Diagnoses     Upper respiratory tract infection, unspecified type    -  Primary    Relevant Orders    Covid/Flu- Office Collect    Close exposure to COVID-19 virus        Relevant Orders    Covid/Flu- Office Collect    Educated about COVID-19 virus infection        Relevant Orders    Covid/Flu- Office Collect    Non-intractable vomiting with nausea, unspecified vomiting type        Relevant Medications    ondansetron (ZOFRAN) 4 mg tablet    Other Relevant Orders    Covid/Flu- Office Collect         Disposition:     I recommended the patient to come to our office to perform rapid antigen testing for COVID-19  Recommended patient to come to the office to test for COVID-19  A/P: Day # 6 since onset of COVID s/s  Covid test will be ordered  ?s/s due to viral illness  Has a h/o ETOH use, but reports abstaining    Doing ok and no fevers, but fatigue, nasal congestion, and mainly n/v/d  SLight cough, but NO SOB or wheezing  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  Start clear liquids and will send in zofran  May need labs  To sick to lift restrictions  Continue isolation/quarantine, increase fluids, PRN motrin/tylenol, and breathing exercises  RTC one day  for f/u  I have spent 20 minutes directly with the patient  Greater than 50% of this time was spent in counseling/coordination of care regarding: diagnostic results, prognosis, risks and benefits of treatment options, instructions for management, patient and family education, importance of treatment compliance, risk factor reductions and impressions        Encounter provider Katie Hauser DO     Provider located at [facility]  [address] [facility] [id number]    Recent Visits  No visits were found meeting these conditions  Showing recent visits within past 7 days and meeting all other requirements  Today's Visits  Date Type Provider Dept   01/12/22 Telemedicine DO Reza Baldwin today's visits and meeting all other requirements  Future Appointments  No visits were found meeting these conditions  Showing future appointments within next 150 days and meeting all other requirements       Patient agrees to participate in a virtual check in via telephone or video visit instead of presenting to the office to address urgent/immediate medical needs  Patient is aware this is a billable service  After connecting through [facility], the patient was identified by name and date of birth  Uri Dias was informed that this was a telemedicine visit and that the exam was being conducted confidentially over secure lines  My office door was closed  Other methods to assure confidentiality were taken: pt reports unable to navigate my chart  [employer] used  No one else was in the room  Uri Dias acknowledged consent and understanding of privacy and security of the telemedicine visit  I informed the patient that I have reviewed her record in Epic and presented the opportunity for her to ask any questions regarding the visit today  The patient agreed to participate  Verification of patient location:  Patient is located in the following state in which I hold an active license: [place]    Subjective:   Uri Dias is a 42 y o  female who is concerned about COVID-19  Patient's symptoms include fatigue, nasal congestion, rhinorrhea, cough, shortness of breath, abdominal pain, nausea, vomiting, diarrhea and headache  Patient denies fever, chills, sore throat, anosmia, loss of taste, chest tightness and myalgias       - Date of symptom onset: 1/6/2022      COVID-19 vaccination status: Fully vaccinated (primary series)    Exposure:   Contact with a person who is under investigation (PUI) for or who is positive for COVID-19 within the last 14 days?: Yes    Hospitalized recently for fever and/or lower respiratory symptoms?: No      Currently a healthcare worker that is involved in direct patient care?: No      Works in a special setting where the risk of COVID-19 transmission may be high? (this may include long-term care, correctional and senior living facilities; homeless shelters; assisted-living facilities and group homes ): No      Resident in a special setting where the risk of COVID-19 transmission may be high? (this may include long-term care, correctional and senior living facilities; homeless shelters; assisted-living facilities and group homes ): No      Lab Results   Component Value Date    [address] Negative 01/26/2021     Past Medical History:   Diagnosis Date    Anxiety     Asthma     Crohn disease ([address] )     Depression     Disease of thyroid gland     Hypertension     Hypothyroidism     Psychiatric disorder      Past Surgical History:   Procedure Laterality Date    NO PAST SURGERIES       Current Outpatient Medications   Medication Sig Dispense Refill    albuterol (PROVENTIL HFA,VENTOLIN HFA) 90 mcg/act inhaler inhale 1 to 2 puffs by mouth and INTO THE LUNGS every 4 to 6 hours if needed 18 g 3    cetirizine (ZyrTEC) 10 mg tablet Take 10 mg by mouth daily      clonazePAM (KlonoPIN) 0 5 mg tablet Take 1 tablet (0 5 mg total) by mouth 2 (two) times a day as needed (prn) 60 tablet 0    diphenhydrAMINE (BENADRYL ALLERGY) 25 mg capsule Take by mouth      levothyroxine 125 mcg tablet take 1 tablet by mouth every morning 90 tablet 3    ARIPiprazole (ABILIFY) 5 mg tablet Take 1 tablet (5 mg total) by mouth every morning (Patient not taking: Reported on 1/12/2022 ) 90 tablet 1    disulfiram (ANTABUSE) 250 mg tablet Take 1 tablet (250 mg total) by mouth daily (Patient not taking: Reported on 1/12/2022 ) 90 tablet 0    ergocalciferol (VITAMIN D2) 50,000 units Take 1 capsule (50,000 Units total) by mouth once a week (Patient not taking: Reported on 1/12/2022 ) 4 capsule 5    fluticasone-vilanterol (BREO ELLIPTA) 200-25 MCG/INH inhaler Inhale 1 puff daily Rinse mouth after use  (Patient not taking: Reported on 1/12/2022 ) 3 Inhaler 1    MAGNESIUM OXIDE PO Take by mouth daily (Patient not taking: Reported on 1/12/2022 )      montelukast (SINGULAIR) 10 mg tablet Take 1 tablet (10 mg total) by mouth daily (Patient not taking: Reported on 1/12/2022 ) 30 tablet 5    Multiple Vitamin (multivitamin) tablet Take 1 tablet by mouth daily (Patient not taking: Reported on 1/12/2022 )      ondansetron (ZOFRAN) 4 mg tablet Take 1 tablet (4 mg total) by mouth every 8 (eight) hours as needed for nausea or vomiting 20 tablet 0    thiamine 250 MG tablet Take 1 tablet (250 mg total) by mouth daily (Patient not taking: Reported on 1/12/2022 ) 90 tablet 0    umeclidinium bromide (INCRUSE ELLIPTA) 62 5 mcg/inh AEPB inhaler Inhale 1 puff daily (Patient not taking: Reported on 1/12/2022 ) 3 Inhaler 1     No current facility-administered medications for this visit  Allergies   Allergen Reactions    Penicillins Hives and Rash     HIVES, SWELLS, riley ancef x 1 dose         Review of Systems   Constitutional: Positive for activity change, appetite change and fatigue  Negative for chills, diaphoresis and fever  HENT: Positive for congestion, postnasal drip and rhinorrhea  Negative for ear discharge, ear pain, facial swelling, sinus pressure, sinus pain and sore throat  No loss of taste/smell  Respiratory: Positive for cough and shortness of breath  Negative for chest tightness and wheezing  Cardiovascular: Negative for chest pain, palpitations and leg swelling  Gastrointestinal: Positive for abdominal pain, diarrhea, nausea and vomiting   Negative for constipation  Genitourinary: Negative for difficulty urinating, dysuria and frequency  Musculoskeletal: Negative for arthralgias, gait problem and myalgias  Neurological: Positive for headaches  Negative for light-headedness  Psychiatric/Behavioral: Negative for confusion  The patient is not nervous/anxious  Objective:    Vitals:    01/12/22 1001   Pulse: 90   Temp: 97 9 °F (36 6 °C)   Weight: 99 8 kg (220 lb)   Height: 5' 2" (1 575 m)       Physical Exam  Constitutional:       General: She is not in acute distress  Appearance: Normal appearance  She is not ill-appearing  HENT:      Head: Normocephalic and atraumatic  Mouth/Throat:      Mouth: Mucous membranes are moist    Eyes:      Extraocular Movements: Extraocular movements intact  Conjunctiva/sclera: Conjunctivae normal       Pupils: Pupils are equal, round, and reactive to light  Pulmonary:      Effort: Pulmonary effort is normal  No respiratory distress  Neurological:      General: No focal deficit present  Mental Status: She is alert and oriented to person, place, and time  Mental status is at baseline  Psychiatric:         Mood and Affect: Mood normal          Behavior: Behavior normal          Thought Content: Thought content normal          Judgment: Judgment normal          VIRTUAL VISIT DISCLAIMER    Marina Hickey verbally agrees to participate in [employer]  Pt is aware that [employer] could be limited without vital signs or the ability to perform a full hands-on physical Maritza Tony understands she or the provider may request at any time to terminate the video visit and request the patient to seek care or treatment in person

## 2022-01-13 ENCOUNTER — TELEMEDICINE (OUTPATIENT)
Dept: INTERNAL MEDICINE CLINIC | Facility: CLINIC | Age: 42
End: 2022-01-13
Payer: COMMERCIAL

## 2022-01-13 DIAGNOSIS — Z71.89 EDUCATED ABOUT COVID-19 VIRUS INFECTION: ICD-10-CM

## 2022-01-13 DIAGNOSIS — J06.9 UPPER RESPIRATORY TRACT INFECTION, UNSPECIFIED TYPE: Primary | ICD-10-CM

## 2022-01-13 DIAGNOSIS — J45.40 MODERATE PERSISTENT ASTHMA WITHOUT COMPLICATION: ICD-10-CM

## 2022-01-13 DIAGNOSIS — Z20.822 CLOSE EXPOSURE TO COVID-19 VIRUS: ICD-10-CM

## 2022-01-13 PROCEDURE — 3725F SCREEN DEPRESSION PERFORMED: CPT | Performed by: INTERNAL MEDICINE

## 2022-01-13 PROCEDURE — 1036F TOBACCO NON-USER: CPT | Performed by: INTERNAL MEDICINE

## 2022-01-13 PROCEDURE — 99213 OFFICE O/P EST LOW 20 MIN: CPT | Performed by: INTERNAL MEDICINE

## 2022-01-13 NOTE — PROGRESS NOTES
COVID-19 Outpatient Progress Note    Assessment/Plan:    Problem List Items Addressed This Visit        Respiratory    Moderate persistent asthma without complication      Other Visit Diagnoses     Upper respiratory tract infection, unspecified type    -  Primary    Close exposure to COVID-19 virus        Educated about COVID-19 virus infection             Disposition:     Patient has COVID-19 infection  Based off CDC guidelines, they were recommended to isolate for 5 days from the date of the positive test  If they remain asymptomatic, isolation may be ended followed by 5 days of wearing a mask when around othes to minimize risk of infecting others  If they have a fever, continue to stay home until fever resolves for at least 24 hours  I recommended continued isolation until at least 24 hours have passed since recovery defined as resolution of fever without the use of fever-reducing medications AND improvement in COVID symptoms AND 10 days have passed since onset of symptoms (or 10 days have passed since date of first positive viral diagnostic test for asymptomatic patients)  A/P: Day # 7 since onset of COVID s/s  Covid test still pending  Doing better  Some nasal congestion, cough, but no SOB  Nausea improving with the zofran    Continue isolation/quarantine, increase fluids, PRN motrin/tylenol, and breathing exercises  May lift restrictions as of 1/14 if s/s improving and no fevers  RTC as scheduled for f/u  I have spent 15 minutes directly with the patient  Greater than 50% of this time was spent in counseling/coordination of care regarding: diagnostic results, prognosis, risks and benefits of treatment options, instructions for management, patient and family education, importance of treatment compliance, risk factor reductions and impressions        Encounter provider Quyen Hamilton DO    Provider located at [address] [place] [id number]    Recent Visits  Date Type Provider Dept   01/12/22 Telephone [facility]   01/12/22 Telemedicine DO [facility]   Showing recent visits within past 7 days and meeting all other requirements  Today's Visits  Date Type Provider Dept   01/13/22 Telemedicine Chris Patrick,  [facility] today's visits and meeting all other requirements  Future Appointments  No visits were found meeting these conditions  Showing future appointments within next 150 days and meeting all other requirements       Patient agrees to participate in a virtual check in via telephone or video visit instead of presenting to the office to address urgent/immediate medical needs  Patient is aware this is a billable service  After connecting through [facility], the patient was identified by name and date of birth  Pallavi Veloz was informed that this was a telemedicine visit and that the exam was being conducted confidentially over secure lines  My office door was closed  Other methods to assure confidentiality were taken: pt unable to navigate [facility] or my chart  D[employer] used    No one else was in the room  Pallavi Veloz acknowledged consent and understanding of privacy and security of the telemedicine visit  I informed the patient that I have reviewed her record in Epic and presented the opportunity for her to ask any questions regarding the visit today  The patient agreed to participate  Verification of patient location:  Patient is located in the following state in which I hold an active license: [place]    Subjective:   Pallavi Veloz is a 42 y o  female who has been screened for COVID-19  Symptom change since last report: improving  Patient's symptoms include nasal congestion, rhinorrhea, cough and nausea   Patient denies fever, chills, fatigue, sore throat, anosmia, loss of taste, shortness of breath, chest tightness, abdominal pain, vomiting, diarrhea, myalgias and headaches  - Date of symptom onset: 1/6/2022      COVID-19 vaccination status: Fully vaccinated (primary series)    Kevyn Mckinney has been staying home and has isolated themselves in her home  She is taking care to not share personal items and is cleaning all surfaces that are touched often, like counters, tabletops, and doorknobs using household cleaning sprays or wipes  She is wearing a mask when she leaves her room  Lab Results   Component Value Date    SARSCOV2 Negative 01/26/2021     Past Medical History:   Diagnosis Date    Anxiety     Asthma     Crohn disease ([address] )     Depression     Disease of thyroid gland     Hypertension     Hypothyroidism     Psychiatric disorder      Past Surgical History:   Procedure Laterality Date    NO PAST SURGERIES       Current Outpatient Medications   Medication Sig Dispense Refill    albuterol (PROVENTIL HFA,VENTOLIN HFA) 90 mcg/act inhaler inhale 1 to 2 puffs by mouth and INTO THE LUNGS every 4 to 6 hours if needed 18 g 3    ARIPiprazole (ABILIFY) 5 mg tablet Take 1 tablet (5 mg total) by mouth every morning 90 tablet 1    cetirizine (ZyrTEC) 10 mg tablet Take 10 mg by mouth daily      clonazePAM (KlonoPIN) 0 5 mg tablet Take 1 tablet (0 5 mg total) by mouth 2 (two) times a day as needed (prn) 60 tablet 0    diphenhydrAMINE (BENADRYL ALLERGY) 25 mg capsule Take by mouth      disulfiram (ANTABUSE) 250 mg tablet Take 1 tablet (250 mg total) by mouth daily 90 tablet 0    ergocalciferol (VITAMIN D2) 50,000 units Take 1 capsule (50,000 Units total) by mouth once a week 4 capsule 5    fluticasone-vilanterol (BREO ELLIPTA) 200-25 MCG/INH inhaler Inhale 1 puff daily Rinse mouth after use   3 Inhaler 1    levothyroxine 125 mcg tablet take 1 tablet by mouth every morning 90 tablet 3    MAGNESIUM OXIDE PO Take by mouth daily        montelukast (SINGULAIR) 10 mg tablet Take 1 tablet (10 mg total) by mouth daily 30 tablet 5    Multiple Vitamin (multivitamin) tablet Take 1 tablet by mouth daily        ondansetron (ZOFRAN) 4 mg tablet Take 1 tablet (4 mg total) by mouth every 8 (eight) hours as needed for nausea or vomiting 20 tablet 0    thiamine 250 MG tablet Take 1 tablet (250 mg total) by mouth daily 90 tablet 0    umeclidinium bromide (INCRUSE ELLIPTA) 62 5 mcg/inh AEPB inhaler Inhale 1 puff daily 3 Inhaler 1     No current facility-administered medications for this visit  Allergies   Allergen Reactions    Penicillins Hives and Rash     HIVES, SWELLS, riley ancef x 1 dose         Review of Systems   Constitutional: Negative for activity change, chills, diaphoresis, fatigue and fever  HENT: Positive for congestion, postnasal drip and rhinorrhea  Negative for ear discharge, ear pain, facial swelling, sinus pressure, sinus pain and sore throat  No loss of taste/smell  Respiratory: Positive for cough  Negative for chest tightness, shortness of breath and wheezing  Cardiovascular: Negative for chest pain, palpitations and leg swelling  Gastrointestinal: Positive for nausea  Negative for abdominal pain, constipation, diarrhea and vomiting  Genitourinary: Negative for difficulty urinating, dysuria and frequency  Musculoskeletal: Negative for arthralgias, gait problem and myalgias  Neurological: Negative for light-headedness and headaches  Psychiatric/Behavioral: Negative for confusion  The patient is not nervous/anxious  Objective:    Vitals:       Physical Exam  Vitals and nursing note reviewed  Constitutional:       General: She is not in acute distress  Appearance: Normal appearance  She is not ill-appearing  HENT:      Head: Normocephalic and atraumatic  Mouth/Throat:      Mouth: Mucous membranes are moist    Eyes:      Extraocular Movements: Extraocular movements intact  Conjunctiva/sclera: Conjunctivae normal       Pupils: Pupils are equal, round, and reactive to light     Pulmonary:      Effort: Pulmonary effort is normal  No respiratory distress  Neurological:      General: No focal deficit present  Mental Status: She is alert and oriented to person, place, and time  Mental status is at baseline  Psychiatric:         Mood and Affect: Mood normal          Behavior: Behavior normal          Thought Content: Thought content normal          Judgment: Judgment normal          VIRTUAL VISIT DISCLAIMER    Marina Hickey verbally agrees to participate in [employer]  Pt is aware that [employer] could be limited without vital signs or the ability to perform a full hands-on physical Isaac Destiney understands she or the provider may request at any time to terminate the video visit and request the patient to seek care or treatment in person

## 2022-01-13 NOTE — PATIENT INSTRUCTIONS
How to Recover from COVID-19 at [facility]:   COVID-19 can cause a range of symptoms, from mild to severe  If you do not need to be treated in a hospital, you will be given instructions to use at home  You will need to watch for worsening symptoms and seek immediate care if needed  You will also need to stay physically apart from others so you do not spread the virus to anyone  Information about COVID-19 is still being learned  It is not known if a person can be infected with the virus again after recovering from COVID-19  It is also not known if or for how long the virus can continue to be passed to others  DISCHARGE INSTRUCTIONS:   If you think someone in your home may be infected,  do the following to protect others:  · If emergency care is needed,  tell the  about the possible infection, or call ahead and tell the emergency department  · Call a healthcare provider  for instructions if symptoms are mild  Anyone who may be infected should not  arrive without calling first  The provider will need to protect staff members and other patients  · The person who may be infected needs to wear a face covering  while getting medical care  This will help lower the risk of infecting others  Coverings are not used for anyone who is younger than 2 years, has breathing problems, or cannot remove it  The provider can give you instructions for anyone who cannot wear a covering  Call your local emergency number (911 in the [address] [place] [address],[address]) or an emergency department if:   · You have trouble breathing or shortness of breath at rest     · You have chest pain or pressure that lasts longer than 5 minutes  · You become confused or hard to wake  · Your lips or face are blue  · You have a fever of 104°F (40°C) or higher  Call your doctor if:   · You have new, returning, or worsening symptoms      · Someone in your home does not  have symptoms of COVID-19 but had close physical contact within 14 days with you     · You have questions or concerns about your condition or care  Medicines:   · NSAIDs , such as ibuprofen, help decrease swelling, pain, and fever  NSAIDs can cause stomach bleeding or kidney problems in certain people  If you take blood thinner medicine, always ask your healthcare provider if NSAIDs are safe for you  Always read the medicine label and follow directions  · Acetaminophen  decreases pain and fever  It is available without a doctor's order  Ask how much to take and how often to take it  Follow directions  Read the labels of all other medicines you are using to see if they also contain acetaminophen, or ask your doctor or pharmacist  Acetaminophen can cause liver damage if not taken correctly  Do not use more than 4 grams (4,000 milligrams) total of acetaminophen in one day  · Take your medicine as directed  Contact your healthcare provider if you think your medicine is not helping or if you have side effects  Tell him or her if you are allergic to any medicine  Keep a list of the medicines, vitamins, and herbs you take  Include the amounts, and when and why you take them  Bring the list or the pill bottles to follow-up visits  Carry your medicine list with you in case of an emergency  Self-care:  Mild symptoms may get better on their own  The following may be used to manage your symptoms:  · Decongestants  help reduce nasal congestion and help you breathe more easily  If you take decongestant pills, they may make you feel restless or cause problems with your sleep  Do not use decongestant sprays for more than a few days  · Cough suppressants  help reduce coughing  Ask your healthcare provider which type of cough medicine is best for you  · To soothe a sore throat,  gargle with warm salt water, or use throat lozenges or a throat spray  Your healthcare provider may recommend a cough medicine  Drink more liquids to thin and loosen mucus and to prevent dehydration   Use decongestants or saline drops as directed for nasal congestion  Keep others safe while you are recovering at home:  Healthcare providers will give you specific instructions to follow  The following are general guidelines to remind you how to keep others safe until you are well:     · Wash your hands often  Use soap and water as much as possible  You can use hand  that contains alcohol if soap and water are not available  Do not share towels with anyone  If you use paper towels, throw them away in a lined trash can kept in your room or area  Use a covered trash can, if possible  · Cover sneezes and coughs  Turn your face away and cover your mouth and nose with a tissue  Throw the tissue away  Use the bend of your arm if a tissue is not available  Then wash your hands well with soap and water or use hand   · Wear a face covering (mask) around anyone who does not live in your home  A covering will help prevent you from passing the virus to others  It is not known for sure if or for how long the virus can be passed after recovery  Do not  wear a plastic face shield instead of a covering  Use a cloth covering with at least 2 layers  You can also create layers by putting a cloth covering over a disposable non-medical mask  Cover your mouth and your nose  The covering should fit snugly against the bridge of your nose  Securely fasten it under your chin and on the sides of your face  A face covering is not a substitute for other safety measures  Continue social distancing and washing your hands often  · Do not go out of your home unless it is necessary  If possible, ask someone who is not infected to go out for groceries, medicines, and household items  Ask your healthcare provider for other ways to have appointments  Some providers offer phone, video, or other types of appointments   If you need to be seen in person, call ahead to make sure the office will be ready for you     · Do not let anyone into your home, room, or area unless it is necessary  If possible, stay in a separate area or room of your home if you live with others  No one should go into the area or room except to give you care  Only allow medical professionals or other necessary helpers in  Wear a face covering  Remind them to wear face coverings and to wash their hands  If possible, ask someone who is well to care for your baby  You can put breast milk in bottles for the person to use, if needed  Wear a clean face covering if you need to breastfeed or express or pump breast milk  Family members and friends should not visit you  You can visit with others by phone, video chat, e-mail, or similar systems  It is important to stay connected with others in your life while you recover  · Talk to your healthcare provider about your baby  Tell him or her if you have any questions or concerns about caring for or bonding with your baby  He or she will tell you when to bring your baby in for check-ups and vaccines  He or she will also tell you what to do if you think your baby was infected with the coronavirus  · Do not handle live animals unless it is necessary  Until more is known, it is best not to touch, play with, or handle live animals  Some animals, including pets, have been infected with the new coronavirus  Do not handle or care for animals until you are well  Care includes feeding, petting, and cuddling your pet  Do not let your pet lick you or share your food  Ask someone who is not infected to take care of your pet, if possible  If you must care for a pet, wear a face covering  Wash your hands before and after you give care  Talk to your healthcare provider about how to keep a service animal safe, if needed  · Follow directions from your healthcare provider for being around others after you recover  It is not known for sure if or for how long a recovered person can pass the virus to others   Your provider may give you instructions, such as continuing social distancing or wearing a face covering around others  The following are general guidelines for when you can be around others:    ? If you never developed any symptoms,  wait at least 10 days after your positive test  Your provider may want you to have 2 negative tests in a row at least 24 hours apart  This depends on how available testing is in your area  ? If you did have symptoms,  wait at least 10 days after the symptoms first appeared  Then you will need to have no fever for 24 hours without fever medicine  Most of your symptoms will also need to be gone  A loss of taste or smell may continue for several months  It is considered okay to be around others if this is your only symptom  ? If you were hospitalized for COVID-19 and needed oxygen,  your provider will tell you how long to wait  You may need to wait until 20 days after symptoms appeared  It may be less if you have 2 negative tests in a row at least 24 hours apart  This will depend on how available testing is in your area  Follow up with your doctor as directed:  Write down your questions so you remember to ask them during your visits  For more information:   · Centers for Disease Control and Prevention  [address] , [address]  Phone: 3- 332 - [phone]  Web Address: [employer]    © [facility] 2021 Information is for End User's use only and may not be sold, redistributed or otherwise used for commercial purposes  All illustrations and images included in CareNotes® are the copyrighted property of A D A M , Inc  or [address] [place], [place] [address]estephania   The above information is an  only  It is not intended as medical advice for individual conditions or treatments  Talk to your doctor, nurse or pharmacist before following any medical regimen to see if it is safe and effective for you

## 2022-01-14 LAB
FLUAV RNA RESP QL NAA+PROBE: NEGATIVE
FLUBV RNA RESP QL NAA+PROBE: NEGATIVE
SARS-COV-2 RNA RESP QL NAA+PROBE: NEGATIVE

## 2022-01-21 ENCOUNTER — HOSPITAL ENCOUNTER (EMERGENCY)
Facility: HOSPITAL | Age: 42
Discharge: HOME | End: 2022-01-21
Attending: EMERGENCY MEDICINE
Payer: COMMERCIAL

## 2022-01-21 ENCOUNTER — APPOINTMENT (EMERGENCY)
Dept: RADIOLOGY | Facility: HOSPITAL | Age: 42
End: 2022-01-21
Payer: COMMERCIAL

## 2022-01-21 VITALS
SYSTOLIC BLOOD PRESSURE: 157 MMHG | OXYGEN SATURATION: 94 % | RESPIRATION RATE: 16 BRPM | TEMPERATURE: 98.4 F | DIASTOLIC BLOOD PRESSURE: 99 MMHG | HEART RATE: 106 BPM

## 2022-01-21 DIAGNOSIS — R10.84 GENERALIZED ABDOMINAL PAIN: Primary | ICD-10-CM

## 2022-01-21 DIAGNOSIS — R11.0 NAUSEA: ICD-10-CM

## 2022-01-21 DIAGNOSIS — R11.10 VOMITING: ICD-10-CM

## 2022-01-21 LAB
ALBUMIN SERPL BCP-MCNC: 3.6 G/DL (ref 3.5–5)
ALP SERPL-CCNC: 83 U/L (ref 46–116)
ALT SERPL W P-5'-P-CCNC: 112 U/L (ref 12–78)
ANION GAP SERPL CALCULATED.3IONS-SCNC: 7 MMOL/L (ref 4–13)
AST SERPL W P-5'-P-CCNC: 142 U/L (ref 5–45)
BASOPHILS # BLD AUTO: 0.05 THOUSANDS/ΜL (ref 0–0.1)
BASOPHILS NFR BLD AUTO: 1 % (ref 0–1)
BILIRUB SERPL-MCNC: 3.03 MG/DL (ref 0.2–1)
BUN SERPL-MCNC: 4 MG/DL (ref 5–25)
CALCIUM SERPL-MCNC: 8.9 MG/DL (ref 8.3–10.1)
CHLORIDE SERPL-SCNC: 99 MMOL/L (ref 100–108)
CO2 SERPL-SCNC: 27 MMOL/L (ref 21–32)
CREAT SERPL-MCNC: 0.75 MG/DL (ref 0.6–1.3)
EOSINOPHIL # BLD AUTO: 0.13 THOUSAND/ΜL (ref 0–0.61)
EOSINOPHIL NFR BLD AUTO: 2 % (ref 0–6)
ERYTHROCYTE [DISTWIDTH] IN BLOOD BY AUTOMATED COUNT: 13.3 % (ref 11.6–15.1)
GFR SERPL CREATININE-BSD FRML MDRD: 98 ML/MIN/1.73SQ M
GLUCOSE SERPL-MCNC: 97 MG/DL (ref 65–140)
HCT VFR BLD AUTO: 43.2 % (ref 34.8–46.1)
HGB BLD-MCNC: 15.2 G/DL (ref 11.5–15.4)
IMM GRANULOCYTES # BLD AUTO: 0.03 THOUSAND/UL (ref 0–0.2)
IMM GRANULOCYTES NFR BLD AUTO: 0 % (ref 0–2)
LIPASE SERPL-CCNC: 80 U/L (ref 73–393)
LYMPHOCYTES # BLD AUTO: 1.55 THOUSANDS/ΜL (ref 0.6–4.47)
LYMPHOCYTES NFR BLD AUTO: 19 % (ref 14–44)
MCH RBC QN AUTO: 39.3 PG (ref 26.8–34.3)
MCHC RBC AUTO-ENTMCNC: 35.2 G/DL (ref 31.4–37.4)
MCV RBC AUTO: 112 FL (ref 82–98)
MONOCYTES # BLD AUTO: 0.81 THOUSAND/ΜL (ref 0.17–1.22)
MONOCYTES NFR BLD AUTO: 10 % (ref 4–12)
NEUTROPHILS # BLD AUTO: 5.76 THOUSANDS/ΜL (ref 1.85–7.62)
NEUTS SEG NFR BLD AUTO: 68 % (ref 43–75)
NRBC BLD AUTO-RTO: 0 /100 WBCS
PLATELET # BLD AUTO: 249 THOUSANDS/UL (ref 149–390)
PMV BLD AUTO: 8.4 FL (ref 8.9–12.7)
POTASSIUM SERPL-SCNC: 3.9 MMOL/L (ref 3.5–5.3)
PROT SERPL-MCNC: 7.7 G/DL (ref 6.4–8.2)
RBC # BLD AUTO: 3.87 MILLION/UL (ref 3.81–5.12)
SODIUM SERPL-SCNC: 133 MMOL/L (ref 136–145)
WBC # BLD AUTO: 8.33 THOUSAND/UL (ref 4.31–10.16)

## 2022-01-21 PROCEDURE — 96374 THER/PROPH/DIAG INJ IV PUSH: CPT

## 2022-01-21 PROCEDURE — 74177 CT ABD & PELVIS W/CONTRAST: CPT

## 2022-01-21 PROCEDURE — G1004 CDSM NDSC: HCPCS

## 2022-01-21 PROCEDURE — 96375 TX/PRO/DX INJ NEW DRUG ADDON: CPT

## 2022-01-21 PROCEDURE — 80053 COMPREHEN METABOLIC PANEL: CPT | Performed by: EMERGENCY MEDICINE

## 2022-01-21 PROCEDURE — 96361 HYDRATE IV INFUSION ADD-ON: CPT

## 2022-01-21 PROCEDURE — 36415 COLL VENOUS BLD VENIPUNCTURE: CPT | Performed by: EMERGENCY MEDICINE

## 2022-01-21 PROCEDURE — 85025 COMPLETE CBC W/AUTO DIFF WBC: CPT | Performed by: EMERGENCY MEDICINE

## 2022-01-21 PROCEDURE — 99285 EMERGENCY DEPT VISIT HI MDM: CPT | Performed by: EMERGENCY MEDICINE

## 2022-01-21 PROCEDURE — 99284 EMERGENCY DEPT VISIT MOD MDM: CPT

## 2022-01-21 PROCEDURE — 83690 ASSAY OF LIPASE: CPT | Performed by: EMERGENCY MEDICINE

## 2022-01-21 RX ORDER — ONDANSETRON 2 MG/ML
4 INJECTION INTRAMUSCULAR; INTRAVENOUS ONCE
Status: COMPLETED | OUTPATIENT
Start: 2022-01-21 | End: 2022-01-21

## 2022-01-21 RX ORDER — HYDROMORPHONE HCL/PF 1 MG/ML
0.5 SYRINGE (ML) INJECTION ONCE
Status: COMPLETED | OUTPATIENT
Start: 2022-01-21 | End: 2022-01-21

## 2022-01-21 RX ORDER — ONDANSETRON 4 MG/1
4 TABLET, FILM COATED ORAL EVERY 6 HOURS
Qty: 12 TABLET | Refills: 0 | Status: SHIPPED | OUTPATIENT
Start: 2022-01-21 | End: 2022-02-11 | Stop reason: HOSPADM

## 2022-01-21 RX ADMIN — IOHEXOL 100 ML: 350 INJECTION, SOLUTION INTRAVENOUS at 18:36

## 2022-01-21 RX ADMIN — HYDROMORPHONE HYDROCHLORIDE 0.5 MG: 1 INJECTION, SOLUTION INTRAMUSCULAR; INTRAVENOUS; SUBCUTANEOUS at 16:47

## 2022-01-21 RX ADMIN — ONDANSETRON 4 MG: 2 INJECTION INTRAMUSCULAR; INTRAVENOUS at 16:46

## 2022-01-21 RX ADMIN — SODIUM CHLORIDE 1000 ML: 0.9 INJECTION, SOLUTION INTRAVENOUS at 16:47

## 2022-01-21 NOTE — ED ATTENDING ATTESTATION
1/21/2022  IChristian MD, saw and evaluated the patient  I have discussed the patient with the resident/non-physician practitioner and agree with the resident's/non-physician practitioner's findings, Plan of Care, and MDM as documented in the resident's/non-physician practitioner's note, except where noted  All available labs and Radiology studies were reviewed  I was present for key portions of any procedure(s) performed by the resident/non-physician practitioner and I was immediately available to provide assistance  At this point I agree with the current assessment done in the Emergency Department    I have conducted an independent evaluation of this patient a history and physical is as follows:  H/o chron dz   chrons flare   Nausea and non bilious non bloody vomiting   Loose stool mucous like No blood   No fever   No urine symptoms    No back pain    No urine symptoms pain is described as crampy and intermittent in nature she feels as though her abdomen is somewhat distended  psh no abd surgery has had bowel obstruction the past treated nonsurgically  Exam nad anicteric   Lungs clear heart rrr  No m  abd soft pos bs  Mild tenderness no r/g   neruo  Normal motor strength   Normal   Imp     abd pain concern for bowel obstruction versus Crohn's flare less likely appendicitis gallbladder disease or pancreatitis  Labs urine pain control IV fluids antiemetics   CT abdomen pelvis      ED Course         Critical Care Time  Procedures

## 2022-01-21 NOTE — ED PROVIDER NOTES
History  Chief Complaint   Patient presents with    Abdominal Pain     1 week of n/v, two days ago started iwth abd pain and bloating  hx of chrons  can only keep down clear liquids  last BM was 1 week ago   Numbness     lower abd, pelvis and top of bilat thighs that started two days ago  no incont of urine  42year old female hx of chrohns and asthma    1 week of N/V and not tolerating solids but tolerating fluids  2 days ago abdominal pain that was dull and achy  Felt similar to her chrohns flares  Diarrhea constantly  1 solid bowel movement last week  Numbness and tingling in lower abdomen  Not on any meds for her Chrohns  Denies any back pain, chest pain or shortness of breath  Prior to Admission Medications   Prescriptions Last Dose Informant Patient Reported? Taking? ARIPiprazole (ABILIFY) 5 mg tablet   No No   Sig: Take 1 tablet (5 mg total) by mouth every morning   MAGNESIUM OXIDE PO   Yes No   Sig: Take by mouth daily     Multiple Vitamin (multivitamin) tablet   Yes No   Sig: Take 1 tablet by mouth daily     albuterol (PROVENTIL HFA,VENTOLIN HFA) 90 mcg/act inhaler   No No   Sig: inhale 1 to 2 puffs by mouth and INTO THE LUNGS every 4 to 6 hours if needed   cetirizine (ZyrTEC) 10 mg tablet  Self Yes No   Sig: Take 10 mg by mouth daily   clonazePAM (KlonoPIN) 0 5 mg tablet   No No   Sig: Take 1 tablet (0 5 mg total) by mouth 2 (two) times a day as needed (prn)   diphenhydrAMINE (BENADRYL ALLERGY) 25 mg capsule   Yes No   Sig: Take by mouth   disulfiram (ANTABUSE) 250 mg tablet   No No   Sig: Take 1 tablet (250 mg total) by mouth daily   ergocalciferol (VITAMIN D2) 50,000 units   No No   Sig: Take 1 capsule (50,000 Units total) by mouth once a week   fluticasone-vilanterol (BREO ELLIPTA) 200-25 MCG/INH inhaler   No No   Sig: Inhale 1 puff daily Rinse mouth after use     levothyroxine 125 mcg tablet   No No   Sig: take 1 tablet by mouth every morning   montelukast (SINGULAIR) 10 mg tablet   No No   Sig: Take 1 tablet (10 mg total) by mouth daily   ondansetron (ZOFRAN) 4 mg tablet   No No   Sig: Take 1 tablet (4 mg total) by mouth every 8 (eight) hours as needed for nausea or vomiting   thiamine 250 MG tablet   No No   Sig: Take 1 tablet (250 mg total) by mouth daily   umeclidinium bromide (INCRUSE ELLIPTA) 62 5 mcg/inh AEPB inhaler   No No   Sig: Inhale 1 puff daily      Facility-Administered Medications: None       Past Medical History:   Diagnosis Date    Anxiety     Asthma     Crohn disease ([facility] [address] )     Depression     Disease of thyroid gland     Hypertension     Hypothyroidism     Psychiatric disorder        Past Surgical History:   Procedure Laterality Date    NO PAST SURGERIES         Family History   Problem Relation Age of Onset    Colon cancer Mother     Kidney cancer Mother     Hypertension Mother     Colon cancer Father     Kidney cancer Father     Hypertension Father     Colon cancer Family     Kidney cancer Family      I have reviewed and agree with the history as documented  E-Cigarette/Vaping    E-Cigarette Use Never User      E-Cigarette/Vaping Substances    Nicotine No     THC No     CBD No     Flavoring No     Other No     Unknown No      Social History     Tobacco Use    Smoking status: Former Smoker     Quit date: 7/13/2018     Years since quitting: 3 5    Smokeless tobacco: Never Used   Vaping Use    Vaping Use: Never used   Substance Use Topics    Alcohol use: Yes     Alcohol/week: 70 0 standard drinks     Types: 70 Glasses of wine per week    Drug use: Never        Review of Systems   Constitutional: Negative for chills and fever  HENT: Negative for hearing loss  Eyes: Negative for visual disturbance  Respiratory: Negative for shortness of breath  Cardiovascular: Negative for chest pain  Gastrointestinal: Positive for abdominal pain, nausea and vomiting  Negative for constipation and diarrhea     Genitourinary: Negative for difficulty urinating  Musculoskeletal: Negative for myalgias  Skin: Negative for color change  Neurological: Negative for dizziness and headaches  Psychiatric/Behavioral: Negative for agitation  All other systems reviewed and are negative  Physical Exam  ED Triage Vitals [01/21/22 1321]   Temperature Pulse Respirations Blood Pressure SpO2   98 4 °F (36 9 °C) (!) 110 16 (!) 141/104 95 %      Temp Source Heart Rate Source Patient Position - Orthostatic VS BP Location FiO2 (%)   Tympanic Monitor Sitting Left arm --      Pain Score       7             Orthostatic Vital Signs  Vitals:    01/21/22 1613 01/21/22 1615 01/21/22 1658 01/21/22 1736   BP: 147/91   157/99   Pulse:  96 (!) 106    Patient Position - Orthostatic VS:           Physical Exam  Vitals and nursing note reviewed  Constitutional:       General: She is not in acute distress  Appearance: Normal appearance  She is well-developed  She is not ill-appearing  HENT:      Head: Normocephalic and atraumatic  Right Ear: External ear normal       Left Ear: External ear normal       Nose: Nose normal  No congestion  Mouth/Throat:      Mouth: Mucous membranes are moist       Pharynx: Oropharynx is clear  No oropharyngeal exudate  Eyes:      General:         Right eye: No discharge  Left eye: No discharge  Extraocular Movements: Extraocular movements intact  Conjunctiva/sclera: Conjunctivae normal       Pupils: Pupils are equal, round, and reactive to light  Cardiovascular:      Rate and Rhythm: Regular rhythm  Tachycardia present  Heart sounds: Normal heart sounds  No murmur heard  No friction rub  No gallop  Pulmonary:      Effort: Pulmonary effort is normal  No respiratory distress  Breath sounds: Normal breath sounds  No stridor  No wheezing  Abdominal:      General: Bowel sounds are normal  There is distension  Palpations: Abdomen is soft  Tenderness:  There is generalized abdominal tenderness and tenderness in the right lower quadrant  There is no guarding or rebound  Musculoskeletal:         General: No swelling  Normal range of motion  Cervical back: Normal range of motion and neck supple  No rigidity  Skin:     General: Skin is warm and dry  Capillary Refill: Capillary refill takes less than 2 seconds  Neurological:      General: No focal deficit present  Mental Status: She is alert and oriented to person, place, and time  Mental status is at baseline     Psychiatric:         Mood and Affect: Mood normal          Behavior: Behavior normal          ED Medications  Medications   HYDROmorphone (DILAUDID) injection 0 5 mg (0 5 mg Intravenous Given 1/21/22 1647)   ondansetron (ZOFRAN) injection 4 mg (4 mg Intravenous Given 1/21/22 1646)   sodium chloride 0 9 % bolus 1,000 mL (0 mL Intravenous Stopped 1/21/22 1747)   iohexol (OMNIPAQUE) 350 MG/ML injection (SINGLE-DOSE) 100 mL (100 mL Intravenous Given 1/21/22 1836)       Diagnostic Studies  Results Reviewed     Procedure Component Value Units Date/Time    Comprehensive metabolic panel [[id number]]  (Abnormal) Collected: 01/21/22 1643    Lab Status: Final result Specimen: Blood from Arm, Left Updated: 01/21/22 1728     Sodium 133 mmol/L      Potassium 3 9 mmol/L      Chloride 99 mmol/L      CO2 27 mmol/L      ANION GAP 7 mmol/L      BUN 4 mg/dL      Creatinine 0 75 mg/dL      Glucose 97 mg/dL      Calcium 8 9 mg/dL       U/L       U/L      Alkaline Phosphatase 83 U/L      Total Protein 7 7 g/dL      Albumin 3 6 g/dL      Total Bilirubin 3 03 mg/dL      eGFR 98 ml/min/1 73sq m     Narrative:      [place] guidelines for Chronic Kidney Disease (CKD):     Stage 1 with normal or high GFR (GFR > 90 mL/min/1 73 square meters)    Stage 2 Mild CKD (GFR = 60-89 mL/min/1 73 square meters)    Stage 3A Moderate CKD (GFR = 45-59 mL/min/1 73 square meters)    Stage 3B Moderate CKD (GFR = 30-44 mL/min/1 73 square meters)    Stage 4 Severe CKD (GFR = 15-29 mL/min/1 73 square meters)    Stage 5 End Stage CKD (GFR <15 mL/min/1 73 square meters)  Note: GFR calculation is accurate only with a steady state creatinine    Lipase [[id number]]  (Normal) Collected: 01/21/22 1643    Lab Status: Final result Specimen: Blood from Arm, Left Updated: 01/21/22 1725     Lipase 80 u/L     CBC and differential [[id number]]  (Abnormal) Collected: 01/21/22 1643    Lab Status: Final result Specimen: Blood from Arm, Left Updated: 01/21/22 1703     WBC 8 33 Thousand/uL      RBC 3 87 Million/uL      Hemoglobin 15 2 g/dL      Hematocrit 43 2 %       fL      MCH 39 3 pg      MCHC 35 2 g/dL      RDW 13 3 %      MPV 8 4 fL      Platelets 539 Thousands/uL      nRBC 0 /100 WBCs      Neutrophils Relative 68 %      Immat GRANS % 0 %      Lymphocytes Relative 19 %      Monocytes Relative 10 %      Eosinophils Relative 2 %      Basophils Relative 1 %      Neutrophils Absolute 5 76 Thousands/µL      Immature Grans Absolute 0 03 Thousand/uL      Lymphocytes Absolute 1 55 Thousands/µL      Monocytes Absolute 0 81 Thousand/µL      Eosinophils Absolute 0 13 Thousand/µL      Basophils Absolute 0 05 Thousands/µL                  CT abdomen pelvis with contrast   Final Result by Ernestine Art MD (01/21 1924)      Mild wall thickening of the rectosigmoid could relate to mild colitis  Hepatic steatosis and hepatomegaly  Workstation performed: [id number]               Procedures  Procedures      ED Course  ED Course as of 01/21/22 2148 Fri Jan 21, 2022 1815 Patient reassessed  Feels much improved after pain and nausea medication   1928 CT abdomen pelvis with contrast  IMPRESSION:     Mild wall thickening of the rectosigmoid could relate to mild colitis      Hepatic steatosis and hepatomegaly                                         MDM  Number of Diagnoses or Management Options  Generalized abdominal pain  Nausea  Vomiting  Diagnosis management comments: 79-year-old female presenting to ED today with abdominal pain  Differential includes Crohn flare, appendicitis, small-bowel obstruction, constipation  At this time will evaluate with a CBC, CMP, CT abdomen pelvis with contrast will treat pain with IV Dilaudid and nausea with IV Zofran  Lab work and imaging unremarkable for any acute process  Will discharge with strict return to ER precautions  Encouraged outpatient follow up with GI and PCP  Disposition  Final diagnoses:   Generalized abdominal pain   Nausea   Vomiting     Time reflects when diagnosis was documented in both MDM as applicable and the Disposition within this note     Time User Action Codes Description Comment    1/21/2022  9:01 PM Benedetta Downs Add [R10 84] Generalized abdominal pain     1/21/2022  9:01 PM Benedetta Downs Add [R11 0] Nausea     1/21/2022  9:01 PM Benedetta Downs Add [R11 10] Vomiting       ED Disposition     ED Disposition Condition Date/Time Comment    Discharge Stable Fri Jan 21, 2022  9:00 PM Alexia Sharpe discharge to home/self care  Follow-up Information     Follow up With Specialties Details Why Contact Info Additional Adolph 5, DO Internal Medicine   [address] [facility]  [address] [facility]  [address] [facility] SPECIALISTS [facility] Schedule an appointment as soon as possible for a visit  for follow up [address] [phone] [address] [facility]astro[facility] Specialists [place] [address], [address], [place], [place], [address]          Discharge Medication List as of 1/21/2022  9:03 PM      START taking these medications    Details   !! ondansetron (ZOFRAN) 4 mg tablet Take 1 tablet (4 mg total) by mouth every 6 (six) hours, Starting Fri 1/21/2022, Normal       !! - Potential duplicate medications found   Please discuss with provider  CONTINUE these medications which have NOT CHANGED    Details   albuterol (PROVENTIL HFA,VENTOLIN HFA) 90 mcg/act inhaler inhale 1 to 2 puffs by mouth and INTO THE LUNGS every 4 to 6 hours if needed, Normal      ARIPiprazole (ABILIFY) 5 mg tablet Take 1 tablet (5 mg total) by mouth every morning, Starting Sat 4/17/2021, Normal      cetirizine (ZyrTEC) 10 mg tablet Take 10 mg by mouth daily, Historical Med      clonazePAM (KlonoPIN) 0 5 mg tablet Take 1 tablet (0 5 mg total) by mouth 2 (two) times a day as needed (prn), Starting Wed 12/22/2021, Normal      diphenhydrAMINE (BENADRYL ALLERGY) 25 mg capsule Take by mouth, Historical Med      disulfiram (ANTABUSE) 250 mg tablet Take 1 tablet (250 mg total) by mouth daily, Starting Mon 4/12/2021, Normal      ergocalciferol (VITAMIN D2) 50,000 units Take 1 capsule (50,000 Units total) by mouth once a week, Starting Tue 5/5/2020, Normal      fluticasone-vilanterol (BREO ELLIPTA) 200-25 MCG/INH inhaler Inhale 1 puff daily Rinse mouth after use , Starting Mon 4/12/2021, Normal      levothyroxine 125 mcg tablet take 1 tablet by mouth every morning, Normal      MAGNESIUM OXIDE PO Take by mouth daily  , Historical Med      montelukast (SINGULAIR) 10 mg tablet Take 1 tablet (10 mg total) by mouth daily, Starting Fri 1/8/2021, Normal      Multiple Vitamin (multivitamin) tablet Take 1 tablet by mouth daily  , Historical Med      !! ondansetron (ZOFRAN) 4 mg tablet Take 1 tablet (4 mg total) by mouth every 8 (eight) hours as needed for nausea or vomiting, Starting Wed 1/12/2022, Normal      thiamine 250 MG tablet Take 1 tablet (250 mg total) by mouth daily, Starting Tue 11/10/2020, Normal      umeclidinium bromide (INCRUSE ELLIPTA) 62 5 mcg/inh AEPB inhaler Inhale 1 puff daily, Starting Mon 4/12/2021, Normal       !! - Potential duplicate medications found  Please discuss with provider  No discharge procedures on file      PDMP Review       Value Time User    PDMP Reviewed  Yes 12/22/2021 10:41 AM Marisol Gunter DO           ED Provider  Attending physically available and evaluated [facility] ELOISE  I managed the patient along with the ED Attending      Electronically Signed by         Jaya Cedeno MD  01/21/22 6906

## 2022-01-22 NOTE — DISCHARGE INSTRUCTIONS
You were seen for your Abdominal pain  CT and Lab work were unremarkable  Please follow up with GI and Primary care doctor    You can take zofran one tablet every 6 hours  Return to the ED if you have any concerns

## 2022-01-25 DIAGNOSIS — F41.1 GAD (GENERALIZED ANXIETY DISORDER): ICD-10-CM

## 2022-01-25 RX ORDER — CLONAZEPAM 0.5 MG/1
0.5 TABLET ORAL 2 TIMES DAILY PRN
Qty: 60 TABLET | Refills: 0 | Status: SHIPPED | OUTPATIENT
Start: 2022-01-25 | End: 2022-02-22 | Stop reason: SDUPTHER

## 2022-02-03 ENCOUNTER — TELEMEDICINE (OUTPATIENT)
Dept: INTERNAL MEDICINE CLINIC | Facility: CLINIC | Age: 42
End: 2022-02-03
Payer: COMMERCIAL

## 2022-02-03 DIAGNOSIS — U07.1 LAB TEST POSITIVE FOR DETECTION OF COVID-19 VIRUS: Primary | ICD-10-CM

## 2022-02-03 DIAGNOSIS — J06.9 UPPER RESPIRATORY TRACT INFECTION, UNSPECIFIED TYPE: ICD-10-CM

## 2022-02-03 DIAGNOSIS — K50.119 CROHN'S DISEASE OF COLON WITH COMPLICATION (HCC): ICD-10-CM

## 2022-02-03 DIAGNOSIS — E87.1 HYPONATREMIA: ICD-10-CM

## 2022-02-03 DIAGNOSIS — J45.40 MODERATE PERSISTENT ASTHMA WITHOUT COMPLICATION: ICD-10-CM

## 2022-02-03 DIAGNOSIS — R79.89 ELEVATED LFTS: ICD-10-CM

## 2022-02-03 DIAGNOSIS — D75.89 MACROCYTOSIS WITHOUT ANEMIA: ICD-10-CM

## 2022-02-03 PROCEDURE — 99213 OFFICE O/P EST LOW 20 MIN: CPT | Performed by: INTERNAL MEDICINE

## 2022-02-03 RX ORDER — GUAIFENESIN 600 MG
600 TABLET, EXTENDED RELEASE 12 HR ORAL EVERY 12 HOURS SCHEDULED
Qty: 20 TABLET | Refills: 0 | Status: SHIPPED | OUTPATIENT
Start: 2022-02-03 | End: 2022-02-22

## 2022-02-03 RX ORDER — FLUTICASONE PROPIONATE 50 MCG
1 SPRAY, SUSPENSION (ML) NASAL DAILY
Qty: 16 G | Refills: 0 | Status: SHIPPED | OUTPATIENT
Start: 2022-02-03

## 2022-02-03 RX ORDER — LEVOFLOXACIN 500 MG/1
500 TABLET, FILM COATED ORAL EVERY 24 HOURS
Qty: 10 TABLET | Refills: 0 | Status: SHIPPED | OUTPATIENT
Start: 2022-02-03 | End: 2022-02-11 | Stop reason: HOSPADM

## 2022-02-03 NOTE — PATIENT INSTRUCTIONS
How to Recover from COVID-19 at [facility]:   COVID-19 can cause a range of symptoms, from mild to severe  If you do not need to be treated in a hospital, you will be given instructions to use at home  You will need to watch for worsening symptoms and seek immediate care if needed  You will also need to stay physically apart from others so you do not spread the virus to anyone  Information about COVID-19 is still being learned  It is not known if a person can be infected with the virus again after recovering from COVID-19  It is also not known if or for how long the virus can continue to be passed to others  DISCHARGE INSTRUCTIONS:   If you think someone in your home may be infected,  do the following to protect others:  · If emergency care is needed,  tell the  about the possible infection, or call ahead and tell the emergency department  · Call a healthcare provider  for instructions if symptoms are mild  Anyone who may be infected should not  arrive without calling first  The provider will need to protect staff members and other patients  · The person who may be infected needs to wear a face covering  while getting medical care  This will help lower the risk of infecting others  Coverings are not used for anyone who is younger than 2 years, has breathing problems, or cannot remove it  The provider can give you instructions for anyone who cannot wear a covering  Call your local emergency number (911 in the [address] [place] [address],[address]) or an emergency department if:   · You have trouble breathing or shortness of breath at rest     · You have chest pain or pressure that lasts longer than 5 minutes  · You become confused or hard to wake  · Your lips or face are blue  · You have a fever of 104°F (40°C) or higher  Call your doctor if:   · You have new, returning, or worsening symptoms      · Someone in your home does not  have symptoms of COVID-19 but had close physical contact within 14 days with you     · You have questions or concerns about your condition or care  Medicines:   · NSAIDs , such as ibuprofen, help decrease swelling, pain, and fever  NSAIDs can cause stomach bleeding or kidney problems in certain people  If you take blood thinner medicine, always ask your healthcare provider if NSAIDs are safe for you  Always read the medicine label and follow directions  · Acetaminophen  decreases pain and fever  It is available without a doctor's order  Ask how much to take and how often to take it  Follow directions  Read the labels of all other medicines you are using to see if they also contain acetaminophen, or ask your doctor or pharmacist  Acetaminophen can cause liver damage if not taken correctly  Do not use more than 4 grams (4,000 milligrams) total of acetaminophen in one day  · Take your medicine as directed  Contact your healthcare provider if you think your medicine is not helping or if you have side effects  Tell him or her if you are allergic to any medicine  Keep a list of the medicines, vitamins, and herbs you take  Include the amounts, and when and why you take them  Bring the list or the pill bottles to follow-up visits  Carry your medicine list with you in case of an emergency  Self-care:  Mild symptoms may get better on their own  The following may be used to manage your symptoms:  · Decongestants  help reduce nasal congestion and help you breathe more easily  If you take decongestant pills, they may make you feel restless or cause problems with your sleep  Do not use decongestant sprays for more than a few days  · Cough suppressants  help reduce coughing  Ask your healthcare provider which type of cough medicine is best for you  · To soothe a sore throat,  gargle with warm salt water, or use throat lozenges or a throat spray  Your healthcare provider may recommend a cough medicine  Drink more liquids to thin and loosen mucus and to prevent dehydration   Use decongestants or saline drops as directed for nasal congestion  Keep others safe while you are recovering at home:  Healthcare providers will give you specific instructions to follow  The following are general guidelines to remind you how to keep others safe until you are well:     · Wash your hands often  Use soap and water as much as possible  You can use hand  that contains alcohol if soap and water are not available  Do not share towels with anyone  If you use paper towels, throw them away in a lined trash can kept in your room or area  Use a covered trash can, if possible  · Cover sneezes and coughs  Turn your face away and cover your mouth and nose with a tissue  Throw the tissue away  Use the bend of your arm if a tissue is not available  Then wash your hands well with soap and water or use hand   · Wear a face covering (mask) around anyone who does not live in your home  A covering will help prevent you from passing the virus to others  It is not known for sure if or for how long the virus can be passed after recovery  Do not  wear a plastic face shield instead of a covering  Use a cloth covering with at least 2 layers  You can also create layers by putting a cloth covering over a disposable non-medical mask  Cover your mouth and your nose  The covering should fit snugly against the bridge of your nose  Securely fasten it under your chin and on the sides of your face  A face covering is not a substitute for other safety measures  Continue social distancing and washing your hands often  · Do not go out of your home unless it is necessary  If possible, ask someone who is not infected to go out for groceries, medicines, and household items  Ask your healthcare provider for other ways to have appointments  Some providers offer phone, video, or other types of appointments   If you need to be seen in person, call ahead to make sure the office will be ready for you     · Do not let anyone into your home, room, or area unless it is necessary  If possible, stay in a separate area or room of your home if you live with others  No one should go into the area or room except to give you care  Only allow medical professionals or other necessary helpers in  Wear a face covering  Remind them to wear face coverings and to wash their hands  If possible, ask someone who is well to care for your baby  You can put breast milk in bottles for the person to use, if needed  Wear a clean face covering if you need to breastfeed or express or pump breast milk  Family members and friends should not visit you  You can visit with others by phone, video chat, e-mail, or similar systems  It is important to stay connected with others in your life while you recover  · Talk to your healthcare provider about your baby  Tell him or her if you have any questions or concerns about caring for or bonding with your baby  He or she will tell you when to bring your baby in for check-ups and vaccines  He or she will also tell you what to do if you think your baby was infected with the coronavirus  · Do not handle live animals unless it is necessary  Until more is known, it is best not to touch, play with, or handle live animals  Some animals, including pets, have been infected with the new coronavirus  Do not handle or care for animals until you are well  Care includes feeding, petting, and cuddling your pet  Do not let your pet lick you or share your food  Ask someone who is not infected to take care of your pet, if possible  If you must care for a pet, wear a face covering  Wash your hands before and after you give care  Talk to your healthcare provider about how to keep a service animal safe, if needed  · Follow directions from your healthcare provider for being around others after you recover  It is not known for sure if or for how long a recovered person can pass the virus to others   Your provider may give you instructions, such as continuing social distancing or wearing a face covering around others  The following are general guidelines for when you can be around others:    ? If you never developed any symptoms,  wait at least 10 days after your positive test  Your provider may want you to have 2 negative tests in a row at least 24 hours apart  This depends on how available testing is in your area  ? If you did have symptoms,  wait at least 10 days after the symptoms first appeared  Then you will need to have no fever for 24 hours without fever medicine  Most of your symptoms will also need to be gone  A loss of taste or smell may continue for several months  It is considered okay to be around others if this is your only symptom  ? If you were hospitalized for COVID-19 and needed oxygen,  your provider will tell you how long to wait  You may need to wait until 20 days after symptoms appeared  It may be less if you have 2 negative tests in a row at least 24 hours apart  This will depend on how available testing is in your area  Follow up with your doctor as directed:  Write down your questions so you remember to ask them during your visits  For more information:   · Centers for Disease Control and Prevention  [address] , [address]  Phone: 2- 814 [phone]  Web Address: [employer] br    © 0693 [facility] 2021 Information is for End User's use only and may not be sold, redistributed or otherwise used for commercial purposes  All illustrations and images included in CareNotes® are the copyrighted property of A D A M , Inc  or [address] [place], [place] [address]estephania   The above information is an  only  It is not intended as medical advice for individual conditions or treatments  Talk to your doctor, nurse or pharmacist before following any medical regimen to see if it is safe and effective for you

## 2022-02-03 NOTE — PROGRESS NOTES
COVID-19 Outpatient Progress Note    Assessment/Plan:    Problem List Items Addressed This Visit        Digestive    Crohn's disease of colon with complication ([address] )       Respiratory    Moderate persistent asthma without complication      Other Visit Diagnoses     Lab test positive for detection of COVID-19 virus    -  Primary    Hyponatremia        Elevated LFTs        Macrocytosis without anemia        Upper respiratory tract infection, unspecified type        Relevant Medications    levofloxacin (LEVAQUIN) 500 mg tablet    guaiFENesin (Mucinex) 600 mg 12 hr tablet    fluticasone (FLONASE) 50 mcg/act nasal spray         Disposition:     Patient has COVID-19 infection  Based off CDC guidelines, they were recommended to isolate for 5 days from the date of the positive test  If they remain asymptomatic, isolation may be ended followed by 5 days of wearing a mask when around othes to minimize risk of infecting others  If they have a fever, continue to stay home until fever resolves for at least 24 hours  I recommended continued isolation until at least 24 hours have passed since recovery defined as resolution of fever without the use of fever-reducing medications AND improvement in COVID symptoms AND 10 days have passed since onset of symptoms (or 10 days have passed since date of first positive viral diagnostic test for asymptomatic patients)  A/P: Day # 6 since onset of COVID s/s  Covid test at home was positive    Doing poorly  Chills, fatigue, weakness, nausea and nasal s/s  Reports three weeks of joint pain and LE numbness  Some increase MDI use  ?joint pain covid or another issues or connected to her Crohn's  Either way, need to treat covid first   Start isolation/quarantine, increase fluids, PRN motrin/tylenol, and breathing exercises  Has zofran and to use her MDI sooner  Will start empiric abx, mucinex, and INS   If s/s persists, consider labs, including for CTD/lymes, b12/folate etc   RTC one day for f/u      I have spent 20 minutes directly with the patient  Greater than 50% of this time was spent in counseling/coordination of care regarding: diagnostic results, prognosis, risks and benefits of treatment options, instructions for management, patient and family education, importance of treatment compliance, risk factor reductions and impressions  Encounter provider Carleene Phalen, DO    Provider located at [address] [place] [id number]    Recent Visits  No visits were found meeting these conditions  Showing recent visits within past 7 days and meeting all other requirements  Today's Visits  Date Type Provider Dept   02/03/22 Telemedicine Carleene Phalen, DO [facility] today's visits and meeting all other requirements  Future Appointments  No visits were found meeting these conditions  Showing future appointments within next 150 days and meeting all other requirements       Patient agrees to participate in a virtual check in via telephone or video visit instead of presenting to the office to address urgent/immediate medical needs  Patient is aware this is a billable service  After connecting through [facility], the patient was identified by name and date of birth  Sunday Maeve was informed that this was a telemedicine visit and that the exam was being conducted confidentially over secure lines  My office door was closed  Other methods to assure confidentiality were taken: unable to navigate [employer]/epic  Doximity used    No one else was in the room  Sunday Maeve acknowledged consent and understanding of privacy and security of the telemedicine visit  I informed the patient that I have reviewed her record in Epic and presented the opportunity for her to ask any questions regarding the visit today  The patient agreed to participate      Verification of patient location:  Patient is located in the following [facility] in which I hold an active license: [place]    Subjective:   Cassandra Prescott is a 42 y o  female who has been screened for COVID-19  Symptom change since last report: unchanged  Patient's symptoms include chills, fatigue, nasal congestion, rhinorrhea, sore throat, cough, shortness of breath, nausea and myalgias  Patient denies fever, anosmia, loss of taste, chest tightness, abdominal pain, vomiting, diarrhea and headaches  - Date of symptom onset: 1/28/2022  - Date of positive COVID-19 test: 1/30/2022  Type of test: Home antigen  COVID-19 vaccination status: Fully vaccinated (primary series)    Marina has been staying home and has isolated themselves in her home  She is taking care to not share personal items and is cleaning all surfaces that are touched often, like counters, tabletops, and doorknobs using household cleaning sprays or wipes  She is wearing a mask when she leaves her room  WF with h/o Crohn's and ?recent flare and seen in the ER about a week ago  Those s/s have resolved, but now with URI s/s, weakness, nausea, and cough with wheezing  Reports severe joint pain, mainly the knees, but reports that has been going on for three weeks       Lab Results   Component Value Date    SARSCOV2 Negative 01/12/2022     Past Medical History:   Diagnosis Date    Anxiety     Asthma     Crohn disease ([facility] [address] )     Depression     Disease of thyroid gland     Hypertension     Hypothyroidism     Psychiatric disorder      Past Surgical History:   Procedure Laterality Date    NO PAST SURGERIES       Current Outpatient Medications   Medication Sig Dispense Refill    albuterol (PROVENTIL HFA,VENTOLIN HFA) 90 mcg/act inhaler inhale 1 to 2 puffs by mouth and INTO THE LUNGS every 4 to 6 hours if needed 18 g 3    clonazePAM (KlonoPIN) 0 5 mg tablet Take 1 tablet (0 5 mg total) by mouth 2 (two) times a day as needed (prn) 60 tablet 0    disulfiram (ANTABUSE) 250 mg tablet Take 1 tablet (250 mg total) by mouth daily 90 tablet 0    levothyroxine 125 mcg tablet take 1 tablet by mouth every morning 90 tablet 3    ondansetron (ZOFRAN) 4 mg tablet Take 1 tablet (4 mg total) by mouth every 8 (eight) hours as needed for nausea or vomiting 20 tablet 0    ARIPiprazole (ABILIFY) 5 mg tablet Take 1 tablet (5 mg total) by mouth every morning (Patient not taking: Reported on 2/3/2022 ) 90 tablet 1    cetirizine (ZyrTEC) 10 mg tablet Take 10 mg by mouth daily (Patient not taking: Reported on 2/3/2022 )      diphenhydrAMINE (BENADRYL ALLERGY) 25 mg capsule Take by mouth (Patient not taking: Reported on 2/3/2022 )      ergocalciferol (VITAMIN D2) 50,000 units Take 1 capsule (50,000 Units total) by mouth once a week (Patient not taking: Reported on 2/3/2022 ) 4 capsule 5    fluticasone (FLONASE) 50 mcg/act nasal spray 1 spray into each nostril daily 16 g 0    fluticasone-vilanterol (BREO ELLIPTA) 200-25 MCG/INH inhaler Inhale 1 puff daily Rinse mouth after use   (Patient not taking: Reported on 2/3/2022 ) 3 Inhaler 1    guaiFENesin (Mucinex) 600 mg 12 hr tablet Take 1 tablet (600 mg total) by mouth every 12 (twelve) hours 20 tablet 0    levofloxacin (LEVAQUIN) 500 mg tablet Take 1 tablet (500 mg total) by mouth every 24 hours for 10 days 10 tablet 0    MAGNESIUM OXIDE PO Take by mouth daily   (Patient not taking: Reported on 2/3/2022 )      montelukast (SINGULAIR) 10 mg tablet Take 1 tablet (10 mg total) by mouth daily (Patient not taking: Reported on 2/3/2022 ) 30 tablet 5    Multiple Vitamin (multivitamin) tablet Take 1 tablet by mouth daily   (Patient not taking: Reported on 2/3/2022 )      ondansetron (ZOFRAN) 4 mg tablet Take 1 tablet (4 mg total) by mouth every 6 (six) hours 12 tablet 0    thiamine 250 MG tablet Take 1 tablet (250 mg total) by mouth daily (Patient not taking: Reported on 2/3/2022 ) 90 tablet 0    umeclidinium bromide (INCRUSE ELLIPTA) 62 5 mcg/inh AEPB inhaler Inhale 1 puff daily (Patient not taking: Reported on 2/3/2022 ) 3 Inhaler 1     No current facility-administered medications for this visit  Allergies   Allergen Reactions    Penicillins Hives and Rash     HIVES, SWELLS, riley ancef x 1 dose         Review of Systems   Constitutional: Positive for activity change, appetite change, chills and fatigue  Negative for diaphoresis and fever  HENT: Positive for congestion, postnasal drip, rhinorrhea and sore throat  Negative for ear discharge, ear pain, sinus pressure and sinus pain  No loss of taste/smell  Respiratory: Positive for cough, shortness of breath and wheezing  Negative for chest tightness  Cardiovascular: Negative for chest pain, palpitations and leg swelling  Gastrointestinal: Positive for nausea  Negative for abdominal pain, constipation, diarrhea and vomiting  Genitourinary: Negative for difficulty urinating, dysuria and frequency  Musculoskeletal: Positive for arthralgias and myalgias  Negative for gait problem  Neurological: Positive for weakness and numbness  Negative for light-headedness and headaches  Psychiatric/Behavioral: Negative for confusion  The patient is not nervous/anxious  Objective:    Vitals:       Physical Exam  Vitals and nursing note reviewed  Constitutional:       General: She is not in acute distress  Appearance: Normal appearance  She is not ill-appearing  HENT:      Head: Normocephalic and atraumatic  Mouth/Throat:      Mouth: Mucous membranes are moist    Eyes:      Extraocular Movements: Extraocular movements intact  Conjunctiva/sclera: Conjunctivae normal       Pupils: Pupils are equal, round, and reactive to light  Pulmonary:      Effort: Pulmonary effort is normal  No respiratory distress  Neurological:      General: No focal deficit present  Mental Status: She is alert and oriented to person, place, and time  Mental status is at baseline     Psychiatric:         Mood and Affect: Mood normal          Behavior: Behavior normal  Thought Content: Thought content normal          Judgment: Judgment normal          VIRTUAL VISIT DISCLAIMER    Marina Hickey verbally agrees to participate in [employer]  Pt is aware that [employer] could be limited without vital signs or the ability to perform a full hands-on physical Trinidad Shadow understands she or the provider may request at any time to terminate the video visit and request the patient to seek care or treatment in person

## 2022-02-04 ENCOUNTER — HOSPITAL ENCOUNTER (INPATIENT)
Facility: HOSPITAL | Age: 42
LOS: 6 days | Discharge: HOME HEALTH | DRG: 092 | End: 2022-02-11
Attending: EMERGENCY MEDICINE | Admitting: INTERNAL MEDICINE
Payer: COMMERCIAL

## 2022-02-04 ENCOUNTER — APPOINTMENT (EMERGENCY)
Dept: RADIOLOGY | Facility: HOSPITAL | Age: 42
DRG: 092 | End: 2022-02-04
Payer: COMMERCIAL

## 2022-02-04 ENCOUNTER — APPOINTMENT (EMERGENCY)
Dept: MRI IMAGING | Facility: HOSPITAL | Age: 42
DRG: 092 | End: 2022-02-04
Payer: COMMERCIAL

## 2022-02-04 ENCOUNTER — TELEMEDICINE (OUTPATIENT)
Dept: INTERNAL MEDICINE CLINIC | Facility: CLINIC | Age: 42
End: 2022-02-04
Payer: COMMERCIAL

## 2022-02-04 DIAGNOSIS — M54.9 BACK PAIN: Primary | ICD-10-CM

## 2022-02-04 DIAGNOSIS — E83.42 HYPOMAGNESEMIA: ICD-10-CM

## 2022-02-04 DIAGNOSIS — D75.89 MACROCYTOSIS WITHOUT ANEMIA: ICD-10-CM

## 2022-02-04 DIAGNOSIS — K50.119 CROHN'S DISEASE OF COLON WITH COMPLICATION (HCC): ICD-10-CM

## 2022-02-04 DIAGNOSIS — J06.9 UPPER RESPIRATORY TRACT INFECTION, UNSPECIFIED TYPE: ICD-10-CM

## 2022-02-04 DIAGNOSIS — R93.7 ABNORMAL MRI, LUMBAR SPINE: ICD-10-CM

## 2022-02-04 DIAGNOSIS — E87.1 HYPONATREMIA: ICD-10-CM

## 2022-02-04 DIAGNOSIS — R79.89 ELEVATED LFTS: ICD-10-CM

## 2022-02-04 DIAGNOSIS — U07.1 LAB TEST POSITIVE FOR DETECTION OF COVID-19 VIRUS: Primary | ICD-10-CM

## 2022-02-04 DIAGNOSIS — R20.0 LOWER EXTREMITY NUMBNESS: ICD-10-CM

## 2022-02-04 DIAGNOSIS — J45.40 MODERATE PERSISTENT ASTHMA WITHOUT COMPLICATION: ICD-10-CM

## 2022-02-04 DIAGNOSIS — U07.1 COVID-19: ICD-10-CM

## 2022-02-04 LAB
ALBUMIN SERPL BCP-MCNC: 4.4 G/DL (ref 3.5–5)
ALP SERPL-CCNC: 88 U/L (ref 34–104)
ALT SERPL W P-5'-P-CCNC: 137 U/L (ref 7–52)
ANION GAP SERPL CALCULATED.3IONS-SCNC: 16 MMOL/L (ref 4–13)
APTT PPP: 27 SECONDS (ref 23–37)
AST SERPL W P-5'-P-CCNC: 156 U/L (ref 13–39)
BASOPHILS # BLD AUTO: 0.02 THOUSANDS/ΜL (ref 0–0.1)
BASOPHILS NFR BLD AUTO: 1 % (ref 0–1)
BILIRUB SERPL-MCNC: 2.51 MG/DL (ref 0.2–1)
BUN SERPL-MCNC: 5 MG/DL (ref 5–25)
CALCIUM SERPL-MCNC: 8.6 MG/DL (ref 8.4–10.2)
CHLORIDE SERPL-SCNC: 92 MMOL/L (ref 96–108)
CK MB SERPL-MCNC: 1.9 % (ref 0–2.5)
CK MB SERPL-MCNC: 5.8 NG/ML (ref 0.6–6.3)
CK SERPL-CCNC: 311 U/L (ref 26–192)
CO2 SERPL-SCNC: 26 MMOL/L (ref 21–32)
CREAT SERPL-MCNC: 0.68 MG/DL (ref 0.6–1.3)
D DIMER PPP FEU-MCNC: 0.32 UG/ML FEU
EOSINOPHIL # BLD AUTO: 0.01 THOUSAND/ΜL (ref 0–0.61)
EOSINOPHIL NFR BLD AUTO: 0 % (ref 0–6)
ERYTHROCYTE [DISTWIDTH] IN BLOOD BY AUTOMATED COUNT: 12.9 % (ref 11.6–15.1)
GFR SERPL CREATININE-BSD FRML MDRD: 108 ML/MIN/1.73SQ M
GLUCOSE SERPL-MCNC: 108 MG/DL (ref 65–140)
HCT VFR BLD AUTO: 45.9 % (ref 34.8–46.1)
HGB BLD-MCNC: 16 G/DL (ref 11.5–15.4)
IMM GRANULOCYTES # BLD AUTO: 0.02 THOUSAND/UL (ref 0–0.2)
IMM GRANULOCYTES NFR BLD AUTO: 1 % (ref 0–2)
INR PPP: 1.02 (ref 0.84–1.19)
LACTATE SERPL-SCNC: 1.7 MMOL/L (ref 0.5–2)
LYMPHOCYTES # BLD AUTO: 0.88 THOUSANDS/ΜL (ref 0.6–4.47)
LYMPHOCYTES NFR BLD AUTO: 22 % (ref 14–44)
MAGNESIUM SERPL-MCNC: 1.3 MG/DL (ref 1.9–2.7)
MCH RBC QN AUTO: 38.5 PG (ref 26.8–34.3)
MCHC RBC AUTO-ENTMCNC: 34.9 G/DL (ref 31.4–37.4)
MCV RBC AUTO: 110 FL (ref 82–98)
MONOCYTES # BLD AUTO: 0.33 THOUSAND/ΜL (ref 0.17–1.22)
MONOCYTES NFR BLD AUTO: 8 % (ref 4–12)
NEUTROPHILS # BLD AUTO: 2.73 THOUSANDS/ΜL (ref 1.85–7.62)
NEUTS SEG NFR BLD AUTO: 68 % (ref 43–75)
NRBC BLD AUTO-RTO: 0 /100 WBCS
PLATELET # BLD AUTO: 214 THOUSANDS/UL (ref 149–390)
PMV BLD AUTO: 8.7 FL (ref 8.9–12.7)
POTASSIUM SERPL-SCNC: 3.3 MMOL/L (ref 3.5–5.3)
PROCALCITONIN SERPL-MCNC: 0.06 NG/ML
PROT SERPL-MCNC: 7.6 G/DL (ref 6.4–8.4)
PROTHROMBIN TIME: 13.3 SECONDS (ref 11.6–14.5)
RBC # BLD AUTO: 4.16 MILLION/UL (ref 3.81–5.12)
SODIUM SERPL-SCNC: 134 MMOL/L (ref 135–147)
WBC # BLD AUTO: 3.99 THOUSAND/UL (ref 4.31–10.16)

## 2022-02-04 PROCEDURE — 85025 COMPLETE CBC W/AUTO DIFF WBC: CPT | Performed by: PHYSICIAN ASSISTANT

## 2022-02-04 PROCEDURE — 36415 COLL VENOUS BLD VENIPUNCTURE: CPT | Performed by: PHYSICIAN ASSISTANT

## 2022-02-04 PROCEDURE — 87040 BLOOD CULTURE FOR BACTERIA: CPT | Performed by: PHYSICIAN ASSISTANT

## 2022-02-04 PROCEDURE — 71045 X-RAY EXAM CHEST 1 VIEW: CPT

## 2022-02-04 PROCEDURE — 99442 PR PHYS/QHP TELEPHONE EVALUATION 11-20 MIN: CPT | Performed by: INTERNAL MEDICINE

## 2022-02-04 PROCEDURE — 80053 COMPREHEN METABOLIC PANEL: CPT | Performed by: PHYSICIAN ASSISTANT

## 2022-02-04 PROCEDURE — 85610 PROTHROMBIN TIME: CPT | Performed by: PHYSICIAN ASSISTANT

## 2022-02-04 PROCEDURE — 72148 MRI LUMBAR SPINE W/O DYE: CPT

## 2022-02-04 PROCEDURE — 96365 THER/PROPH/DIAG IV INF INIT: CPT

## 2022-02-04 PROCEDURE — 93005 ELECTROCARDIOGRAM TRACING: CPT

## 2022-02-04 PROCEDURE — 82553 CREATINE MB FRACTION: CPT | Performed by: PHYSICIAN ASSISTANT

## 2022-02-04 PROCEDURE — 99285 EMERGENCY DEPT VISIT HI MDM: CPT | Performed by: PHYSICIAN ASSISTANT

## 2022-02-04 PROCEDURE — 99285 EMERGENCY DEPT VISIT HI MDM: CPT

## 2022-02-04 PROCEDURE — 96375 TX/PRO/DX INJ NEW DRUG ADDON: CPT

## 2022-02-04 PROCEDURE — 84145 PROCALCITONIN (PCT): CPT | Performed by: PHYSICIAN ASSISTANT

## 2022-02-04 PROCEDURE — 85730 THROMBOPLASTIN TIME PARTIAL: CPT | Performed by: PHYSICIAN ASSISTANT

## 2022-02-04 PROCEDURE — 83605 ASSAY OF LACTIC ACID: CPT | Performed by: PHYSICIAN ASSISTANT

## 2022-02-04 PROCEDURE — 82550 ASSAY OF CK (CPK): CPT | Performed by: PHYSICIAN ASSISTANT

## 2022-02-04 PROCEDURE — 83735 ASSAY OF MAGNESIUM: CPT | Performed by: PHYSICIAN ASSISTANT

## 2022-02-04 PROCEDURE — 85379 FIBRIN DEGRADATION QUANT: CPT | Performed by: PHYSICIAN ASSISTANT

## 2022-02-04 RX ORDER — KETOROLAC TROMETHAMINE 30 MG/ML
15 INJECTION, SOLUTION INTRAMUSCULAR; INTRAVENOUS ONCE
Status: DISCONTINUED | OUTPATIENT
Start: 2022-02-04 | End: 2022-02-04

## 2022-02-04 RX ORDER — ONDANSETRON 2 MG/ML
4 INJECTION INTRAMUSCULAR; INTRAVENOUS ONCE
Status: DISCONTINUED | OUTPATIENT
Start: 2022-02-04 | End: 2022-02-04

## 2022-02-04 RX ORDER — MORPHINE SULFATE 4 MG/ML
4 INJECTION, SOLUTION INTRAMUSCULAR; INTRAVENOUS ONCE
Status: COMPLETED | OUTPATIENT
Start: 2022-02-05 | End: 2022-02-04

## 2022-02-04 RX ORDER — MAGNESIUM SULFATE HEPTAHYDRATE 40 MG/ML
2 INJECTION, SOLUTION INTRAVENOUS ONCE
Status: COMPLETED | OUTPATIENT
Start: 2022-02-04 | End: 2022-02-04

## 2022-02-04 RX ORDER — ACETAMINOPHEN 325 MG/1
975 TABLET ORAL ONCE
Status: COMPLETED | OUTPATIENT
Start: 2022-02-04 | End: 2022-02-04

## 2022-02-04 RX ORDER — DEXAMETHASONE SODIUM PHOSPHATE 4 MG/ML
8 INJECTION, SOLUTION INTRA-ARTICULAR; INTRALESIONAL; INTRAMUSCULAR; INTRAVENOUS; SOFT TISSUE ONCE
Status: COMPLETED | OUTPATIENT
Start: 2022-02-04 | End: 2022-02-04

## 2022-02-04 RX ORDER — POTASSIUM CHLORIDE 20 MEQ/1
20 TABLET, EXTENDED RELEASE ORAL ONCE
Status: COMPLETED | OUTPATIENT
Start: 2022-02-04 | End: 2022-02-04

## 2022-02-04 RX ORDER — OXYCODONE HYDROCHLORIDE 5 MG/1
5 TABLET ORAL ONCE
Status: COMPLETED | OUTPATIENT
Start: 2022-02-04 | End: 2022-02-04

## 2022-02-04 RX ADMIN — DEXAMETHASONE SODIUM PHOSPHATE 8 MG: 4 INJECTION, SOLUTION INTRAMUSCULAR; INTRAVENOUS at 16:37

## 2022-02-04 RX ADMIN — MAGNESIUM SULFATE HEPTAHYDRATE 2 G: 40 INJECTION, SOLUTION INTRAVENOUS at 14:58

## 2022-02-04 RX ADMIN — OXYCODONE HYDROCHLORIDE 5 MG: 5 TABLET ORAL at 16:37

## 2022-02-04 RX ADMIN — MORPHINE SULFATE 4 MG: 4 INJECTION INTRAVENOUS at 23:54

## 2022-02-04 RX ADMIN — SODIUM CHLORIDE 1000 ML: 0.9 INJECTION, SOLUTION INTRAVENOUS at 14:48

## 2022-02-04 RX ADMIN — POTASSIUM CHLORIDE 20 MEQ: 1500 TABLET, EXTENDED RELEASE ORAL at 16:36

## 2022-02-04 RX ADMIN — ACETAMINOPHEN 975 MG: 325 TABLET ORAL at 14:48

## 2022-02-04 NOTE — PATIENT INSTRUCTIONS
How to Recover from COVID-19 at [facility]:   COVID-19 can cause a range of symptoms, from mild to severe  If you do not need to be treated in a hospital, you will be given instructions to use at home  You will need to watch for worsening symptoms and seek immediate care if needed  You will also need to stay physically apart from others so you do not spread the virus to anyone  Information about COVID-19 is still being learned  It is not known if a person can be infected with the virus again after recovering from COVID-19  It is also not known if or for how long the virus can continue to be passed to others  DISCHARGE INSTRUCTIONS:   If you think someone in your home may be infected,  do the following to protect others:  · If emergency care is needed,  tell the  about the possible infection, or call ahead and tell the emergency department  · Call a healthcare provider  for instructions if symptoms are mild  Anyone who may be infected should not  arrive without calling first  The provider will need to protect staff members and other patients  · The person who may be infected needs to wear a face covering  while getting medical care  This will help lower the risk of infecting others  Coverings are not used for anyone who is younger than 2 years, has breathing problems, or cannot remove it  The provider can give you instructions for anyone who cannot wear a covering  Call your local emergency number (911 in the [address] [place] [address],[address]) or an emergency department if:   · You have trouble breathing or shortness of breath at rest     · You have chest pain or pressure that lasts longer than 5 minutes  · You become confused or hard to wake  · Your lips or face are blue  · You have a fever of 104°F (40°C) or higher  Call your doctor if:   · You have new, returning, or worsening symptoms      · Someone in your home does not  have symptoms of COVID-19 but had close physical contact within 14 days with you     · You have questions or concerns about your condition or care  Medicines:   · NSAIDs , such as ibuprofen, help decrease swelling, pain, and fever  NSAIDs can cause stomach bleeding or kidney problems in certain people  If you take blood thinner medicine, always ask your healthcare provider if NSAIDs are safe for you  Always read the medicine label and follow directions  · Acetaminophen  decreases pain and fever  It is available without a doctor's order  Ask how much to take and how often to take it  Follow directions  Read the labels of all other medicines you are using to see if they also contain acetaminophen, or ask your doctor or pharmacist  Acetaminophen can cause liver damage if not taken correctly  Do not use more than 4 grams (4,000 milligrams) total of acetaminophen in one day  · Take your medicine as directed  Contact your healthcare provider if you think your medicine is not helping or if you have side effects  Tell him or her if you are allergic to any medicine  Keep a list of the medicines, vitamins, and herbs you take  Include the amounts, and when and why you take them  Bring the list or the pill bottles to follow-up visits  Carry your medicine list with you in case of an emergency  Self-care:  Mild symptoms may get better on their own  The following may be used to manage your symptoms:  · Decongestants  help reduce nasal congestion and help you breathe more easily  If you take decongestant pills, they may make you feel restless or cause problems with your sleep  Do not use decongestant sprays for more than a few days  · Cough suppressants  help reduce coughing  Ask your healthcare provider which type of cough medicine is best for you  · To soothe a sore throat,  gargle with warm salt water, or use throat lozenges or a throat spray  Your healthcare provider may recommend a cough medicine  Drink more liquids to thin and loosen mucus and to prevent dehydration   Use decongestants or saline drops as directed for nasal congestion  Keep others safe while you are recovering at home:  Healthcare providers will give you specific instructions to follow  The following are general guidelines to remind you how to keep others safe until you are well:     · Wash your hands often  Use soap and water as much as possible  You can use hand  that contains alcohol if soap and water are not available  Do not share towels with anyone  If you use paper towels, throw them away in a lined trash can kept in your room or area  Use a covered trash can, if possible  · Cover sneezes and coughs  Turn your face away and cover your mouth and nose with a tissue  Throw the tissue away  Use the bend of your arm if a tissue is not available  Then wash your hands well with soap and water or use hand   · Wear a face covering (mask) around anyone who does not live in your home  A covering will help prevent you from passing the virus to others  It is not known for sure if or for how long the virus can be passed after recovery  Do not  wear a plastic face shield instead of a covering  Use a cloth covering with at least 2 layers  You can also create layers by putting a cloth covering over a disposable non-medical mask  Cover your mouth and your nose  The covering should fit snugly against the bridge of your nose  Securely fasten it under your chin and on the sides of your face  A face covering is not a substitute for other safety measures  Continue social distancing and washing your hands often  · Do not go out of your home unless it is necessary  If possible, ask someone who is not infected to go out for groceries, medicines, and household items  Ask your healthcare provider for other ways to have appointments  Some providers offer phone, video, or other types of appointments   If you need to be seen in person, call ahead to make sure the office will be ready for you     · Do not let anyone into your home, room, or area unless it is necessary  If possible, stay in a separate area or room of your home if you live with others  No one should go into the area or room except to give you care  Only allow medical professionals or other necessary helpers in  Wear a face covering  Remind them to wear face coverings and to wash their hands  If possible, ask someone who is well to care for your baby  You can put breast milk in bottles for the person to use, if needed  Wear a clean face covering if you need to breastfeed or express or pump breast milk  Family members and friends should not visit you  You can visit with others by phone, video chat, e-mail, or similar systems  It is important to stay connected with others in your life while you recover  · Talk to your healthcare provider about your baby  Tell him or her if you have any questions or concerns about caring for or bonding with your baby  He or she will tell you when to bring your baby in for check-ups and vaccines  He or she will also tell you what to do if you think your baby was infected with the coronavirus  · Do not handle live animals unless it is necessary  Until more is known, it is best not to touch, play with, or handle live animals  Some animals, including pets, have been infected with the new coronavirus  Do not handle or care for animals until you are well  Care includes feeding, petting, and cuddling your pet  Do not let your pet lick you or share your food  Ask someone who is not infected to take care of your pet, if possible  If you must care for a pet, wear a face covering  Wash your hands before and after you give care  Talk to your healthcare provider about how to keep a service animal safe, if needed  · Follow directions from your healthcare provider for being around others after you recover  It is not known for sure if or for how long a recovered person can pass the virus to others   Your provider may give you instructions, such as continuing social distancing or wearing a face covering around others  The following are general guidelines for when you can be around others:    ? If you never developed any symptoms,  wait at least 10 days after your positive test  Your provider may want you to have 2 negative tests in a row at least 24 hours apart  This depends on how available testing is in your area  ? If you did have symptoms,  wait at least 10 days after the symptoms first appeared  Then you will need to have no fever for 24 hours without fever medicine  Most of your symptoms will also need to be gone  A loss of taste or smell may continue for several months  It is considered okay to be around others if this is your only symptom  ? If you were hospitalized for COVID-19 and needed oxygen,  your provider will tell you how long to wait  You may need to wait until 20 days after symptoms appeared  It may be less if you have 2 negative tests in a row at least 24 hours apart  This will depend on how available testing is in your area  Follow up with your doctor as directed:  Write down your questions so you remember to ask them during your visits  For more information:   · Centers for Disease Control and Prevention  [address] , [address]  Phone: 8- 236 - 139-2787  Web Address: emotion.me br    © [address] [place] [address] 2021 Information is for End User's use only and may not be sold, redistributed or otherwise used for commercial purposes  All illustrations and images included in CareNotes® are the copyrighted property of A D A M , Inc  or [address] [place], [place] [address]estephania   The above information is an  only  It is not intended as medical advice for individual conditions or treatments  Talk to your doctor, nurse or pharmacist before following any medical regimen to see if it is safe and effective for you

## 2022-02-04 NOTE — LETTER
[address] [address]  Dept: [phone]    February 11, 2022     Patient: Cheryl Abraham   YOB: 1980   Date of Visit: 2/4/2022       To Whom it May Concern:    Cheryl Abraham is under my professional care  She was seen in the hospital from 2/4/2022   to 02/11/22  She may return to work on 02/21/22 without limitations  If you have any questions or concerns, please don't hesitate to call           Sincerely,          Cate English, DO

## 2022-02-04 NOTE — PROGRESS NOTES
COVID-19 Outpatient Progress Note    Assessment/Plan:    Problem List Items Addressed This Visit        Digestive    Crohn's disease of colon with complication ([facility] [address] )       Respiratory    Moderate persistent asthma without complication      Other Visit Diagnoses     Lab test positive for detection of COVID-19 virus    -  Primary    Hyponatremia        Elevated LFTs        Macrocytosis without anemia        Upper respiratory tract infection, unspecified type             Disposition:     Patient has COVID-19 infection  Based off CDC guidelines, they were recommended to isolate for 5 days from the date of the positive test  If they remain asymptomatic, isolation may be ended followed by 5 days of wearing a mask when around othes to minimize risk of infecting others  If they have a fever, continue to stay home until fever resolves for at least 24 hours  I recommended continued isolation until at least 24 hours have passed since recovery defined as resolution of fever without the use of fever-reducing medications AND improvement in COVID symptoms AND 10 days have passed since onset of symptoms (or 10 days have passed since date of first positive viral diagnostic test for asymptomatic patients)  A/P: Day # 7 since onset of COVID s/s  Covid test positive  Doing poorly  Started abx, mucinex, and INS  Deferred steroids yesterday  Still with fatigue, nasal s/s, and cough  Denies worsening SOB, but increase wheezing with increase DRAKE use  Main concern is worsening and now severe bilat LE pain  ?swelling  Discussed coming to the office for CXR, labs, and starting empiric abx for both the breathing and joint pain  ?DVT  Told pt would have to hold on w/u for Lyme's/CTD, etc due to the covid infection and possible abnormal labs  Pt requesting to go to the ER and will triage the pt to [facility]  Continue isolation/quarantine, increase fluids, PRN motrin/tylenol, and breathing exercises  RTC three days for f/u       I have spent 15 minutes directly with the patient  Greater than 50% of this time was spent in counseling/coordination of care regarding: prognosis, risks and benefits of treatment options, instructions for management, patient and family education, importance of treatment compliance, risk factor reductions and impressions  Encounter provider Gloria Wolf DO    Provider located at [address] [place] [phone]    Recent Visits  Date Type Provider Dept   02/03/22 Telemedicine Gloria Wolf DO [facility] recent visits within past 7 days and meeting all other requirements  Today's Visits  Date Type Provider Dept   02/04/22 Telemedicine Gloria Wolf, DO [facility] today's visits and meeting all other requirements  Future Appointments  No visits were found meeting these conditions  Showing future appointments within next 150 days and meeting all other requirements       Patient agrees to participate in a virtual check in via telephone or video visit instead of presenting to the office to address urgent/immediate medical needs  Patient is aware this is a billable service  After connecting through [facility], the patient was identified by name and date of birth  Lucius Ramos was informed that this was a telemedicine visit and that the exam was being conducted confidentially over secure lines  My office door was closed  No one else was in the room  Lucius Ramos acknowledged consent and understanding of privacy and security of the telemedicine visit  I informed the patient that I have reviewed her record in Epic and presented the opportunity for her to ask any questions regarding the visit today  The patient agreed to participate      Verification of patient location:  Patient is located in the following state in which I hold an active license: [place]    Subjective:   Lucius Ramos is a 42 y o  female who has been screened for COVID-19  Symptom change since last report: unchanged  Patient's symptoms include chills, fatigue, nasal congestion, rhinorrhea, anosmia, loss of taste and cough  Patient denies fever, sore throat, shortness of breath, chest tightness, abdominal pain, nausea, vomiting, diarrhea, myalgias and headaches  - Date of symptom onset: 1/28/2022  - Date of positive COVID-19 test: 1/30/2022  Type of test: Home antigen  COVID-19 vaccination status: Fully vaccinated (primary series)    Marina has been staying home and has isolated themselves in her home  She is taking care to not share personal items and is cleaning all surfaces that are touched often, like counters, tabletops, and doorknobs using household cleaning sprays or wipes  She is wearing a mask when she leaves her room       Lab Results   Component Value Date    SARSCOV2 Negative 01/12/2022     Past Medical History:   Diagnosis Date    Anxiety     Asthma     Crohn disease ([address] )     Depression     Disease of thyroid gland     Hypertension     Hypothyroidism     Psychiatric disorder      Past Surgical History:   Procedure Laterality Date    NO PAST SURGERIES       Current Outpatient Medications   Medication Sig Dispense Refill    albuterol (PROVENTIL HFA,VENTOLIN HFA) 90 mcg/act inhaler inhale 1 to 2 puffs by mouth and INTO THE LUNGS every 4 to 6 hours if needed 18 g 3    clonazePAM (KlonoPIN) 0 5 mg tablet Take 1 tablet (0 5 mg total) by mouth 2 (two) times a day as needed (prn) 60 tablet 0    disulfiram (ANTABUSE) 250 mg tablet Take 1 tablet (250 mg total) by mouth daily 90 tablet 0    fluticasone (FLONASE) 50 mcg/act nasal spray 1 spray into each nostril daily 16 g 0    guaiFENesin (Mucinex) 600 mg 12 hr tablet Take 1 tablet (600 mg total) by mouth every 12 (twelve) hours 20 tablet 0    levofloxacin (LEVAQUIN) 500 mg tablet Take 1 tablet (500 mg total) by mouth every 24 hours for 10 days 10 tablet 0    levothyroxine 125 mcg tablet take 1 tablet by mouth every morning 90 tablet 3    ondansetron (ZOFRAN) 4 mg tablet Take 1 tablet (4 mg total) by mouth every 8 (eight) hours as needed for nausea or vomiting 20 tablet 0    ARIPiprazole (ABILIFY) 5 mg tablet Take 1 tablet (5 mg total) by mouth every morning (Patient not taking: Reported on 2/3/2022 ) 90 tablet 1    cetirizine (ZyrTEC) 10 mg tablet Take 10 mg by mouth daily (Patient not taking: Reported on 2/3/2022 )      diphenhydrAMINE (BENADRYL ALLERGY) 25 mg capsule Take by mouth (Patient not taking: Reported on 2/3/2022 )      ergocalciferol (VITAMIN D2) 50,000 units Take 1 capsule (50,000 Units total) by mouth once a week (Patient not taking: Reported on 2/3/2022 ) 4 capsule 5    fluticasone-vilanterol (BREO ELLIPTA) 200-25 MCG/INH inhaler Inhale 1 puff daily Rinse mouth after use  (Patient not taking: Reported on 2/3/2022 ) 3 Inhaler 1    MAGNESIUM OXIDE PO Take by mouth daily   (Patient not taking: Reported on 2/3/2022 )      montelukast (SINGULAIR) 10 mg tablet Take 1 tablet (10 mg total) by mouth daily (Patient not taking: Reported on 2/3/2022 ) 30 tablet 5    Multiple Vitamin (multivitamin) tablet Take 1 tablet by mouth daily   (Patient not taking: Reported on 2/3/2022 )      ondansetron (ZOFRAN) 4 mg tablet Take 1 tablet (4 mg total) by mouth every 6 (six) hours (Patient not taking: Reported on 2/4/2022 ) 12 tablet 0    thiamine 250 MG tablet Take 1 tablet (250 mg total) by mouth daily (Patient not taking: Reported on 2/3/2022 ) 90 tablet 0    umeclidinium bromide (INCRUSE ELLIPTA) 62 5 mcg/inh AEPB inhaler Inhale 1 puff daily (Patient not taking: Reported on 2/3/2022 ) 3 Inhaler 1     No current facility-administered medications for this visit  Allergies   Allergen Reactions    Penicillins Hives and Rash     HIVES, SWELLS, riley ancef x 1 dose         Review of Systems   Constitutional: Positive for activity change, chills and fatigue  Negative for diaphoresis and fever     HENT: Positive for congestion, postnasal drip and rhinorrhea  Negative for ear discharge, ear pain, facial swelling, sinus pressure, sinus pain and sore throat           loss of taste/smell  Respiratory: Positive for cough and wheezing  Negative for chest tightness and shortness of breath  Cardiovascular: Negative for chest pain, palpitations and leg swelling  Gastrointestinal: Negative for abdominal pain, constipation, diarrhea, nausea and vomiting  Genitourinary: Negative for difficulty urinating, dysuria and frequency  Musculoskeletal: Positive for arthralgias and gait problem  Negative for myalgias  Neurological: Negative for light-headedness and headaches  Psychiatric/Behavioral: Negative for confusion  The patient is not nervous/anxious  Objective:    Vitals:       Physical Exam  Vitals and nursing note reviewed  Constitutional:       General: She is not in acute distress  Appearance: Normal appearance  She is ill-appearing  HENT:      Head: Normocephalic and atraumatic  Mouth/Throat:      Mouth: Mucous membranes are moist    Eyes:      Extraocular Movements: Extraocular movements intact  Conjunctiva/sclera: Conjunctivae normal       Pupils: Pupils are equal, round, and reactive to light  Pulmonary:      Effort: Pulmonary effort is normal  No respiratory distress  Neurological:      General: No focal deficit present  Mental Status: She is alert and oriented to person, place, and time  Mental status is at baseline  Psychiatric:         Mood and Affect: Mood normal          Behavior: Behavior normal          Thought Content: Thought content normal          Judgment: Judgment normal          VIRTUAL VISIT DISCLAIMER    Marina Hickey verbally agrees to participate in [employer]   Pt is aware that [employer] could be limited without vital signs or the ability to perform a full hands-on physical Everson Destiney understands she or the provider may request at any time to terminate the video visit and request the patient to seek care or treatment in person

## 2022-02-05 PROBLEM — U07.1 COVID-19: Status: ACTIVE | Noted: 2022-02-05

## 2022-02-05 PROBLEM — R20.0 LOWER EXTREMITY NUMBNESS: Status: ACTIVE | Noted: 2022-02-05

## 2022-02-05 LAB
ANION GAP SERPL CALCULATED.3IONS-SCNC: 13 MMOL/L (ref 4–13)
ATRIAL RATE: 93 BPM
BILIRUB UR QL STRIP: NEGATIVE
BUN SERPL-MCNC: 5 MG/DL (ref 5–25)
CALCIUM SERPL-MCNC: 8.8 MG/DL (ref 8.4–10.2)
CHLORIDE SERPL-SCNC: 94 MMOL/L (ref 96–108)
CLARITY UR: CLEAR
CO2 SERPL-SCNC: 24 MMOL/L (ref 21–32)
COLOR UR: YELLOW
CREAT SERPL-MCNC: 0.61 MG/DL (ref 0.6–1.3)
ERYTHROCYTE [DISTWIDTH] IN BLOOD BY AUTOMATED COUNT: 12.8 % (ref 11.6–15.1)
GFR SERPL CREATININE-BSD FRML MDRD: 112 ML/MIN/1.73SQ M
GLUCOSE SERPL-MCNC: 143 MG/DL (ref 65–140)
GLUCOSE UR STRIP-MCNC: NEGATIVE MG/DL
HCT VFR BLD AUTO: 46.3 % (ref 34.8–46.1)
HGB BLD-MCNC: 16.2 G/DL (ref 11.5–15.4)
HGB UR QL STRIP.AUTO: NEGATIVE
KETONES UR STRIP-MCNC: ABNORMAL MG/DL
LEUKOCYTE ESTERASE UR QL STRIP: NEGATIVE
MAGNESIUM SERPL-MCNC: 1.9 MG/DL (ref 1.9–2.7)
MCH RBC QN AUTO: 39.2 PG (ref 26.8–34.3)
MCHC RBC AUTO-ENTMCNC: 35 G/DL (ref 31.4–37.4)
MCV RBC AUTO: 112 FL (ref 82–98)
NITRITE UR QL STRIP: NEGATIVE
P AXIS: 114 DEGREES
PH UR STRIP.AUTO: 8 [PH]
PLATELET # BLD AUTO: 205 THOUSANDS/UL (ref 149–390)
PMV BLD AUTO: 9 FL (ref 8.9–12.7)
POTASSIUM SERPL-SCNC: 4.1 MMOL/L (ref 3.5–5.3)
PR INTERVAL: 152 MS
PROCALCITONIN SERPL-MCNC: 0.07 NG/ML
PROT UR STRIP-MCNC: NEGATIVE MG/DL
QRS AXIS: -33 DEGREES
QRSD INTERVAL: 90 MS
QT INTERVAL: 426 MS
QTC INTERVAL: 529 MS
RBC # BLD AUTO: 4.13 MILLION/UL (ref 3.81–5.12)
SODIUM SERPL-SCNC: 131 MMOL/L (ref 135–147)
SP GR UR STRIP.AUTO: 1.01 (ref 1–1.03)
T WAVE AXIS: 228 DEGREES
TSH SERPL DL<=0.05 MIU/L-ACNC: 5.36 UIU/ML (ref 0.45–5.33)
UROBILINOGEN UR QL STRIP.AUTO: 0.2 E.U./DL
VENTRICULAR RATE: 93 BPM
WBC # BLD AUTO: 2.52 THOUSAND/UL (ref 4.31–10.16)

## 2022-02-05 PROCEDURE — 97166 OT EVAL MOD COMPLEX 45 MIN: CPT

## 2022-02-05 PROCEDURE — 84443 ASSAY THYROID STIM HORMONE: CPT | Performed by: PHYSICIAN ASSISTANT

## 2022-02-05 PROCEDURE — 99223 1ST HOSP IP/OBS HIGH 75: CPT | Performed by: INTERNAL MEDICINE

## 2022-02-05 PROCEDURE — 80048 BASIC METABOLIC PNL TOTAL CA: CPT | Performed by: PHYSICIAN ASSISTANT

## 2022-02-05 PROCEDURE — 84145 PROCALCITONIN (PCT): CPT | Performed by: PHYSICIAN ASSISTANT

## 2022-02-05 PROCEDURE — 85027 COMPLETE CBC AUTOMATED: CPT | Performed by: PHYSICIAN ASSISTANT

## 2022-02-05 PROCEDURE — 93010 ELECTROCARDIOGRAM REPORT: CPT | Performed by: INTERNAL MEDICINE

## 2022-02-05 PROCEDURE — 81003 URINALYSIS AUTO W/O SCOPE: CPT | Performed by: PHYSICIAN ASSISTANT

## 2022-02-05 PROCEDURE — 97163 PT EVAL HIGH COMPLEX 45 MIN: CPT

## 2022-02-05 PROCEDURE — 36415 COLL VENOUS BLD VENIPUNCTURE: CPT | Performed by: PHYSICIAN ASSISTANT

## 2022-02-05 PROCEDURE — 83735 ASSAY OF MAGNESIUM: CPT | Performed by: PHYSICIAN ASSISTANT

## 2022-02-05 RX ORDER — CLONAZEPAM 0.5 MG/1
0.5 TABLET ORAL 2 TIMES DAILY PRN
Status: DISCONTINUED | OUTPATIENT
Start: 2022-02-05 | End: 2022-02-05

## 2022-02-05 RX ORDER — ONDANSETRON 2 MG/ML
4 INJECTION INTRAMUSCULAR; INTRAVENOUS EVERY 6 HOURS PRN
Status: DISCONTINUED | OUTPATIENT
Start: 2022-02-05 | End: 2022-02-11 | Stop reason: HOSPADM

## 2022-02-05 RX ORDER — IBUPROFEN 400 MG/1
400 TABLET ORAL EVERY 6 HOURS PRN
Status: DISCONTINUED | OUTPATIENT
Start: 2022-02-05 | End: 2022-02-11 | Stop reason: HOSPADM

## 2022-02-05 RX ORDER — HEPARIN SODIUM 5000 [USP'U]/ML
5000 INJECTION, SOLUTION INTRAVENOUS; SUBCUTANEOUS EVERY 8 HOURS SCHEDULED
Status: DISCONTINUED | OUTPATIENT
Start: 2022-02-05 | End: 2022-02-08

## 2022-02-05 RX ORDER — LEVOFLOXACIN 500 MG/1
500 TABLET, FILM COATED ORAL EVERY 24 HOURS
Status: DISCONTINUED | OUTPATIENT
Start: 2022-02-05 | End: 2022-02-10

## 2022-02-05 RX ORDER — GUAIFENESIN 600 MG
600 TABLET, EXTENDED RELEASE 12 HR ORAL EVERY 12 HOURS SCHEDULED
Status: DISCONTINUED | OUTPATIENT
Start: 2022-02-05 | End: 2022-02-05

## 2022-02-05 RX ORDER — ACETAMINOPHEN 325 MG/1
650 TABLET ORAL EVERY 6 HOURS PRN
Status: DISCONTINUED | OUTPATIENT
Start: 2022-02-05 | End: 2022-02-05

## 2022-02-05 RX ORDER — ALBUTEROL SULFATE 90 UG/1
2 AEROSOL, METERED RESPIRATORY (INHALATION) EVERY 4 HOURS PRN
Status: DISCONTINUED | OUTPATIENT
Start: 2022-02-05 | End: 2022-02-11 | Stop reason: HOSPADM

## 2022-02-05 RX ORDER — CLONAZEPAM 0.5 MG/1
0.5 TABLET ORAL 2 TIMES DAILY PRN
Status: DISCONTINUED | OUTPATIENT
Start: 2022-02-05 | End: 2022-02-11 | Stop reason: HOSPADM

## 2022-02-05 RX ORDER — OXYCODONE HYDROCHLORIDE 5 MG/1
5 TABLET ORAL EVERY 4 HOURS PRN
Status: DISCONTINUED | OUTPATIENT
Start: 2022-02-05 | End: 2022-02-10

## 2022-02-05 RX ORDER — GUAIFENESIN 600 MG
600 TABLET, EXTENDED RELEASE 12 HR ORAL EVERY 12 HOURS SCHEDULED
Status: DISCONTINUED | OUTPATIENT
Start: 2022-02-05 | End: 2022-02-11 | Stop reason: HOSPADM

## 2022-02-05 RX ORDER — FLUTICASONE PROPIONATE 50 MCG
1 SPRAY, SUSPENSION (ML) NASAL DAILY
Status: DISCONTINUED | OUTPATIENT
Start: 2022-02-05 | End: 2022-02-11 | Stop reason: HOSPADM

## 2022-02-05 RX ORDER — DISULFIRAM 250 MG/1
250 TABLET ORAL DAILY
Status: DISCONTINUED | OUTPATIENT
Start: 2022-02-05 | End: 2022-02-07

## 2022-02-05 RX ORDER — LORAZEPAM 2 MG/ML
1 INJECTION INTRAMUSCULAR ONCE
Status: COMPLETED | OUTPATIENT
Start: 2022-02-05 | End: 2022-02-06

## 2022-02-05 RX ORDER — DEXAMETHASONE SODIUM PHOSPHATE 4 MG/ML
4 INJECTION, SOLUTION INTRA-ARTICULAR; INTRALESIONAL; INTRAMUSCULAR; INTRAVENOUS; SOFT TISSUE EVERY 6 HOURS SCHEDULED
Status: DISCONTINUED | OUTPATIENT
Start: 2022-02-05 | End: 2022-02-06

## 2022-02-05 RX ORDER — POTASSIUM CHLORIDE 20 MEQ/1
20 TABLET, EXTENDED RELEASE ORAL DAILY
Status: DISCONTINUED | OUTPATIENT
Start: 2022-02-05 | End: 2022-02-11 | Stop reason: HOSPADM

## 2022-02-05 RX ADMIN — LEVOFLOXACIN 500 MG: 500 TABLET, FILM COATED ORAL at 01:39

## 2022-02-05 RX ADMIN — HEPARIN SODIUM 5000 UNITS: 5000 INJECTION INTRAVENOUS; SUBCUTANEOUS at 13:42

## 2022-02-05 RX ADMIN — HEPARIN SODIUM 5000 UNITS: 5000 INJECTION INTRAVENOUS; SUBCUTANEOUS at 20:53

## 2022-02-05 RX ADMIN — POTASSIUM CHLORIDE 20 MEQ: 1500 TABLET, EXTENDED RELEASE ORAL at 09:31

## 2022-02-05 RX ADMIN — DEXAMETHASONE SODIUM PHOSPHATE 4 MG: 4 INJECTION, SOLUTION INTRA-ARTICULAR; INTRALESIONAL; INTRAMUSCULAR; INTRAVENOUS; SOFT TISSUE at 20:52

## 2022-02-05 RX ADMIN — GUAIFENESIN 600 MG: 600 TABLET, EXTENDED RELEASE ORAL at 13:42

## 2022-02-05 RX ADMIN — OXYCODONE HYDROCHLORIDE 5 MG: 5 TABLET ORAL at 09:31

## 2022-02-05 RX ADMIN — LEVOTHYROXINE SODIUM 125 MCG: 25 TABLET ORAL at 09:31

## 2022-02-05 RX ADMIN — HEPARIN SODIUM 5000 UNITS: 5000 INJECTION INTRAVENOUS; SUBCUTANEOUS at 05:18

## 2022-02-05 RX ADMIN — OXYCODONE HYDROCHLORIDE 5 MG: 5 TABLET ORAL at 20:53

## 2022-02-05 RX ADMIN — DEXAMETHASONE SODIUM PHOSPHATE 4 MG: 4 INJECTION, SOLUTION INTRA-ARTICULAR; INTRALESIONAL; INTRAMUSCULAR; INTRAVENOUS; SOFT TISSUE at 13:42

## 2022-02-05 RX ADMIN — OXYCODONE HYDROCHLORIDE 5 MG: 5 TABLET ORAL at 14:24

## 2022-02-05 RX ADMIN — IBUPROFEN 400 MG: 400 TABLET, FILM COATED ORAL at 05:18

## 2022-02-05 RX ADMIN — GUAIFENESIN 600 MG: 600 TABLET, EXTENDED RELEASE ORAL at 01:39

## 2022-02-05 NOTE — OCCUPATIONAL THERAPY NOTE
Occupational Therapy Evaluation      Ricky Reynaga    2/5/2022    Principal Problem:    Lower extremity numbness  Active Problems:    Alcohol dependence in remission (HCC)    JOSE (generalized anxiety disorder)    Moderate persistent asthma without complication    Hypothyroidism    Hypomagnesemia    Class 1 obesity in adult    COVID-19      Past Medical History:   Diagnosis Date    Anxiety     Asthma     Crohn disease ([address] )     Depression     Disease of thyroid gland     Hypertension     Hypothyroidism     Psychiatric disorder        Past Surgical History:   Procedure Laterality Date    NO PAST SURGERIES          02/05/22 0833   OT Last Visit   OT Visit Date 02/05/22   Note Type   Note type Evaluation   Restrictions/Precautions   Weight Bearing Precautions Per Order No   Other Precautions Multiple lines; Fall Risk;Pain   Pain Assessment   Pain Assessment Tool 0-10   Pain Score 3  (at rest, increased with activity)   Pain Location/Orientation Orientation: Bilateral  (calves)   Home Living   Type of [address] [place] [address] Two level;Stairs to enter without rails; Able to live on main level with bedroom/bathroom  (2 TORI)   Bathroom Shower/Tub Walk-in shower   Bathroom Toilet Standard   Bathroom Equipment   (none)   [address]  (started using on Thursday)   Prior Function   Level of [place] Independent with ADLs and functional mobility   Lives With Daughter   ADL Assistance Independent   IADLs Independent   Falls in the last 6 months 0   Vocational Full time employment  (nurse )   Comments +    ADL   Eating Assistance 7  Independent   Grooming Assistance 7  [address] 6  Modified Independent   LB [address] 4  [address] 6  Modified independent   [address] 4  Minimal Assistance   Bed Mobility   Supine to Sit 5  Supervision   Additional items Increased time required;HOB elevated   Sit to Supine 5  Supervision   Additional items Increased time required;HOB elevated   Additional Comments pt reported sligth dizziness with supine to sit however it resolved with time   Transfers   Sit to Stand   (CGA)   Additional items Verbal cues; Increased time required   Stand to Sit   (CGA)   Additional items Verbal cues; Increased time required   Functional Mobility   Functional Mobility   (CGA)   Additional items Rolling walker   Balance   Static Sitting Normal   Dynamic Sitting Normal   Static Standing Fair   Dynamic Standing Fair   Ambulatory Fair   Activity Tolerance   Activity Tolerance Patient limited by pain; Patient limited by fatigue   Nurse Made Aware Valladares DO made aware of findings   RUE Assessment   RUE Assessment WFL   LUE Assessment   LUE Assessment WFL   Hand Function   Gross Motor Coordination Functional   Fine Motor Coordination Functional   Cognition   Overall Cognitive Status WFL   Arousal/Participation Alert; Cooperative   Attention Within functional limits   Orientation Level Oriented X4   Memory Within functional limits   Following Commands Follows all commands and directions without difficulty   Assessment   Limitation Decreased ADL status; Decreased self-care trans;Decreased high-level ADLs   Prognosis Good   Assessment Pt is a 42 y o  female seen for OT evaluation s/p admit to [place] [id number] on 2/4/2022 w/ Lower extremity numbness  Comorbidities affecting pt's functional performance at time of assessment include: Anxiety, Asthma, Crohn's disease, Depression, HTN, Hypothyroidism, Psychiatric disorder  Personal factors affecting pt at time of IE include:steps to enter environment, limited home support and difficulty performing ADLS  Prior to admission, pt was I with ADLs  Upon evaluation: the following deficits impact occupational performance: decreased balance, decreased tolerance and increased pain   Pt to benefit from continued skilled OT tx while in the hospital to address deficits as defined above and maximize level of functional independence w ADL's and functional mobility  Occupational Performance areas to address include: bathing/shower, toilet hygiene, dressing, functional mobility and clothing management  From OT standpoint, recommendation at time of d/c would be Home OT  Goals   Patient Goals to get better   Plan   Treatment Interventions ADL retraining;Functional transfer training; Endurance training;Equipment evaluation/education; Neuromuscular reeducation; Energy conservation; Activityengagement   Goal Expiration Date 02/15/22   OT Treatment Day 0   OT Frequency 3-5x/wk   Recommendation   OT Discharge Recommendation Home with home health rehabilitation   OT - OK to Discharge Yes  (when medically stable)   Additional Comments  Pt seen as a co-eval with PT due to the patient's co-morbidities, clinically unstable presentation, and present impairments which are a regression from the patient's baseline  Additional Comments 2 The patient's raw score on the AM-PAC Daily Activity inpatient short form is 21, standardized score is 44 27, greater than 39 4  Patients at this level are likely to benefit from discharge to home  Please refer to the recommendation of the Occupational Therapist for safe discharge planning     AM-PAC Daily Activity Inpatient   Lower Body Dressing 3   Bathing 3   Toileting 3   Upper Body Dressing 4   Grooming 4   Eating 4   Daily Activity Raw Score 21   Daily Activity Standardized Score (Calc for Raw Score >=11) 44 27   AM-PAC Applied Cognition Inpatient   Following a Speech/Presentation 4   Understanding Ordinary Conversation 4   Taking Medications 4   Remembering Where Things Are Placed or Put Away 4   Remembering List of 4-5 Errands 4   Taking Care of Complicated Tasks 4   Applied Cognition Raw Score 24   Applied Cognition Standardized Score 62 21     GOALS:    Pt will achieve the following within specified time frame: STG  Pt will achieve the following goals within 5 days    *ADL transfers with (S) for inc'd independence with ADLs/purposeful tasks    *LB ADL with CGA using AE prn for inc'd independence with self cares    *Toileting with (S) for clothing management and hygiene for return to PLOF with personal care    *Increase static stand balace and dyn stand balance to F+ for inc'd safety with standing purposeful tasks    *Increase stand tolerance x3 m for inc'd tolerance with standing purposeful tasks    *Participate in 10m UE therex to increase overall stamina/activity tolerance for purposeful tasks    *Bed mobility- (I) for inc'd independence to manage own comfort and initiate EOB & OOB purposeful tasks    Pt will achieve the following within specified time frame: LTG  Pt will achieve the following goals within 10 days    *ADL transfers with (I) for inc'd independence with ADLs/purposeful tasks    *LB ADL with (S) using AE prn for inc'd independence with self cares    *Toileting with (I) for clothing management and hygiene for return to PLOF with personal care    *Increase static stand balance and dyn stand balance to G- for inc'd safety with standing purposeful tasks    *Increase stand tolerance x5 m for inc'd tolerance with standing purposeful tasks      Guanakito Mccoy MS, OTR/L

## 2022-02-05 NOTE — PLAN OF CARE
Problem: PHYSICAL THERAPY ADULT  Goal: Performs mobility at highest level of function for planned discharge setting  See evaluation for individualized goals  Description: Treatment/Interventions: Functional transfer training,Elevations,Therapeutic exercise,Endurance training,Bed mobility,Gait training  Equipment Recommended: Helio Church       See flowsheet documentation for full assessment, interventions and recommendations  Outcome: Progressing  Note: Prognosis: Fair  Problem List: Decreased endurance,Decreased mobility,Pain,Obesity  Assessment: Pt is 42 y o  female seen for PT evaluation s/p admit to [address] on 2/4/2022 w/ Lower extremity numbness  PT consulted to assess pt's functional mobility and d/c needs  Order placed for PT eval and tx, w/ up and OOB as tolerated order  Pt agreeable to PT  session upon arrival, pt found supine on stretcher  PTA, pt was independent w/ all functional mobility w/ no AD, ambulates community distances and elevations, has 2 TORI, lives w/ daughter in 2 level home and works full time  Pt to benefit from continued PT tx to address deficits and maximize level of functional independent mobility and consistency  From PT/mobility standpoint, recommendation at time of d/c would be home with home health rehabilitation pending progress in order to facilitate return to PLOF  Upon conclusion pt  supine on stretcher  Barriers to Discharge: Decreased caregiver support        PT Discharge Recommendation: Home with home health rehabilitation          See flowsheet documentation for full assessment   Jane Jones PT

## 2022-02-05 NOTE — H&P
[address] 1980, 42 y o  female MRN: [id number]  Unit/Bed#: ED 05 Encounter: [id number]  Primary Care Provider: Alea Nelson DO   Date and time admitted to hospital: 2/4/2022  1:39 PM    * Lower extremity numbness  Assessment & Plan  · Placed in observation Medicine  · Consult PT OT in a m  · Patient had noncontrast MRI with concern for possible transverse myelitis  · Case was discussed by ER provider with neuro surgery on-call  · Recommended repeat MRI thoracic spine with and without contrast  · Will obtain TSH    COVID-19  Assessment & Plan  · Patient is vaccinated for same  · Had a positive home test approximately 1 week ago  · Not requiring any supplemental oxygen at this time  · Will continue to monitor and treat symptomatically    Class 1 obesity in adult  Assessment & Plan  Encourage healthy weight loss and supportive care    Hypomagnesemia  Assessment & Plan  Patient is status post IV repletion in ER, will continue to monitor with repeat labs in a m  Hypothyroidism  Assessment & Plan  Continue pre-hospital levothyroxine 125 mcg p o  daily    Moderate persistent asthma without complication  Assessment & Plan  Continue pre-hospital Proventil 90 mcg 2 puffs q 4 hours p r n  for shortness of breath or wheeze    JOSE (generalized anxiety disorder)  Assessment & Plan  Continue pre-hospital Klonopin 0 5 mg p o  b i d  p r n  Alcohol dependence in remission [facility])  Assessment & Plan  · Patient reports that she has not drank in months  · Will continue pre-hospital Antabuse 250 mg p o  daily    VTE Prophylaxis: Heparin  Code Status:  Level 1  POLST: There is no POLST form on file for this patient (pre-hospital)  Discussion with family:  None present at bedside at time of exam    Anticipated Length of Stay:  Patient will be admitted on an Observation basis with an anticipated length of stay of  < 2 midnights     Justification for Hospital Stay:  Progressive numbness and weakness bilateral lower extremities requiring further neurological evaluation    Chief Complaint:   Numbness and weakness bilateral lower extremities x2 weeks    History of Present Illness:    Alexia Sharpe is a 42 y o  female who presents with progressive numbness or weakness of bilateral lower extremities x2 weeks  Patient presents ER for further evaluation treatment of 2 week history of progressive numbness that she states began in her abdomen approximately her navel level and has descended to include the entirety of her bilateral lower extremities  Patient additionally reports some subjective weakness that she describes as feeling like her legs are extremely heavy complains of pain in her bilateral thighs     Patient denies any loss of bladder or bowel control though she does state that she has numbness to include her genitalia as well  Patient was recently diagnosed with COVID-19 via home test and has been doing well from that standpoint with only complaint being a nonproductive cough  Patient denies any fever chills, no muscle aches or body aches other than previously mentioned pain in her lower extremities  Review of Systems:  Review of Systems   Constitutional: Negative for chills and fever  HENT: Negative for congestion and sore throat  Respiratory: Negative for cough, shortness of breath and wheezing  Cardiovascular: Negative for chest pain and palpitations  Gastrointestinal: Negative for diarrhea, nausea and vomiting  Genitourinary: Negative for dysuria, frequency, hematuria and urgency  Musculoskeletal: Positive for arthralgias and myalgias  Skin: Negative for wound  Neurological: Positive for weakness and numbness  Negative for dizziness, syncope, light-headedness and headaches  All other systems reviewed and are negative        Past Medical and Surgical History:   Past Medical History:   Diagnosis Date    Anxiety     Asthma     Crohn disease ([facility] [address] )     Depression     Disease of thyroid gland     Hypertension     Hypothyroidism     Psychiatric disorder        Past Surgical History:   Procedure Laterality Date    NO PAST SURGERIES         Meds/Allergies:  Prior to Admission medications    Medication Sig Start Date End Date Taking? Authorizing Provider   albuterol (PROVENTIL HFA,VENTOLIN HFA) 90 mcg/act inhaler inhale 1 to 2 puffs by mouth and INTO THE LUNGS every 4 to 6 hours if needed 10/18/21   Tyler Britton,    ARIPiprazole (ABILIFY) 5 mg tablet Take 1 tablet (5 mg total) by mouth every morning  Patient not taking: Reported on 2/3/2022  4/17/21   Tyler Britton DO   cetirizine (ZyrTEC) 10 mg tablet Take 10 mg by mouth daily  Patient not taking: Reported on 2/3/2022     Historical Provider, MD   clonazePAM (KlonoPIN) 0 5 mg tablet Take 1 tablet (0 5 mg total) by mouth 2 (two) times a day as needed (prn) 1/25/22   Tyler Britton,    diphenhydrAMINE (BENADRYL ALLERGY) 25 mg capsule Take by mouth  Patient not taking: Reported on 2/3/2022     Historical Provider, MD   disulfiram (ANTABUSE) 250 mg tablet Take 1 tablet (250 mg total) by mouth daily 4/12/21   Tyler Britton,    ergocalciferol (VITAMIN D2) 50,000 units Take 1 capsule (50,000 Units total) by mouth once a week  Patient not taking: Reported on 2/3/2022  5/5/20   Jadyn Guevara PA-C   fluticasone (FLONASE) 50 mcg/act nasal spray 1 spray into each nostril daily 2/3/22   Tyler Britton,    fluticasone-vilanterol (BREO ELLIPTA) 200-25 MCG/INH inhaler Inhale 1 puff daily Rinse mouth after use    Patient not taking: Reported on 2/3/2022  4/12/21   Tyler Britton DO   guaiFENesin (Mucinex) 600 mg 12 hr tablet Take 1 tablet (600 mg total) by mouth every 12 (twelve) hours 2/3/22   Tyler Britton,    levofloxacin (LEVAQUIN) 500 mg tablet Take 1 tablet (500 mg total) by mouth every 24 hours for 10 days 2/3/22 2/13/22  Tyler Britton,    levothyroxine 125 mcg tablet take 1 tablet by mouth every morning 12/10/21   Monika Martel DO   MAGNESIUM OXIDE PO Take by mouth daily    Patient not taking: Reported on 2/3/2022     Historical Provider, MD   montelukast (SINGULAIR) 10 mg tablet Take 1 tablet (10 mg total) by mouth daily  Patient not taking: Reported on 2/3/2022  1/8/21   Monika Martle DO   Multiple Vitamin (multivitamin) tablet Take 1 tablet by mouth daily    Patient not taking: Reported on 2/3/2022     Historical Provider, MD   ondansetron (ZOFRAN) 4 mg tablet Take 1 tablet (4 mg total) by mouth every 8 (eight) hours as needed for nausea or vomiting 1/12/22   Monika Martel DO   ondansetron (ZOFRAN) 4 mg tablet Take 1 tablet (4 mg total) by mouth every 6 (six) hours  Patient not taking: Reported on 2/4/2022 1/21/22   Darien Tang MD   thiamine 250 MG tablet Take 1 tablet (250 mg total) by mouth daily  Patient not taking: Reported on 2/3/2022  11/10/20   Monika Martel DO   umeclidinium bromide (INCRUSE ELLIPTA) 62 5 mcg/inh AEPB inhaler Inhale 1 puff daily  Patient not taking: Reported on 2/3/2022  4/12/21   Monika Martel DO     I have reviewed home medications with patient personally  Allergies:    Allergies   Allergen Reactions    Penicillins Hives and Rash     HIVES, SWELLS, riley ancef x 1 dose         Social History:  Marital Status:    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[employer]  Patient Pre-hospital Living Situation:  Resides at home with child  Patient Pre-hospital Level of Mobility:  Recently assist of a cane  Patient Pre-hospital Diet Restrictions:  None  Substance Use History:   Social History     Substance and Sexual Activity   Alcohol Use Not Currently     Social History     Tobacco Use   Smoking Status Former Smoker    Quit date: 7/13/2018    Years since quitting: 3 5   Smokeless Tobacco Never Used     Social History     Substance and Sexual Activity   Drug Use Never       Family History:  I have reviewed the patients family history    Physical Exam:   Vitals:   Blood Pressure: 157/99 (02/04/22 2340)  Pulse: 89 (02/04/22 2340)  Temperature: 98 5 °F (36 9 °C) (02/04/22 1347)  Temp Source: Temporal (02/04/22 1347)  Respirations: 18 (02/04/22 2340)  Height: 5' 2" (157 5 cm) (02/04/22 1341)  Weight - Scale: 99 8 kg (220 lb) (02/04/22 1341)  SpO2: 94 % (02/04/22 2340)    Physical Exam  Vitals and nursing note reviewed  Constitutional:       General: She is not in acute distress  Appearance: Normal appearance  HENT:      Head: Normocephalic and atraumatic  Right Ear: Tympanic membrane normal       Left Ear: Tympanic membrane normal       Nose: Nose normal       Mouth/Throat:      Mouth: Mucous membranes are moist       Pharynx: Oropharynx is clear  No posterior oropharyngeal erythema  Eyes:      Extraocular Movements: Extraocular movements intact  Conjunctiva/sclera: Conjunctivae normal       Pupils: Pupils are equal, round, and reactive to light  Cardiovascular:      Rate and Rhythm: Normal rate and regular rhythm  Pulses: Normal pulses  Heart sounds: Normal heart sounds  No murmur heard  Pulmonary:      Effort: Pulmonary effort is normal  No respiratory distress  Breath sounds: Normal breath sounds  No rhonchi or rales  Abdominal:      General: Bowel sounds are normal       Palpations: Abdomen is soft  Tenderness: There is no abdominal tenderness  Musculoskeletal:      Cervical back: Normal range of motion and neck supple  No muscular tenderness  Right lower leg: No edema  Left lower leg: No edema  Skin:     General: Skin is warm and dry  Capillary Refill: Capillary refill takes less than 2 seconds  Neurological:      General: No focal deficit present  Mental Status: She is alert and oriented to person, place, and time  Sensory: Sensory deficit present  Comments: Decreased light touch sensation bilateral lower extremities with pressure sensation intact    Strength 5/5 bilateral lower extremities with brisk DTRs         Additional Data:   Lab Results: I have personally reviewed pertinent reports  Results from last 7 days   Lab Units 02/04/22  1444   WBC Thousand/uL 3 99*   HEMOGLOBIN g/dL 16 0*   HEMATOCRIT % 45 9   PLATELETS Thousands/uL 214   NEUTROS PCT % 68   LYMPHS PCT % 22   MONOS PCT % 8   EOS PCT % 0     Results from last 7 days   Lab Units 02/04/22  1444   SODIUM mmol/L 134*   POTASSIUM mmol/L 3 3*   CHLORIDE mmol/L 92*   CO2 mmol/L 26   BUN mg/dL 5   CREATININE mg/dL 0 68   CALCIUM mg/dL 8 6   ALK PHOS U/L 88   ALT U/L 137*   AST U/L 156*     Results from last 7 days   Lab Units 02/04/22  1444   INR  1 02               Imaging: I have personally reviewed pertinent reports  MRI lumbar spine wo contrast   Final Result by Alessandro Nielsen MD (02/04 2222)      Questionable T2 increased signal abnormality within the lower spinal cord at T11  Follow-up contrast-enhanced thoracic spine MRI for further evaluation  Right L5 exiting nerve root touches the right L5 disc  Workstation performed: [id number]         XR chest 1 view portable   Final Result by Adilene Thurman MD (02/04 1541)      No active pulmonary disease on examination which is somewhat limited secondary to low lung volumes  Workstation performed: [id number]         MRI inpatient order    (Results Pending)       EKG, Pathology, and Other Studies Reviewed on Admission:   · EKG: N/A    Epic Records Reviewed: Yes     ** Please Note: This note has been constructed using a voice recognition system   **

## 2022-02-05 NOTE — ASSESSMENT & PLAN NOTE
· Placed in observation Medicine  · Consult PT OT in a m    · Patient had noncontrast MRI with concern for possible transverse myelitis  · Case was discussed by ER provider with neuro surgery on-call  · Recommended repeat MRI thoracic spine with and without contrast  · Will obtain TSH

## 2022-02-05 NOTE — PLAN OF CARE
Problem: OCCUPATIONAL THERAPY ADULT  Goal: Performs self-care activities at highest level of function for planned discharge setting  See evaluation for individualized goals  Description: Treatment Interventions: ADL retraining,Functional transfer training,Endurance training,Equipment evaluation/education,Neuromuscular reeducation,Energy conservation,Activityengagement    See flowsheet documentation for full assessment, interventions and recommendations  Note: Limitation: Decreased ADL status,Decreased self-care trans,Decreased high-level ADLs  Prognosis: Good  Assessment: Pt is a 42 y o  female seen for OT evaluation s/p admit to [place] [id number] on 2/4/2022 w/ Lower extremity numbness  Comorbidities affecting pt's functional performance at time of assessment include: Anxiety, Asthma, Crohn's disease, Depression, HTN, Hypothyroidism, Psychiatric disorder  Personal factors affecting pt at time of IE include:steps to enter environment, limited home support and difficulty performing ADLS  Prior to admission, pt was I with ADLs  Upon evaluation: the following deficits impact occupational performance: decreased balance, decreased tolerance and increased pain  Pt to benefit from continued skilled OT tx while in the hospital to address deficits as defined above and maximize level of functional independence w ADL's and functional mobility  Occupational Performance areas to address include: bathing/shower, toilet hygiene, dressing, functional mobility and clothing management  From OT standpoint, recommendation at time of d/c would be Home OT       OT Discharge Recommendation: Home with home health rehabilitation  OT - OK to Discharge: Yes (when medically stable)    Guanakito Holder MS, OTR/L

## 2022-02-05 NOTE — PHYSICAL THERAPY NOTE
PHYSICAL THERAPY EVALUATION  NAME:  Tamiko Garcia  DATE: 02/05/22    AGE:   42 y o  Mrn:   [id number]  ADMIT DX:  Leg pain [M79 [id number]]  Problem List:   Patient Active Problem List   Diagnosis    Umbilical hernia without obstruction and without gangrene    [facility]    Recurrent major depressive disorder, in partial remission ([facility] )    JOSE (generalized anxiety disorder)    Moderate persistent asthma without complication    Hypothyroidism    Terminal ileitis without complication ([facility] )    Crohn's disease of colon with complication ([facility] )    Hypomagnesemia    Class 1 obesity in adult    Severe protein-calorie malnutrition (HCC)    Lower extremity numbness    COVID-19       Past Medical History  Past Medical History:   Diagnosis Date    Anxiety     Asthma     Crohn disease ([facility] )     Depression     Disease of thyroid gland     Hypertension     Hypothyroidism     Psychiatric disorder        Past Surgical History  Past Surgical History:   Procedure Laterality Date    NO PAST SURGERIES         Length Of Stay: 0  Performed at least 2 patient identifiers during session: Name and Chrissy Rivera         02/05/22 0817   PT Last Visit   PT Visit Date 02/05/22   Note Type   Note type Evaluation   Pain Assessment   Pain Assessment Tool 0-10   Pain Score 3   Pain Location/Orientation Orientation: Bilateral  (calves)   Home Living   Type of [address] [place] [address] Two level;Stairs to enter without rails; Able to live on main level with bedroom/bathroom  (2 TORI)   Bathroom Shower/Tub Walk-in shower   Bathroom Toilet Standard   Bathroom Equipment   (none reported)   [facility]  (started using on Thursday)   Prior Function   Level of [place] Independent with ADLs and functional mobility   Lives With Daughter   ADL Assistance Independent   IADLs Independent   Falls in the last 6 months 0   Vocational Full time employment  (nurse )   Comments +    General   Additional Pertinent History ankle kinethesia intact   Family/Caregiver Present No   Cognition   Overall Cognitive Status WFL   Arousal/Participation Alert   Orientation Level Oriented X4   Memory Within functional limits   Following Commands Follows all commands and directions without difficulty   Subjective   Subjective pt reported that the pain is better   RLE Assessment   RLE Assessment WFL   LLE Assessment   LLE Assessment WFL   Coordination   Sensation X  (B LE tingeling, numbess, heavy)   Light Touch   RLE Light Touch Impaired   RLE Light Touch Comments   (lower abdomen numb, quads and feet intact, shins impaired)   LLE Light Touch Impaired  (more in feet/thigh compared to shin)   Bed Mobility   Supine to Sit 5  Supervision   Additional items Increased time required;HOB elevated   Sit to Supine 5  Supervision   Additional items Increased time required;HOB elevated   Additional Comments pt reported sligth dizziness with supine to sit however it resolved with time   Transfers   Sit to Stand   (CGA)   Additional items Verbal cues; Increased time required   Stand to Sit   (CGA)   Additional items Verbal cues; Increased time required   Ambulation/Elevation   Gait pattern Decreased foot clearance; Antalgic   Gait Assistance   (CGA)   Additional items Verbal cues   Assistive Device   (HHA)   Distance 20 ft   Ambulation/Elevation Additional Comments required 1 standing rest break   Balance   Static Sitting Normal   Dynamic Sitting Normal   Static Standing Fair   Dynamic Standing Fair   Ambulatory Fair   Endurance Deficit   Endurance Deficit Yes   Endurance Deficit Description unable to ambualte further due to pain   Activity Tolerance   Activity Tolerance Patient limited by pain; Patient limited by fatigue   Nurse Made Aware Valladares DO made aware of findings   Assessment   Prognosis Fair   Problem List Decreased endurance;Decreased mobility;Pain;Obesity   Assessment Pt is 42 y o  female seen for PT evaluation s/p admit to [address] [facility] on 2/4/2022 w/ Lower extremity numbness  PT consulted to assess pt's functional mobility and d/c needs  Order placed for PT eval and tx, w/ up and OOB as tolerated order  Pt agreeable to PT  session upon arrival, pt found supine on stretcher  PTA, pt was independent w/ all functional mobility w/ no AD, ambulates community distances and elevations, has 2 TORI, lives w/ daughter in 2 level home and works full time  Pt to benefit from continued PT tx to address deficits and maximize level of functional independent mobility and consistency  From PT/mobility standpoint, recommendation at time of d/c would be home with home health rehabilitation pending progress in order to facilitate return to PLOF  Upon conclusion pt  supine on stretcher   Barriers to Discharge Decreased caregiver support   Goals   Patient Goals to get better   LTG Expiration Date 02/15/22   Long Term Goal #1 Pt will: Perform bed mobility tasks to modified I to improve ease of bed mobility  Perform transfers to modified I to improve ease of transfers  Perform ambulation with MI and RW for 250 feet to  increase Indep in home environment  Increase dynamic standing balance to F+ to decrease fall risk  Increase OOB activity tolerance to 10 minutes without s/s of exertion to decrease fall risk  Navigate up and down 2 steps with MI so patient can enter and exit home  Plan   Treatment/Interventions Functional transfer training;Elevations; Therapeutic exercise; Endurance training;Bed mobility;Gait training   PT Frequency 3-5x/wk   Recommendation   PT Discharge Recommendation Home with home health rehabilitation   Equipment Recommended Walker;Cane   AM-PAC Basic Mobility Inpatient   Turning in Bed Without Bedrails 4   Lying on Back to Sitting on Edge of Flat Bed 4   Moving Bed to Chair 4   Standing Up From Chair 4   Walk in Room 3   Climb 3-5 Stairs 3   Basic Mobility Inpatient Raw Score 22   Basic Mobility Standardized Score 47 4   Highest Level Of Mobility   -[facility] Goal 7: Walk 25 feet or more     Time In: 0817  Time Out: 0830  Total Evaluation Minutes: Joseph Thornton PT

## 2022-02-05 NOTE — ED CARE HANDOFF
Emergency Department Sign Out Note        Sign out and transfer of care from Amarilys Stark PA-C  See Separate Emergency Department note  The patient, Era Stanton, was evaluated by the previous provider for LOWER EXTREMITY WEAKNESS, FEELING AS IF THERE WAS 50 LB OF WEIGHT ON IT LEGS, GROIN NUMBNESS       Workup Completed:  Baseline labs    ED Course / Workup Pending (followup): MRI LUMBAR SPINE W/O CONTRAST                                  ED Course as of 02/05/22 0115   Fri Feb 04, 2022   2105 Received signout from RANGEL Valdez PA-C, MRI pending of lumbar spine w/o contrast between 9 pm - 9:45 pm, see previous note for discussion with neurosurgery on call  2234 MRI results as follows: Questionable T2 increased signal abnormality within the lower spinal cord at T11  Follow-up contrast-enhanced thoracic spine MRI for further evaluation  Right L5 exiting nerve root touches the right L5 disc  [address] on call at [facility] Dr Obdulia Reynolds tiger texted  2304 PACS referral made in attempt to contact Dr Obdulia Reynolds  Sat Feb 05, 2022   0022 Case d/w Dr Obdulia Reynolds, neuro surgeon on call, reviewed imaging from MRI, in the lumbar region the felt that there was no acute significant findings but reviewed the findings of a T12 signal within the local spinal cord at the level of T11, felt that this is very subtle but did agree with further imaging with an MRI with and without contrast of the thoracic spine to rule out further pathology  [id number] Reviewed case w/ Abner Gaytan PA-C, WILL ADMIT THE PATIENT FOR A THORACIC MRI WITH WITHOUT CONTRAST  Patient was updated in terms of the MRI findings at T11 and T12  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 and the reports a history of her legs feeling like "a 50 lb weight" on her legs  Will proceed with informing the admitting team the patient need some form of analgesia and anti anxiety medication related to performing a subsequent MRI  Procedures  MDM        Disposition  Final diagnoses:   Back pain   Abnormal MRI, lumbar spine   Hypomagnesemia     Time reflects when diagnosis was documented in both MDM as applicable and the Disposition within this note     Time User Action Codes Description Comment    2/5/2022 12:43 AM Alverna Steph Add [M54 9] Back pain     2/5/2022 12:43 AM Alverna Steph Add [R93 7] Abnormal MRI, lumbar spine     2/5/2022 12:45 AM Alverna Steph Add [E83 42] Hypomagnesemia       ED Disposition     ED Disposition Condition Date/Time Comment    Admit Stable Sat Feb 5, 2022 12:43 AM Case was discussed with Carrie Case PA-C and the patient's admission status was agreed to be Admission Status: observation status to the service of Dr Brittani Pimentel   Follow-up Information    None       Patient's Medications   Discharge Prescriptions    No medications on file     No discharge procedures on file         ED Provider  Electronically Signed by     Hannah Randle III, DO  02/05/22 5850

## 2022-02-05 NOTE — ASSESSMENT & PLAN NOTE
· Patient reports that she has not drank in months  · Will continue pre-hospital Antabuse 250 mg p o  daily

## 2022-02-05 NOTE — ASSESSMENT & PLAN NOTE
· Patient is vaccinated for same  · Had a positive home test approximately 1 week ago  · Not requiring any supplemental oxygen at this time  · Will continue to monitor and treat symptomatically

## 2022-02-05 NOTE — UTILIZATION REVIEW
Initial Clinical Review    OBSERVATION 2/5/22 @ 0045 - CHANGED TO INPATIENT ON 2/5/22 @ 1148 - contrast enhanced MRI T spine, inability to ambulate  Admission: Date/Time/Statement:   02/05/22 1148  Inpatient Admission  Once        Transfer Service: Hospitalist       Question Answer Comment   Level of Care Med Surg    Estimated length of stay More than 2 Midnights    Certification I certify that inpatient services are medically necessary for this patient for a duration of greater than two midnights  See H&P and MD Progress Notes for additional information about the patient's course of treatment  02/05/22 1148     Orders Placed This Encounter   Procedures    Inpatient Admission     Standing Status:   Standing     Number of Occurrences:   1     Order Specific Question:   Level of Care     Answer:   Med Surg [16]     Order Specific Question:   Estimated length of stay     Answer:   More than 2 Midnights     Order Specific Question:   Certification     Answer:   I certify that inpatient services are medically necessary for this patient for a duration of greater than two midnights  See H&P and MD Progress Notes for additional information about the patient's course of treatment  ED Arrival Information     Expected Arrival Acuity    - 2/4/2022 13:32 Urgent         Means of arrival Escorted by Service Admission type    [facility] Member Hospitalist Urgent         Arrival complaint    covid+        Chief Complaint   Patient presents with    Leg Pain     bilateral leg pain  sent in by Patsy Reaves     Initial Presentation:   Ms Donovan Spatz is a 42 yof who presents to the ED from PCP office with c/o progressive numbness or weakness of BLE that began in abd at level of navel and descended into BLE  x2 weeks  she c/o subjective weakness and feeling that legs are heavy and pain in bilat thighs  No loss of bowel or bladder control but has numbness in genital area    Recent + Covid test at home (vaccinated) 1 week ago and only c/o nonproductive cough  PMH: alcohol dependence in remission, JOSE, asthma, obesity, hypothyroidism on Levothyroxine  MRI showed possible transverse myelitis  She is admitted to OBSERVATION status with Lower Extremity Numbness - MRI T spine, TSH  Covid - not on oxygen  Hypomagnesemia - 1 3 - replete and trend  Asthma - Home Proventil PRN  JOSE - home Klonopin  Alcohol dependence in remission - home Antabuse daily  Pt was changed to INPATIENT status  Therapy is recommending home with in home therapy  Date: 2/6:   Pt stable  MRI T spine incomplete  Pt states she cannot walk  Will do MRI as IP vs OP  Neuro consult pending  On exam today pt has Subjective change in sensation of her bilateral lower extremities, no overt sensory deficit or weakness        ED Triage Vitals   Temperature Pulse Respirations Blood Pressure SpO2   02/04/22 1347 02/04/22 1341 02/04/22 1341 02/04/22 1341 02/04/22 1341   98 5 °F (36 9 °C) 104 20 140/81 96 %      Temp Source Heart Rate Source Patient Position - Orthostatic VS BP Location FiO2 (%)   02/04/22 1347 02/04/22 1341 02/04/22 1341 02/04/22 1341 --   Temporal Monitor Sitting Left arm       Pain Score       02/04/22 2354       7          Wt Readings from Last 1 Encounters:   02/06/22 98 5 kg (217 lb 3 2 oz)     Additional Vital Signs:   02/06/22 15:28:33 97 4 °F (36 3 °C) Abnormal  71 18 114/78 90 93 % None (Room air) Lying   02/06/22 1400 97 °F (36 1 °C) Abnormal  76 18 116/70 88 91 % None (Room air) Sitting   02/06/22 0526 99 °F (37 2 °C) 85 19 133/78 -- 93 % None (Room air) Sitting   02/06/22 0111 -- 80 18 132/85 -- 94 % None (Room air) --   02/05/22 2057 99 1 °F (37 3 °C) 86 16 128/71 -- 93 % None (Room air) --   02/05/22 0921 97 7 °F (36 5 °C) 80 20 147/86 -- 93 % None (Room air) --     02/05/22 0415 -- 78 18 144/85 108 93 % None (Room air) --   02/04/22 2340 -- 89 18 157/99 121 94 % None (Room air) --   02/04/22 2100 -- 89 18 152/85 114 91 % None (Room air) Lying   02/04/22 1930 -- 91 18 159/93 121 91 % None (Room air) Lying   02/04/22 1914 -- 90 18 139/83 -- 99 % None (Room air) Lying   02/04/22 1645 -- 103 18 134/78 101 95 % None (Room air) Lying     Pertinent Labs/Diagnostic Test Results:     2/4 CXR - No active pulmonary disease on examination which is somewhat limited secondary to low lung volumes  2/4 MRI L spine - Questionable T2 increased signal abnormality within the lower spinal cord at T11  Follow-up contrast-enhanced thoracic spine MRI for further evaluation  Right L5 exiting nerve root touches the right L5 disc    2/4 ECG - Normal sinus rhythm, prolonged QT  2/5 MRI T spine - P      Results from last 7 days   Lab Units 02/06/22 0516 02/05/22 0428 02/04/22  1444   WBC Thousand/uL 4 11* 2 52* 3 99*   HEMOGLOBIN g/dL 15 5* 16 2* 16 0*   HEMATOCRIT % 44 6 46 3* 45 9   PLATELETS Thousands/uL 185 205 214   NEUTROS ABS Thousands/µL  --   --  2 73         Results from last 7 days   Lab Units 02/06/22  0516 02/05/22  0429 02/04/22  1445 02/04/22  1444   SODIUM mmol/L 130* 131*  --  134*   POTASSIUM mmol/L 4 1 4 1  --  3 3*   CHLORIDE mmol/L 95* 94*  --  92*   CO2 mmol/L 24 24  --  26   ANION GAP mmol/L 11 13  --  16*   BUN mg/dL 9 5  --  5   CREATININE mg/dL 0 60 0 61  --  0 68   EGFR ml/min/1 73sq m 112 112  --  108   CALCIUM mg/dL 8 9 8 8  --  8 6   MAGNESIUM mg/dL  --  1 9 1 3*  --      Results from last 7 days   Lab Units 02/04/22  1444   AST U/L 156*   ALT U/L 137*   ALK PHOS U/L 88   TOTAL PROTEIN g/dL 7 6   ALBUMIN g/dL 4 4   TOTAL BILIRUBIN mg/dL 2 51*         Results from last 7 days   Lab Units 02/06/22 0516 02/05/22  0429 02/04/22  1444   GLUCOSE RANDOM mg/dL 146* 143* 108     Results from last 7 days   Lab Units 02/04/22  1444   CK TOTAL U/L 311*   CK MB INDEX % 1 9   CK MB ng/mL 5 8         Results from last 7 days   Lab Units 02/04/22  1444   D-DIMER QUANTITATIVE ug/ml FEU 0 32     Results from last 7 days   Lab Units 02/04/22  1444   PROTIME seconds 13 3   INR  1 02   PTT seconds 27     Results from last 7 days   Lab Units 02/05/22  0429   TSH 3RD GENERATON uIU/mL 5 361*     Results from last 7 days   Lab Units 02/05/22  0429 02/04/22  1444   PROCALCITONIN ng/ml 0 07 0 06     Results from last 7 days   Lab Units 02/04/22  1444   LACTIC ACID mmol/L 1 7     Results from last 7 days   Lab Units 02/05/22  0428   CLARITY UA  Clear   COLOR UA  Yellow   SPEC GRAV UA  1 015   PH UA  8 0*   GLUCOSE UA mg/dl Negative   KETONES UA mg/dl 40 (2+)*   BLOOD UA  Negative   PROTEIN UA mg/dl Negative   NITRITE UA  Negative   BILIRUBIN UA  Negative   UROBILINOGEN UA E U /dl 0 2   LEUKOCYTES UA  Negative         Results from last 7 days   Lab Units 02/04/22  1444   BLOOD CULTURE  No Growth at 24 hrs  No Growth at 24 hrs       ED Treatment:   Medication Administration from 02/04/2022 1331 to 02/05/2022 0819    Date/Time Order Dose Route Action   02/04/2022 1448 sodium chloride 0 9 % bolus 1,000 mL 1,000 mL Intravenous New Bag   02/04/2022 1448 acetaminophen (TYLENOL) tablet 975 mg 975 mg Oral Given   02/04/2022 1458 magnesium sulfate 2 g/50 mL IVPB (premix) 2 g 2 g Intravenous New Bag   02/04/2022 1636 potassium chloride (K-DUR,KLOR-CON) CR tablet 20 mEq 20 mEq Oral Given   02/04/2022 1637 dexamethasone (DECADRON) injection 8 mg 8 mg Intravenous Given   02/04/2022 1637 oxyCODONE (ROXICODONE) IR tablet 5 mg 5 mg Oral Given   02/04/2022 2354 morphine (PF) 4 mg/mL injection 4 mg 4 mg Intravenous Given   02/05/2022 0139 levofloxacin (LEVAQUIN) tablet 500 mg 500 mg Oral Given   02/05/2022 0139 guaiFENesin (MUCINEX) 12 hr tablet 600 mg 600 mg Oral Given   02/05/2022 0518 heparin (porcine) subcutaneous injection 5,000 Units 5,000 Units Subcutaneous Given   02/05/2022 0518 ibuprofen (MOTRIN) tablet 400 mg 400 mg Oral Given        Past Medical History:   Diagnosis Date    Anxiety     Asthma     Crohn disease ([address] )     Depression     Disease of thyroid gland     Hypertension  Hypothyroidism     Psychiatric disorder      Present on Admission:   Hypothyroidism   JOSE (generalized anxiety disorder)   [facility] dependence in remission ([facility])   Lower extremity numbness   COVID-19      Admitting Diagnosis: Hypomagnesemia [E83 42]  Back pain [M54 9]  Leg pain [M79 606]  Lower extremity numbness [R20 0]  Abnormal MRI, lumbar spine [R93 7]  Age/Sex: 42 y o  female  Admission Orders:  Scheduled Medications:  disulfiram, 250 mg, Oral, Daily  fluticasone, 1 spray, Nasal, Daily  guaiFENesin, 600 mg, Oral, Q12H GURMEET  heparin (porcine), 5,000 Units, Subcutaneous, Q8H GURMEET  levofloxacin, 500 mg, Oral, Q24H  levothyroxine, 125 mcg, Oral, QAM  LORazepam, 2 mg, Intravenous, Once  potassium chloride, 20 mEq, Oral, Daily      Continuous IV Infusions:     PRN Meds:  albuterol, 2 puff, Inhalation, Q4H PRN  clonazePAM, 0 5 mg, Oral, BID PRN  ibuprofen, 400 mg, Oral, Q6H PRN -x 1 2/5  ondansetron, 4 mg, Intravenous, Q6H PRN  Oxycodone 5 mg PO q 4 hr PRN -  X 3 2/5, x 4 2/6    Isolation   SCDs  Up w/ assist   MRI T spine     Network Utilization Review Department  ATTENTION: Please call with any questions or concerns to [phone] and carefully listen to the prompts so that you are directed to the right person  All voicemails are confidential   Sandy Lester all requests for admission clinical reviews, approved or denied determinations and any other requests to dedicated fax number below belonging to the campus where the patient is receiving treatment   List of dedicated fax numbers for the Facilities:  [address] DENIALS (Administrative/Medical Necessity) [phone]   [address] [place] [address] (Maternity/NICU/Pediatrics) [phone]   [address]  [address] [address] [place] [address] Medical Shelter [place] [phone] [address] [place] [address] Carley Lopez [phone] [place] [address] [phone]

## 2022-02-05 NOTE — ED PROVIDER NOTES
History  Chief Complaint   Patient presents with    Leg Pain     bilateral leg pain  sent in by Jaylon Spivey     53-year-old female history of hypertension, asthma, anxiety, Crohn's disease and currently COVID 19 positive be at home test presents with complaining of bilateral leg pain  Patient reports that she has been symptomatic with COVID for approximately the past 4 days  She reports progressively worsening bilateral leg pain predominantly in her thighs  She reports numbness below her knees  She reports significant inability to walk secondary to pain and weakness  Patient reports she has been lying on her couch for most of the week  Taking Tylenol which she believes made her pain worse  She states her pain was so severe that she requested a wheelchair to get into the ED  She denies any fevers, chills, chest pain or shortness of breath, abdominal pain, urinary symptoms, bowel or bladder incontinence  Patient does note genital numbness that is been present for the past 2 weeks  She denies any history of cancer, recent trauma  She does note chronic low back pain that has never been evaluated or imaged  Prior to Admission Medications   Prescriptions Last Dose Informant Patient Reported? Taking?    ARIPiprazole (ABILIFY) 5 mg tablet   No No   Sig: Take 1 tablet (5 mg total) by mouth every morning   Patient not taking: Reported on 2/3/2022    MAGNESIUM OXIDE PO   Yes No   Sig: Take by mouth daily     Patient not taking: Reported on 2/3/2022    Multiple Vitamin (multivitamin) tablet   Yes No   Sig: Take 1 tablet by mouth daily     Patient not taking: Reported on 2/3/2022    albuterol (PROVENTIL HFA,VENTOLIN HFA) 90 mcg/act inhaler   No No   Sig: inhale 1 to 2 puffs by mouth and INTO THE LUNGS every 4 to 6 hours if needed   cetirizine (ZyrTEC) 10 mg tablet  Self Yes No   Sig: Take 10 mg by mouth daily   Patient not taking: Reported on 2/3/2022    clonazePAM (KlonoPIN) 0 5 mg tablet   No No   Sig: Take 1 tablet (0 5 mg total) by mouth 2 (two) times a day as needed (prn)   diphenhydrAMINE (BENADRYL ALLERGY) 25 mg capsule   Yes No   Sig: Take by mouth   Patient not taking: Reported on 2/3/2022    disulfiram (ANTABUSE) 250 mg tablet   No No   Sig: Take 1 tablet (250 mg total) by mouth daily   ergocalciferol (VITAMIN D2) 50,000 units   No No   Sig: Take 1 capsule (50,000 Units total) by mouth once a week   Patient not taking: Reported on 2/3/2022    fluticasone (FLONASE) 50 mcg/act nasal spray   No No   Si spray into each nostril daily   fluticasone-vilanterol (BREO ELLIPTA) 200-25 MCG/INH inhaler   No No   Sig: Inhale 1 puff daily Rinse mouth after use     Patient not taking: Reported on 2/3/2022    guaiFENesin (Mucinex) 600 mg 12 hr tablet   No No   Sig: Take 1 tablet (600 mg total) by mouth every 12 (twelve) hours   levofloxacin (LEVAQUIN) 500 mg tablet   No No   Sig: Take 1 tablet (500 mg total) by mouth every 24 hours for 10 days   levothyroxine 125 mcg tablet   No No   Sig: take 1 tablet by mouth every morning   montelukast (SINGULAIR) 10 mg tablet   No No   Sig: Take 1 tablet (10 mg total) by mouth daily   Patient not taking: Reported on 2/3/2022    ondansetron (ZOFRAN) 4 mg tablet   No No   Sig: Take 1 tablet (4 mg total) by mouth every 8 (eight) hours as needed for nausea or vomiting   ondansetron (ZOFRAN) 4 mg tablet   No No   Sig: Take 1 tablet (4 mg total) by mouth every 6 (six) hours   Patient not taking: Reported on 2022    thiamine 250 MG tablet   No No   Sig: Take 1 tablet (250 mg total) by mouth daily   Patient not taking: Reported on 2/3/2022    umeclidinium bromide (INCRUSE ELLIPTA) 62 5 mcg/inh AEPB inhaler   No No   Sig: Inhale 1 puff daily   Patient not taking: Reported on 2/3/2022       Facility-Administered Medications: None       Past Medical History:   Diagnosis Date    Anxiety     Asthma     Crohn disease ([address] )     Depression     Disease of thyroid gland     Hypertension     Hypothyroidism     Psychiatric disorder        Past Surgical History:   Procedure Laterality Date    NO PAST SURGERIES         Family History   Problem Relation Age of Onset    Colon cancer Mother     Kidney cancer Mother     Hypertension Mother     Colon cancer Father     Kidney cancer Father     Hypertension Father     Colon cancer Family     Kidney cancer Family      I have reviewed and agree with the history as documented  E-Cigarette/Vaping    E-Cigarette Use Never User      E-Cigarette/Vaping Substances    Nicotine No     THC No     CBD No     Flavoring No     Other No     Unknown No      Social History     Tobacco Use    Smoking status: Former Smoker     Quit date: 7/13/2018     Years since quitting: 3 5    Smokeless tobacco: Never Used   Vaping Use    Vaping Use: Never used   Substance Use Topics    Alcohol use: Not Currently    Drug use: Never       Review of Systems   Constitutional: Negative for chills, fatigue and fever  HENT: Negative for ear pain and sore throat  Eyes: Negative for pain  Respiratory: Negative for cough, shortness of breath and wheezing  Cardiovascular: Negative for chest pain, palpitations and leg swelling  Gastrointestinal: Negative for abdominal pain, constipation, diarrhea, nausea and vomiting  Endocrine: Negative for polyuria  Genitourinary: Negative for dysuria and pelvic pain  Musculoskeletal: Positive for arthralgias, back pain, gait problem and myalgias  Negative for neck pain and neck stiffness  Skin: Negative for rash  Neurological: Positive for numbness  Negative for dizziness, syncope, light-headedness and headaches  All other systems reviewed and are negative  Physical Exam  Physical Exam  Exam conducted with a chaperone present (Chaperone Mariya Estevez present)  Constitutional:       Appearance: She is well-developed  HENT:      Head: Normocephalic and atraumatic        Right Ear: External ear normal       Left Ear: External ear normal       Mouth/Throat:      Pharynx: No oropharyngeal exudate  Eyes:      Extraocular Movements: Extraocular movements intact  Cardiovascular:      Rate and Rhythm: Normal rate and regular rhythm  Heart sounds: Normal heart sounds  Pulmonary:      Effort: Pulmonary effort is normal       Breath sounds: Normal breath sounds  Abdominal:      General: Bowel sounds are normal       Palpations: Abdomen is soft  Tenderness: There is no abdominal tenderness  Genitourinary:     Comments: Perianal sensation intact  Rectal tone intact  Musculoskeletal:         General: Normal range of motion  Cervical back: Normal range of motion  Comments: Sensation grossly intact throughout bilateral lower extremities  5/5 strength in bilateral knee flexion and extension  Patient with minimal ability to straight leg raise bilateral legs off bed secondary to pain  Pinprick sensation throughout T10-S4 dermatomes intact although L2-L4 dermatomes somewhat non-diagnostic especially on R side  Skin:     General: Skin is warm  Capillary Refill: Capillary refill takes less than 2 seconds  Neurological:      General: No focal deficit present  Mental Status: She is alert and oriented to person, place, and time        Gait: Gait abnormal    Psychiatric:         Mood and Affect: Mood normal          Vital Signs  ED Triage Vitals   Temperature Pulse Respirations Blood Pressure SpO2   02/04/22 1347 02/04/22 1341 02/04/22 1341 02/04/22 1341 02/04/22 1341   98 5 °F (36 9 °C) 104 20 140/81 96 %      Temp Source Heart Rate Source Patient Position - Orthostatic VS BP Location FiO2 (%)   02/04/22 1347 02/04/22 1341 02/04/22 1341 02/04/22 1341 --   Temporal Monitor Sitting Left arm       Pain Score       --                  Vitals:    02/04/22 1645 02/04/22 1914 02/04/22 1930 02/04/22 2100   BP: 134/78 139/83 159/93 152/85   Pulse: 103 90 91 89   Patient Position - Orthostatic VS: Lying Lying Lying Lying Visual Acuity      ED Medications  Medications   sodium chloride 0 9 % bolus 1,000 mL (1,000 mL Intravenous New Bag 2/4/22 1448)   acetaminophen (TYLENOL) tablet 975 mg (975 mg Oral Given 2/4/22 1448)   magnesium sulfate 2 g/50 mL IVPB (premix) 2 g (0 g Intravenous Stopped 2/4/22 1528)   potassium chloride (K-DUR,KLOR-CON) CR tablet 20 mEq (20 mEq Oral Given 2/4/22 1636)   dexamethasone (DECADRON) injection 8 mg (8 mg Intravenous Given 2/4/22 1637)   oxyCODONE (ROXICODONE) IR tablet 5 mg (5 mg Oral Given 2/4/22 1637)       Diagnostic Studies  Results Reviewed     Procedure Component Value Units Date/Time    CKMB [[id number]]  (Normal) Collected: 02/04/22 1444    Lab Status: Final result Specimen: Blood from Arm, Left Updated: 02/04/22 1549     CK-MB Index 1 9 %      CK-MB 5 8 ng/mL     Lactic acid [[id number]]  (Normal) Collected: 02/04/22 1444    Lab Status: Final result Specimen: Blood from Arm, Left Updated: 02/04/22 1528     LACTIC ACID 1 7 mmol/L     Narrative:      Result may be elevated if tourniquet was used during collection      Procalcitonin with AM Reflex [[id number]]  (Normal) Collected: 02/04/22 1444    Lab Status: Final result Specimen: Blood from Arm, Left Updated: 02/04/22 1525     Procalcitonin 0 06 ng/ml     Procalcitonin Reflex [[id number]]     Lab Status: No result Specimen: Blood     Comprehensive metabolic panel [[id number]]  (Abnormal) Collected: 02/04/22 1444    Lab Status: Final result Specimen: Blood from Arm, Left Updated: 02/04/22 1515     Sodium 134 mmol/L      Potassium 3 3 mmol/L      Chloride 92 mmol/L      CO2 26 mmol/L      ANION GAP 16 mmol/L      BUN 5 mg/dL      Creatinine 0 68 mg/dL      Glucose 108 mg/dL      Calcium 8 6 mg/dL       U/L       U/L      Alkaline Phosphatase 88 U/L      Total Protein 7 6 g/dL      Albumin 4 4 g/dL      Total Bilirubin 2 51 mg/dL      eGFR 108 ml/min/1 73sq m     Narrative:      [place] guidelines for Chronic Kidney Disease (CKD):     Stage 1 with normal or high GFR (GFR > 90 mL/min/1 73 square meters)    Stage 2 Mild CKD (GFR = 60-89 mL/min/1 73 square meters)    Stage 3A Moderate CKD (GFR = 45-59 mL/min/1 73 square meters)    Stage 3B Moderate CKD (GFR = 30-44 mL/min/1 73 square meters)    Stage 4 Severe CKD (GFR = 15-29 mL/min/1 73 square meters)    Stage 5 End Stage CKD (GFR <15 mL/min/1 73 square meters)  Note: GFR calculation is accurate only with a steady state creatinine    CK (with reflex to MB) [[id number]]  (Abnormal) Collected: 02/04/22 1444    Lab Status: Final result Specimen: Blood from Arm, Left Updated: 02/04/22 1515     Total  U/L     Magnesium [[id number]]  (Abnormal) Collected: 02/04/22 1445    Lab Status: Final result Specimen: Blood from Arm, Left Updated: 02/04/22 1515     Magnesium 1 3 mg/dL     D-dimer, quantitative [[id number]]  (Normal) Collected: 02/04/22 1444    Lab Status: Final result Specimen: Blood from Arm, Left Updated: 02/04/22 1510     D-Dimer, Quant 0 32 ug/ml FEU     Protime-INR [[id number]]  (Normal) Collected: 02/04/22 1444    Lab Status: Final result Specimen: Blood from Arm, Left Updated: 02/04/22 1507     Protime 13 3 seconds      INR 1 02    APTT [[id number]]  (Normal) Collected: 02/04/22 1444    Lab Status: Final result Specimen: Blood from Arm, Left Updated: 02/04/22 1507     PTT 27 seconds     CBC and differential [[id number]]  (Abnormal) Collected: 02/04/22 1444    Lab Status: Final result Specimen: Blood from Arm, Left Updated: 02/04/22 1458     WBC 3 99 Thousand/uL      RBC 4 16 Million/uL      Hemoglobin 16 0 g/dL      Hematocrit 45 9 %       fL      MCH 38 5 pg      MCHC 34 9 g/dL      RDW 12 9 %      MPV 8 7 fL      Platelets 856 Thousands/uL      nRBC 0 /100 WBCs      Neutrophils Relative 68 %      Immat GRANS % 1 %      Lymphocytes Relative 22 %      Monocytes Relative 8 %      Eosinophils Relative 0 %      Basophils Relative 1 % Neutrophils Absolute 2 73 Thousands/µL      Immature Grans Absolute 0 02 Thousand/uL      Lymphocytes Absolute 0 88 Thousands/µL      Monocytes Absolute 0 33 Thousand/µL      Eosinophils Absolute 0 01 Thousand/µL      Basophils Absolute 0 02 Thousands/µL     Blood culture #1 [[id number]] Collected: 02/04/22 1444    Lab Status: In process Specimen: Blood from Arm, Left Updated: 02/04/22 1455    Blood culture #2 [[id number]] Collected: 02/04/22 1444    Lab Status: In process Specimen: Blood from Arm, Left Updated: 02/04/22 1455    UA w Reflex to Microscopic w Reflex to Culture [[id number]]     Lab Status: No result Specimen: Urine                  XR chest 1 view portable   Final Result by Romeo Figueroa MD (02/04 1541)      No active pulmonary disease on examination which is somewhat limited secondary to low lung volumes  Workstation performed: [id number]         MRI lumbar spine wo contrast    (Results Pending)              Procedures  ECG 12 Lead Documentation Only    Date/Time: 2/4/2022 8:10 PM  Performed by: Angelica Boswell PA-C  Authorized by:  Angelica Boswell PA-C     ECG reviewed by me, the ED Provider: yes    Patient location:  ED  Previous ECG:     Previous ECG:  Compared to current    Similarity:  No change    Comparison to cardiac monitor: Yes    Interpretation:     Interpretation: normal    Rate:     ECG rate:  93    ECG rate assessment: normal    Rhythm:     Rhythm: sinus rhythm    Ectopy:     Ectopy: none    QRS:     QRS axis:  Normal  Conduction:     Conduction: normal    ST segments:     ST segments:  Normal  T waves:     T waves: normal    Comments:      No evidence of acute cardiac ischemia             ED Course  ED Course as of 02/04/22 2111 Fri Feb 04, 2022   1427 Patient refusing Deborha Actis citing that she learned in CRNA school that Deborha Actis could cause bronchospasm in severe asthmatics and she needs something else for pain   [address]  Aislinn Chavez approves ED MRI   1606 MRI Yamilet Lepe called, states MRI cannot be performed until 9pm    1615 Attempted to admit to [facility], requesting neurosurgy input prior to accepting due to concern of neurologic emergency   1615 Reached out to neurosurgery Dr Gurmeet Baird, awaiting input   0501 [facility] referral placed for neurosurg consultation   1914 Discussed case with Dr Gurmeet Baird of neurosurgery  Considering patient has low likelihood of cauda equina and has had symptoms for 2 weeks, okay to wait 2 hours for the MRI to be performed  Will determine disposition after MRI result  SBIRT 22yo+      Most Recent Value   SBIRT (22 yo +)    In order to provide better care to our patients, we are screening all of our patients for alcohol and drug use  Would it be okay to ask you these screening questions? Yes Filed at: 02/04/2022 1353   Initial Alcohol Screen: US AUDIT-C     1  How often do you have a drink containing alcohol? 0 Filed at: 02/04/2022 1353   2  How many drinks containing alcohol do you have on a typical day you are drinking? 0 Filed at: 02/04/2022 1353   3a  Male UNDER 65: How often do you have five or more drinks on one occasion? 0 Filed at: 02/04/2022 1353   3b  FEMALE Any Age, or MALE 65+: How often do you have 4 or more drinks on one occassion? 0 Filed at: 02/04/2022 1353   Audit-C Score 0 Filed at: 02/04/2022 1353   MIREYA: How many times in the past year have you    Used an illegal drug or used a prescription medication for non-medical reasons? Never Filed at: 02/04/2022 1353                    MDM  Number of Diagnoses or Management Options  Diagnosis management comments: Patient presented with multiple complaints of generalized body aches likely secondary to COVID-19, back pain, bilateral leg pain and reported leg weakness  On exam, patient was unable to straight leg raise bilaterally however the areas of the patient's thighs were she said that she was weak I estimate 5/5 strength in both knee flexion and extension  Patient appeared to have pinprick sensation throughout her T10-S4 dermatomes however she did report that some of her L2-4 dermatomes she thought the sharp pinprick sensation felt all the time however when I went back to recheck, she did describe them as sharp  I would describe this as nondiagnostic  She denies bowel or bladder incontinence however does still reports subjective numbness and back pain  Attempted to obtain stat MRI however due to patient being COVID positive, patient was forced to be pushed until the end of the day  Given extremely low clinical suspicion for cauda equina and the fact that patient's symptoms have been going on for approximately 1-2 weeks, nurse surgery as seen in ED course did not feel that the patient could not wait another 2 hours for the study to be performed  Patient is comfortable this plan  If MRI is negative, will assess patient's ability to ambulate with likely plan to be discharged home  Patient signed out to Dr Abhishek Ramirez at 2100 pending MRI  Disposition  Final diagnoses:   None     ED Disposition     None      Follow-up Information    None         Patient's Medications   Discharge Prescriptions    No medications on file       No discharge procedures on file      PDMP Review       Value Time User    PDMP Reviewed  Yes 2/4/2022  9:04 PM Garcia Blanc DO          ED Provider  Electronically Signed by           Angelica Boswell PA-C  02/04/22 9140

## 2022-02-06 ENCOUNTER — APPOINTMENT (INPATIENT)
Dept: MRI IMAGING | Facility: HOSPITAL | Age: 42
DRG: 092 | End: 2022-02-06
Payer: COMMERCIAL

## 2022-02-06 PROBLEM — R26.2 AMBULATORY DYSFUNCTION: Status: ACTIVE | Noted: 2022-02-06

## 2022-02-06 LAB
ANION GAP SERPL CALCULATED.3IONS-SCNC: 11 MMOL/L (ref 4–13)
BUN SERPL-MCNC: 9 MG/DL (ref 5–25)
CALCIUM SERPL-MCNC: 8.9 MG/DL (ref 8.4–10.2)
CHLORIDE SERPL-SCNC: 95 MMOL/L (ref 96–108)
CO2 SERPL-SCNC: 24 MMOL/L (ref 21–32)
CREAT SERPL-MCNC: 0.6 MG/DL (ref 0.6–1.3)
ERYTHROCYTE [DISTWIDTH] IN BLOOD BY AUTOMATED COUNT: 12.5 % (ref 11.6–15.1)
GFR SERPL CREATININE-BSD FRML MDRD: 112 ML/MIN/1.73SQ M
GLUCOSE SERPL-MCNC: 146 MG/DL (ref 65–140)
HCT VFR BLD AUTO: 44.6 % (ref 34.8–46.1)
HGB BLD-MCNC: 15.5 G/DL (ref 11.5–15.4)
MCH RBC QN AUTO: 39.2 PG (ref 26.8–34.3)
MCHC RBC AUTO-ENTMCNC: 34.8 G/DL (ref 31.4–37.4)
MCV RBC AUTO: 113 FL (ref 82–98)
PLATELET # BLD AUTO: 185 THOUSANDS/UL (ref 149–390)
PMV BLD AUTO: 9.3 FL (ref 8.9–12.7)
POTASSIUM SERPL-SCNC: 4.1 MMOL/L (ref 3.5–5.3)
RBC # BLD AUTO: 3.95 MILLION/UL (ref 3.81–5.12)
SODIUM SERPL-SCNC: 130 MMOL/L (ref 135–147)
WBC # BLD AUTO: 4.11 THOUSAND/UL (ref 4.31–10.16)

## 2022-02-06 PROCEDURE — 85027 COMPLETE CBC AUTOMATED: CPT | Performed by: INTERNAL MEDICINE

## 2022-02-06 PROCEDURE — A9585 GADOBUTROL INJECTION: HCPCS | Performed by: PHYSICIAN ASSISTANT

## 2022-02-06 PROCEDURE — G1004 CDSM NDSC: HCPCS

## 2022-02-06 PROCEDURE — 36415 COLL VENOUS BLD VENIPUNCTURE: CPT | Performed by: INTERNAL MEDICINE

## 2022-02-06 PROCEDURE — 99232 SBSQ HOSP IP/OBS MODERATE 35: CPT | Performed by: INTERNAL MEDICINE

## 2022-02-06 PROCEDURE — 80048 BASIC METABOLIC PNL TOTAL CA: CPT | Performed by: INTERNAL MEDICINE

## 2022-02-06 PROCEDURE — 72157 MRI CHEST SPINE W/O & W/DYE: CPT

## 2022-02-06 RX ORDER — LORAZEPAM 2 MG/ML
2 INJECTION INTRAMUSCULAR ONCE
Status: DISCONTINUED | OUTPATIENT
Start: 2022-02-06 | End: 2022-02-10

## 2022-02-06 RX ADMIN — OXYCODONE HYDROCHLORIDE 5 MG: 5 TABLET ORAL at 01:12

## 2022-02-06 RX ADMIN — OXYCODONE HYDROCHLORIDE 5 MG: 5 TABLET ORAL at 18:12

## 2022-02-06 RX ADMIN — GUAIFENESIN 600 MG: 600 TABLET, EXTENDED RELEASE ORAL at 05:17

## 2022-02-06 RX ADMIN — OXYCODONE HYDROCHLORIDE 5 MG: 5 TABLET ORAL at 22:42

## 2022-02-06 RX ADMIN — GADOBUTROL 10 ML: 604.72 INJECTION INTRAVENOUS at 17:09

## 2022-02-06 RX ADMIN — GUAIFENESIN 600 MG: 600 TABLET, EXTENDED RELEASE ORAL at 13:05

## 2022-02-06 RX ADMIN — HEPARIN SODIUM 5000 UNITS: 5000 INJECTION INTRAVENOUS; SUBCUTANEOUS at 22:42

## 2022-02-06 RX ADMIN — LEVOFLOXACIN 500 MG: 500 TABLET, FILM COATED ORAL at 05:17

## 2022-02-06 RX ADMIN — OXYCODONE HYDROCHLORIDE 5 MG: 5 TABLET ORAL at 09:07

## 2022-02-06 RX ADMIN — OXYCODONE HYDROCHLORIDE 5 MG: 5 TABLET ORAL at 13:05

## 2022-02-06 RX ADMIN — POTASSIUM CHLORIDE 20 MEQ: 1500 TABLET, EXTENDED RELEASE ORAL at 08:24

## 2022-02-06 RX ADMIN — OXYCODONE HYDROCHLORIDE 5 MG: 5 TABLET ORAL at 05:16

## 2022-02-06 RX ADMIN — HEPARIN SODIUM 5000 UNITS: 5000 INJECTION INTRAVENOUS; SUBCUTANEOUS at 05:30

## 2022-02-06 RX ADMIN — LEVOTHYROXINE SODIUM 125 MCG: 25 TABLET ORAL at 08:24

## 2022-02-06 RX ADMIN — LORAZEPAM 1 MG: 2 INJECTION INTRAMUSCULAR; INTRAVENOUS at 16:00

## 2022-02-06 RX ADMIN — FLUTICASONE PROPIONATE 1 SPRAY: 50 SPRAY, METERED NASAL at 08:24

## 2022-02-06 RX ADMIN — HEPARIN SODIUM 5000 UNITS: 5000 INJECTION INTRAVENOUS; SUBCUTANEOUS at 13:06

## 2022-02-06 RX ADMIN — DEXAMETHASONE SODIUM PHOSPHATE 4 MG: 4 INJECTION, SOLUTION INTRA-ARTICULAR; INTRALESIONAL; INTRAMUSCULAR; INTRAVENOUS; SOFT TISSUE at 11:36

## 2022-02-06 RX ADMIN — DEXAMETHASONE SODIUM PHOSPHATE 4 MG: 4 INJECTION, SOLUTION INTRA-ARTICULAR; INTRALESIONAL; INTRAMUSCULAR; INTRAVENOUS; SOFT TISSUE at 05:30

## 2022-02-06 NOTE — UTILIZATION REVIEW
Inpatient Admission Authorization Request   NOTIFICATION OF INPATIENT ADMISSION/INPATIENT AUTHORIZATION REQUEST   SERVICING FAC[facility]:   [facility]  [address] [place] [address], [address] [place] [address], [address]  Tax ID: [id number]  NPI: [id number]  Place of Service: Inpatient [address] [facility]  [address]  Place of Service Code: 24     ATTENDING PROVIDER:  Attending Name and NPI#: Yobani Jc Do [[id number]]  Address: [address] [place] [address], [address]  Phone: [phone]     UTILIZATION REVIEW CONTACT:  Marita Rogel Utilization Review Supervisor  Network Utilization Review Department  Phone: [phone]  Fax [phone]  Email: [email or web address]     PHYSICIAN ADVISORY SERVICES:  FOR HOME-KC-TVCQ REVIEW - MEDICAL NECESSITY DENIAL  Phone: [phone]  Fax: [phone]  Email: [email or web address]     TYPE OF REQUEST:  Inpatient Status     ADMISSION INFORMATION:  ADMISSION DATE/TIME: 2/5/22 11:48 AM  PATIENT DIAGNOSIS CODE/DESCRIPTION:  Leg pain [M79 [id number]]  DISCHARGE DATE/TIME: No discharge date for patient encounter  DISCHARGE DISPOSITION (IF DISCHARGED): Home/Self Care     IMPORTANT INFORMATION:  Please contact the Marita Rogel directly with any questions or concerns regarding this request  Department voicemails are confidential     Send requests for admission clinical reviews, concurrent reviews, approvals, and administrative denials due to lack of clinical to fax [phone]

## 2022-02-06 NOTE — PROGRESS NOTES
[facility] 44  Progress Note - Evangelina Ruiz 1980, 42 y o  female MRN: [id number]  Unit/Bed#: ED 05 Encounter: [id number]  Primary Care Provider: Alfie Farah DO   Date and time admitted to hospital: 2/4/2022  1:39 PM    * Lower extremity numbness  Assessment & Plan  · Two week history of abdominal numbness, groin numbness, thigh and knee pain, calf numbness, and no abnormality of the feet  · Subjective change in sensation of her bilateral lower leg but with no complete sensory deficit or motor dysfunction  · MRI L Spine (2/4/2022): Questionable T2 increased signal abnormality within the lower spinal cord at T11  Follow-up contrast-enhanced thoracic spine MRI for further evaluation  Right L5 exiting nerve root touches the right L5 disc  · MRI findings were discussed with radiology and could just be artifact however follow-up MRI is still recommended- attempted to have the patient follow-up outpatient with MRI, however, she noted that she could not walk and therefore could not go home  · MRI T-spine with and without contrast pending  · Formal neurology consult pending  · PT recommending home with home healthcare    Ambulatory dysfunction  Assessment & Plan  · Due to knee/thigh pain and numbness as above  · MRI pending as above  · Neurology consultation placed    COVID-19  Assessment & Plan  · Positive home test approximately 1 week ago  · Not requiring any supplemental oxygen at this time  · Continue further supportive care    Hypothyroidism  Assessment & Plan  · Continue pre-hospital levothyroxine 125 mcg p o  daily    Alcohol dependence in remission ([facility] [address] )  Assessment & Plan  · Patient reports that she has not drank in months  · Will continue pre-hospital Antabuse 250 mg p o  daily    JOSE (generalized anxiety disorder)  Assessment & Plan  · Continue pre-hospital Klonopin 0 5 mg p o  b i d  p r n        VTE Pharmacologic Prophylaxis: VTE Score: 3 Moderate Risk (Score 3-4) - Pharmacological DVT Prophylaxis Ordered: heparin  Patient Centered Rounds: I performed bedside rounds with nursing staff today  Discussions with Specialists or Other Care Team Provider: Nursing    Education and Discussions with Family / Patient: Patient declined call to   Time Spent for Care: 45 minutes  More than 50% of total time spent on counseling and coordination of care as described above  Current Length of Stay: 1 day(s)  Current Patient Status: Inpatient   Certification Statement: The patient will continue to require additional inpatient hospital stay due to Abnormal MRI in ambulatory dysfunction  Discharge Plan: Anticipate discharge in 24-48 hrs to home with home services  Code Status: Level 1 - Full Code    Subjective:   Patient resting comfortably in bed without any acute complaints  She has no change in her symptoms from yesterday  No overnight events reported by nursing  Objective:     Vitals:   Temp (24hrs), Av 1 °F (37 3 °C), Min:99 °F (37 2 °C), Max:99 1 °F (37 3 °C)    Temp:  [99 °F (37 2 °C)-99 1 °F (37 3 °C)] 99 °F (37 2 °C)  HR:  [80-86] 85  Resp:  [16-19] 19  BP: (128-133)/(71-85) 133/78  SpO2:  [93 %-94 %] 93 %  Body mass index is 40 24 kg/m²  Input and Output Summary (last 24 hours):   No intake or output data in the 24 hours ending 22 1328    Physical Exam:   Physical Exam  Vitals and nursing note reviewed  Constitutional:       General: She is not in acute distress  Appearance: She is well-developed  She is obese  HENT:      Head: Normocephalic and atraumatic  Mouth/Throat:      Mouth: Mucous membranes are moist       Pharynx: Oropharynx is clear  Eyes:      Extraocular Movements: Extraocular movements intact  Conjunctiva/sclera: Conjunctivae normal    Cardiovascular:      Rate and Rhythm: Normal rate and regular rhythm  Pulses: Normal pulses  Heart sounds: Normal heart sounds  No murmur heard        Pulmonary: Effort: Pulmonary effort is normal  No respiratory distress  Breath sounds: Normal breath sounds  Abdominal:      General: Bowel sounds are normal  There is no distension  Palpations: Abdomen is soft  Tenderness: There is no abdominal tenderness  Musculoskeletal:         General: Normal range of motion  Cervical back: Normal range of motion and neck supple  Skin:     General: Skin is warm and dry  Neurological:      General: No focal deficit present  Mental Status: She is alert and oriented to person, place, and time  Comments: Subjective change in sensation of her bilateral lower extremities, no overt sensory deficit or weakness   Psychiatric:         Mood and Affect: Mood normal          Behavior: Behavior normal          Judgment: Judgment normal         Labs:  Results from last 7 days   Lab Units 02/06/22 0516 02/05/22 0428 02/04/22  1444   WBC Thousand/uL 4 11*   < > 3 99*   HEMOGLOBIN g/dL 15 5*   < > 16 0*   HEMATOCRIT % 44 6   < > 45 9   PLATELETS Thousands/uL 185   < > 214   NEUTROS PCT %  --   --  68   LYMPHS PCT %  --   --  22   MONOS PCT %  --   --  8   EOS PCT %  --   --  0    < > = values in this interval not displayed  Results from last 7 days   Lab Units 02/06/22 0516 02/05/22 0429 02/04/22  1444   SODIUM mmol/L 130*   < > 134*   POTASSIUM mmol/L 4 1   < > 3 3*   CHLORIDE mmol/L 95*   < > 92*   CO2 mmol/L 24   < > 26   BUN mg/dL 9   < > 5   CREATININE mg/dL 0 60   < > 0 68   ANION GAP mmol/L 11   < > 16*   CALCIUM mg/dL 8 9   < > 8 6   ALBUMIN g/dL  --   --  4 4   TOTAL BILIRUBIN mg/dL  --   --  2 51*   ALK PHOS U/L  --   --  88   ALT U/L  --   --  137*   AST U/L  --   --  156*   GLUCOSE RANDOM mg/dL 146*   < > 108    < > = values in this interval not displayed       Results from last 7 days   Lab Units 02/04/22  1444   INR  1 02             Results from last 7 days   Lab Units 02/05/22 0429 02/04/22  1444   LACTIC ACID mmol/L  --  1 7   PROCALCITONIN ng/ml 0 07 0 06       Lines/Drains:  Invasive Devices  Report    Peripheral Intravenous Line            Peripheral IV 02/05/22 Right Antecubital 1 day              Imaging: Reviewed radiology reports from this admission including: MRI spine    Recent Cultures (last 7 days):   Results from last 7 days   Lab Units 02/04/22  1444   BLOOD CULTURE  No Growth at 24 hrs  No Growth at 24 hrs  Last 24 Hours Medication List:   Current Facility-Administered Medications   Medication Dose Route Frequency Provider Last Rate    albuterol  2 puff Inhalation Q4H PRN Carrie Self, PA-C      clonazePAM  0 5 mg Oral BID PRN Carrie Self, PA-C      disulfiram  250 mg Oral Daily Carrie Self, PA-C      fluticasone  1 spray Nasal Daily Carrie Self, PA-C      guaiFENesin  600 mg Oral Q12H [address] Carrie Self, PA-C      heparin (porcine)  5,000 Units Subcutaneous [facility] Carrie Self, PA-C      ibuprofen  400 mg Oral Q6H PRN Carrie Self, PA-C      levofloxacin  500 mg Oral Q24H Carrie Self, PA-C      levothyroxine  125 mcg Oral QAM Carrie Self, PA-C      LORazepam  1 mg Intravenous Once Carrie Self, PA-C      LORazepam  2 mg Intravenous Once Rosario Richards DO      ondansetron  4 mg Intravenous Q6H PRN Carrie Self, PA-C      oxyCODONE  5 mg Oral Q4H PRN Rose Valladares,       potassium chloride  20 mEq Oral Daily Carrie Self, PA-C          Today, Patient Was Seen By: Rosario Richards DO    **Please Note: This note may have been constructed using a voice recognition system  **

## 2022-02-06 NOTE — ASSESSMENT & PLAN NOTE
· Due to knee/thigh pain and numbness as above  · MRI pending as above  · Neurology consultation placed

## 2022-02-06 NOTE — ASSESSMENT & PLAN NOTE
· Positive home test approximately 1 week ago  · Not requiring any supplemental oxygen at this time  · Continue further supportive care

## 2022-02-06 NOTE — PLAN OF CARE
Problem: Potential for Falls  Goal: Patient will remain free of falls  Description: INTERVENTIONS:  - Educate patient/family on patient safety including physical limitations  - Instruct patient to call for assistance with activity   - Consult OT/PT to assist with strengthening/mobility   - Keep Call bell within reach  - Keep bed low and locked with side rails adjusted as appropriate  - Keep care items and personal belongings within reach  - Initiate and maintain comfort rounds  - Make Fall Risk Sign visible to staff  - Offer Toileting every 2 Hours, in advance of need  - Initiate/Maintain alarms  - Obtain necessary fall risk management equipment  - Apply yellow socks and bracelet for high fall risk patients  - Consider moving patient to room near nurses station  Outcome: Progressing

## 2022-02-06 NOTE — ASSESSMENT & PLAN NOTE
· Two week history of abdominal numbness, groin numbness, thigh and knee pain, calf numbness, and no abnormality of the feet  · Subjective change in sensation of her bilateral lower leg but with no complete sensory deficit or motor dysfunction  · MRI L Spine (2/4/2022): Questionable T2 increased signal abnormality within the lower spinal cord at T11  Follow-up contrast-enhanced thoracic spine MRI for further evaluation  Right L5 exiting nerve root touches the right L5 disc    · MRI findings were discussed with radiology and could just be artifact however follow-up MRI is still recommended- attempted to have the patient follow-up outpatient with MRI, however, she noted that she could not walk and therefore could not go home  · MRI T-spine with and without contrast pending  · Formal neurology consult pending  · PT recommending home with home healthcare

## 2022-02-07 LAB
ANION GAP SERPL CALCULATED.3IONS-SCNC: 10 MMOL/L (ref 4–13)
BUN SERPL-MCNC: 12 MG/DL (ref 5–25)
CALCIUM SERPL-MCNC: 8.9 MG/DL (ref 8.4–10.2)
CHLORIDE SERPL-SCNC: 95 MMOL/L (ref 96–108)
CO2 SERPL-SCNC: 25 MMOL/L (ref 21–32)
CREAT SERPL-MCNC: 0.64 MG/DL (ref 0.6–1.3)
ERYTHROCYTE [DISTWIDTH] IN BLOOD BY AUTOMATED COUNT: 12.5 % (ref 11.6–15.1)
GFR SERPL CREATININE-BSD FRML MDRD: 110 ML/MIN/1.73SQ M
GLUCOSE SERPL-MCNC: 125 MG/DL (ref 65–140)
HCT VFR BLD AUTO: 43.6 % (ref 34.8–46.1)
HGB BLD-MCNC: 15.1 G/DL (ref 11.5–15.4)
MCH RBC QN AUTO: 38.7 PG (ref 26.8–34.3)
MCHC RBC AUTO-ENTMCNC: 34.6 G/DL (ref 31.4–37.4)
MCV RBC AUTO: 112 FL (ref 82–98)
PLATELET # BLD AUTO: 181 THOUSANDS/UL (ref 149–390)
PMV BLD AUTO: 9.6 FL (ref 8.9–12.7)
POTASSIUM SERPL-SCNC: 3.5 MMOL/L (ref 3.5–5.3)
RBC # BLD AUTO: 3.9 MILLION/UL (ref 3.81–5.12)
SODIUM SERPL-SCNC: 130 MMOL/L (ref 135–147)
WBC # BLD AUTO: 6.51 THOUSAND/UL (ref 4.31–10.16)

## 2022-02-07 PROCEDURE — 99214 OFFICE O/P EST MOD 30 MIN: CPT | Performed by: PSYCHIATRY & NEUROLOGY

## 2022-02-07 PROCEDURE — 82784 ASSAY IGA/IGD/IGG/IGM EACH: CPT | Performed by: PHYSICIAN ASSISTANT

## 2022-02-07 PROCEDURE — 85027 COMPLETE CBC AUTOMATED: CPT | Performed by: INTERNAL MEDICINE

## 2022-02-07 PROCEDURE — 82607 VITAMIN B-12: CPT | Performed by: STUDENT IN AN ORGANIZED HEALTH CARE EDUCATION/TRAINING PROGRAM

## 2022-02-07 PROCEDURE — 80048 BASIC METABOLIC PNL TOTAL CA: CPT | Performed by: INTERNAL MEDICINE

## 2022-02-07 PROCEDURE — 82746 ASSAY OF FOLIC ACID SERUM: CPT | Performed by: STUDENT IN AN ORGANIZED HEALTH CARE EDUCATION/TRAINING PROGRAM

## 2022-02-07 PROCEDURE — 99232 SBSQ HOSP IP/OBS MODERATE 35: CPT | Performed by: STUDENT IN AN ORGANIZED HEALTH CARE EDUCATION/TRAINING PROGRAM

## 2022-02-07 PROCEDURE — NC001 PR NO CHARGE: Performed by: PSYCHIATRY & NEUROLOGY

## 2022-02-07 RX ADMIN — OXYCODONE HYDROCHLORIDE 5 MG: 5 TABLET ORAL at 09:11

## 2022-02-07 RX ADMIN — OXYCODONE HYDROCHLORIDE 5 MG: 5 TABLET ORAL at 03:31

## 2022-02-07 RX ADMIN — LEVOFLOXACIN 500 MG: 500 TABLET, FILM COATED ORAL at 01:49

## 2022-02-07 RX ADMIN — OXYCODONE HYDROCHLORIDE 5 MG: 5 TABLET ORAL at 21:56

## 2022-02-07 RX ADMIN — HEPARIN SODIUM 5000 UNITS: 5000 INJECTION INTRAVENOUS; SUBCUTANEOUS at 05:33

## 2022-02-07 RX ADMIN — HEPARIN SODIUM 5000 UNITS: 5000 INJECTION INTRAVENOUS; SUBCUTANEOUS at 15:56

## 2022-02-07 RX ADMIN — GUAIFENESIN 600 MG: 600 TABLET, EXTENDED RELEASE ORAL at 05:34

## 2022-02-07 RX ADMIN — OXYCODONE HYDROCHLORIDE 5 MG: 5 TABLET ORAL at 15:51

## 2022-02-07 RX ADMIN — POTASSIUM CHLORIDE 20 MEQ: 1500 TABLET, EXTENDED RELEASE ORAL at 08:48

## 2022-02-07 RX ADMIN — GUAIFENESIN 600 MG: 600 TABLET, EXTENDED RELEASE ORAL at 15:55

## 2022-02-07 RX ADMIN — HEPARIN SODIUM 5000 UNITS: 5000 INJECTION INTRAVENOUS; SUBCUTANEOUS at 21:56

## 2022-02-07 RX ADMIN — LEVOTHYROXINE SODIUM 125 MCG: 25 TABLET ORAL at 08:48

## 2022-02-07 RX ADMIN — CLONAZEPAM 0.5 MG: 0.5 TABLET ORAL at 01:53

## 2022-02-07 NOTE — ASSESSMENT & PLAN NOTE
· Two week history of abdominal numbness, groin numbness, thigh and knee pain, calf numbness, and no abnormality of the feet  · Subjective change in sensation of her bilateral lower leg but with no complete sensory deficit or motor dysfunction  · MRI L Spine (2/4/2022): Questionable T2 increased signal abnormality within the lower spinal cord at T11  Follow-up contrast-enhanced thoracic spine MRI for further evaluation  Right L5 exiting nerve root touches the right L5 disc    · MRI findings were discussed with radiology and could just be artifact however follow-up MRI is still recommended- attempted to have the patient follow-up outpatient with MRI, however, she noted that she could not walk and therefore could not go home  · MRI T-spine with and without contrast no acute cord pathology, mild degenerative changes noted T8 through T9  · Formal neurology consult pending  · PT recommending home with home healthcare  · Likely discharge tomorrow 2/8

## 2022-02-07 NOTE — UTILIZATION REVIEW
Initial Clinical Review    OBSERVATION 2/5/22 @ 0045 - CHANGED TO INPATIENT ON 2/5/22 @ 1148 - contrast enhanced MRI T spine, inability to ambulate  Admission: Date/Time/Statement:   02/05/22 1148  Inpatient Admission  Once        Transfer Service: Hospitalist       Question Answer Comment   Level of Care Med Surg    Estimated length of stay More than 2 Midnights    Certification I certify that inpatient services are medically necessary for this patient for a duration of greater than two midnights  See H&P and MD Progress Notes for additional information about the patient's course of treatment  02/05/22 1148     Orders Placed This Encounter   Procedures    Inpatient Admission     Standing Status:   Standing     Number of Occurrences:   1     Order Specific Question:   Level of Care     Answer:   Med Surg [16]     Order Specific Question:   Estimated length of stay     Answer:   More than 2 Midnights     Order Specific Question:   Certification     Answer:   I certify that inpatient services are medically necessary for this patient for a duration of greater than two midnights  See H&P and MD Progress Notes for additional information about the patient's course of treatment  ED Arrival Information     Expected Arrival Acuity    - 2/4/2022 13:32 Urgent         Means of arrival Escorted by Service Admission type    [facility] Member Hospitalist Urgent         Arrival complaint    covid+        Chief Complaint   Patient presents with    Leg Pain     bilateral leg pain  sent in by [facility]     Initial Presentation:   Ms Adonay Frank is a 42 yof who presents to the ED from PCP office with c/o progressive numbness or weakness of BLE that began in abd at level of navel and descended into BLE  x2 weeks  she c/o subjective weakness and feeling that legs are heavy and pain in bilat thighs  No loss of bowel or bladder control but has numbness in genital area    Recent + Covid test at home (vaccinated) 1 week ago and only c/o nonproductive cough  PMH: alcohol dependence in remission, JOSE, asthma, obesity, hypothyroidism on Levothyroxine  MRI showed possible transverse myelitis  She is admitted to OBSERVATION status with Lower Extremity Numbness - MRI T spine, TSH  Covid - not on oxygen  Hypomagnesemia - 1 3 - replete and trend  Asthma - Home Proventil PRN  JOSE - home Klonopin  Alcohol dependence in remission - home Antabuse daily  Pt was changed to INPATIENT status  Therapy is recommending home with in home therapy  Day 2- Date: 2/6/22:  Pt stable  MRI T spine incomplete  Pt states she cannot walk  Will do MRI  Neuro consult pending  On exam today pt has Subjective change in sensation of her bilateral lower extremities, no overt sensory deficit or weakness  Per neuro:  MRI lumbar spine showed a faint T2 in conus, which was confirmed to be artifactual on dedicated MRI thoracic spine w/wo contrast  Although there are no convincing features of GBS, thje limitation of tele examination does not allow for proper exclusion until we obtain CSF fluid  Hence LP with CSF analysis for protein, cell and glucose is warranted  If LP is normal, then she can be discharged to have EMG/NCS in outpatient setting  If LP shows high protein, then contact neurology for recs on management of GBS  Neurologic Exam  Alert and oriented for self, place and time  Memory is intact to recent and remote events  Language is intact to comprehension and expression  No neglect  Speech is normal  EOMI  Pupils are symmetric  Visual field appears intact  Face is symmetric  Tongue is midline  Able to move all extremities against gravity  No dysmetria noted  Gait is slightly wide based, but without ataxia  Negative Romberg      ED Triage Vitals   Temperature Pulse Respirations Blood Pressure SpO2   02/04/22 1347 02/04/22 1341 02/04/22 1341 02/04/22 1341 02/04/22 1341   98 5 °F (36 9 °C) 104 20 140/81 96 %      Temp Source Heart Rate Source Patient Position - Orthostatic VS BP Location FiO2 (%)   02/04/22 1347 02/04/22 1341 02/04/22 1341 02/04/22 1341 --   Temporal Monitor Sitting Left arm       Pain Score       02/04/22 2354       7          Wt Readings from Last 1 Encounters:   02/06/22 98 5 kg (217 lb 3 2 oz)     Additional Vital Signs:   02/06/22 15:28:33 97 4 °F (36 3 °C) Abnormal  71 18 114/78 90 93 % None (Room air) Lying   02/06/22 1400 97 °F (36 1 °C) Abnormal  76 18 116/70 88 91 % None (Room air) Sitting   02/06/22 0526 99 °F (37 2 °C) 85 19 133/78 -- 93 % None (Room air) Sitting   02/06/22 0111 -- 80 18 132/85 -- 94 % None (Room air) --   02/05/22 2057 99 1 °F (37 3 °C) 86 16 128/71 -- 93 % None (Room air) --   02/05/22 0921 97 7 °F (36 5 °C) 80 20 147/86 -- 93 % None (Room air) --     02/05/22 0415 -- 78 18 144/85 108 93 % None (Room air) --   02/04/22 2340 -- 89 18 157/99 121 94 % None (Room air) --   02/04/22 2100 -- 89 18 152/85 114 91 % None (Room air) Lying   02/04/22 1930 -- 91 18 159/93 121 91 % None (Room air) Lying   02/04/22 1914 -- 90 18 139/83 -- 99 % None (Room air) Lying   02/04/22 1645 -- 103 18 134/78 101 95 % None (Room air) Lying     Pertinent Labs/Diagnostic Test Results:     2/4   CXR=  No active pulmonary disease on examination which is somewhat limited secondary to low lung volumes  MRI L spine=  Questionable T2 increased signal abnormality within the lower spinal cord at T11  Follow-up contrast-enhanced thoracic spine MRI for further evaluation  Right L5 exiting nerve root touches the right L5 disc  ECG=  Normal sinus rhythm, prolonged QT    2/6   MRI T spine=  1  No cord signal abnormality or pathologic enhancement  2   Mild degenerative change pronounced at level T8-9 with mild canal narrowing        Results from last 7 days   Lab Units 02/07/22  0538 02/06/22  0516 02/05/22  0428 02/04/22  1444 02/04/22  1444   WBC Thousand/uL 6 51 4 11* 2 52*   < > 3 99*   HEMOGLOBIN g/dL 15 1 15 5* 16 2*  --  16 0* HEMATOCRIT % 43 6 44 6 46 3*  --  45 9   PLATELETS Thousands/uL 181 185 205   < > 214   NEUTROS ABS Thousands/µL  --   --   --   --  2 73    < > = values in this interval not displayed       Results from last 7 days   Lab Units 02/07/22  0538 02/06/22  0516 02/05/22  0429 02/04/22  1445 02/04/22  1444   SODIUM mmol/L 130* 130* 131*  --  134*   POTASSIUM mmol/L 3 5 4 1 4 1  --  3 3*   CHLORIDE mmol/L 95* 95* 94*  --  92*   CO2 mmol/L 25 24 24  --  26   ANION GAP mmol/L 10 11 13  --  16*   BUN mg/dL 12 9 5  --  5   CREATININE mg/dL 0 64 0 60 0 61  --  0 68   EGFR ml/min/1 73sq m 110 112 112  --  108   CALCIUM mg/dL 8 9 8 9 8 8  --  8 6   MAGNESIUM mg/dL  --   --  1 9 1 3*  --      Results from last 7 days   Lab Units 02/04/22  1444   AST U/L 156*   ALT U/L 137*   ALK PHOS U/L 88   TOTAL PROTEIN g/dL 7 6   ALBUMIN g/dL 4 4   TOTAL BILIRUBIN mg/dL 2 51*     Results from last 7 days   Lab Units 02/07/22  0538 02/06/22  0516 02/05/22  0429 02/04/22  1444   GLUCOSE RANDOM mg/dL 125 146* 143* 108     Results from last 7 days   Lab Units 02/04/22  1444   CK TOTAL U/L 311*   CK MB INDEX % 1 9   CK MB ng/mL 5 8     Results from last 7 days   Lab Units 02/04/22  1444   D-DIMER QUANTITATIVE ug/ml FEU 0 32     Results from last 7 days   Lab Units 02/04/22  1444   PROTIME seconds 13 3   INR  1 02   PTT seconds 27     Results from last 7 days   Lab Units 02/05/22  0429   TSH 3RD GENERATON uIU/mL 5 361*     Results from last 7 days   Lab Units 02/05/22  0429 02/04/22  1444   PROCALCITONIN ng/ml 0 07 0 06     Results from last 7 days   Lab Units 02/04/22  1444   LACTIC ACID mmol/L 1 7     Results from last 7 days   Lab Units 02/05/22  0428   CLARITY UA  Clear   COLOR UA  Yellow   SPEC GRAV UA  1 015   PH UA  8 0*   GLUCOSE UA mg/dl Negative   KETONES UA mg/dl 40 (2+)*   BLOOD UA  Negative   PROTEIN UA mg/dl Negative   NITRITE UA  Negative   BILIRUBIN UA  Negative   UROBILINOGEN UA E U /dl 0 2   LEUKOCYTES UA  Negative Results from last 7 days   Lab Units 02/04/22  1444   BLOOD CULTURE  No Growth at 48 hrs  No Growth at 48 hrs       ED Treatment:   Medication Administration from 02/04/2022 1331 to 02/05/2022 0819    Date/Time Order Dose Route Action   02/04/2022 1448 sodium chloride 0 9 % bolus 1,000 mL 1,000 mL Intravenous New Bag   02/04/2022 1448 acetaminophen (TYLENOL) tablet 975 mg 975 mg Oral Given   02/04/2022 1458 magnesium sulfate 2 g/50 mL IVPB (premix) 2 g 2 g Intravenous New Bag   02/04/2022 1636 potassium chloride (K-DUR,KLOR-CON) CR tablet 20 mEq 20 mEq Oral Given   02/04/2022 1637 dexamethasone (DECADRON) injection 8 mg 8 mg Intravenous Given   02/04/2022 1637 oxyCODONE (ROXICODONE) IR tablet 5 mg 5 mg Oral Given   02/04/2022 2354 morphine (PF) 4 mg/mL injection 4 mg 4 mg Intravenous Given   02/05/2022 0139 levofloxacin (LEVAQUIN) tablet 500 mg 500 mg Oral Given   02/05/2022 0139 guaiFENesin (MUCINEX) 12 hr tablet 600 mg 600 mg Oral Given   02/05/2022 0518 heparin (porcine) subcutaneous injection 5,000 Units 5,000 Units Subcutaneous Given   02/05/2022 0518 ibuprofen (MOTRIN) tablet 400 mg 400 mg Oral Given        Past Medical History:   Diagnosis Date    Anxiety     Asthma     Crohn disease ([facility] )     Depression     Disease of thyroid gland     Hypertension     Hypothyroidism     Psychiatric disorder      Present on Admission:   Hypothyroidism   JOSE (generalized anxiety disorder)   Alcohol dependence in remission ([facility] )   Lower extremity numbness   COVID-19      Admitting Diagnosis: Hypomagnesemia [E83 42]  Back pain [M54 9]  Leg pain [M79 606]  Lower extremity numbness [R20 0]  Abnormal MRI, lumbar spine [R93 7]  Age/Sex: 42 y o  female  Admission Orders:  Scheduled Medications:  disulfiram, 250 mg, Oral, Daily  fluticasone, 1 spray, Nasal, Daily  guaiFENesin, 600 mg, Oral, Q12H [facility]  heparin (porcine), 5,000 Units, Subcutaneous, Q8H [facility]  levofloxacin, 500 mg, Oral, Q24H  levothyroxine, 125 mcg, Oral, QAM  LORazepam, 2 mg, Intravenous, Once  potassium chloride, 20 mEq, Oral, Daily      Continuous IV Infusions:     PRN Meds:  albuterol, 2 puff, Inhalation, Q4H PRN  clonazePAM, 0 5 mg, Oral, BID PRN  ibuprofen, 400 mg, Oral, Q6H PRN -x 1 2/5  ondansetron, 4 mg, Intravenous, Q6H PRN  Oxycodone 5 mg PO q 4 hr PRN -  X 3 2/5, x 4 2/6    Isolation   SCDs  Up w/ assist   MRI T spine     Network Utilization Review Department  ATTENTION: Please call with any questions or concerns to [phone] and carefully listen to the prompts so that you are directed to the right person  All voicemails are confidential   Brittaney Aly all requests for admission clinical reviews, approved or denied determinations and any other requests to dedicated fax number below belonging to the campus where the patient is receiving treatment   List of dedicated fax numbers for the Facilities:  [address] DENIALS (Administrative/Medical Necessity) [phone]   [address] (Maternity/NICU/Pediatrics) [phone]   [address] [place] [address]  [address] [facility] [address] [place] [address] [address]ley [address] [facility] [place] [address] [phone]

## 2022-02-07 NOTE — CASE MANAGEMENT
Case Management Assessment    Patient name Valentino [facility]  Location /-10 MRN [id number]  : 1980 Date 2022       Current Admission Date: 2022  Current Admission Diagnosis:Lower extremity numbness   Patient Active Problem List    Diagnosis Date Noted    Ambulatory dysfunction 2022    Lower extremity numbness 2022    COVID-19 2022    Severe protein-calorie malnutrition ([facility] [address] ) 2020    Hypomagnesemia 2020    Class 1 obesity in adult 2020    [facility] ) 76/15/4724    Crohn's disease of colon with complication ([facility] ) 7292    Hypothyroidism 2020    Recurrent major depressive disorder, in partial remission ([facility] ) 10/04/2019    JOSE (generalized anxiety disorder) 10/04/2019    Moderate persistent asthma without complication     Umbilical hernia without obstruction and without gangrene 2019    Alcohol dependence in remission ([place] [address] ) 2015      LOS (days): 2  Geometric Mean LOS (GMLOS) (days):   Days to GMLOS:     OBJECTIVE:    Risk of Unplanned Readmission Score: 9     Current admission status: Inpatient  Referral Reason:  (Discharge planning)    Preferred Pharmacy:   [facility]  [address] [place] [id number]  Phone: [phone] Fax: [phone]    Primary Care Provider: Larry Duggan DO    Primary Insurance: DERICK  Secondary Insurance:     ASSESSMENT:  Active Health Care Agents    There are no active Health Care Agents on file         Advance Directives  Does patient have a [address]?: No  Was patient offered paperwork?: Yes (Paperwork provided)  Does patient currently have a Health Care decision maker?: Yes, please see Health Care Proxy section  Does patient have Advance Directives?: No  Was patient offered paperwork?: Yes (Paperwork provided)  Primary Contact: Sebastian Mccormick father    Readmission Root Cause  30 Day Readmission: No    Patient Information  Admitted from[de-identified] Home  Mental Status: Alert  During Assessment patient was accompanied by: Not accompanied during assessment  Assessment information provided by[de-identified] Patient  Primary Caregiver: Self  Support Systems: Parent  [place] of Residence: [address] do you live in?: [address] entry access options   Select all that apply : Stairs  Number of steps to enter home : 2  Do the steps have railings?: Yes  Type of Current Residence: 2 story home  Upon entering residence, is there a bedroom on the main floor (no further steps)?: Yes  Upon entering residence, is there a bathroom on the main floor (no further steps)?: Yes  In the last 12 months, was there a time when you were not able to pay the mortgage or rent on time?: No  In the last 12 months, how many places have you lived?: 1  In the last 12 months, was there a time when you did not have a steady place to sleep or slept in a shelter (including now)?: No  Homeless/housing insecurity resource given?: N/A  Living Arrangements: Lives w/ Daughter  Is patient a ?: No    Activities of Daily Living Prior to Admission  Functional Status: Independent  Completes ADLs independently?: Yes  Ambulates independently?: Yes  Does patient use assisted devices?: No  Does patient currently own DME?: Yes  What DME does the patient currently own?: lAia Cooper  Does patient have a history of Outpatient Therapy (PT/OT)?: No  Does the patient have a history of Short-Term Rehab?: No  Does patient have a history of HHC?: No  Does patient currently have [facility]?: No    Patient Information Continued  Income Source: Employed  Does patient have prescription coverage?: Yes  Within the past 12 months, you worried that your food would run out before you got the money to buy more : Never true  Within the past 12 months, the food you bought just didnt last and you didnt have money to get more : Never true  Food insecurity resource given?: N/A  Does patient receive dialysis treatments?: No  Does patient have a history of substance abuse?: No    PHQ 2/9 Screening   Reviewed PHQ 2/9 Depression Screening Score?: No    Means of Transportation  Means of Transport to Appts[de-identified] Drives Self  In the past 12 months, has lack of transportation kept you from medical appointments or from getting medications?: No  In the past 12 months, has lack of transportation kept you from meetings, work, or from getting things needed for daily living?: No  Was application for public transport provided?: N/A   Pt has a pending Neurology consult  Unsure if she wants Home PT, waiting to see PT today

## 2022-02-07 NOTE — PROGRESS NOTES
[address]  Progress Note - Lio Lamar 1980, 42 y o  female MRN: [id number]  Unit/Bed#: -01 Encounter: [id number]  Primary Care Provider: Alia Morrison DO   Date and time admitted to hospital: 2/4/2022  1:39 PM    * Lower extremity numbness  Assessment & Plan  · Two week history of abdominal numbness, groin numbness, thigh and knee pain, calf numbness, and no abnormality of the feet  · Subjective change in sensation of her bilateral lower leg but with no complete sensory deficit or motor dysfunction  · MRI L Spine (2/4/2022): Questionable T2 increased signal abnormality within the lower spinal cord at T11  Follow-up contrast-enhanced thoracic spine MRI for further evaluation  Right L5 exiting nerve root touches the right L5 disc  · MRI findings were discussed with radiology and could just be artifact however follow-up MRI is still recommended- attempted to have the patient follow-up outpatient with MRI, however, she noted that she could not walk and therefore could not go home  · MRI T-spine with and without contrast no acute cord pathology, mild degenerative changes noted T8 through T9  · Formal neurology consult pending  · PT recommending home with home healthcare  · Likely discharge tomorrow 2/8    Ambulatory dysfunction  Assessment & Plan  · Due to knee/thigh pain and numbness as above  · MRI pending as above  · Neurology consultation placed    COVID-19  Assessment & Plan  · Positive home test approximately 1 week ago  · Not requiring any supplemental oxygen at this time  · Continue further supportive care    Hypothyroidism  Assessment & Plan  · Continue pre-hospital levothyroxine 125 mcg p o  daily    JOSE (generalized anxiety disorder)  Assessment & Plan  · Continue pre-hospital Klonopin 0 5 mg p o  b i d  p r n      Alcohol dependence in remission [facility])  Assessment & Plan  · Patient reports that she has not drank in months  · Will continue pre-hospital Antabuse 250 mg p o  daily          VTE Pharmacologic Prophylaxis: VTE Score: 3 Moderate Risk (Score 3-4) - Pharmacological DVT Prophylaxis Ordered: heparin  Patient Centered Rounds: I performed bedside rounds with nursing staff today  Discussions with Specialists or Other Care Team Provider:  None    Education and Discussions with Family / Patient:  Discussed with patient, questions answered    Time Spent for Care: 30 minutes  More than 50% of total time spent on counseling and coordination of care as described above  Current Length of Stay: 2 day(s)  Current Patient Status: Inpatient   Certification Statement: The patient will continue to require additional inpatient hospital stay due to Pending formal neurology consult, likely discharge tomorrow  Discharge Plan: Anticipate discharge tomorrow to home with home services  Code Status: Level 1 - Full Code    Subjective:   Patient seen examined at bedside  Patient resting in bed no apparent distress  No acute events overnight  Patient states she still feels weak and is anxious about going home  Objective:     Vitals:   Temp (24hrs), Av 7 °F (36 5 °C), Min:97 °F (36 1 °C), Max:98 1 °F (36 7 °C)    Temp:  [97 °F (36 1 °C)-98 1 °F (36 7 °C)] 98 1 °F (36 7 °C)  HR:  [71-76] 72  Resp:  [18-20] 20  BP: (114-116)/(70-78) 116/78  SpO2:  [91 %-94 %] 94 %  Body mass index is 39 73 kg/m²  Input and Output Summary (last 24 hours): Intake/Output Summary (Last 24 hours) at 2022 1014  Last data filed at 2022 0911  Gross per 24 hour   Intake 0 ml   Output --   Net 0 ml       Physical Exam:   Physical Exam  Constitutional:       Appearance: She is obese  Cardiovascular:      Rate and Rhythm: Normal rate and regular rhythm  Pulses: Normal pulses  Heart sounds: Normal heart sounds  Pulmonary:      Effort: Pulmonary effort is normal       Breath sounds: Normal breath sounds  Abdominal:      General: Abdomen is flat   Bowel sounds are normal  Palpations: Abdomen is soft  Musculoskeletal:         General: Normal range of motion  Cervical back: Normal range of motion  Skin:     General: Skin is warm and dry  Neurological:      General: No focal deficit present  Mental Status: She is alert  Mental status is at baseline  Sensory: No sensory deficit  Motor: No weakness  Coordination: Coordination normal    Psychiatric:      Comments: Anxious          Additional Data:     Labs:  Results from last 7 days   Lab Units 02/07/22 0538 02/05/22 0428 02/04/22  1444   WBC Thousand/uL 6 51   < > 3 99*   HEMOGLOBIN g/dL 15 1   < > 16 0*   HEMATOCRIT % 43 6   < > 45 9   PLATELETS Thousands/uL 181   < > 214   NEUTROS PCT %  --   --  68   LYMPHS PCT %  --   --  22   MONOS PCT %  --   --  8   EOS PCT %  --   --  0    < > = values in this interval not displayed  Results from last 7 days   Lab Units 02/07/22 0538 02/05/22 0429 02/04/22  1444   SODIUM mmol/L 130*   < > 134*   POTASSIUM mmol/L 3 5   < > 3 3*   CHLORIDE mmol/L 95*   < > 92*   CO2 mmol/L 25   < > 26   BUN mg/dL 12   < > 5   CREATININE mg/dL 0 64   < > 0 68   ANION GAP mmol/L 10   < > 16*   CALCIUM mg/dL 8 9   < > 8 6   ALBUMIN g/dL  --   --  4 4   TOTAL BILIRUBIN mg/dL  --   --  2 51*   ALK PHOS U/L  --   --  88   ALT U/L  --   --  137*   AST U/L  --   --  156*   GLUCOSE RANDOM mg/dL 125   < > 108    < > = values in this interval not displayed  Results from last 7 days   Lab Units 02/04/22  1444   INR  1 02             Results from last 7 days   Lab Units 02/05/22  0429 02/04/22  1444   LACTIC ACID mmol/L  --  1 7   PROCALCITONIN ng/ml 0 07 0 06       Lines/Drains:  Invasive Devices  Report    Peripheral Intravenous Line            Peripheral IV 02/05/22 Right Antecubital 2 days                      Imaging: No pertinent imaging reviewed  Recent Cultures (last 7 days):   Results from last 7 days   Lab Units 02/04/22  1444   BLOOD CULTURE  No Growth at 48 hrs    No Growth at 48 hrs  Last 24 Hours Medication List:   Current Facility-Administered Medications   Medication Dose Route Frequency Provider Last Rate    albuterol  2 puff Inhalation Q4H PRN Fei Singh PA-C      clonazePAM  0 5 mg Oral BID PRN Fei Singh PA-C      disulfiram  250 mg Oral Daily Fei Singh PA-C      fluticasone  1 spray Nasal Daily Fei Singh PA-C      guaiFENesin  600 mg Oral Q12H [facility] Fei Singh PA-C      heparin (porcine)  5,000 Units Subcutaneous [facility] Fei Singh PA-C      ibuprofen  400 mg Oral Q6H PRN Fei Singh PA-C      levofloxacin  500 mg Oral Q24H Fei Singh PA-C      levothyroxine  125 mcg Oral QAM Fei Singh PA-C      LORazepam  2 mg Intravenous Once Ancil Alken, DO      ondansetron  4 mg Intravenous Q6H PRN Fei Singh PA-C      oxyCODONE  5 mg Oral Q4H PRN Edda Ciaranjasmin Valladares, DO      potassium chloride  20 mEq Oral Daily Fei Singh PA-C          Today, Patient Was Seen By: Yanely Acevedo DO    **Please Note: This note may have been constructed using a voice recognition system  **

## 2022-02-07 NOTE — TELEMEDICINE
TeleConsultation - Neurology   Brett Sanchez 42 y o  female MRN: [id number]   Encounter: [id number]      REQUIRED DOCUMENTATION:     1  This service was provided via Telemedicine  2  Provider located in [place]   3  TeleMed provider: Bill Nicole MD   4  Identify all parties in room with patient during tele consult:  RN  5  After connecting through [employer], patient was identified by name and date of birth and assistant checked wristband  Patient was then informed that this was a Telemedicine visit and that the exam was being conducted confidentially over secure lines  My office door was closed  No one else was in the room  Patient acknowledged consent and understanding of privacy and security of the Telemedicine visit, and gave us permission to have the assistant stay in the room in order to assist with the history and to conduct the exam   I informed the patient that I have reviewed their record in Epic and presented the opportunity for them to ask any questions regarding the visit today  The patient agreed to participate  Assessment/Plan   Descending tingling/numbness from abdomen down, over the course of two weeks, preceded by positive  MRI lumbar spine showed a faint T2 in conus, which was confirmed to be artifactual on dedicated MRI thoracic spine w/wo contrast  Although there are no convincing features of GBS, thje limitation of tele examination does not allow for proper exclusion until we obtain CSF fluid  Hence LP with CSF analysis for protein, cell and glucose is warranted  If LP is normal, then she can be discharged to have EMG/NCS in outpatient setting  If LP shows high protein, then contact neurology for recs on management of GBS  History of Present Illness     Reason for Consult / Principal Problem: tingling and numbness  Hx and PE limited by: none  HPI: Brett Sanchez is a 42 y o  female who presents with numbness and tingling of lower feet   She stated that she had a Crohn's flare-up 3 weeks ago, which resulted in numbness in lower abdomen to the genital area  Crohn flare-up's symptoms included distended abdomen and nausea/vomiting  As the week progressed, she started having pain in the thighs, and in the knees  Pain has bee throbbing, 7-8/10, without exacerbating or relieving factors    Numbness spread down to legs all the way to ankles  She also noted difficulty with walking due to heaviness of legs, until she became unable to stand 4 days ago  Symptoms have not changed since she came  Of note, she tested positive for Covid 7 days ago (home test)  She had symptoms of coughing, loss of taste and chest pain  She has a history of biopsy confirmed Crohn's disease  She has history of depression, but not on any treatment at this point  Inpatient consult to Neurology  Consult performed by: Juno Reyes MD  Consult ordered by: Cynthia Hughes DO          Review of Systems  Complete ROS was done and is negative other than what is mentioned in HPI    Historical Information   Past Medical History:   Diagnosis Date    Anxiety     Asthma     Crohn disease ([address] )     Depression     Disease of thyroid gland     Hypertension     Hypothyroidism     Psychiatric disorder      Past Surgical History:   Procedure Laterality Date    NO PAST SURGERIES       Social History   Social History     Substance and Sexual Activity   Alcohol Use Not Currently     Social History     Substance and Sexual Activity   Drug Use Never     Social History     Tobacco Use   Smoking Status Former Smoker    Quit date: 7/13/2018    Years since quitting: 3 5   Smokeless Tobacco Never Used     Family History: non-contributory    Review of previous medical records was completed       Meds/Allergies   current meds:   Current Facility-Administered Medications   Medication Dose Route Frequency    albuterol (PROVENTIL HFA,VENTOLIN HFA) inhaler 2 puff  2 puff Inhalation Q4H PRN    clonazePAM (KlonoPIN) tablet 0 5 mg  0 5 mg Oral BID PRN    fluticasone (FLONASE) 50 mcg/act nasal spray 1 spray  1 spray Nasal Daily    guaiFENesin (MUCINEX) 12 hr tablet 600 mg  600 mg Oral Q12H [facility]    heparin (porcine) subcutaneous injection 5,000 Units  5,000 Units Subcutaneous Q8H [facility]    ibuprofen (MOTRIN) tablet 400 mg  400 mg Oral Q6H PRN    levofloxacin (LEVAQUIN) tablet 500 mg  500 mg Oral Q24H    levothyroxine tablet 125 mcg  125 mcg Oral QAM    LORazepam (ATIVAN) injection 2 mg  2 mg Intravenous Once    ondansetron (ZOFRAN) injection 4 mg  4 mg Intravenous Q6H PRN    oxyCODONE (ROXICODONE) IR tablet 5 mg  5 mg Oral Q4H PRN    potassium chloride (K-DUR,KLOR-CON) CR tablet 20 mEq  20 mEq Oral Daily       Allergies   Allergen Reactions    Penicillins Hives and Rash     HIVES, SWELLS, riley ancef x 1 dose         Objective   Vitals:Blood pressure 123/79, pulse 81, temperature 97 9 °F (36 6 °C), temperature source Temporal, resp  rate 19, height 5' 2" (1 575 m), weight 98 5 kg (217 lb 3 2 oz), SpO2 94 %, not currently breastfeeding  ,Body mass index is 39 73 kg/m²  Intake/Output Summary (Last 24 hours) at 2/7/2022 1738  Last data filed at 2/7/2022 1200  Gross per 24 hour   Intake 180 ml   Output --   Net 180 ml       Invasive Devices: Invasive Devices  Report    Peripheral Intravenous Line            Peripheral IV 02/05/22 Right Antecubital 2 days                Physical Exam NAD  Skin: no seen lesions  HEENT: ATNC  Chest: breathing comfortably on room air  GI: no apparent distension  Neurologic Exam  Alert and oriented for self, place and time  Memory is intact to recent and remote events  Language is intact to comprehension and expression  No neglect  Speech is normal  EOMI  Pupils are symmetric  Visual field appears intact  Face is symmetric  Tongue is midline  Able to move all extremities against gravity  No dysmetria noted  Gait is slightly wide based, but without ataxia   Negative Romberg    Lab Results:   CBC:   Results from last 7 days   Lab Units 02/07/22  0538 02/06/22  0516 02/05/22  0428   WBC Thousand/uL 6 51 4 11* 2 52*   RBC Million/uL 3 90 3 95 4 13   HEMOGLOBIN g/dL 15 1 15 5* 16 2*   HEMATOCRIT % 43 6 44 6 46 3*   MCV fL 112* 113* 112*   PLATELETS Thousands/uL 181 185 205   , BMP/CMP:   Results from last 7 days   Lab Units 02/07/22  0538 02/06/22  0516 02/05/22  0429 02/04/22  1444 02/04/22  1444   SODIUM mmol/L 130* 130* 131*   < > 134*   POTASSIUM mmol/L 3 5 4 1 4 1   < > 3 3*   CHLORIDE mmol/L 95* 95* 94*   < > 92*   CO2 mmol/L 25 24 24   < > 26   BUN mg/dL 12 9 5   < > 5   CREATININE mg/dL 0 64 0 60 0 61   < > 0 68   CALCIUM mg/dL 8 9 8 9 8 8   < > 8 6   AST U/L  --   --   --   --  156*   ALT U/L  --   --   --   --  137*   ALK PHOS U/L  --   --   --   --  88   EGFR ml/min/1 73sq m 110 112 112   < > 108    < > = values in this interval not displayed  Imaging Studies: I have personally reviewed pertinent films in PACS  EKG, Pathology, and Other Studies: I have personally reviewed pertinent reports      VTE Prophylaxis: Sequential compression device [place])     Code Status: Level 1 - Full Code  Advance Directive and Living Will:      Power of :    POLST:      Counseling / Coordination of Care  N/A

## 2022-02-07 NOTE — PLAN OF CARE
Problem: Potential for Falls  Goal: Patient will remain free of falls  Description: INTERVENTIONS:  - Educate patient/family on patient safety including physical limitations  - Instruct patient to call for assistance with activity   - Consult OT/PT to assist with strengthening/mobility   - Keep Call bell within reach  - Keep bed low and locked with side rails adjusted as appropriate  - Keep care items and personal belongings within reach  - Initiate and maintain comfort rounds  - Make Fall Risk Sign visible to staff  - Initiate/Maintain bed alarm  - Apply yellow socks and bracelet for high fall risk patients  - Consider moving patient to room near nurses station  Outcome: Progressing     Problem: Nutrition/Hydration-ADULT  Goal: Nutrient/Hydration intake appropriate for improving, restoring or maintaining nutritional needs  Description: Monitor and assess patient's nutrition/hydration status for malnutrition  Collaborate with interdisciplinary team and initiate plan and interventions as ordered  Monitor patient's weight and dietary intake as ordered or per policy  Utilize nutrition screening tool and intervene as necessary  Determine patient's food preferences and provide high-protein, high-caloric foods as appropriate       INTERVENTIONS:  - Monitor oral intake, urinary output, labs, and treatment plans  - Assess nutrition and hydration status and recommend course of action  - Evaluate amount of meals eaten  - Assist patient with eating if necessary   - Allow adequate time for meals  - Recommend/ encourage appropriate diets, oral nutritional supplements, and vitamin/mineral supplements  - Order, calculate, and assess calorie counts as needed  - Recommend, monitor, and adjust tube feedings and TPN/PPN based on assessed needs  - Assess need for intravenous fluids  - Provide specific nutrition/hydration education as appropriate  - Include patient/family/caregiver in decisions related to nutrition  Outcome: Progressing     Problem: MOBILITY - ADULT  Goal: Maintain or return to baseline ADL function  Description: INTERVENTIONS:  -  Assess patient's ability to carry out ADLs; assess patient's baseline for ADL function and identify physical deficits which impact ability to perform ADLs (bathing, care of mouth/teeth, toileting, grooming, dressing, etc )  - Assess/evaluate cause of self-care deficits   - Assess range of motion  - Assess patient's mobility; develop plan if impaired  - Assess patient's need for assistive devices and provide as appropriate  - Encourage maximum independence but intervene and supervise when necessary  - Involve family in performance of ADLs  - Assess for home care needs following discharge   - Consider OT consult to assist with ADL evaluation and planning for discharge  - Provide patient education as appropriate  Outcome: Progressing  Goal: Maintains/Returns to pre admission functional level  Description: INTERVENTIONS:  - Perform BMAT or MOVE assessment daily    - Set and communicate daily mobility goal to care team and patient/family/caregiver     - Collaborate with rehabilitation services on mobility goals if consulted  - Out of bed for meals 2-3 times a day  - Out of bed for toileting  - Record patient progress and toleration of activity level   Outcome: Progressing     Problem: NEUROSENSORY - ADULT  Goal: Achieves stable or improved neurological status  Description: INTERVENTIONS  - Monitor and report changes in neurological status  - Monitor vital signs such as temperature, blood pressure, glucose, and any other labs ordered   - Initiate measures to prevent increased intracranial pressure  - Monitor for seizure activity and implement precautions if appropriate      Outcome: Progressing

## 2022-02-08 ENCOUNTER — APPOINTMENT (INPATIENT)
Dept: INTERVENTIONAL RADIOLOGY/VASCULAR | Facility: HOSPITAL | Age: 42
DRG: 092 | End: 2022-02-08
Attending: STUDENT IN AN ORGANIZED HEALTH CARE EDUCATION/TRAINING PROGRAM
Payer: COMMERCIAL

## 2022-02-08 LAB
APPEARANCE CSF: ABNORMAL
FOLATE SERPL-MCNC: 2.7 NG/ML (ref 3.1–17.5)
GLUCOSE CSF-MCNC: 69 MG/DL (ref 40–70)
GRAM STN SPEC: NORMAL
LYMPHOCYTES NFR CSF MANUAL: 77 %
MONOS+MACROS CSF MANUAL: 21 %
NEUTROPHILS NFR CSF MANUAL: 2 %
PROT CSF-MCNC: 58 MG/DL (ref 15–45)
RBC # CSF MANUAL: 8 UL (ref 0–10)
TOTAL CELLS COUNTED BLD: NO
TOTAL CELLS COUNTED SPEC: 100
TUBE # CSF: 4
VIT B12 SERPL-MCNC: 758 PG/ML (ref 100–900)
WBC # CSF AUTO: 6 /UL (ref 0–5)

## 2022-02-08 PROCEDURE — 62328 DX LMBR SPI PNXR W/FLUOR/CT: CPT

## 2022-02-08 PROCEDURE — 89050 BODY FLUID CELL COUNT: CPT | Performed by: STUDENT IN AN ORGANIZED HEALTH CARE EDUCATION/TRAINING PROGRAM

## 2022-02-08 PROCEDURE — 89051 BODY FLUID CELL COUNT: CPT | Performed by: STUDENT IN AN ORGANIZED HEALTH CARE EDUCATION/TRAINING PROGRAM

## 2022-02-08 PROCEDURE — 82945 GLUCOSE OTHER FLUID: CPT | Performed by: STUDENT IN AN ORGANIZED HEALTH CARE EDUCATION/TRAINING PROGRAM

## 2022-02-08 PROCEDURE — 99232 SBSQ HOSP IP/OBS MODERATE 35: CPT | Performed by: STUDENT IN AN ORGANIZED HEALTH CARE EDUCATION/TRAINING PROGRAM

## 2022-02-08 PROCEDURE — 62328 DX LMBR SPI PNXR W/FLUOR/CT: CPT | Performed by: RADIOLOGY

## 2022-02-08 PROCEDURE — 84157 ASSAY OF PROTEIN OTHER: CPT | Performed by: STUDENT IN AN ORGANIZED HEALTH CARE EDUCATION/TRAINING PROGRAM

## 2022-02-08 PROCEDURE — 87070 CULTURE OTHR SPECIMN AEROBIC: CPT | Performed by: PHYSICIAN ASSISTANT

## 2022-02-08 PROCEDURE — 009U3ZX DRAINAGE OF SPINAL CANAL, PERCUTANEOUS APPROACH, DIAGNOSTIC: ICD-10-PCS | Performed by: RADIOLOGY

## 2022-02-08 RX ORDER — LIDOCAINE HYDROCHLORIDE 10 MG/ML
INJECTION, SOLUTION EPIDURAL; INFILTRATION; INTRACAUDAL; PERINEURAL CODE/TRAUMA/SEDATION MEDICATION
Status: COMPLETED | OUTPATIENT
Start: 2022-02-08 | End: 2022-02-08

## 2022-02-08 RX ADMIN — SODIUM CHLORIDE 1000 ML: 0.9 INJECTION, SOLUTION INTRAVENOUS at 08:32

## 2022-02-08 RX ADMIN — POTASSIUM CHLORIDE 20 MEQ: 1500 TABLET, EXTENDED RELEASE ORAL at 08:31

## 2022-02-08 RX ADMIN — FLUTICASONE PROPIONATE 1 SPRAY: 50 SPRAY, METERED NASAL at 08:31

## 2022-02-08 RX ADMIN — OXYCODONE HYDROCHLORIDE 5 MG: 5 TABLET ORAL at 08:30

## 2022-02-08 RX ADMIN — OXYCODONE HYDROCHLORIDE 5 MG: 5 TABLET ORAL at 04:32

## 2022-02-08 RX ADMIN — OXYCODONE HYDROCHLORIDE 5 MG: 5 TABLET ORAL at 12:52

## 2022-02-08 RX ADMIN — OXYCODONE HYDROCHLORIDE 5 MG: 5 TABLET ORAL at 17:44

## 2022-02-08 RX ADMIN — LIDOCAINE HYDROCHLORIDE 10 ML: 10 INJECTION, SOLUTION EPIDURAL; INFILTRATION; INTRACAUDAL; PERINEURAL at 16:51

## 2022-02-08 RX ADMIN — LEVOFLOXACIN 500 MG: 500 TABLET, FILM COATED ORAL at 02:30

## 2022-02-08 RX ADMIN — HEPARIN SODIUM 5000 UNITS: 5000 INJECTION INTRAVENOUS; SUBCUTANEOUS at 05:04

## 2022-02-08 RX ADMIN — GUAIFENESIN 600 MG: 600 TABLET, EXTENDED RELEASE ORAL at 05:04

## 2022-02-08 RX ADMIN — GUAIFENESIN 600 MG: 600 TABLET, EXTENDED RELEASE ORAL at 14:30

## 2022-02-08 RX ADMIN — LEVOTHYROXINE SODIUM 125 MCG: 25 TABLET ORAL at 08:30

## 2022-02-08 NOTE — ASSESSMENT & PLAN NOTE
· Two week history of abdominal numbness, groin numbness, thigh and knee pain, calf numbness, and no abnormality of the feet  · Subjective change in sensation of her bilateral lower leg but with no complete sensory deficit or motor dysfunction  · MRI L Spine (2/4/2022): Questionable T2 increased signal abnormality within the lower spinal cord at T11  Follow-up contrast-enhanced thoracic spine MRI for further evaluation  Right L5 exiting nerve root touches the right L5 disc    · MRI findings were discussed with radiology and could just be artifact however follow-up MRI is still recommended- attempted to have the patient follow-up outpatient with MRI, however, she noted that she could not walk and therefore could not go home  · MRI T-spine with and without contrast no acute cord pathology, mild degenerative changes noted T8 through T9  · Neurology consulted, appreciate recommendations  · Neurology at this time recommending LP to rule out Guillian [place] syndrome, will consult IR  · PT recommending home with home healthcare

## 2022-02-08 NOTE — PROCEDURES
Procedures     Interventional Radiology Procedure Note    PATIENT NAME:  Maeve  : 1980  MRN: [id number]    Procedure: Image guided lumbar puncture    Estimated Blood Loss: none     Findings: Fluoroscopic guidance was used  L 2-3 puncture with 20g spinal needle  12 ml clear fluid drained  PACS dictation to follow  Specimens: as ordered   4 vials delivered to lab      Complications:  None    Anesthesia: Local    Trixie Romo MD     Date: 2022  Time: 5:23 PM

## 2022-02-08 NOTE — PROGRESS NOTES
[facility]  Progress Note - Jeaneth Melton 1980, 42 y o  female MRN: [id number]  Unit/Bed#: -01 Encounter: [id number]  Primary Care Provider: Vince Richmond DO   Date and time admitted to hospital: 2/4/2022  1:39 PM    * Lower extremity numbness  Assessment & Plan  · Two week history of abdominal numbness, groin numbness, thigh and knee pain, calf numbness, and no abnormality of the feet  · Subjective change in sensation of her bilateral lower leg but with no complete sensory deficit or motor dysfunction  · MRI L Spine (2/4/2022): Questionable T2 increased signal abnormality within the lower spinal cord at T11  Follow-up contrast-enhanced thoracic spine MRI for further evaluation  Right L5 exiting nerve root touches the right L5 disc  · MRI findings were discussed with radiology and could just be artifact however follow-up MRI is still recommended- attempted to have the patient follow-up outpatient with MRI, however, she noted that she could not walk and therefore could not go home  · MRI T-spine with and without contrast no acute cord pathology, mild degenerative changes noted T8 through T9  · Neurology consulted, appreciate recommendations  · Neurology at this time recommending LP to rule out Guillian Dayton syndrome, will consult IR  · PT recommending home with home healthcare      Ambulatory dysfunction  Assessment & Plan  · Due to knee/thigh pain and numbness as above  · Neurology consultation placed    COVID-19  Assessment & Plan  · Positive home test approximately 1 week ago  · Not requiring any supplemental oxygen at this time  · Continue further supportive care    Hypothyroidism  Assessment & Plan  · Continue pre-hospital levothyroxine 125 mcg p o  daily    JOSE (generalized anxiety disorder)  Assessment & Plan  · Continue pre-hospital Klonopin 0 5 mg p o  b i d  p r n      Alcohol dependence in remission [facility])  Assessment & Plan  · Patient reports that she has not drank in months  · Will continue pre-hospital Antabuse 250 mg p o  daily          VTE Pharmacologic Prophylaxis: VTE Score: 3 holding heparin at this time in anticipation of LP    Patient Centered Rounds: I performed bedside rounds with nursing staff today  Discussions with Specialists or Other Care Team Provider:  Neurology    Education and Discussions with Family / Patient:  Discussed with patient    Time Spent for Care: 30 minutes  More than 50% of total time spent on counseling and coordination of care as described above  Current Length of Stay: 3 day(s)  Current Patient Status: Inpatient   Certification Statement: The patient will continue to require additional inpatient hospital stay due to Patient will need lumbar puncture to rule out GBS  Discharge Plan:  Pending clinical course    Code Status: Level 1 - Full Code    Subjective:   Patient seen examined bedside  Patient continues endorses sensory deficits in lower extremities bilaterally    Objective:     Vitals:   Temp (24hrs), Av °F (36 7 °C), Min:97 9 °F (36 6 °C), Max:98 3 °F (36 8 °C)    Temp:  [97 9 °F (36 6 °C)-98 3 °F (36 8 °C)] 98 3 °F (36 8 °C)  HR:  [76-81] 76  Resp:  [18-19] 18  BP: (115-123)/(77-83) 116/83  SpO2:  [90 %-94 %] 90 %  Body mass index is 39 73 kg/m²  Input and Output Summary (last 24 hours): Intake/Output Summary (Last 24 hours) at 2022 1342  Last data filed at 2022 1300  Gross per 24 hour   Intake 420 ml   Output --   Net 420 ml       Physical Exam:   Physical Exam  Constitutional:       Appearance: She is obese  Cardiovascular:      Rate and Rhythm: Normal rate and regular rhythm  Pulses: Normal pulses  Heart sounds: Normal heart sounds  Pulmonary:      Effort: Pulmonary effort is normal       Breath sounds: Normal breath sounds  Abdominal:      General: Abdomen is flat  Bowel sounds are normal       Palpations: Abdomen is soft  Musculoskeletal:         General: Normal range of motion  Cervical back: Normal range of motion  Skin:     General: Skin is warm and dry  Neurological:      General: No focal deficit present  Mental Status: She is alert  Mental status is at baseline  Cranial Nerves: No cranial nerve deficit  Sensory: Sensory deficit present  Psychiatric:         Mood and Affect: Mood normal          Behavior: Behavior normal          Thought Content: Thought content normal          Judgment: Judgment normal           Additional Data:     Labs:  Results from last 7 days   Lab Units 02/07/22 0538 02/05/22 0428 02/04/22  1444   WBC Thousand/uL 6 51   < > 3 99*   HEMOGLOBIN g/dL 15 1   < > 16 0*   HEMATOCRIT % 43 6   < > 45 9   PLATELETS Thousands/uL 181   < > 214   NEUTROS PCT %  --   --  68   LYMPHS PCT %  --   --  22   MONOS PCT %  --   --  8   EOS PCT %  --   --  0    < > = values in this interval not displayed  Results from last 7 days   Lab Units 02/07/22 0538 02/05/22 0429 02/04/22  1444   SODIUM mmol/L 130*   < > 134*   POTASSIUM mmol/L 3 5   < > 3 3*   CHLORIDE mmol/L 95*   < > 92*   CO2 mmol/L 25   < > 26   BUN mg/dL 12   < > 5   CREATININE mg/dL 0 64   < > 0 68   ANION GAP mmol/L 10   < > 16*   CALCIUM mg/dL 8 9   < > 8 6   ALBUMIN g/dL  --   --  4 4   TOTAL BILIRUBIN mg/dL  --   --  2 51*   ALK PHOS U/L  --   --  88   ALT U/L  --   --  137*   AST U/L  --   --  156*   GLUCOSE RANDOM mg/dL 125   < > 108    < > = values in this interval not displayed  Results from last 7 days   Lab Units 02/04/22  1444   INR  1 02             Results from last 7 days   Lab Units 02/05/22  0429 02/04/22  1444   LACTIC ACID mmol/L  --  1 7   PROCALCITONIN ng/ml 0 07 0 06       Lines/Drains:  Invasive Devices  Report    Peripheral Intravenous Line            Peripheral IV 02/05/22 Right Antecubital 3 days                      Imaging:  None    Recent Cultures (last 7 days):   Results from last 7 days   Lab Units 02/04/22  1444   BLOOD CULTURE  No Growth at 72 hrs    No Growth at 72 hrs  Last 24 Hours Medication List:   Current Facility-Administered Medications   Medication Dose Route Frequency Provider Last Rate    albuterol  2 puff Inhalation Q4H PRN Linda Mood, PA-KAREN      clonazePAM  0 5 mg Oral BID PRN Linda Mood, PA-C      fluticasone  1 spray Nasal Daily Linda Mood, PA-C      guaiFENesin  600 mg Oral Q12H [facility] Linda Mood, PA-C      ibuprofen  400 mg Oral Q6H PRN Linda Mood, PA-C      levofloxacin  500 mg Oral Q24H Linda Mood, PA-C      levothyroxine  125 mcg Oral QAM Linda Mood, PA-C      LORazepam  2 mg Intravenous Once Michelle Elders, DO      ondansetron  4 mg Intravenous Q6H PRN Linda Mood, PA-KAREN      oxyCODONE  5 mg Oral Q4H PRN Jordan Plain Valladares, DO      potassium chloride  20 mEq Oral Daily Linda Mood, GLORIA          Today, Patient Was Seen By: Lara oCnway DO    **Please Note: This note may have been constructed using a voice recognition system  **

## 2022-02-08 NOTE — PLAN OF CARE
Problem: Potential for Falls  Goal: Patient will remain free of falls  Description: INTERVENTIONS:  - Educate patient/family on patient safety including physical limitations  - Instruct patient to call for assistance with activity   - Consult OT/PT to assist with strengthening/mobility   - Keep Call bell within reach  - Keep bed low and locked with side rails adjusted as appropriate  - Keep care items and personal belongings within reach  - Initiate and maintain comfort rounds  - Make Fall Risk Sign visible to staff  - Offer Toileting in advance of need  - Initiate/Maintain bed alarm  - Obtain necessary fall risk management equipment:   - Apply yellow socks and bracelet for high fall risk patients  - Consider moving patient to room near nurses station  Outcome: Progressing

## 2022-02-08 NOTE — PLAN OF CARE
Problem: Potential for Falls  Goal: Patient will remain free of falls  Description: INTERVENTIONS:  - Educate patient/family on patient safety including physical limitations  - Instruct patient to call for assistance with activity   - Consult OT/PT to assist with strengthening/mobility   - Keep Call bell within reach  - Keep bed low and locked with side rails adjusted as appropriate  - Keep care items and personal belongings within reach  - Initiate and maintain comfort rounds  - Make Fall Risk Sign visible to staff  - Offer Toileting in advance of need  - Initiate/Maintain bed alarm  - Obtain necessary fall risk management equipment:   - Apply yellow socks and bracelet for high fall risk patients  - Consider moving patient to room near nurses station  Outcome: Progressing     Problem: Nutrition/Hydration-ADULT  Goal: Nutrient/Hydration intake appropriate for improving, restoring or maintaining nutritional needs  Description: Monitor and assess patient's nutrition/hydration status for malnutrition  Collaborate with interdisciplinary team and initiate plan and interventions as ordered  Monitor patient's weight and dietary intake as ordered or per policy  Utilize nutrition screening tool and intervene as necessary  Determine patient's food preferences and provide high-protein, high-caloric foods as appropriate       INTERVENTIONS:  - Monitor oral intake, urinary output, labs, and treatment plans  - Assess nutrition and hydration status and recommend course of action  - Evaluate amount of meals eaten  - Assist patient with eating if necessary   - Allow adequate time for meals  - Recommend/ encourage appropriate diets, oral nutritional supplements, and vitamin/mineral supplements  - Order, calculate, and assess calorie counts as needed  - Assess need for intravenous fluids  - Provide specific nutrition/hydration education as appropriate  - Include patient/family/caregiver in decisions related to nutrition  Outcome: Progressing     Problem: MOBILITY - ADULT  Goal: Maintain or return to baseline ADL function  Description: INTERVENTIONS:  -  Assess patient's ability to carry out ADLs; assess patient's baseline for ADL function and identify physical deficits which impact ability to perform ADLs (bathing, care of mouth/teeth, toileting, grooming, dressing, etc )  - Assess/evaluate cause of self-care deficits   - Assess range of motion  - Assess patient's mobility; develop plan if impaired  - Assess patient's need for assistive devices and provide as appropriate  - Encourage maximum independence but intervene and supervise when necessary  - Involve family in performance of ADLs  - Assess for home care needs following discharge   - Consider OT consult to assist with ADL evaluation and planning for discharge  - Provide patient education as appropriate  Outcome: Progressing  Goal: Maintains/Returns to pre admission functional level  Description: INTERVENTIONS:  - Perform BMAT or MOVE assessment daily    - Set and communicate daily mobility goal to care team and patient/family/caregiver     - Collaborate with rehabilitation services on mobility goals if consulted  - Ambulate patient as tolerated  - Out of bed to chair as desired  - Out of bed for meals   - Out of bed for toileting  - Record patient progress and toleration of activity level   Outcome: Progressing     Problem: NEUROSENSORY - ADULT  Goal: Achieves stable or improved neurological status  Description: INTERVENTIONS  - Monitor and report changes in neurological status  - Monitor vital signs such as temperature, blood pressure, glucose, and any other labs ordered   - Initiate measures to prevent increased intracranial pressure  - Monitor for seizure activity and implement precautions if appropriate      Outcome: Progressing     Problem: DISCHARGE PLANNING  Goal: Discharge to home or other facility with appropriate resources  Description: INTERVENTIONS:  - Identify barriers to discharge w/patient and caregiver  - Arrange for needed discharge resources and transportation as appropriate  - Identify discharge learning needs (meds, wound care, etc )  - Refer to Case Management Department for coordinating discharge planning if the patient needs post-hospital services based on physician/advanced practitioner order or complex needs related to functional status, cognitive ability, or social support system  Outcome: Progressing     Problem: Knowledge Deficit  Goal: Patient/family/caregiver demonstrates understanding of disease process, treatment plan, medications, and discharge instructions  Description: Complete learning assessment and assess knowledge base    Interventions:  - Provide teaching at level of understanding  - Provide teaching via preferred learning methods  Outcome: Progressing

## 2022-02-09 LAB
BACTERIA BLD CULT: NORMAL
BACTERIA BLD CULT: NORMAL
IGA SERPL-MCNC: 418 MG/DL (ref 70–400)

## 2022-02-09 PROCEDURE — 99232 SBSQ HOSP IP/OBS MODERATE 35: CPT | Performed by: STUDENT IN AN ORGANIZED HEALTH CARE EDUCATION/TRAINING PROGRAM

## 2022-02-09 RX ORDER — HEPARIN SODIUM 5000 [USP'U]/ML
5000 INJECTION, SOLUTION INTRAVENOUS; SUBCUTANEOUS EVERY 8 HOURS SCHEDULED
Status: DISCONTINUED | OUTPATIENT
Start: 2022-02-09 | End: 2022-02-11 | Stop reason: HOSPADM

## 2022-02-09 RX ADMIN — OXYCODONE HYDROCHLORIDE 5 MG: 5 TABLET ORAL at 09:24

## 2022-02-09 RX ADMIN — POTASSIUM CHLORIDE 20 MEQ: 1500 TABLET, EXTENDED RELEASE ORAL at 08:50

## 2022-02-09 RX ADMIN — HEPARIN SODIUM 5000 UNITS: 5000 INJECTION INTRAVENOUS; SUBCUTANEOUS at 21:51

## 2022-02-09 RX ADMIN — FLUTICASONE PROPIONATE 1 SPRAY: 50 SPRAY, METERED NASAL at 08:51

## 2022-02-09 RX ADMIN — LEVOFLOXACIN 500 MG: 500 TABLET, FILM COATED ORAL at 00:31

## 2022-02-09 RX ADMIN — OXYCODONE HYDROCHLORIDE 5 MG: 5 TABLET ORAL at 00:20

## 2022-02-09 RX ADMIN — LEVOTHYROXINE SODIUM 125 MCG: 25 TABLET ORAL at 08:50

## 2022-02-09 RX ADMIN — HEPARIN SODIUM 5000 UNITS: 5000 INJECTION INTRAVENOUS; SUBCUTANEOUS at 14:46

## 2022-02-09 RX ADMIN — GUAIFENESIN 600 MG: 600 TABLET, EXTENDED RELEASE ORAL at 05:09

## 2022-02-09 RX ADMIN — OXYCODONE HYDROCHLORIDE 5 MG: 5 TABLET ORAL at 14:45

## 2022-02-09 RX ADMIN — OXYCODONE HYDROCHLORIDE 5 MG: 5 TABLET ORAL at 20:00

## 2022-02-09 RX ADMIN — OXYCODONE HYDROCHLORIDE 5 MG: 5 TABLET ORAL at 05:09

## 2022-02-09 RX ADMIN — GUAIFENESIN 600 MG: 600 TABLET, EXTENDED RELEASE ORAL at 14:45

## 2022-02-09 NOTE — ASSESSMENT & PLAN NOTE
· Patient reports that she has not drank in months  · continue pre-hospital Antabuse 250 mg p o  daily on discharge

## 2022-02-09 NOTE — ASSESSMENT & PLAN NOTE
· Two week history of abdominal numbness, groin numbness, thigh and knee pain, calf numbness, and no abnormality of the feet  · Subjective change in sensation of her bilateral lower leg but with no complete sensory deficit or motor dysfunction  · MRI L Spine (2/4/2022): Questionable T2 increased signal abnormality within the lower spinal cord at T11  Follow-up contrast-enhanced thoracic spine MRI for further evaluation  Right L5 exiting nerve root touches the right L5 disc    · MRI findings were discussed with radiology and could just be artifact however follow-up MRI is still recommended- attempted to have the patient follow-up outpatient with MRI, however, she noted that she could not walk and therefore could not go home  · MRI T-spine with and without contrast no acute cord pathology, mild degenerative changes noted T8 through T9  · Neurology consulted, appreciate recommendations  · CSF protein elevated at 58, CSF WBCs 6, Gram stain negative  · Will discuss with Neurology for further recommendations  · PT recommending home with home healthcare

## 2022-02-09 NOTE — UTILIZATION REVIEW
Continued Stay Review    Date: 2/9/22                          Current Patient Class: inpatient  Current Level of Care: med surg    HPI:41 y o  female initially admitted on 2/5/22     Assessment/Plan:   Persistent BLE weakness & loss of sensation  S/p LP 2/8; CSF protein elevated at 58, CSF WBCs 6, Gram stain negative  Reconsult  Neurology for further recommendations  Vital Signs:   /66   Pulse 74   Temp 98 2 °F (36 8 °C)   Resp 19   Ht 5' 2" (1 575 m)   Wt 98 5 kg (217 lb 3 2 oz)   SpO2 93%   BMI 39 73 kg/m²     Pertinent Labs/Diagnostic Results:   Results from last 7 days   Lab Units 02/07/22  0538 02/06/22  0516 02/05/22  0428 02/04/22  1444 02/04/22  1444   WBC Thousand/uL 6 51 4 11* 2 52*   < > 3 99*   HEMOGLOBIN g/dL 15 1 15 5* 16 2*  --  16 0*   HEMATOCRIT % 43 6 44 6 46 3*  --  45 9   PLATELETS Thousands/uL 181 185 205   < > 214   NEUTROS ABS Thousands/µL  --   --   --   --  2 73    < > = values in this interval not displayed       Results from last 7 days   Lab Units 02/07/22  0538 02/06/22  0516 02/05/22  0429 02/04/22  1445 02/04/22  1444   SODIUM mmol/L 130* 130* 131*  --  134*   POTASSIUM mmol/L 3 5 4 1 4 1  --  3 3*   CHLORIDE mmol/L 95* 95* 94*  --  92*   CO2 mmol/L 25 24 24  --  26   ANION GAP mmol/L 10 11 13  --  16*   BUN mg/dL 12 9 5  --  5   CREATININE mg/dL 0 64 0 60 0 61  --  0 68   EGFR ml/min/1 73sq m 110 112 112  --  108   CALCIUM mg/dL 8 9 8 9 8 8  --  8 6   MAGNESIUM mg/dL  --   --  1 9 1 3*  --      Results from last 7 days   Lab Units 02/04/22  1444   AST U/L 156*   ALT U/L 137*   ALK PHOS U/L 88   TOTAL PROTEIN g/dL 7 6   ALBUMIN g/dL 4 4   TOTAL BILIRUBIN mg/dL 2 51*     Results from last 7 days   Lab Units 02/07/22  0538 02/06/22  0516 02/05/22  0429 02/04/22  1444   GLUCOSE RANDOM mg/dL 125 146* 143* 108     Results from last 7 days   Lab Units 02/04/22  1444   CK TOTAL U/L 311*   CK MB INDEX % 1 9   CK MB ng/mL 5 8     Results from last 7 days   Lab Units 02/04/22  1444   D-DIMER QUANTITATIVE ug/ml FEU 0 32     Results from last 7 days   Lab Units 02/04/22  1444   PROTIME seconds 13 3   INR  1 02   PTT seconds 27     Results from last 7 days   Lab Units 02/05/22  0429   TSH 3RD GENERATON uIU/mL 5 361*     Results from last 7 days   Lab Units 02/05/22  0429 02/04/22  1444   PROCALCITONIN ng/ml 0 07 0 06     Results from last 7 days   Lab Units 02/04/22  1444   LACTIC ACID mmol/L 1 7     Results from last 7 days   Lab Units 02/05/22  0428   CLARITY UA  Clear   COLOR UA  Yellow   SPEC GRAV UA  1 015   PH UA  8 0*   GLUCOSE UA mg/dl Negative   KETONES UA mg/dl 40 (2+)*   BLOOD UA  Negative   PROTEIN UA mg/dl Negative   NITRITE UA  Negative   BILIRUBIN UA  Negative   UROBILINOGEN UA E U /dl 0 2   LEUKOCYTES UA  Negative     Results from last 7 days   Lab Units 02/08/22  1725 02/04/22  1444   BLOOD CULTURE   --  No Growth After 4 Days  No Growth After 4 Days  GRAM STAIN RESULT  No No Polys or Bacteria seen  --      Results from last 7 days   Lab Units 02/08/22  1725   TOTAL COUNTED  100     Results from last 7 days   Lab Units 02/08/22  1725 02/08/22  1724   APPEARANCE CSF  clear,colorless  --    TUBE NUM CSF  4  --    WBC CSF /uL 6*  --    XANTHOCHROMIA  No  --    NEUTROPHILS % (CSF) % 2  --    LYMPHS % (CSF) % 77  --    MONOCYTES % (CSF) % 21  --    GLUCOSE CSF mg/dL  --  69   PROTEIN CSF mg/dL  --  58*   RBC CSF uL 8  --      2/8  Lumbar puncture=   Findings: There was return of clear CSF upon entering the thecal sac  4 vials containing a total of 13 cc of CSF were submitted for laboratory analysis as ordered       Medications:   fluticasone, 1 spray, Nasal, Daily  guaiFENesin, 600 mg, Oral, Q12H [facility]  levofloxacin, 500 mg, Oral, Q24H  levothyroxine, 125 mcg, Oral, QAM  potassium chloride, 20 mEq, Oral, Daily    PRN Meds:  albuterol, 2 puff, Inhalation, Q4H PRN  clonazePAM, 0 5 mg, Oral, BID PRN  ibuprofen, 400 mg, Oral, Q6H PRN  ondansetron, 4 mg, Intravenous, Q6H PRN  oxyCODONE, 5 mg, Oral, Q4H PRN    Discharge Plan: tbd    Network Utilization Review Department  ATTENTION: Please call with any questions or concerns to [phone] and carefully listen to the prompts so that you are directed to the right person  All voicemails are confidential   Sudie Crigler all requests for admission clinical reviews, approved or denied determinations and any other requests to dedicated fax number below belonging to the campus where the patient is receiving treatment   List of dedicated fax numbers for the Facilities:  [address] DENIALS (Administrative/Medical Necessity) [phone]   [address] (Maternity/NICU/Pediatrics) [phone]   [address]  [address] [facility] [place] [address] [address] [place] [address] [address] [facility] [place] [address] [phone]

## 2022-02-09 NOTE — QUICK NOTE
Notified by lab CSF wbc is 6  CSF gram stain and culture added  Reviewed with Dr Christelle Cheema and Dr Judy Flood asked for IGA lab and if normal then start IVIg 0 4g/kg/d for 5 days

## 2022-02-10 PROBLEM — R11.0 NAUSEA: Status: ACTIVE | Noted: 2022-02-10

## 2022-02-10 LAB
ANION GAP SERPL CALCULATED.3IONS-SCNC: 11 MMOL/L (ref 4–13)
BUN SERPL-MCNC: 7 MG/DL (ref 5–25)
CALCIUM SERPL-MCNC: 9.7 MG/DL (ref 8.4–10.2)
CHLORIDE SERPL-SCNC: 95 MMOL/L (ref 96–108)
CO2 SERPL-SCNC: 25 MMOL/L (ref 21–32)
CREAT SERPL-MCNC: 0.5 MG/DL (ref 0.6–1.3)
ERYTHROCYTE [SEDIMENTATION RATE] IN BLOOD: 19 MM/HOUR (ref 0–19)
GFR SERPL CREATININE-BSD FRML MDRD: 119 ML/MIN/1.73SQ M
GLUCOSE SERPL-MCNC: 106 MG/DL (ref 65–140)
POTASSIUM SERPL-SCNC: 3.5 MMOL/L (ref 3.5–5.3)
SODIUM SERPL-SCNC: 131 MMOL/L (ref 135–147)
TSH SERPL DL<=0.05 MIU/L-ACNC: 8.65 UIU/ML (ref 0.45–5.33)

## 2022-02-10 PROCEDURE — 85652 RBC SED RATE AUTOMATED: CPT | Performed by: PSYCHIATRY & NEUROLOGY

## 2022-02-10 PROCEDURE — 82595 ASSAY OF CRYOGLOBULIN: CPT | Performed by: PSYCHIATRY & NEUROLOGY

## 2022-02-10 PROCEDURE — 86140 C-REACTIVE PROTEIN: CPT | Performed by: PSYCHIATRY & NEUROLOGY

## 2022-02-10 PROCEDURE — 84439 ASSAY OF FREE THYROXINE: CPT | Performed by: PSYCHIATRY & NEUROLOGY

## 2022-02-10 PROCEDURE — 80048 BASIC METABOLIC PNL TOTAL CA: CPT | Performed by: STUDENT IN AN ORGANIZED HEALTH CARE EDUCATION/TRAINING PROGRAM

## 2022-02-10 PROCEDURE — 86430 RHEUMATOID FACTOR TEST QUAL: CPT | Performed by: PSYCHIATRY & NEUROLOGY

## 2022-02-10 PROCEDURE — 84165 PROTEIN E-PHORESIS SERUM: CPT | Performed by: SPECIALIST

## 2022-02-10 PROCEDURE — 82164 ANGIOTENSIN I ENZYME TEST: CPT | Performed by: PSYCHIATRY & NEUROLOGY

## 2022-02-10 PROCEDURE — 84443 ASSAY THYROID STIM HORMONE: CPT | Performed by: PSYCHIATRY & NEUROLOGY

## 2022-02-10 PROCEDURE — 86235 NUCLEAR ANTIGEN ANTIBODY: CPT | Performed by: PSYCHIATRY & NEUROLOGY

## 2022-02-10 PROCEDURE — 99232 SBSQ HOSP IP/OBS MODERATE 35: CPT | Performed by: STUDENT IN AN ORGANIZED HEALTH CARE EDUCATION/TRAINING PROGRAM

## 2022-02-10 PROCEDURE — 84165 PROTEIN E-PHORESIS SERUM: CPT | Performed by: PSYCHIATRY & NEUROLOGY

## 2022-02-10 PROCEDURE — 86038 ANTINUCLEAR ANTIBODIES: CPT | Performed by: PSYCHIATRY & NEUROLOGY

## 2022-02-10 RX ORDER — OXYCODONE HYDROCHLORIDE 5 MG/1
5 TABLET ORAL EVERY 4 HOURS PRN
Status: DISCONTINUED | OUTPATIENT
Start: 2022-02-10 | End: 2022-02-11 | Stop reason: HOSPADM

## 2022-02-10 RX ORDER — OXYCODONE HYDROCHLORIDE 10 MG/1
10 TABLET ORAL EVERY 4 HOURS PRN
Status: DISCONTINUED | OUTPATIENT
Start: 2022-02-10 | End: 2022-02-11 | Stop reason: HOSPADM

## 2022-02-10 RX ADMIN — OXYCODONE HYDROCHLORIDE 10 MG: 10 TABLET ORAL at 05:11

## 2022-02-10 RX ADMIN — OXYCODONE HYDROCHLORIDE 10 MG: 10 TABLET ORAL at 10:32

## 2022-02-10 RX ADMIN — POTASSIUM CHLORIDE 20 MEQ: 1500 TABLET, EXTENDED RELEASE ORAL at 10:06

## 2022-02-10 RX ADMIN — HEPARIN SODIUM 5000 UNITS: 5000 INJECTION INTRAVENOUS; SUBCUTANEOUS at 22:33

## 2022-02-10 RX ADMIN — OXYCODONE HYDROCHLORIDE 5 MG: 5 TABLET ORAL at 00:14

## 2022-02-10 RX ADMIN — FLUTICASONE PROPIONATE 1 SPRAY: 50 SPRAY, METERED NASAL at 10:06

## 2022-02-10 RX ADMIN — HEPARIN SODIUM 5000 UNITS: 5000 INJECTION INTRAVENOUS; SUBCUTANEOUS at 16:18

## 2022-02-10 RX ADMIN — HEPARIN SODIUM 5000 UNITS: 5000 INJECTION INTRAVENOUS; SUBCUTANEOUS at 05:11

## 2022-02-10 RX ADMIN — GUAIFENESIN 600 MG: 600 TABLET, EXTENDED RELEASE ORAL at 05:11

## 2022-02-10 RX ADMIN — LEVOFLOXACIN 500 MG: 500 TABLET, FILM COATED ORAL at 00:37

## 2022-02-10 RX ADMIN — GUAIFENESIN 600 MG: 600 TABLET, EXTENDED RELEASE ORAL at 16:17

## 2022-02-10 RX ADMIN — OXYCODONE HYDROCHLORIDE 10 MG: 10 TABLET ORAL at 22:34

## 2022-02-10 RX ADMIN — OXYCODONE HYDROCHLORIDE 10 MG: 10 TABLET ORAL at 16:17

## 2022-02-10 RX ADMIN — LEVOTHYROXINE SODIUM 125 MCG: 25 TABLET ORAL at 10:06

## 2022-02-10 NOTE — PROGRESS NOTES
[address]  Progress Note - [facility] 1980, 42 y o  female MRN: [id number]  Unit/Bed#: -01 Encounter: [id number]  Primary Care Provider: Julisa Calderón DO   Date and time admitted to hospital: 2/4/2022  1:39 PM    * Lower extremity numbness  Assessment & Plan  · Prior to admission patient with two week history of abdominal numbness, groin numbness, thigh and knee pain, calf numbness, and no abnormality of the feet  · Subjective change in sensation of her bilateral lower leg but with no complete sensory deficit or motor dysfunction  · MRI L Spine (2/4/2022): Questionable T2 increased signal abnormality within the lower spinal cord at T11  Follow-up contrast-enhanced thoracic spine MRI for further evaluation  Right L5 exiting nerve root touches the right L5 disc  · MRI findings were discussed with radiology and could just be artifact however follow-up MRI is still recommended- attempted to have the patient follow-up outpatient with MRI, however, she noted that she could not walk and therefore could not go home  · MRI T-spine with and without contrast no acute cord pathology, mild degenerative changes noted T8 through T9  · Neurology consulted, appreciate recommendations  · CSF protein elevated at 58, CSF WBCs 6, Gram stain negative  · Discussed case with Neurology, likely not GBS at this time given length of symptoms and above CSF findings    · Neurology to order more lab work to rule out other potential causes  · PT recommending home with home healthcare      Nausea  Assessment & Plan  Patient reports episode of nausea and vomiting last night  -Zofran p r n   -will monitor patient's oral intake overnight    Ambulatory dysfunction  Assessment & Plan  · Due to knee/thigh pain and numbness as above  · PT/OT recommending home with home rehabilitation    COVID-19  Assessment & Plan  · Positive home test approximately 1 5 weeks ago  · Not requiring any supplemental oxygen at this time  · Continue further supportive care    Hypothyroidism  Assessment & Plan  · Continue pre-hospital levothyroxine 125 mcg p o  daily    JOSE (generalized anxiety disorder)  Assessment & Plan  · Continue pre-hospital Klonopin 0 5 mg p o  b i d  p r n  Alcohol dependence in remission [facility])  Assessment & Plan  · Patient reports that she has not drank in months  · continue pre-hospital Antabuse 250 mg p o  daily on discharge      VTE Pharmacologic Prophylaxis: VTE Score: 3 Moderate Risk (Score 3-4) - Pharmacological DVT Prophylaxis Ordered: heparin  Patient Centered Rounds: I performed bedside rounds with nursing staff today  Discussions with Specialists or Other Care Team Provider:  Neurology    Education and Discussions with Family / Patient:  Discussed with patient all questions answered    Time Spent for Care: 30 minutes  More than 50% of total time spent on counseling and coordination of care as described above  Current Length of Stay: 5 day(s)  Current Patient Status: Inpatient   Certification Statement: The patient will continue to require additional inpatient hospital stay due to Nausea and vomiting, further neurological workup  Discharge Plan: Anticipate discharge tomorrow to home with home services  Code Status: Level 1 - Full Code    Subjective:   Patient seen examined bedside  Patient resting in bed no apparent distress  Patient reports difficulty tolerating oral diet last night and experiencing nausea vomiting  Objective:     Vitals:   Temp (24hrs), Av °F (36 7 °C), Min:97 8 °F (36 6 °C), Max:98 1 °F (36 7 °C)    Temp:  [97 8 °F (36 6 °C)-98 1 °F (36 7 °C)] 98 1 °F (36 7 °C)  HR:  [70-75] 70  Resp:  [18] 18  BP: ()/(72-78) 113/72  SpO2:  [91 %-94 %] 91 %  Body mass index is 39 73 kg/m²  Input and Output Summary (last 24 hours):   No intake or output data in the 24 hours ending 02/10/22 1319    Physical Exam:   Physical Exam  Constitutional:       Appearance: She is obese  Cardiovascular:      Rate and Rhythm: Normal rate and regular rhythm  Pulses: Normal pulses  Heart sounds: Normal heart sounds  Pulmonary:      Effort: Pulmonary effort is normal    Abdominal:      General: Abdomen is flat  Bowel sounds are normal       Palpations: Abdomen is soft  Musculoskeletal:         General: Normal range of motion  Cervical back: Normal range of motion  Skin:     General: Skin is warm and dry  Neurological:      General: No focal deficit present  Mental Status: She is alert and oriented to person, place, and time  Mental status is at baseline  Cranial Nerves: No cranial nerve deficit  Sensory: Sensory deficit present  Motor: No weakness  Additional Data:     Labs:  Results from last 7 days   Lab Units 02/07/22  0538 02/05/22 0428 02/04/22  1444   WBC Thousand/uL 6 51   < > 3 99*   HEMOGLOBIN g/dL 15 1   < > 16 0*   HEMATOCRIT % 43 6   < > 45 9   PLATELETS Thousands/uL 181   < > 214   NEUTROS PCT %  --   --  68   LYMPHS PCT %  --   --  22   MONOS PCT %  --   --  8   EOS PCT %  --   --  0    < > = values in this interval not displayed  Results from last 7 days   Lab Units 02/10/22  0511 02/05/22 0429 02/04/22  1444   SODIUM mmol/L 131*   < > 134*   POTASSIUM mmol/L 3 5   < > 3 3*   CHLORIDE mmol/L 95*   < > 92*   CO2 mmol/L 25   < > 26   BUN mg/dL 7   < > 5   CREATININE mg/dL 0 50*   < > 0 68   ANION GAP mmol/L 11   < > 16*   CALCIUM mg/dL 9 7   < > 8 6   ALBUMIN g/dL  --   --  4 4   TOTAL BILIRUBIN mg/dL  --   --  2 51*   ALK PHOS U/L  --   --  88   ALT U/L  --   --  137*   AST U/L  --   --  156*   GLUCOSE RANDOM mg/dL 106   < > 108    < > = values in this interval not displayed       Results from last 7 days   Lab Units 02/04/22  1444   INR  1 02             Results from last 7 days   Lab Units 02/05/22 0429 02/04/22  1444   LACTIC ACID mmol/L  --  1 7   PROCALCITONIN ng/ml 0 07 0 06       Lines/Drains:  Invasive Devices  Report Peripheral Intravenous Line            Peripheral IV 02/05/22 Right Antecubital 5 days                      Imaging: No pertinent imaging reviewed  Recent Cultures (last 7 days):   Results from last 7 days   Lab Units 02/08/22  1725 02/04/22  1444   BLOOD CULTURE   --  No Growth After 5 Days  No Growth After 5 Days  GRAM STAIN RESULT  No No Polys or Bacteria seen  --        Last 24 Hours Medication List:   Current Facility-Administered Medications   Medication Dose Route Frequency Provider Last Rate    albuterol  2 puff Inhalation Q4H PRN Jonel Jaci, PA-C      clonazePAM  0 5 mg Oral BID PRN Jonel Jaci, PA-C      fluticasone  1 spray Nasal Daily Jonel Jaci, PA-C      guaiFENesin  600 mg Oral Q12H [address] Jonel Jaci, PA-C      heparin (porcine)  5,000 Units Subcutaneous [facility] Salome Lew DO      ibuprofen  400 mg Oral Q6H PRN Jonel Jaci, PA-C      levothyroxine  125 mcg Oral QAM Jonel Jaci, PA-C      LORazepam  2 mg Intravenous Once Wilhelmena Fix, DO      ondansetron  4 mg Intravenous Q6H PRN Jonel Jaci, PA-C      oxyCODONE  10 mg Oral Q4H PRN Edwyna Ferrned, PA-C      oxyCODONE  5 mg Oral Q4H PRN Edwyna Ferrned, PA-C      potassium chloride  20 mEq Oral Daily ZACHARY Herrera-C          Today, Patient Was Seen By: Salome Lew DO    **Please Note: This note may have been constructed using a voice recognition system  **

## 2022-02-10 NOTE — ASSESSMENT & PLAN NOTE
· Positive home test approximately 1 5 weeks ago  · Not requiring any supplemental oxygen at this time  · Continue further supportive care

## 2022-02-10 NOTE — ASSESSMENT & PLAN NOTE
· Prior to admission patient with two week history of abdominal numbness, groin numbness, thigh and knee pain, calf numbness, and no abnormality of the feet  · Subjective change in sensation of her bilateral lower leg but with no complete sensory deficit or motor dysfunction  · MRI L Spine (2/4/2022): Questionable T2 increased signal abnormality within the lower spinal cord at T11  Follow-up contrast-enhanced thoracic spine MRI for further evaluation  Right L5 exiting nerve root touches the right L5 disc  · MRI findings were discussed with radiology and could just be artifact however follow-up MRI is still recommended- attempted to have the patient follow-up outpatient with MRI, however, she noted that she could not walk and therefore could not go home  · MRI T-spine with and without contrast no acute cord pathology, mild degenerative changes noted T8 through T9  · Neurology consulted, appreciate recommendations  · CSF protein elevated at 58, CSF WBCs 6, Gram stain negative  · Discussed case with Neurology, likely not GBS at this time given length of symptoms and above CSF findings    · Neurology to order more lab work to rule out other potential causes  · PT recommending home with home healthcare

## 2022-02-10 NOTE — ASSESSMENT & PLAN NOTE
Patient reports episode of nausea and vomiting last night  -Zofran p r n   -will monitor patient's oral intake overnight

## 2022-02-10 NOTE — CASE MANAGEMENT
Case Management Discharge Planning Note    Patient name Uri Dias  Location /-11 MRN [id number]  : 1980 Date 2/10/2022       Current Admission Date: 2022  Current Admission Diagnosis:Lower extremity numbness   Patient Active Problem List    Diagnosis Date Noted    Nausea 02/10/2022    Ambulatory dysfunction 2022    Lower extremity numbness 2022    COVID-19 2022    Severe protein-calorie malnutrition [facility] ) 2020    Hypomagnesemia 2020    Class 1 obesity in adult 2020    Terminal ileitis without complication ([facility] )     Cro[facility]'s disease of colon with complication ([facility] )     Hypothyroidism 2020    Recurrent major depressive disorder, in partial remission ([place] [address] ) 10/04/2019    JOSE (generalized anxiety disorder) 10/04/2019    Moderate persistent asthma without complication     Umbilical hernia without obstruction and without gangrene 2019    Alcohol dependence in remission ([facility] ) 2015      LOS (days): 5  Geometric Mean LOS (GMLOS) (days):   Days to GMLOS:     OBJECTIVE:  Risk of Unplanned Readmission Score: 10         Current admission status: Inpatient   Preferred Pharmacy:   [facility] [place] [facility] [address]  [address] [place] [id number]  Phone: [phone] Fax: [phone]    Primary Care Provider: Quyen Hamilton DO    Primary Insurance: [employer]  Secondary Insurance:     DISCHARGE DETAILS:    Discharge planning discussed with[de-identified] pt was called at 12:04 pm  Freedom of Choice: Yes  Comments - Freedom of Choice: [facility] recommended    permission was given to send a blanket referral to agencies that accept her insurance                     Requested  [facility] [address]         Is the patient interested in [facility] at discharge?: Yes  [facility] requested[de-identified] [address] [facility] Name[de-identified] Other ([facility])  Home Health Follow-Up Provider[de-identified] PCP  Home Health Services Needed[de-identified] Strengthening/Theraputic Exercises to Improve Function  Homebound Criteria Met[de-identified] Uses an Assist Device (i e  cane, walker, etc),Requires the Assistance of Another Person for Safe Ambulation or to Leave the Home  Supporting Clincal Findings[de-identified] Limited Endurance         Other Referral/Resources/Interventions Provided:  Interventions: HHC ([facility])  Referral Comments: blanket referrals were sent   [facility] accepted    Would you like to participate in our [address] service program?  : No - Declined    Treatment Team Recommendation: Home with [employer] ([facility] ,  family will transport)  Discharge Destination Plan[de-identified] Home with [employer] (home with [facility], family will transport)

## 2022-02-10 NOTE — PLAN OF CARE
Problem: Potential for Falls  Goal: Patient will remain free of falls  Description: INTERVENTIONS:  - Educate patient/family on patient safety including physical limitations  - Instruct patient to call for assistance with activity   - Consult OT/PT to assist with strengthening/mobility   - Keep Call bell within reach  - Keep bed low and locked with side rails adjusted as appropriate  - Keep care items and personal belongings within reach  - Initiate and maintain comfort rounds  - Make Fall Risk Sign visible to staff  - Offer Toileting in advance of need  - Initiate/Maintain bed alarm  - Obtain necessary fall risk management equipment:   - Apply yellow socks and bracelet for high fall risk patients  - Consider moving patient to room near nurses station  Outcome: Progressing     Problem: Nutrition/Hydration-ADULT  Goal: Nutrient/Hydration intake appropriate for improving, restoring or maintaining nutritional needs  Description: Monitor and assess patient's nutrition/hydration status for malnutrition  Collaborate with interdisciplinary team and initiate plan and interventions as ordered  Monitor patient's weight and dietary intake as ordered or per policy  Utilize nutrition screening tool and intervene as necessary  Determine patient's food preferences and provide high-protein, high-caloric foods as appropriate       INTERVENTIONS:  - Monitor oral intake, urinary output, labs, and treatment plans  - Assess nutrition and hydration status and recommend course of action  - Evaluate amount of meals eaten  - Assist patient with eating if necessary   - Allow adequate time for meals  - Recommend/ encourage appropriate diets, oral nutritional supplements, and vitamin/mineral supplements  - Order, calculate, and assess calorie counts as needed  - Assess need for intravenous fluids  - Provide specific nutrition/hydration education as appropriate  - Include patient/family/caregiver in decisions related to nutrition  Outcome: Progressing

## 2022-02-11 ENCOUNTER — TRANSITIONAL CARE MANAGEMENT (OUTPATIENT)
Dept: INTERNAL MEDICINE CLINIC | Facility: CLINIC | Age: 42
End: 2022-02-11

## 2022-02-11 VITALS
HEIGHT: 62 IN | TEMPERATURE: 98.2 F | BODY MASS INDEX: 39.97 KG/M2 | HEART RATE: 80 BPM | SYSTOLIC BLOOD PRESSURE: 104 MMHG | OXYGEN SATURATION: 90 % | WEIGHT: 217.2 LBS | RESPIRATION RATE: 19 BRPM | DIASTOLIC BLOOD PRESSURE: 76 MMHG

## 2022-02-11 LAB
BACTERIA CSF CULT: NO GROWTH
CRP SERPL QL: 22.1 MG/L
RHEUMATOID FACT SER QL LA: NEGATIVE
T4 FREE SERPL-MCNC: 1.48 NG/DL (ref 0.76–1.46)

## 2022-02-11 PROCEDURE — 99239 HOSP IP/OBS DSCHRG MGMT >30: CPT | Performed by: STUDENT IN AN ORGANIZED HEALTH CARE EDUCATION/TRAINING PROGRAM

## 2022-02-11 RX ORDER — CALCIUM CARBONATE 200(500)MG
500 TABLET,CHEWABLE ORAL DAILY PRN
Qty: 30 TABLET | Refills: 0 | Status: SHIPPED | OUTPATIENT
Start: 2022-02-11 | End: 2022-02-27 | Stop reason: HOSPADM

## 2022-02-11 RX ORDER — CALCIUM CARBONATE 200(500)MG
500 TABLET,CHEWABLE ORAL DAILY PRN
Status: DISCONTINUED | OUTPATIENT
Start: 2022-02-11 | End: 2022-02-11 | Stop reason: HOSPADM

## 2022-02-11 RX ORDER — OXYCODONE HYDROCHLORIDE 10 MG/1
10 TABLET ORAL EVERY 4 HOURS PRN
Qty: 10 TABLET | Refills: 0 | Status: SHIPPED | OUTPATIENT
Start: 2022-02-11 | End: 2022-02-21

## 2022-02-11 RX ADMIN — GUAIFENESIN 600 MG: 600 TABLET, EXTENDED RELEASE ORAL at 06:26

## 2022-02-11 RX ADMIN — OXYCODONE HYDROCHLORIDE 10 MG: 10 TABLET ORAL at 06:30

## 2022-02-11 RX ADMIN — OXYCODONE HYDROCHLORIDE 10 MG: 10 TABLET ORAL at 16:00

## 2022-02-11 RX ADMIN — OXYCODONE HYDROCHLORIDE 10 MG: 10 TABLET ORAL at 02:30

## 2022-02-11 RX ADMIN — FLUTICASONE PROPIONATE 1 SPRAY: 50 SPRAY, METERED NASAL at 08:16

## 2022-02-11 RX ADMIN — LEVOTHYROXINE SODIUM 125 MCG: 25 TABLET ORAL at 08:21

## 2022-02-11 RX ADMIN — CALCIUM CARBONATE (ANTACID) CHEW TAB 500 MG 500 MG: 500 CHEW TAB at 09:32

## 2022-02-11 RX ADMIN — ONDANSETRON 4 MG: 2 INJECTION INTRAMUSCULAR; INTRAVENOUS at 16:00

## 2022-02-11 RX ADMIN — POTASSIUM CHLORIDE 20 MEQ: 1500 TABLET, EXTENDED RELEASE ORAL at 08:21

## 2022-02-11 RX ADMIN — CLONAZEPAM 0.5 MG: 0.5 TABLET ORAL at 09:15

## 2022-02-11 RX ADMIN — OXYCODONE HYDROCHLORIDE 10 MG: 10 TABLET ORAL at 11:26

## 2022-02-11 RX ADMIN — HEPARIN SODIUM 5000 UNITS: 5000 INJECTION INTRAVENOUS; SUBCUTANEOUS at 06:26

## 2022-02-11 NOTE — ASSESSMENT & PLAN NOTE
· Prior to admission patient with two week history of abdominal numbness, groin numbness, thigh and knee pain, calf numbness, and no abnormality of the feet  · Subjective change in sensation of her bilateral lower leg but with no complete sensory deficit or motor dysfunction  · MRI L Spine (2/4/2022): Questionable T2 increased signal abnormality within the lower spinal cord at T11  Follow-up contrast-enhanced thoracic spine MRI for further evaluation  Right L5 exiting nerve root touches the right L5 disc  · MRI findings were discussed with radiology and could just be artifact however follow-up MRI is still recommended- attempted to have the patient follow-up outpatient with MRI, however, she noted that she could not walk and therefore could not go home  · MRI T-spine with and without contrast no acute cord pathology, mild degenerative changes noted T8 through T9  · Neurology consulted, appreciate recommendations  · CSF protein elevated at 58, CSF WBCs 6, Gram stain negative  · Discussed case with Neurology, likely not GBS at this time given length of symptoms and above CSF findings    · Neurology to order more lab work to rule out other potential causes, pending at time of discharge  · PT recommending home with home rehab  · Patient medically stable for discharge  · Patient will follow-up with Neurology on outpatient basis

## 2022-02-11 NOTE — PLAN OF CARE
Problem: Potential for Falls  Goal: Patient will remain free of falls  Description: INTERVENTIONS:  - Educate patient/family on patient safety including physical limitations  - Instruct patient to call for assistance with activity   - Consult OT/PT to assist with strengthening/mobility   - Keep Call bell within reach  - Keep bed low and locked with side rails adjusted as appropriate  - Keep care items and personal belongings within reach  - Initiate and maintain comfort rounds  - Make Fall Risk Sign visible to staff  - Offer Toileting in advance of need  - Initiate/Maintain bed alarm  - Obtain necessary fall risk management equipment:   - Apply yellow socks and bracelet for high fall risk patients  - Consider moving patient to room near nurses station  2/11/2022 1803 by Kylee Lundy RN  Outcome: Adequate for Discharge  2/11/2022 1237 by Kylee Lundy RN  Outcome: Progressing     Problem: Nutrition/Hydration-ADULT  Goal: Nutrient/Hydration intake appropriate for improving, restoring or maintaining nutritional needs  Description: Monitor and assess patient's nutrition/hydration status for malnutrition  Collaborate with interdisciplinary team and initiate plan and interventions as ordered  Monitor patient's weight and dietary intake as ordered or per policy  Utilize nutrition screening tool and intervene as necessary  Determine patient's food preferences and provide high-protein, high-caloric foods as appropriate       INTERVENTIONS:  - Monitor oral intake, urinary output, labs, and treatment plans  - Assess nutrition and hydration status and recommend course of action  - Evaluate amount of meals eaten  - Assist patient with eating if necessary   - Allow adequate time for meals  - Recommend/ encourage appropriate diets, oral nutritional supplements, and vitamin/mineral supplements  - Order, calculate, and assess calorie counts as needed  - Assess need for intravenous fluids  - Provide specific nutrition/hydration education as appropriate  - Include patient/family/caregiver in decisions related to nutrition  Outcome: Adequate for Discharge     Problem: MOBILITY - ADULT  Goal: Maintain or return to baseline ADL function  Description: INTERVENTIONS:  -  Assess patient's ability to carry out ADLs; assess patient's baseline for ADL function and identify physical deficits which impact ability to perform ADLs (bathing, care of mouth/teeth, toileting, grooming, dressing, etc )  - Assess/evaluate cause of self-care deficits   - Assess range of motion  - Assess patient's mobility; develop plan if impaired  - Assess patient's need for assistive devices and provide as appropriate  - Encourage maximum independence but intervene and supervise when necessary  - Involve family in performance of ADLs  - Assess for home care needs following discharge   - Consider OT consult to assist with ADL evaluation and planning for discharge  - Provide patient education as appropriate  Outcome: Adequate for Discharge  Goal: Maintains/Returns to pre admission functional level  Description: INTERVENTIONS:  - Perform BMAT or MOVE assessment daily    - Set and communicate daily mobility goal to care team and patient/family/caregiver     - Collaborate with rehabilitation services on mobility goals if consulted  - Ambulate patient as tolerated  - Out of bed to chair as desired  - Out of bed for meals   - Out of bed for toileting  - Record patient progress and toleration of activity level   Outcome: Adequate for Discharge     Problem: NEUROSENSORY - ADULT  Goal: Achieves stable or improved neurological status  Description: INTERVENTIONS  - Monitor and report changes in neurological status  - Monitor vital signs such as temperature, blood pressure, glucose, and any other labs ordered   - Initiate measures to prevent increased intracranial pressure  - Monitor for seizure activity and implement precautions if appropriate      Outcome: Adequate for Discharge     Problem: DISCHARGE PLANNING  Goal: Discharge to home or other facility with appropriate resources  Description: INTERVENTIONS:  - Identify barriers to discharge w/patient and caregiver  - Arrange for needed discharge resources and transportation as appropriate  - Identify discharge learning needs (meds, wound care, etc )  - Refer to Case Management Department for coordinating discharge planning if the patient needs post-hospital services based on physician/advanced practitioner order or complex needs related to functional status, cognitive ability, or social support system  Outcome: Adequate for Discharge

## 2022-02-11 NOTE — CASE MANAGEMENT
Case Management Discharge Planning Note    Patient name Brett Sanchez  Location /-53 MRN [id number]  : 1980 Date 2022       Current Admission Date: 2022  Current Admission Diagnosis:Lower extremity numbness   Patient Active Problem List    Diagnosis Date Noted    Nausea 02/10/2022    Ambulatory dysfunction 2022    Lower extremity numbness 2022    COVID-19 2022    Severe protein-calorie malnutrition ([facility] ) 2020    Hypomagnesemia 2020    Class 1 obesity in adult 2020    Terminal ileitis without complication ([facility] )     Crohn's disease of colon with complication ([place] [address] )     Hypothyroidism 2020    Recurrent major depressive disorder, in partial remission ([place] [address] ) 10/04/2019    JOSE (generalized anxiety disorder) 10/04/2019    Moderate persistent asthma without complication     Umbilical hernia without obstruction and without gangrene 2019    Alcohol dependence in remission ([place] [address] ) 2015      LOS (days): 6  Geometric Mean LOS (GMLOS) (days):   Days to GMLOS:     OBJECTIVE:  Risk of Unplanned Readmission Score: 10     Current admission status: Inpatient   Preferred Pharmacy:   [facility] [address]  [address] [place] [id number]  Phone: [phone] Fax: [phone]    Primary Care Provider: Monika Martel DO    Primary Insurance: DERICK  Secondary Insurance:     DISCHARGE DETAILS:  Pt is stable to be discharged  TC to pt father Shelton Talavera who indicated he is in [place], will call [address] [place] [address] RN (phone number provided) when he is on the way

## 2022-02-11 NOTE — DISCHARGE SUMMARY
Yoon 44  Discharge- Alice Saliva 1980, 42 y o  female MRN: [id number]  Unit/Bed#: -01 Encounter: [id number]  Primary Care Provider: Casper Mancuso DO   Date and time admitted to hospital: 2/4/2022  1:39 PM    * Lower extremity numbness  Assessment & Plan  · Prior to admission patient with two week history of abdominal numbness, groin numbness, thigh and knee pain, calf numbness, and no abnormality of the feet  · Subjective change in sensation of her bilateral lower leg but with no complete sensory deficit or motor dysfunction  · MRI L Spine (2/4/2022): Questionable T2 increased signal abnormality within the lower spinal cord at T11  Follow-up contrast-enhanced thoracic spine MRI for further evaluation  Right L5 exiting nerve root touches the right L5 disc  · MRI findings were discussed with radiology and could just be artifact however follow-up MRI is still recommended- attempted to have the patient follow-up outpatient with MRI, however, she noted that she could not walk and therefore could not go home  · MRI T-spine with and without contrast no acute cord pathology, mild degenerative changes noted T8 through T9  · Neurology consulted, appreciate recommendations  · CSF protein elevated at 58, CSF WBCs 6, Gram stain negative  · Discussed case with Neurology, likely not GBS at this time given length of symptoms and above CSF findings    · Neurology to order more lab work to rule out other potential causes, pending at time of discharge  · PT recommending home with home healthcare  · Patient medically stable for discharge  · Patient will follow-up with Neurology on outpatient basis      Nausea  Assessment & Plan  Patient reports episode of nausea and vomiting last night  -Zofran p r n   -will monitor patient's oral intake overnight    Ambulatory dysfunction  Assessment & Plan  · Due to knee/thigh pain and numbness as above  · PT/OT recommending home with home rehabilitation    COVID-19  Assessment & Plan  · Positive home test approximately 1 5 weeks ago  · Not requiring any supplemental oxygen at this time  · Continue further supportive care    Hypothyroidism  Assessment & Plan  · Continue pre-hospital levothyroxine 125 mcg p o  daily    JOSE (generalized anxiety disorder)  Assessment & Plan  · Continue pre-hospital Klonopin 0 5 mg p o  b i d  p r n  Alcohol dependence in remission [facility])  Assessment & Plan  · Patient reports that she has not drank in months  · continue pre-hospital Antabuse 250 mg p o  daily on discharge      Medical Problems             Resolved Problems  Date Reviewed: 2/11/2022    None              Discharging Physician / Practitioner: Lex Osorio DO  PCP: Larry Duggan DO  Admission Date:   Admission Orders (From admission, onward)     Ordered        02/05/22 1148  Inpatient Admission  Once            02/05/22 0045  Place in Observation  Once                      Discharge Date: 02/11/22    Consultations During Hospital Stay:  · Neurology    Procedures Performed:   · Lumbar puncture "results in chart"    Significant Findings / Test Results:   MRI lumbar spine revealed:   Questionable T2 increased signal abnormality within the lower spinal cord at T11  Follow-up contrast-enhanced thoracic spine MRI for further evaluation    Right L5 exiting nerve root touches the right L5 disc  MRI T-spine revealed:  No cord signal abnormality or pathologic enhancement    Mild degenerative change pronounced at level T8-9 with mild canal narrowing  Incidental Findings:   · None    Test Results Pending at Discharge (will require follow up):    · Further test ordered by Neurology pending include( angiotensin-converting enzyme, Sjogren's antibody, DALLAS screen, cryoglobulin, protein electrophoresis     Outpatient Tests Requested:  · None    Complications:  None    Reason for Admission:  Numbness and weakness of bilateral lower extremities x2 weeks    Hospital Course:   Kwame Hernandez is a 42 y o  female patient who originally presented to the hospital on 2/4/2022 due to numbness and weakness of bilateral lower extremities x2 weeks  Patient presented to ED, MRI lumbar spine was obtained, which revealed findings as above  ED provider discussed case with Neurosurgery who deemed no acute intervention warranted at this time  Recommended checking MRI T-spine which revealed findings as above  Neurology was consulted for further guidance  Lumbar puncture was obtained to rule out GBS  Findings were not consistent with GBS as per Neurology  Neurology recommending patient follow-up with neuromusculoskeletal clinic on outpatient basis  Patient was seen by PT/OT who recommended home with home rehab  Patient was advised follow-up with PCP within 7 days  Please see above list of diagnoses and related plan for additional information  Condition at Discharge: good    Discharge Day Visit / Exam:   Subjective:  Patient seen examined at bedside  No acute events overnight  Vitals: Blood Pressure: 104/76 (02/11/22 1503)  Pulse: 80 (02/11/22 1503)  Temperature: 98 2 °F (36 8 °C) (02/11/22 0711)  Temp Source: Oral (02/10/22 2250)  Respirations: 19 (02/11/22 1503)  Height: 5' 2" (157 5 cm) (02/07/22 1726)  Weight - Scale: 98 5 kg (217 lb 3 2 oz) (02/06/22 1528)  SpO2: 90 % (02/11/22 1503)  Exam:   Physical Exam  Constitutional:       Appearance: She is obese  HENT:      Head: Normocephalic  Nose: Nose normal    Cardiovascular:      Rate and Rhythm: Normal rate and regular rhythm  Pulses: Normal pulses  Pulmonary:      Effort: Pulmonary effort is normal    Abdominal:      General: Abdomen is flat  Bowel sounds are normal  There is no distension  Palpations: There is no mass  Tenderness: There is no abdominal tenderness  Musculoskeletal:      Cervical back: Normal range of motion  Right lower leg: No edema  Left lower leg: No edema  Neurological:      General: No focal deficit present  Mental Status: She is alert and oriented to person, place, and time  Mental status is at baseline  Cranial Nerves: No cranial nerve deficit  Sensory: Sensory deficit present  Motor: No weakness  Gait: Gait normal       Comments: Patient with perceived sensory deficit lower extremities bilaterally     Psychiatric:         Mood and Affect: Mood normal           Discussion with Family:  None discussed with patient    Discharge instructions/Information to patient and family:   See after visit summary for information provided to patient and family  Provisions for Follow-Up Care:  See after visit summary for information related to follow-up care and any pertinent home health orders  Disposition:   Home with [facility] Services (Reminder: Complete face to face encounter)    Planned Readmission:  Now     Discharge Statement:  I spent 45 minutes discharging the patient  This time was spent on the day of discharge  I had direct contact with the patient on the day of discharge  Greater than 50% of the total time was spent examining patient, answering all patient questions, arranging and discussing plan of care with patient as well as directly providing post-discharge instructions  Additional time then spent on discharge activities  Discharge Medications:  See after visit summary for reconciled discharge medications provided to patient and/or family        **Please Note: This note may have been constructed using a voice recognition system**

## 2022-02-11 NOTE — NURSING NOTE
IV removedwith catheter tip intact, DSD and pressure applied  AVS reviewed with the patient, who expressed understanding

## 2022-02-12 LAB
ENA SS-A AB SER-ACNC: <0.2 AI (ref 0–0.9)
ENA SS-B AB SER-ACNC: <0.2 AI (ref 0–0.9)

## 2022-02-14 LAB
ACE SERPL-CCNC: 52 U/L (ref 14–82)
RYE IGE QN: NEGATIVE

## 2022-02-14 NOTE — UTILIZATION REVIEW
Notification of Discharge   This is a Notification of Discharge from our facility [address]  Please be advised that this patient has been discharge from our facility  Below you will find the admission and discharge date and time including the patients disposition  UTILIZATION REVIEW CONTACT:  Cindy Echols  Utilization   Network Utilization Review Department  Phone: [phone] carefully listen to the prompts  All voicemails are confidential   Email: [email or web address]     PHYSICIAN ADVISORY SERVICES:  FOR [facility] REVIEW - MEDICAL NECESSITY DENIAL  Phone: [phone]  Fax: [phone]  Email: [email or web address]     PRESENTATION DATE: 2/4/2022  1:39 PM  OBERVATION ADMISSION DATE:  INPATIENT ADMISSION DATE: 2/5/22 11:48 AM   DISCHARGE DATE: 2/11/2022  5:35 PM  DISPOSITION: Home with [address] [place] [address] with [address] INFORMATION:  Send all requests for admission clinical reviews, approved or denied determinations and any other requests to dedicated fax number below belonging to the campus where the patient is receiving treatment   List of dedicated fax numbers:  [address] [place] [address] DENIALS (Administrative/Medical Necessity) [phone]   [address] (Maternity/NICU/Pediatrics) [phone]   [address] [facility] [phone]   [address] [phone]   [address] [place] [address] [phone]   [address] [phone]   [facility]  [phone]   [address] [place] [address], [address]  [address] [facility] [address] [facility] [phone]

## 2022-02-15 ENCOUNTER — OFFICE VISIT (OUTPATIENT)
Dept: INTERNAL MEDICINE CLINIC | Facility: CLINIC | Age: 42
End: 2022-02-15
Payer: COMMERCIAL

## 2022-02-15 VITALS
SYSTOLIC BLOOD PRESSURE: 110 MMHG | HEART RATE: 106 BPM | HEIGHT: 62 IN | DIASTOLIC BLOOD PRESSURE: 74 MMHG | TEMPERATURE: 98.8 F | BODY MASS INDEX: 38.24 KG/M2 | OXYGEN SATURATION: 97 % | WEIGHT: 207.8 LBS

## 2022-02-15 DIAGNOSIS — Z12.31 ENCOUNTER FOR SCREENING MAMMOGRAM FOR MALIGNANT NEOPLASM OF BREAST: ICD-10-CM

## 2022-02-15 DIAGNOSIS — K50.119 CROHN'S DISEASE OF COLON WITH COMPLICATION (HCC): ICD-10-CM

## 2022-02-15 DIAGNOSIS — R73.09 ELEVATED GLUCOSE: ICD-10-CM

## 2022-02-15 DIAGNOSIS — J45.909 UNCOMPLICATED ASTHMA, UNSPECIFIED ASTHMA SEVERITY, UNSPECIFIED WHETHER PERSISTENT: ICD-10-CM

## 2022-02-15 DIAGNOSIS — J45.40 MODERATE PERSISTENT ASTHMA WITHOUT COMPLICATION: ICD-10-CM

## 2022-02-15 DIAGNOSIS — E83.42 HYPOMAGNESEMIA: ICD-10-CM

## 2022-02-15 DIAGNOSIS — K70.0 FATTY LIVER, ALCOHOLIC: ICD-10-CM

## 2022-02-15 DIAGNOSIS — F41.8 ANXIETY WITH DEPRESSION: ICD-10-CM

## 2022-02-15 DIAGNOSIS — R20.0 LOWER EXTREMITY NUMBNESS: ICD-10-CM

## 2022-02-15 DIAGNOSIS — E83.51 HYPOCALCEMIA: ICD-10-CM

## 2022-02-15 DIAGNOSIS — E43 SEVERE PROTEIN-CALORIE MALNUTRITION (HCC): ICD-10-CM

## 2022-02-15 DIAGNOSIS — R79.82 ELEVATED C-REACTIVE PROTEIN (CRP): ICD-10-CM

## 2022-02-15 DIAGNOSIS — Z02.89 ENCOUNTER FOR COMPLETION OF FORM WITH PATIENT: ICD-10-CM

## 2022-02-15 DIAGNOSIS — R79.89 LACTATE BLOOD INCREASED: ICD-10-CM

## 2022-02-15 DIAGNOSIS — E03.9 HYPOTHYROIDISM, UNSPECIFIED TYPE: ICD-10-CM

## 2022-02-15 DIAGNOSIS — Z13.31 POSITIVE DEPRESSION SCREENING: ICD-10-CM

## 2022-02-15 DIAGNOSIS — F10.239 ALCOHOL WITHDRAWAL SYNDROME WITH COMPLICATION (HCC): ICD-10-CM

## 2022-02-15 DIAGNOSIS — R45.84 ANHEDONIA: ICD-10-CM

## 2022-02-15 DIAGNOSIS — R74.01 TRANSAMINITIS: ICD-10-CM

## 2022-02-15 DIAGNOSIS — F51.05 INSOMNIA SECONDARY TO DEPRESSION WITH ANXIETY: ICD-10-CM

## 2022-02-15 DIAGNOSIS — F33.41 RECURRENT MAJOR DEPRESSIVE DISORDER, IN PARTIAL REMISSION (HCC): ICD-10-CM

## 2022-02-15 DIAGNOSIS — F32.1 MODERATE MAJOR DEPRESSION, SINGLE EPISODE (HCC): ICD-10-CM

## 2022-02-15 DIAGNOSIS — E87.1 HYPONATREMIA: ICD-10-CM

## 2022-02-15 DIAGNOSIS — D75.89 MACROCYTOSIS WITHOUT ANEMIA: ICD-10-CM

## 2022-02-15 DIAGNOSIS — F41.8 INSOMNIA SECONDARY TO DEPRESSION WITH ANXIETY: ICD-10-CM

## 2022-02-15 DIAGNOSIS — U07.1 COVID-19: Primary | ICD-10-CM

## 2022-02-15 DIAGNOSIS — R26.9 GAIT DISTURBANCE: ICD-10-CM

## 2022-02-15 DIAGNOSIS — Z23 ENCOUNTER FOR VACCINATION: ICD-10-CM

## 2022-02-15 LAB
ALBUMIN SERPL ELPH-MCNC: 3.91 G/DL (ref 3.5–5)
ALBUMIN SERPL ELPH-MCNC: 55.9 % (ref 52–65)
ALPHA1 GLOB SERPL ELPH-MCNC: 0.39 G/DL (ref 0.1–0.4)
ALPHA1 GLOB SERPL ELPH-MCNC: 5.6 % (ref 2.5–5)
ALPHA2 GLOB SERPL ELPH-MCNC: 0.83 G/DL (ref 0.4–1.2)
ALPHA2 GLOB SERPL ELPH-MCNC: 11.8 % (ref 7–13)
BETA GLOB ABNORMAL SERPL ELPH-MCNC: 0.48 G/DL (ref 0.4–0.8)
BETA1 GLOB SERPL ELPH-MCNC: 6.9 % (ref 5–13)
BETA2 GLOB SERPL ELPH-MCNC: 6 % (ref 2–8)
BETA2+GAMMA GLOB SERPL ELPH-MCNC: 0.42 G/DL (ref 0.2–0.5)
GAMMA GLOB ABNORMAL SERPL ELPH-MCNC: 0.97 G/DL (ref 0.5–1.6)
GAMMA GLOB SERPL ELPH-MCNC: 13.8 % (ref 12–22)
IGG/ALB SER: 1.27 {RATIO} (ref 1.1–1.8)
PROT PATTERN SERPL ELPH-IMP: ABNORMAL
PROT SERPL-MCNC: 7 G/DL (ref 6.4–8.2)

## 2022-02-15 PROCEDURE — 99496 TRANSJ CARE MGMT HIGH F2F 7D: CPT | Performed by: INTERNAL MEDICINE

## 2022-02-15 PROCEDURE — 1111F DSCHRG MED/CURRENT MED MERGE: CPT | Performed by: INTERNAL MEDICINE

## 2022-02-15 PROCEDURE — 3008F BODY MASS INDEX DOCD: CPT | Performed by: INTERNAL MEDICINE

## 2022-02-15 RX ORDER — LEVOTHYROXINE SODIUM 0.15 MG/1
150 TABLET ORAL EVERY MORNING
Qty: 90 TABLET | Refills: 1 | Status: SHIPPED | OUTPATIENT
Start: 2022-02-15 | End: 2022-05-05 | Stop reason: SDUPTHER

## 2022-02-15 NOTE — PROGRESS NOTES
Assessment/Plan:     No problem-specific Assessment & Plan notes found for this encounter  Diagnoses and all orders for this visit:    COVID-19  -     CBC and differential; Future  -     Comprehensive metabolic panel; Future  -     C-reactive protein; Future  -     EMG 2 limb lower extremity; Future  -     Ambulatory Referral to Neurology; Future    Lower extremity numbness  -     CBC and differential; Future  -     Comprehensive metabolic panel; Future  -     C-reactive protein; Future  -     EMG 2 limb lower extremity; Future  -     Ambulatory Referral to Neurology; Future    Gait disturbance  -     CBC and differential; Future  -     Comprehensive metabolic panel; Future  -     C-reactive protein; Future  -     EMG 2 limb lower extremity; Future  -     Ambulatory Referral to Neurology; Future    Elevated C-reactive protein (CRP)  -     CBC and differential; Future  -     Comprehensive metabolic panel; Future  -     C-reactive protein; Future  -     EMG 2 limb lower extremity; Future  -     Ambulatory Referral to Neurology; Future    Hypothyroidism, unspecified type  -     CBC and differential; Future  -     Comprehensive metabolic panel; Future  -     C-reactive protein; Future  -     EMG 2 limb lower extremity; Future  -     Ambulatory Referral to Neurology; Future  -     levothyroxine 150 mcg tablet; Take 1 tablet (150 mcg total) by mouth every morning    Hyponatremia  -     CBC and differential; Future  -     Comprehensive metabolic panel; Future  -     C-reactive protein; Future  -     EMG 2 limb lower extremity; Future  -     Ambulatory Referral to Neurology; Future    Moderate persistent asthma without complication  -     CBC and differential; Future  -     Comprehensive metabolic panel; Future  -     C-reactive protein; Future  -     EMG 2 limb lower extremity; Future  -     Ambulatory Referral to Neurology; Future  -     levothyroxine 150 mcg tablet;  Take 1 tablet (150 mcg total) by mouth every morning    Encounter for completion of form with patient  -     CBC and differential; Future  -     Comprehensive metabolic panel; Future  -     C-reactive protein; Future  -     EMG 2 limb lower extremity; Future  -     Ambulatory Referral to Neurology; Future    Alcohol withdrawal syndrome with complication (HCC)  -     levothyroxine 150 mcg tablet; Take 1 tablet (150 mcg total) by mouth every morning    Severe protein-calorie malnutrition (HCC)  -     levothyroxine 150 mcg tablet; Take 1 tablet (150 mcg total) by mouth every morning    Recurrent major depressive disorder, in partial remission (HCC)  -     levothyroxine 150 mcg tablet; Take 1 tablet (150 mcg total) by mouth every morning    Lactate blood increased  -     levothyroxine 150 mcg tablet; Take 1 tablet (150 mcg total) by mouth every morning    Hypomagnesemia  -     levothyroxine 150 mcg tablet; Take 1 tablet (150 mcg total) by mouth every morning    Macrocytosis without anemia  -     levothyroxine 150 mcg tablet; Take 1 tablet (150 mcg total) by mouth every morning    Hypocalcemia  -     levothyroxine 150 mcg tablet; Take 1 tablet (150 mcg total) by mouth every morning    Transaminitis  -     levothyroxine 150 mcg tablet; Take 1 tablet (150 mcg total) by mouth every morning    Positive depression screening  -     levothyroxine 150 mcg tablet; Take 1 tablet (150 mcg total) by mouth every morning    Elevated glucose  -     levothyroxine 150 mcg tablet; Take 1 tablet (150 mcg total) by mouth every morning    Crohn's disease of colon with complication (HCC)  -     levothyroxine 150 mcg tablet; Take 1 tablet (150 mcg total) by mouth every morning    Fatty liver, alcoholic  -     levothyroxine 150 mcg tablet; Take 1 tablet (150 mcg total) by mouth every morning    Moderate major depression, single episode (HCC)  -     levothyroxine 150 mcg tablet;  Take 1 tablet (150 mcg total) by mouth every morning    Anxiety with depression  -     levothyroxine 150 mcg tablet; Take 1 tablet (150 mcg total) by mouth every morning    Insomnia secondary to depression with anxiety  -     levothyroxine 150 mcg tablet; Take 1 tablet (150 mcg total) by mouth every morning    Encounter for vaccination  -     levothyroxine 150 mcg tablet; Take 1 tablet (150 mcg total) by mouth every morning    Anhedonia  -     levothyroxine 150 mcg tablet; Take 1 tablet (150 mcg total) by mouth every morning    Uncomplicated asthma, unspecified asthma severity, unspecified whether persistent  -     levothyroxine 150 mcg tablet; Take 1 tablet (150 mcg total) by mouth every morning    Encounter for screening mammogram for malignant neoplasm of breast  -     levothyroxine 150 mcg tablet; Take 1 tablet (150 mcg total) by mouth every morning     A/P: Stable and from a covid/respiratory standpoint is good  ? ?LE s/s cause  ?due to covid  W/u was negative so far  Will order an EMG and refer to neuro  Continue with PT  Will recheck labs and increase her thyroid replacement  Continue off work and RTC one week for f/u  Will need TSH ordered next appt  Forms filled out  Subjective:     Patient ID: Kwame Hernandez is a 42 y o  female  WF asthmatic seen for a moderate admission for covid and more so, acute onset of intractable LE pain, numbness, and weakness  Pt with covid infection and being treated as an OP, but wasn't getting better and developed LE s/s  Admitted  COVID improve and didn't require intubation  LE s/s persisted to the point where the pt was unable to ambulate and she lives alone except for a grade school child  W/u included MRI, lumbar puncture and seen by neuro  Nothing was found  Coarse complicated by low sodium, elevated TSH, and elevated CRP  D/c'd to home  Since d/c, doing a little paula  No fever or chills  NO SOB  Still with LE pain, numbness and weakness  Slightly better  Has home PT coming in  No change in bowel or bladder habits  Activity level low  Appetite is good  No new issues   Need FMLA and disabilty forms filled out  Review of Systems   Constitutional: Positive for activity change and fatigue  Negative for appetite change, chills, diaphoresis and fever  HENT: Negative  Eyes: Negative for visual disturbance  Respiratory: Negative for cough, chest tightness, shortness of breath and wheezing  Cardiovascular: Negative for chest pain, palpitations and leg swelling  Gastrointestinal: Negative for abdominal pain, constipation, diarrhea, nausea and vomiting  Endocrine: Negative for cold intolerance and heat intolerance  Genitourinary: Negative for difficulty urinating, dysuria and frequency  Musculoskeletal: Positive for myalgias  Negative for arthralgias and gait problem  Neurological: Positive for weakness and numbness  Negative for dizziness, seizures, syncope, light-headedness and headaches  Psychiatric/Behavioral: Negative for confusion  The patient is not nervous/anxious  Objective:     Physical Exam  Vitals and nursing note reviewed  Constitutional:       General: She is not in acute distress  Appearance: Normal appearance  She is not ill-appearing  HENT:      Head: Normocephalic and atraumatic  Mouth/Throat:      Mouth: Mucous membranes are moist    Eyes:      Extraocular Movements: Extraocular movements intact  Conjunctiva/sclera: Conjunctivae normal       Pupils: Pupils are equal, round, and reactive to light  Neck:      Vascular: No carotid bruit  Cardiovascular:      Rate and Rhythm: Regular rhythm  Heart sounds: Normal heart sounds  Pulmonary:      Effort: Pulmonary effort is normal  No respiratory distress  Breath sounds: Normal breath sounds  No wheezing or rales  Abdominal:      General: Bowel sounds are normal  There is no distension  Palpations: Abdomen is soft  Tenderness: There is no abdominal tenderness  Musculoskeletal:      Cervical back: Neck supple  Comments: LE strength 4-5/5  ROM wnl  Tone wnl  DTR 2/4  Neurological:      General: No focal deficit present  Mental Status: She is alert and oriented to person, place, and time  Mental status is at baseline  Cranial Nerves: No cranial nerve deficit  Motor: Weakness present  Gait: Gait abnormal       Deep Tendon Reflexes: Reflexes normal    Psychiatric:         Mood and Affect: Mood normal          Behavior: Behavior normal          Thought Content: Thought content normal          Judgment: Judgment normal            Vitals:    02/15/22 1409   BP: 110/74   Pulse: (!) 106   Temp: 98 8 °F (37 1 °C)   TempSrc: Tympanic   SpO2: 97%   Weight: 94 3 kg (207 lb 12 8 oz)   Height: 5' 2" (1 575 m)       Transitional Care Management Review:  Chris Ocasio is a 42 y o  female here for TCM follow up  During the TCM phone call patient stated:    TCM Call (since 1/15/2022)     Date and time call was made  2/14/2022 10:24 AM    Hospital care reviewed  Records reviewed        Patient was hospitialized at  [address]        Date of Admission  02/04/22    Date of discharge  02/11/22    Diagnosis  Lower extremity numbness    Disposition  Home    Were the patients medications reviewed and updated  Yes    Current Symptoms  None      TCM Call (since 1/15/2022)     Post hospital issues  None    Should patient be enrolled in anticoag monitoring? No    Scheduled for follow up?   Yes    Did you obtain your prescribed medications  Yes    Do you need help managing your prescriptions or medications  No    Is transportation to your appointment needed  No    I have advised the patient to call PCP with any new or worsening symptoms  sony kelsey    Are you recieving any outpatient services  No    Are you recieving home care services  No    Are you using any community resources  No    Current waiver services  No    Have you fallen in the last 12 months  No    Interperter language line needed  No    Counseling  Patient    Counseling topics Diagnostic results; Prognosis; Activities of daily living; Importance of RX compliance; patient and family education; instructions for management;  Risk factor reduction          Gloria Wolf DO

## 2022-02-17 ENCOUNTER — TELEPHONE (OUTPATIENT)
Dept: INTERNAL MEDICINE CLINIC | Facility: CLINIC | Age: 42
End: 2022-02-17

## 2022-02-17 NOTE — TELEPHONE ENCOUNTER
Called Dr Jania Serrano office, kept getting just a busy signal  Then called [facility] central scheduling  The best she could do for me was put the PT on a wait list that they will call her  The woman I spoke to said that the scheduling for the [facility] has not been released yet so that's all she could do   I called [facility] central scheduling as well to see if they could make it sooner, they said April is the soonest and some places are booked out to August

## 2022-02-17 NOTE — TELEPHONE ENCOUNTER
Pt called, states she called to schedule her EMG  The soonest they can get her in is April 27th  She is asking if there is a way you can place this order as a stat order  PT also states that she fell last night  States that when she woke up this morning the "tips of her fingers are numb"  States no spine or lower back pain  Please advise

## 2022-02-18 LAB — CRYOGLOB SER QL 1D COLD INC: POSITIVE

## 2022-02-22 ENCOUNTER — APPOINTMENT (OUTPATIENT)
Dept: LAB | Facility: CLINIC | Age: 42
End: 2022-02-22
Payer: COMMERCIAL

## 2022-02-22 ENCOUNTER — OFFICE VISIT (OUTPATIENT)
Dept: INTERNAL MEDICINE CLINIC | Facility: CLINIC | Age: 42
End: 2022-02-22
Payer: COMMERCIAL

## 2022-02-22 VITALS
HEART RATE: 112 BPM | OXYGEN SATURATION: 98 % | SYSTOLIC BLOOD PRESSURE: 130 MMHG | TEMPERATURE: 98.3 F | DIASTOLIC BLOOD PRESSURE: 80 MMHG

## 2022-02-22 DIAGNOSIS — U07.1 COVID-19: ICD-10-CM

## 2022-02-22 DIAGNOSIS — J45.40 MODERATE PERSISTENT ASTHMA WITHOUT COMPLICATION: ICD-10-CM

## 2022-02-22 DIAGNOSIS — F10.21 ALCOHOL DEPENDENCE IN REMISSION (HCC): ICD-10-CM

## 2022-02-22 DIAGNOSIS — F41.1 GAD (GENERALIZED ANXIETY DISORDER): ICD-10-CM

## 2022-02-22 DIAGNOSIS — K50.119 CROHN'S DISEASE OF COLON WITH COMPLICATION (HCC): ICD-10-CM

## 2022-02-22 DIAGNOSIS — R20.0 LOWER EXTREMITY NUMBNESS: ICD-10-CM

## 2022-02-22 DIAGNOSIS — R10.84 GENERALIZED ABDOMINAL PAIN: ICD-10-CM

## 2022-02-22 DIAGNOSIS — R79.82 ELEVATED C-REACTIVE PROTEIN (CRP): ICD-10-CM

## 2022-02-22 DIAGNOSIS — R26.9 GAIT DISTURBANCE: ICD-10-CM

## 2022-02-22 DIAGNOSIS — F33.41 RECURRENT MAJOR DEPRESSIVE DISORDER, IN PARTIAL REMISSION (HCC): ICD-10-CM

## 2022-02-22 DIAGNOSIS — Z02.83 EMPLOYMENT-RELATED DRUG TESTING, ENCOUNTER FOR: ICD-10-CM

## 2022-02-22 DIAGNOSIS — R94.5 ABNORMAL RESULTS OF LIVER FUNCTION STUDIES: ICD-10-CM

## 2022-02-22 DIAGNOSIS — E03.9 HYPOTHYROIDISM, UNSPECIFIED TYPE: ICD-10-CM

## 2022-02-22 DIAGNOSIS — J45.909 UNCOMPLICATED ASTHMA, UNSPECIFIED ASTHMA SEVERITY, UNSPECIFIED WHETHER PERSISTENT: ICD-10-CM

## 2022-02-22 DIAGNOSIS — R29.6 MULTIPLE FALLS: ICD-10-CM

## 2022-02-22 DIAGNOSIS — D89.1 CRYOGLOBULINS PRESENT (HCC): Primary | ICD-10-CM

## 2022-02-22 DIAGNOSIS — R52 INTRACTABLE PAIN: ICD-10-CM

## 2022-02-22 DIAGNOSIS — M79.602 PARESTHESIA AND PAIN OF BOTH UPPER EXTREMITIES: ICD-10-CM

## 2022-02-22 DIAGNOSIS — K50.80 CROHN'S DISEASE OF BOTH SMALL AND LARGE INTESTINE WITHOUT COMPLICATION (HCC): ICD-10-CM

## 2022-02-22 DIAGNOSIS — R26.9 GAIT ABNORMALITY: ICD-10-CM

## 2022-02-22 DIAGNOSIS — Z11.4 SCREENING FOR HIV (HUMAN IMMUNODEFICIENCY VIRUS): ICD-10-CM

## 2022-02-22 DIAGNOSIS — R19.7 DIARRHEA, UNSPECIFIED TYPE: ICD-10-CM

## 2022-02-22 DIAGNOSIS — M79.601 PARESTHESIA AND PAIN OF BOTH UPPER EXTREMITIES: ICD-10-CM

## 2022-02-22 DIAGNOSIS — E87.1 HYPONATREMIA: ICD-10-CM

## 2022-02-22 DIAGNOSIS — R11.2 NAUSEA AND VOMITING, INTRACTABILITY OF VOMITING NOT SPECIFIED, UNSPECIFIED VOMITING TYPE: ICD-10-CM

## 2022-02-22 DIAGNOSIS — R20.2 PARESTHESIA AND PAIN OF BOTH UPPER EXTREMITIES: ICD-10-CM

## 2022-02-22 DIAGNOSIS — R77.8 ABNORMAL SERUM PROTEIN ELECTROPHORESIS: ICD-10-CM

## 2022-02-22 DIAGNOSIS — Z02.89 ENCOUNTER FOR COMPLETION OF FORM WITH PATIENT: ICD-10-CM

## 2022-02-22 LAB
ALBUMIN SERPL BCP-MCNC: 4.4 G/DL (ref 3.5–5)
ALP SERPL-CCNC: 98 U/L (ref 46–116)
ALT SERPL W P-5'-P-CCNC: 182 U/L (ref 12–78)
ANION GAP SERPL CALCULATED.3IONS-SCNC: 15 MMOL/L (ref 4–13)
AST SERPL W P-5'-P-CCNC: 138 U/L (ref 5–45)
BASOPHILS # BLD AUTO: 0.06 THOUSANDS/ΜL (ref 0–0.1)
BASOPHILS NFR BLD AUTO: 1 % (ref 0–1)
BILIRUB SERPL-MCNC: 3.33 MG/DL (ref 0.2–1)
BUN SERPL-MCNC: 7 MG/DL (ref 5–25)
CALCIUM SERPL-MCNC: 8.9 MG/DL (ref 8.3–10.1)
CHLORIDE SERPL-SCNC: 98 MMOL/L (ref 100–108)
CHOLEST SERPL-MCNC: 277 MG/DL
CO2 SERPL-SCNC: 19 MMOL/L (ref 21–32)
CREAT SERPL-MCNC: 0.57 MG/DL (ref 0.6–1.3)
CRP SERPL QL: 3.3 MG/L
EOSINOPHIL # BLD AUTO: 0.02 THOUSAND/ΜL (ref 0–0.61)
EOSINOPHIL NFR BLD AUTO: 0 % (ref 0–6)
ERYTHROCYTE [DISTWIDTH] IN BLOOD BY AUTOMATED COUNT: 12.1 % (ref 11.6–15.1)
EST. AVERAGE GLUCOSE BLD GHB EST-MCNC: 114 MG/DL
FOLATE SERPL-MCNC: 2.5 NG/ML (ref 3.1–17.5)
GFR SERPL CREATININE-BSD FRML MDRD: 114 ML/MIN/1.73SQ M
GLUCOSE P FAST SERPL-MCNC: 77 MG/DL (ref 65–99)
HAV AB SER QL IA: NORMAL
HAV IGM SER QL: NORMAL
HBA1C MFR BLD: 5.6 %
HBV CORE IGM SER QL: NORMAL
HBV SURFACE AB SER-ACNC: <3.1 MIU/ML
HBV SURFACE AG SER QL: NORMAL
HCT VFR BLD AUTO: 45.2 % (ref 34.8–46.1)
HCV AB SER QL: NORMAL
HDLC SERPL-MCNC: 92 MG/DL
HGB BLD-MCNC: 15.9 G/DL (ref 11.5–15.4)
IMM GRANULOCYTES # BLD AUTO: 0.03 THOUSAND/UL (ref 0–0.2)
IMM GRANULOCYTES NFR BLD AUTO: 0 % (ref 0–2)
LDLC SERPL CALC-MCNC: 170 MG/DL (ref 0–100)
LYMPHOCYTES # BLD AUTO: 1.06 THOUSANDS/ΜL (ref 0.6–4.47)
LYMPHOCYTES NFR BLD AUTO: 12 % (ref 14–44)
MAGNESIUM SERPL-MCNC: 1.4 MG/DL (ref 1.6–2.6)
MCH RBC QN AUTO: 38.5 PG (ref 26.8–34.3)
MCHC RBC AUTO-ENTMCNC: 35.2 G/DL (ref 31.4–37.4)
MCV RBC AUTO: 109 FL (ref 82–98)
MONOCYTES # BLD AUTO: 0.41 THOUSAND/ΜL (ref 0.17–1.22)
MONOCYTES NFR BLD AUTO: 5 % (ref 4–12)
NEUTROPHILS # BLD AUTO: 7.22 THOUSANDS/ΜL (ref 1.85–7.62)
NEUTS SEG NFR BLD AUTO: 82 % (ref 43–75)
NRBC BLD AUTO-RTO: 0 /100 WBCS
PLATELET # BLD AUTO: 525 THOUSANDS/UL (ref 149–390)
PMV BLD AUTO: 9.2 FL (ref 8.9–12.7)
POTASSIUM SERPL-SCNC: 3.5 MMOL/L (ref 3.5–5.3)
PROLACTIN SERPL-MCNC: 5 NG/ML
PROT SERPL-MCNC: 8.6 G/DL (ref 6.4–8.2)
RBC # BLD AUTO: 4.13 MILLION/UL (ref 3.81–5.12)
SODIUM SERPL-SCNC: 132 MMOL/L (ref 136–145)
TRIGL SERPL-MCNC: 75 MG/DL
TSH SERPL DL<=0.05 MIU/L-ACNC: 9.62 UIU/ML (ref 0.36–3.74)
VIT B12 SERPL-MCNC: 1135 PG/ML (ref 100–900)
WBC # BLD AUTO: 8.8 THOUSAND/UL (ref 4.31–10.16)

## 2022-02-22 PROCEDURE — 82746 ASSAY OF FOLIC ACID SERUM: CPT

## 2022-02-22 PROCEDURE — 36415 COLL VENOUS BLD VENIPUNCTURE: CPT

## 2022-02-22 PROCEDURE — 84443 ASSAY THYROID STIM HORMONE: CPT

## 2022-02-22 PROCEDURE — 80053 COMPREHEN METABOLIC PANEL: CPT

## 2022-02-22 PROCEDURE — 80074 ACUTE HEPATITIS PANEL: CPT

## 2022-02-22 PROCEDURE — 87389 HIV-1 AG W/HIV-1&-2 AB AG IA: CPT

## 2022-02-22 PROCEDURE — 86706 HEP B SURFACE ANTIBODY: CPT

## 2022-02-22 PROCEDURE — 83735 ASSAY OF MAGNESIUM: CPT

## 2022-02-22 PROCEDURE — 83036 HEMOGLOBIN GLYCOSYLATED A1C: CPT

## 2022-02-22 PROCEDURE — 82607 VITAMIN B-12: CPT

## 2022-02-22 PROCEDURE — 86140 C-REACTIVE PROTEIN: CPT

## 2022-02-22 PROCEDURE — 99213 OFFICE O/P EST LOW 20 MIN: CPT | Performed by: INTERNAL MEDICINE

## 2022-02-22 PROCEDURE — 86708 HEPATITIS A ANTIBODY: CPT

## 2022-02-22 PROCEDURE — 84146 ASSAY OF PROLACTIN: CPT

## 2022-02-22 PROCEDURE — 80061 LIPID PANEL: CPT

## 2022-02-22 PROCEDURE — 1036F TOBACCO NON-USER: CPT | Performed by: INTERNAL MEDICINE

## 2022-02-22 PROCEDURE — 85025 COMPLETE CBC W/AUTO DIFF WBC: CPT

## 2022-02-22 PROCEDURE — 86480 TB TEST CELL IMMUN MEASURE: CPT

## 2022-02-22 RX ORDER — CLONAZEPAM 0.5 MG/1
0.5 TABLET ORAL 2 TIMES DAILY PRN
Qty: 60 TABLET | Refills: 0 | Status: SHIPPED | OUTPATIENT
Start: 2022-02-22 | End: 2022-03-24 | Stop reason: SDUPTHER

## 2022-02-22 RX ORDER — ALBUTEROL SULFATE 90 UG/1
AEROSOL, METERED RESPIRATORY (INHALATION)
Qty: 18 G | Refills: 3 | Status: SHIPPED | OUTPATIENT
Start: 2022-02-22 | End: 2022-04-07

## 2022-02-22 NOTE — PROGRESS NOTES
Assessment/Plan:  Problem List Items Addressed This Visit     None           There are no diagnoses linked to this encounter  No problem-specific Assessment & Plan notes found for this encounter  A/P: not doing well and in some aspects, regressing  Pt dependent for a lot of her ADL's and has had several falls  Discussed labs  ??cause  Suspect multifactorial including COVID, CRYOGLOBULINEMIA, Hypothyroidism(has yet to  new dose of meds) and ?ETOH neuropathy  EMG ordered, but not available to April  Labs just done  Continue current treatment and keep warm  RTC one week for f/u  Subjective:      Patient ID: Darío Theodore is a 42 y o  female  [facility] RTC for f/u COVID 19 infection complicated by intractable lower extemity pain and weakness  Was admitted and seen by neuro  R/o for GB with multiple imaging and spinal tap  Receiving home PT and little improvement noted  Still having problems walking and notes at least three falls  Now reports UE tingling as well  Parents are coming by to provide ADL's and are actually carrying pt at times  CRP was elevated and recent cryoglobulin levels were elevated  Fhx of RA  No fever, but some chills  NO loss of bowel or bladder habits  Still drinking, but not as much  Reports pain is 9/10  NO CP or SOB  No edema  The following portions of the patient's history were reviewed and updated as appropriate:   She has a past medical history of Anxiety, Asthma, Crohn disease ([facility] [address] ), Depression, Disease of thyroid gland, Hypertension, Hypothyroidism, Lower extremity numbness (02/11/2022), and Psychiatric disorder  ,  does not have any pertinent problems on file  ,   has a past surgical history that includes No past surgeries and IR lumbar puncture (2/8/2022)  ,  family history includes Colon cancer in her family, father, and mother; Hypertension in her father and mother; Kidney cancer in her family, father, and mother  ,   reports that she quit smoking about 3 years ago   She has never used smokeless tobacco  She reports previous alcohol use  She reports that she does not use drugs  ,  is allergic to penicillins     Current Outpatient Medications   Medication Sig Dispense Refill    albuterol (PROVENTIL HFA,VENTOLIN HFA) 90 mcg/act inhaler inhale 1 to 2 puffs by mouth and INTO THE LUNGS every 4 to 6 hours if needed 18 g 3    calcium carbonate (TUMS) 500 mg chewable tablet Chew 1 tablet (500 mg total) daily as needed for indigestion or heartburn 30 tablet 0    clonazePAM (KlonoPIN) 0 5 mg tablet Take 1 tablet (0 5 mg total) by mouth 2 (two) times a day as needed (prn) 60 tablet 0    disulfiram (ANTABUSE) 250 mg tablet Take 1 tablet (250 mg total) by mouth daily 90 tablet 0    fluticasone (FLONASE) 50 mcg/act nasal spray 1 spray into each nostril daily 16 g 0    fluticasone-vilanterol (BREO ELLIPTA) 200-25 MCG/INH inhaler Inhale 1 puff daily Rinse mouth after use   (Patient not taking: Reported on 2/3/2022 ) 3 Inhaler 1    guaiFENesin (Mucinex) 600 mg 12 hr tablet Take 1 tablet (600 mg total) by mouth every 12 (twelve) hours 20 tablet 0    levothyroxine 150 mcg tablet Take 1 tablet (150 mcg total) by mouth every morning 90 tablet 1    MAGNESIUM OXIDE PO Take by mouth daily   (Patient not taking: Reported on 2/3/2022 )      montelukast (SINGULAIR) 10 mg tablet Take 1 tablet (10 mg total) by mouth daily (Patient not taking: Reported on 2/3/2022 ) 30 tablet 5    Multiple Vitamin (multivitamin) tablet Take 1 tablet by mouth daily   (Patient not taking: Reported on 2/3/2022 )      ondansetron (ZOFRAN) 4 mg tablet Take 1 tablet (4 mg total) by mouth every 8 (eight) hours as needed for nausea or vomiting 20 tablet 0    thiamine 250 MG tablet Take 1 tablet (250 mg total) by mouth daily (Patient not taking: Reported on 2/3/2022 ) 90 tablet 0    umeclidinium bromide (INCRUSE ELLIPTA) 62 5 mcg/inh AEPB inhaler Inhale 1 puff daily (Patient not taking: Reported on 2/3/2022 ) 3 Inhaler 1 No current facility-administered medications for this visit  Review of Systems   Constitutional: Positive for activity change  Negative for chills, diaphoresis, fatigue and fever  Respiratory: Negative for cough, chest tightness, shortness of breath and wheezing  Cardiovascular: Negative for chest pain, palpitations and leg swelling  Gastrointestinal: Negative for abdominal pain, constipation, diarrhea, nausea and vomiting  Genitourinary: Negative for difficulty urinating, dysuria and frequency  Musculoskeletal: Positive for arthralgias and gait problem  Negative for myalgias  Neurological: Positive for weakness, numbness and headaches  Negative for dizziness, seizures, syncope and light-headedness  Psychiatric/Behavioral: Negative for confusion, dysphoric mood and sleep disturbance  The patient is not nervous/anxious  PHQ-2/9 Depression Screening          Objective: There were no vitals filed for this visit  There is no height or weight on file to calculate BMI  Physical Exam  Constitutional:       General: She is not in acute distress  Appearance: Normal appearance  She is not ill-appearing  HENT:      Head: Normocephalic and atraumatic  Mouth/Throat:      Mouth: Mucous membranes are moist    Eyes:      Extraocular Movements: Extraocular movements intact  Conjunctiva/sclera: Conjunctivae normal       Pupils: Pupils are equal, round, and reactive to light  Cardiovascular:      Rate and Rhythm: Normal rate and regular rhythm  Heart sounds: Normal heart sounds  Pulmonary:      Effort: Pulmonary effort is normal  No respiratory distress  Breath sounds: Normal breath sounds  No wheezing or rales  Abdominal:      General: Bowel sounds are normal  There is no distension  Palpations: Abdomen is soft  Tenderness: There is no abdominal tenderness  Musculoskeletal:      Right lower leg: No edema  Left lower leg: No edema     Neurological: General: No focal deficit present  Mental Status: She is alert and oriented to person, place, and time  Mental status is at baseline  Psychiatric:         Mood and Affect: Mood normal          Behavior: Behavior normal          Thought Content:  Thought content normal          Judgment: Judgment normal

## 2022-02-23 ENCOUNTER — APPOINTMENT (EMERGENCY)
Dept: RADIOLOGY | Facility: HOSPITAL | Age: 42
DRG: 071 | End: 2022-02-23
Payer: COMMERCIAL

## 2022-02-23 ENCOUNTER — HOSPITAL ENCOUNTER (INPATIENT)
Facility: HOSPITAL | Age: 42
LOS: 4 days | Discharge: HOME HEALTH | DRG: 071 | End: 2022-02-27
Attending: EMERGENCY MEDICINE | Admitting: FAMILY MEDICINE
Payer: COMMERCIAL

## 2022-02-23 DIAGNOSIS — K50.119 CROHN'S DISEASE OF COLON WITH COMPLICATION (HCC): ICD-10-CM

## 2022-02-23 DIAGNOSIS — Z13.31 POSITIVE DEPRESSION SCREENING: ICD-10-CM

## 2022-02-23 DIAGNOSIS — F10.239 ALCOHOL WITHDRAWAL SYNDROME WITH COMPLICATION (HCC): ICD-10-CM

## 2022-02-23 DIAGNOSIS — E03.9 HYPOTHYROIDISM, UNSPECIFIED TYPE: ICD-10-CM

## 2022-02-23 DIAGNOSIS — D75.89 MACROCYTOSIS WITHOUT ANEMIA: ICD-10-CM

## 2022-02-23 DIAGNOSIS — R11.2 NON-INTRACTABLE VOMITING WITH NAUSEA, UNSPECIFIED VOMITING TYPE: ICD-10-CM

## 2022-02-23 DIAGNOSIS — E87.6 HYPOKALEMIA: ICD-10-CM

## 2022-02-23 DIAGNOSIS — R20.0 LOWER EXTREMITY NUMBNESS: ICD-10-CM

## 2022-02-23 DIAGNOSIS — R73.09 ELEVATED GLUCOSE: ICD-10-CM

## 2022-02-23 DIAGNOSIS — R79.89 LACTATE BLOOD INCREASED: ICD-10-CM

## 2022-02-23 DIAGNOSIS — F41.8 INSOMNIA SECONDARY TO DEPRESSION WITH ANXIETY: ICD-10-CM

## 2022-02-23 DIAGNOSIS — F32.1 MODERATE MAJOR DEPRESSION, SINGLE EPISODE (HCC): ICD-10-CM

## 2022-02-23 DIAGNOSIS — E43 SEVERE PROTEIN-CALORIE MALNUTRITION (HCC): ICD-10-CM

## 2022-02-23 DIAGNOSIS — Z23 ENCOUNTER FOR VACCINATION: ICD-10-CM

## 2022-02-23 DIAGNOSIS — F33.41 RECURRENT MAJOR DEPRESSIVE DISORDER, IN PARTIAL REMISSION (HCC): ICD-10-CM

## 2022-02-23 DIAGNOSIS — F51.05 INSOMNIA SECONDARY TO DEPRESSION WITH ANXIETY: ICD-10-CM

## 2022-02-23 DIAGNOSIS — E83.51 HYPOCALCEMIA: ICD-10-CM

## 2022-02-23 DIAGNOSIS — R26.2 AMBULATORY DYSFUNCTION: ICD-10-CM

## 2022-02-23 DIAGNOSIS — J45.909 UNCOMPLICATED ASTHMA, UNSPECIFIED ASTHMA SEVERITY, UNSPECIFIED WHETHER PERSISTENT: ICD-10-CM

## 2022-02-23 DIAGNOSIS — E83.42 HYPOMAGNESEMIA: ICD-10-CM

## 2022-02-23 DIAGNOSIS — J45.40 MODERATE PERSISTENT ASTHMA WITHOUT COMPLICATION: ICD-10-CM

## 2022-02-23 DIAGNOSIS — R45.84 ANHEDONIA: ICD-10-CM

## 2022-02-23 DIAGNOSIS — R74.01 TRANSAMINITIS: ICD-10-CM

## 2022-02-23 DIAGNOSIS — F10.20 UNCOMPLICATED ALCOHOL DEPENDENCE (HCC): ICD-10-CM

## 2022-02-23 DIAGNOSIS — G93.9 PONTINE LESION: Primary | ICD-10-CM

## 2022-02-23 DIAGNOSIS — F41.8 ANXIETY WITH DEPRESSION: ICD-10-CM

## 2022-02-23 DIAGNOSIS — K70.0 FATTY LIVER, ALCOHOLIC: ICD-10-CM

## 2022-02-23 DIAGNOSIS — Z12.31 ENCOUNTER FOR SCREENING MAMMOGRAM FOR MALIGNANT NEOPLASM OF BREAST: ICD-10-CM

## 2022-02-23 LAB
ALBUMIN SERPL BCP-MCNC: 3.8 G/DL (ref 3.5–5)
ALP SERPL-CCNC: 83 U/L (ref 46–116)
ALT SERPL W P-5'-P-CCNC: 141 U/L (ref 12–78)
ANION GAP SERPL CALCULATED.3IONS-SCNC: 9 MMOL/L (ref 4–13)
AST SERPL W P-5'-P-CCNC: 101 U/L (ref 5–45)
BASOPHILS # BLD AUTO: 0.06 THOUSANDS/ΜL (ref 0–0.1)
BASOPHILS NFR BLD AUTO: 1 % (ref 0–1)
BILIRUB SERPL-MCNC: 3.55 MG/DL (ref 0.2–1)
BUN SERPL-MCNC: 7 MG/DL (ref 5–25)
CALCIUM SERPL-MCNC: 8.8 MG/DL (ref 8.3–10.1)
CHLORIDE SERPL-SCNC: 99 MMOL/L (ref 100–108)
CK MB SERPL-MCNC: 1.7 NG/ML (ref 0–5)
CK MB SERPL-MCNC: <1 % (ref 0–2.5)
CK SERPL-CCNC: 222 U/L (ref 26–192)
CO2 SERPL-SCNC: 24 MMOL/L (ref 21–32)
CREAT SERPL-MCNC: 0.68 MG/DL (ref 0.6–1.3)
CRP SERPL QL: 8.8 MG/L
EOSINOPHIL # BLD AUTO: 0.09 THOUSAND/ΜL (ref 0–0.61)
EOSINOPHIL NFR BLD AUTO: 1 % (ref 0–6)
ERYTHROCYTE [DISTWIDTH] IN BLOOD BY AUTOMATED COUNT: 12 % (ref 11.6–15.1)
ERYTHROCYTE [SEDIMENTATION RATE] IN BLOOD: 18 MM/HOUR (ref 0–19)
GFR SERPL CREATININE-BSD FRML MDRD: 108 ML/MIN/1.73SQ M
GLUCOSE SERPL-MCNC: 90 MG/DL (ref 65–140)
HCT VFR BLD AUTO: 43.4 % (ref 34.8–46.1)
HGB BLD-MCNC: 15.3 G/DL (ref 11.5–15.4)
HIV 1+2 AB+HIV1 P24 AG SERPL QL IA: NORMAL
IMM GRANULOCYTES # BLD AUTO: 0.02 THOUSAND/UL (ref 0–0.2)
IMM GRANULOCYTES NFR BLD AUTO: 0 % (ref 0–2)
LYMPHOCYTES # BLD AUTO: 0.71 THOUSANDS/ΜL (ref 0.6–4.47)
LYMPHOCYTES NFR BLD AUTO: 9 % (ref 14–44)
MCH RBC QN AUTO: 37.4 PG (ref 26.8–34.3)
MCHC RBC AUTO-ENTMCNC: 35.3 G/DL (ref 31.4–37.4)
MCV RBC AUTO: 106 FL (ref 82–98)
MONOCYTES # BLD AUTO: 0.39 THOUSAND/ΜL (ref 0.17–1.22)
MONOCYTES NFR BLD AUTO: 5 % (ref 4–12)
NEUTROPHILS # BLD AUTO: 7.1 THOUSANDS/ΜL (ref 1.85–7.62)
NEUTS SEG NFR BLD AUTO: 84 % (ref 43–75)
NRBC BLD AUTO-RTO: 0 /100 WBCS
PLATELET # BLD AUTO: 374 THOUSANDS/UL (ref 149–390)
PMV BLD AUTO: 9 FL (ref 8.9–12.7)
POTASSIUM SERPL-SCNC: 3.6 MMOL/L (ref 3.5–5.3)
PROT SERPL-MCNC: 8.1 G/DL (ref 6.4–8.2)
RBC # BLD AUTO: 4.09 MILLION/UL (ref 3.81–5.12)
SODIUM SERPL-SCNC: 132 MMOL/L (ref 136–145)
WBC # BLD AUTO: 8.37 THOUSAND/UL (ref 4.31–10.16)

## 2022-02-23 PROCEDURE — 86255 FLUORESCENT ANTIBODY SCREEN: CPT | Performed by: PHYSICIAN ASSISTANT

## 2022-02-23 PROCEDURE — 86140 C-REACTIVE PROTEIN: CPT

## 2022-02-23 PROCEDURE — 72157 MRI CHEST SPINE W/O & W/DYE: CPT

## 2022-02-23 PROCEDURE — 72158 MRI LUMBAR SPINE W/O & W/DYE: CPT

## 2022-02-23 PROCEDURE — 88108 CYTOPATH CONCENTRATE TECH: CPT | Performed by: SPECIALIST

## 2022-02-23 PROCEDURE — 99223 1ST HOSP IP/OBS HIGH 75: CPT | Performed by: PSYCHIATRY & NEUROLOGY

## 2022-02-23 PROCEDURE — 82553 CREATINE MB FRACTION: CPT | Performed by: EMERGENCY MEDICINE

## 2022-02-23 PROCEDURE — 85025 COMPLETE CBC W/AUTO DIFF WBC: CPT

## 2022-02-23 PROCEDURE — 80053 COMPREHEN METABOLIC PANEL: CPT

## 2022-02-23 PROCEDURE — 99285 EMERGENCY DEPT VISIT HI MDM: CPT | Performed by: EMERGENCY MEDICINE

## 2022-02-23 PROCEDURE — 85652 RBC SED RATE AUTOMATED: CPT

## 2022-02-23 PROCEDURE — 96375 TX/PRO/DX INJ NEW DRUG ADDON: CPT

## 2022-02-23 PROCEDURE — 36415 COLL VENOUS BLD VENIPUNCTURE: CPT

## 2022-02-23 PROCEDURE — 96376 TX/PRO/DX INJ SAME DRUG ADON: CPT

## 2022-02-23 PROCEDURE — A9585 GADOBUTROL INJECTION: HCPCS

## 2022-02-23 PROCEDURE — 96374 THER/PROPH/DIAG INJ IV PUSH: CPT

## 2022-02-23 PROCEDURE — 82550 ASSAY OF CK (CPK): CPT | Performed by: EMERGENCY MEDICINE

## 2022-02-23 PROCEDURE — 99285 EMERGENCY DEPT VISIT HI MDM: CPT

## 2022-02-23 PROCEDURE — 96361 HYDRATE IV INFUSION ADD-ON: CPT

## 2022-02-23 PROCEDURE — 72156 MRI NECK SPINE W/O & W/DYE: CPT

## 2022-02-23 PROCEDURE — 99223 1ST HOSP IP/OBS HIGH 75: CPT | Performed by: FAMILY MEDICINE

## 2022-02-23 RX ORDER — OXYCODONE HYDROCHLORIDE AND ACETAMINOPHEN 5; 325 MG/1; MG/1
1 TABLET ORAL EVERY 4 HOURS PRN
Status: DISCONTINUED | OUTPATIENT
Start: 2022-02-23 | End: 2022-02-25

## 2022-02-23 RX ORDER — ALBUTEROL SULFATE 90 UG/1
1 AEROSOL, METERED RESPIRATORY (INHALATION) EVERY 4 HOURS PRN
Status: DISCONTINUED | OUTPATIENT
Start: 2022-02-23 | End: 2022-02-24

## 2022-02-23 RX ORDER — DISULFIRAM 250 MG/1
250 TABLET ORAL DAILY
Status: DISCONTINUED | OUTPATIENT
Start: 2022-02-24 | End: 2022-02-25

## 2022-02-23 RX ORDER — SODIUM CHLORIDE 9 MG/ML
125 INJECTION, SOLUTION INTRAVENOUS CONTINUOUS
Status: DISCONTINUED | OUTPATIENT
Start: 2022-02-23 | End: 2022-02-25

## 2022-02-23 RX ORDER — FLUTICASONE PROPIONATE 50 MCG
1 SPRAY, SUSPENSION (ML) NASAL DAILY
Status: DISCONTINUED | OUTPATIENT
Start: 2022-02-24 | End: 2022-02-27 | Stop reason: HOSPADM

## 2022-02-23 RX ORDER — CLONAZEPAM 0.5 MG/1
0.5 TABLET ORAL 2 TIMES DAILY PRN
Status: DISCONTINUED | OUTPATIENT
Start: 2022-02-23 | End: 2022-02-27 | Stop reason: HOSPADM

## 2022-02-23 RX ORDER — LORAZEPAM 0.5 MG/1
0.5 TABLET ORAL EVERY 8 HOURS PRN
Status: DISCONTINUED | OUTPATIENT
Start: 2022-02-23 | End: 2022-02-25

## 2022-02-23 RX ORDER — HYDROMORPHONE HCL/PF 1 MG/ML
1 SYRINGE (ML) INJECTION ONCE
Status: COMPLETED | OUTPATIENT
Start: 2022-02-23 | End: 2022-02-23

## 2022-02-23 RX ORDER — LEVOTHYROXINE SODIUM 0.07 MG/1
150 TABLET ORAL
Status: DISCONTINUED | OUTPATIENT
Start: 2022-02-24 | End: 2022-02-27 | Stop reason: HOSPADM

## 2022-02-23 RX ORDER — LORAZEPAM 2 MG/ML
1 INJECTION INTRAMUSCULAR ONCE
Status: COMPLETED | OUTPATIENT
Start: 2022-02-23 | End: 2022-02-23

## 2022-02-23 RX ORDER — ACETAMINOPHEN 325 MG/1
650 TABLET ORAL EVERY 6 HOURS PRN
Status: DISCONTINUED | OUTPATIENT
Start: 2022-02-23 | End: 2022-02-25

## 2022-02-23 RX ORDER — CALCIUM CARBONATE 200(500)MG
500 TABLET,CHEWABLE ORAL DAILY PRN
Status: DISCONTINUED | OUTPATIENT
Start: 2022-02-23 | End: 2022-02-27 | Stop reason: HOSPADM

## 2022-02-23 RX ORDER — ONDANSETRON 2 MG/ML
4 INJECTION INTRAMUSCULAR; INTRAVENOUS ONCE
Status: COMPLETED | OUTPATIENT
Start: 2022-02-23 | End: 2022-02-23

## 2022-02-23 RX ADMIN — ONDANSETRON 4 MG: 2 INJECTION INTRAMUSCULAR; INTRAVENOUS at 09:37

## 2022-02-23 RX ADMIN — GADOBUTROL 9 ML: 604.72 INJECTION INTRAVENOUS at 12:27

## 2022-02-23 RX ADMIN — SODIUM CHLORIDE 1000 ML: 0.9 INJECTION, SOLUTION INTRAVENOUS at 13:12

## 2022-02-23 RX ADMIN — MORPHINE SULFATE 2 MG: 2 INJECTION, SOLUTION INTRAMUSCULAR; INTRAVENOUS at 09:34

## 2022-02-23 RX ADMIN — SODIUM CHLORIDE 125 ML/HR: 0.9 INJECTION, SOLUTION INTRAVENOUS at 19:23

## 2022-02-23 RX ADMIN — LORAZEPAM 0.5 MG: 0.5 TABLET ORAL at 23:54

## 2022-02-23 RX ADMIN — LORAZEPAM 1 MG: 2 INJECTION INTRAMUSCULAR; INTRAVENOUS at 10:32

## 2022-02-23 RX ADMIN — HYDROMORPHONE HYDROCHLORIDE 1 MG: 1 INJECTION, SOLUTION INTRAMUSCULAR; INTRAVENOUS; SUBCUTANEOUS at 13:13

## 2022-02-23 RX ADMIN — OXYCODONE HYDROCHLORIDE AND ACETAMINOPHEN 1 TABLET: 5; 325 TABLET ORAL at 20:29

## 2022-02-23 RX ADMIN — ONDANSETRON 4 MG: 2 INJECTION INTRAMUSCULAR; INTRAVENOUS at 14:50

## 2022-02-23 NOTE — H&P
[facility]  H&P- Valentino Church 1980, 42 y o  female MRN: [id number]  Unit/Bed#: ED 15 Encounter: [id number]  Primary Care Provider: Larry Duggan DO   Date and time admitted to hospital: 2/23/2022  8:39 AM    * Lower extremity numbness  Assessment & Plan  Patient presented with progressive lower extremity weakness, lower extremity as well as abdominal/truncal and finger tip numbness, and significant gait instability/difficulty at home since mid January    -pending  MRI brain   -pending repeat LP/CSF analysis:   Monitor (NIF/VC) every shift to start and update primary team/neurology if abnormal or concerning values:  -MRI cervical spine today noting well-circumscribed T2/FLAIR signal abnormality within the posterior central simona with mild associated enhancement  Neurology following:  Plan for LP and possible IV steroids         Class 1 obesity in adult  Assessment & Plan  Diet and exercise counseling prior discharge    Crohn's disease of colon with complication [facility])  Assessment & Plan  Follows GI as outpatient on, on Humira    Hypothyroidism  Assessment & Plan  Continue levothyroxine    Moderate persistent asthma without complication  Assessment & Plan  No exacerbation  Continue home inhalers      VTE Prophylaxis: Patient low risk  / sequential compression device   Code Status:  Full code  POLST: POLST form is not discussed and not completed at this time  Discussion with family:  With patient    Anticipated Length of Stay:  Patient will be admitted on an Inpatient basis with an anticipated length of stay of  more than 2 midnights  Justification for Hospital Stay:  As above    Total Time for Visit, including Counseling / Coordination of Care: 45 minutes  Greater than 50% of this total time spent on direct patient counseling and coordination of care  Chief Complaint:   Bilateral lower extremity weakness    History of Present Illness:    Valentino Church is a 42 y o  female with past medical history significant for Crohn's disease, hypothyroid this, asthma presenting for bilateral lower extremity weakness for about 4 weeks  Patient had difficulties ambulating and home secondary to profound weakness  She is also complaining of paresthesia of her legs and fingers  Patient had a admission to [facility] several weeks ago with similar presentation and COVID infection  At that point LP showed elevated protein a cryoglobulin  MRI thoracic and lumbar spine were negative  Review of Systems:    Review of Systems   Constitutional: Negative for chills, fever and unexpected weight change  HENT: Negative for hearing loss, nosebleeds and sore throat  Eyes: Negative for pain, redness and visual disturbance  Respiratory: Negative for cough, shortness of breath and wheezing  Cardiovascular: Negative for chest pain, palpitations and leg swelling  Gastrointestinal: Negative for abdominal pain, nausea and vomiting  Endocrine: Negative for polydipsia and polyuria  Genitourinary: Negative for dysuria and hematuria  Musculoskeletal: Positive for gait problem  Negative for arthralgias, joint swelling and myalgias  Skin: Negative for rash and wound  Neurological: Negative for dizziness, numbness and headaches  Psychiatric/Behavioral: Negative for decreased concentration and suicidal ideas  The patient is not nervous/anxious  Past Medical and Surgical History:     Past Medical History:   Diagnosis Date    Anxiety     Asthma     Crohn disease ([facility] [address] )     Depression     Disease of thyroid gland     Hypertension     Hypothyroidism     Lower extremity numbness 02/11/2022    Psychiatric disorder        Past Surgical History:   Procedure Laterality Date    IR LUMBAR PUNCTURE  2/8/2022    NO PAST SURGERIES         Meds/Allergies:    Prior to Admission medications    Medication Sig Start Date End Date Taking?  Authorizing Provider   albuterol (PROVENTIL HFA,VENTOLIN HFA) 90 mcg/act inhaler inhale 1 to 2 puffs by mouth and INTO THE LUNGS every 4 to 6 hours if needed 2/22/22   Larry Duggan DO   calcium carbonate (TUMS) 500 mg chewable tablet Chew 1 tablet (500 mg total) daily as needed for indigestion or heartburn 2/11/22   Lex Osorio DO   clonazePAM (KlonoPIN) 0 5 mg tablet Take 1 tablet (0 5 mg total) by mouth 2 (two) times a day as needed (prn) 2/22/22   Larry Duggan DO   disulfiram (ANTABUSE) 250 mg tablet Take 1 tablet (250 mg total) by mouth daily 4/12/21   Larry Duggan DO   fluticasone [facility]) 50 mcg/act nasal spray 1 spray into each nostril daily 2/3/22   Larry Duggan DO   fluticasone-vilanterol (BREO ELLIPTA) 200-25 MCG/INH inhaler Inhale 1 puff daily Rinse mouth after use  Patient not taking: Reported on 2/3/2022  4/12/21   Larry Duggan DO   levothyroxine 150 mcg tablet Take 1 tablet (150 mcg total) by mouth every morning 2/15/22   Larry Duggan DO   MAGNESIUM OXIDE PO Take by mouth daily    Patient not taking: Reported on 2/3/2022     Historical Provider, MD   montelukast (SINGULAIR) 10 mg tablet Take 1 tablet (10 mg total) by mouth daily  Patient not taking: Reported on 2/3/2022  1/8/21   Larry Duggan DO   Multiple Vitamin (multivitamin) tablet Take 1 tablet by mouth daily    Patient not taking: Reported on 2/3/2022     Historical Provider, MD   ondansetron (ZOFRAN) 4 mg tablet Take 1 tablet (4 mg total) by mouth every 8 (eight) hours as needed for nausea or vomiting 1/12/22   Larry Duggan DO   thiamine 250 MG tablet Take 1 tablet (250 mg total) by mouth daily  Patient not taking: Reported on 2/3/2022  11/10/20   Larry Duggan DO   umeclidinium bromide (INCRUSE ELLIPTA) 62 5 mcg/inh AEPB inhaler Inhale 1 puff daily  Patient not taking: Reported on 2/3/2022  4/12/21   Larry Duggan DO     I have reviewed home medications with patient personally  Allergies:    Allergies   Allergen Reactions    Penicillins Hives and Rash HIVES, SWELLS, riley ancef x 1 dose         Social History:     Marital Status:    Occupation:   Patient Pre-hospital Living Situation:   Patient Pre-hospital Level of Mobility:   Patient Pre-hospital Diet Restrictions:   Substance Use History:   Social History     Substance and Sexual Activity   Alcohol Use Not Currently     Social History     Tobacco Use   Smoking Status Former Smoker    Quit date: 7/13/2018    Years since quitting: 3 6   Smokeless Tobacco Never Used     Social History     Substance and Sexual Activity   Drug Use Never       Family History:    non-contributory    Physical Exam:     Vitals:   Blood Pressure: 120/81 (02/23/22 1300)  Pulse: (!) 108 (02/23/22 1300)  Temperature: 98 3 °F (36 8 °C) (02/23/22 0933)  Temp Source: Oral (02/23/22 0933)  Respirations: 18 (02/23/22 1300)  SpO2: 95 % (02/23/22 1300)    Physical Exam  Vitals and nursing note reviewed  Constitutional:       General: She is not in acute distress  HENT:      Head: Normocephalic  Nose: Nose normal       Mouth/Throat:      Pharynx: Oropharynx is clear  Eyes:      Conjunctiva/sclera: Conjunctivae normal    Cardiovascular:      Rate and Rhythm: Normal rate and regular rhythm  Heart sounds: No murmur heard  Pulmonary:      Effort: Pulmonary effort is normal  No respiratory distress  Breath sounds: Normal breath sounds  No wheezing  Abdominal:      General: There is no distension  Tenderness: There is no abdominal tenderness  There is no guarding  Musculoskeletal:         General: No swelling  Normal range of motion  Right lower leg: No edema  Skin:     General: Skin is warm  Capillary Refill: Capillary refill takes less than 2 seconds  Neurological:      General: No focal deficit present  Mental Status: She is alert and oriented to person, place, and time  Cranial Nerves: No cranial nerve deficit        Comments: Bilateral lower extremities 4/5 strength   Psychiatric: Mood and Affect: Mood normal              Additional Data:     Lab Results: I have personally reviewed pertinent reports  Results from last 7 days   Lab Units 02/23/22  0941   WBC Thousand/uL 8 37   HEMOGLOBIN g/dL 15 3   HEMATOCRIT % 43 4   PLATELETS Thousands/uL 374   NEUTROS PCT % 84*   LYMPHS PCT % 9*   MONOS PCT % 5   EOS PCT % 1     Results from last 7 days   Lab Units 02/23/22  0941   SODIUM mmol/L 132*   POTASSIUM mmol/L 3 6   CHLORIDE mmol/L 99*   CO2 mmol/L 24   BUN mg/dL 7   CREATININE mg/dL 0 68   ANION GAP mmol/L 9   CALCIUM mg/dL 8 8   ALBUMIN g/dL 3 8   TOTAL BILIRUBIN mg/dL 3 55*   ALK PHOS U/L 83   ALT U/L 141*   AST U/L 101*   GLUCOSE RANDOM mg/dL 90             Results from last 7 days   Lab Units 02/22/22  1339   HEMOGLOBIN A1C % 5 6           Imaging: I have personally reviewed pertinent reports  MRI lumbar spine w wo contrast   Final Result by Charles Bui DO (02/23 1305)      Although there is no discrete disc herniation at the L5-S1 level there is mild right-sided facet degenerative change and disc material does abut the ventral aspect of the exiting nerve, unchanged from the prior examination  Normal signal within the distal cord and conus  The abnormal signal seen on prior examination is likely artifactual          Workstation performed: [id number]         MRI thoracic spine w wo contrast   Final Result by Charles Bui DO (02/23 1309)      No abnormal cord signal       Minor noncompressive mid thoracic degenerative change is stable  Workstation performed: [id number]         MRI cervical spine w wo contrast   Final Result by Charles Bui DO (02/23 1319)      Mild cervical degenerative change C4-5, C5-6 and C6-7  No canal stenosis or cord compression  There is right-sided foraminal narrowing at the C5-6 level  Normal cervical and thoracic cord        There is a well-circumscribed focus of abnormal hyperintense T2 and FLAIR signal noted within the posterior central aspect of the simona with mild associated enhancement  Findings are nonspecific and differential considerations would include demyelinating    lesion with active inflammation, subacute infarct or possibly brainstem neoplasm  Dedicated MRI of the brain with contrast recommended  I personally discussed this study with Lorelei Castro on 2/23/2022 at 1:19 PM                   Workstation performed: [id number]         MRI ED/Inpt Order    (Results Pending)       EKG, Pathology, and Other Studies Reviewed on Admission:   · EKG:     Allscripts / Epic Records Reviewed: Yes     ** Please Note: This note has been constructed using a voice recognition system   **

## 2022-02-23 NOTE — CONSULTS
Consultation - Neurology   [facility] ELOISE 42 y o  female MRN: [id number]  Unit/Bed#: ED 13 Encounter: [id number]      Assessment/Plan   41-year-old female with history of Crohn's disease, anxiety/depression, hypertension, hypothyroidism, presenting following progressive lower extremity weakness, lower extremity as well as abdominal/truncal and finger tip numbness, and significant gait instability/difficulty at home  Per discussion with patient, symptoms initially began several weeks ago in January:  Evaluated in the ED in late January as well as recent admission from 02/04-2/11 at [address] [facility] [address]: had workup done as mentioned below, has had progressive symptoms since (notable over the past several days)    Spinal imaging today notes enhancing central simona signal abnormality; exam reveals notable ataxia in the extremities/trunk with standing/ambulating   Inflammatory workup to be initiated causing the pontine lesion/signal change (demyelinating disease, infectious/autoimmmune/paraneoplastic screening)    * Lower extremity numbness  Assessment & Plan  -see above for additional info and below for workup/results thusfar    -obtaining MRI brain with/without contrast tonight   -following MRI brain, will start IV Solumedrol 1 G daily (likely for 5 days)  -depending on MRI brain results, may pursue repeat LP/CSF analysis:  Discussed this with patient, ED team and primary team   -ordered CSF labs in case LP is needed: basic studies, IgG synthesis, cytometry/flow cytometry, Lyme, WNV, ME panel, MS panel, cytology, ENC2, NMO, ACE, VDRL, anti-MOG    -Underwent spinal MRI imaging this afternoon (02/23):  -MRI cervical spine today noting well-circumscribed T2/FLAIR signal abnormality within the posterior central simona with mild associated enhancement  -MRI cervical spine negative for canal stenosis/cord compression  -MRI thoracic spine negative for abnormal cord signal/canal stenosis  -MRI lumbar spine with normal signal within the distal cord/conus, no significant cord pathology/narrowing    Will check RPR, Lyme, ANCA, dsDNA, other serum workup recently:    -Normal ACE, normal Sjogren's, normal DALLAS, normal RF, protein electrophoresis with no monoclonal bands,  -cryoglobulin was positive, TSH greater than 8, T4 1 48, low folate yesterday at 2 5  -IgA on 02/07 elevated at 418 (reference range )  -acute hepatitis panel negative, hepatitis-A antibody negative, QuantiFERON TB pending, HIV negative  -total CK today 222  -supportive care  -therapy evaluations  -medical management per primary team, metabolic/infectious derangement monitoring    Discussed plan of care with attending neurologist      Recommendations for outpatient neurological follow up have yet to be determined  History of Present Illness     Reason for Consult / Principal Problem:  Progressive lower extremity weakness, heaviness, numbness, lower extremity pain, gait difficulty    HPI: Brett Sanchez is a 42 y o  female with history as mentioned above in assessment who neurology is asked to evaluate in regards to the above/below mentioned symptoms:     Please see below for timeline of recent symptoms/presentation/workup:  1/27: seen in ER at [address] having recent N/V, abdominal pain (similar sensation to prior Crohn's flares), numbness/tingling in the lower abdomen; discharged from ER  Had positive COVID test at home on approx  January 30th (patient had cough, no other symptoms)    2/4-2/11: admission to [facility]: noted progressive bilateral leg pain (pain is confined to the thighs/does not radiate, electric-like shocks with subsequent throbbing), numbness in the distal LE, gait difficulty due to the pain/weakness   Was evaluated by neurology on 2/7: neurodiagnostics during that workup included:    MRI lumbar spine on 2/4: questionable T2 signal abnormality at T11  MRI thoracic spine 2/6: no cord signal change/pathology, no post-contrast enhancement  LP/CSF analysis: protein of 58, normal glucose, WBC 6  Had numerous autoimmune/inflammatory serum labs sent  Patient was discharged without treatment as GBS felt to be less likely; was arranged for outpatient neurology follow up (including EMG in late April)  Since discharge on 2/11 until presently: patient was able to ambulate at home over past two weeks, but with significant difficulty; since few days ago, approx  Monday 2/21: progressive leg weakness, progressive leg numbness (feet to thighs bilaterally), unable to walk  Additionally, has noticed lower abdominal numbness, perineal area numbness and numbness to the fingertips  Denies changes to bowel/bladder function, respiratory issues, vision changes (aside from mild blurry vision), dysphagia  Has had some chills lately, otherwise no fever or diaphoresis  No recent infection or illness otherwise, no family illnesses, recent travel out of the country, no recent rashes  Inpatient consult to Neurology  Consult performed by: Douglas Vail PA-C  Consult ordered by: Mohini Diaz MD          Review of Systems   Constitutional: Positive for chills  Negative for diaphoresis, fatigue and fever  HENT: Negative  Negative for hearing loss, trouble swallowing and voice change  Eyes: Negative for photophobia and visual disturbance  Respiratory: Negative for chest tightness and shortness of breath  Cardiovascular: Negative for chest pain and palpitations  Gastrointestinal: Positive for abdominal pain, diarrhea, nausea and vomiting  Genitourinary: Negative for difficulty urinating and dysuria  Musculoskeletal: Positive for gait problem  Negative for arthralgias, back pain, neck pain and neck stiffness  Skin: Negative  Neurological: Positive for weakness and numbness  Negative for dizziness, tremors, seizures, syncope, facial asymmetry, speech difficulty, light-headedness and headaches  All other ROS reviewed and negative       Historical Information   Past Medical History:   Diagnosis Date    Anxiety     Asthma     Crohn disease ([address] )     Depression     Disease of thyroid gland     Hypertension     Hypothyroidism     Lower extremity numbness 02/11/2022    Psychiatric disorder      Past Surgical History:   Procedure Laterality Date    IR LUMBAR PUNCTURE  2/8/2022    NO PAST SURGERIES       Social History   Social History     Substance and Sexual Activity   Alcohol Use Not Currently     Social History     Substance and Sexual Activity   Drug Use Never     E-Cigarette/Vaping    E-Cigarette Use Never User      E-Cigarette/Vaping Substances    Nicotine No     THC No     CBD No     Flavoring No     Other No     Unknown No      Social History     Tobacco Use   Smoking Status Former Smoker    Quit date: 7/13/2018    Years since quitting: 3 6   Smokeless Tobacco Never Used     Family History:   Family History   Problem Relation Age of Onset    Colon cancer Mother    [employer] Kidney cancer Mother     Hypertension Mother     Colon cancer Father     Kidney cancer Father     Hypertension Father     Colon cancer Family     Kidney cancer Family        Review of previous medical records was completed  Meds/Allergies   current meds:   Current Facility-Administered Medications   Medication Dose Route Frequency    sodium chloride 0 9 % bolus 1,000 mL  1,000 mL Intravenous Once    and PTA meds:   Prior to Admission Medications   Prescriptions Last Dose Informant Patient Reported? Taking?    MAGNESIUM OXIDE PO   Yes No   Sig: Take by mouth daily     Patient not taking: Reported on 2/3/2022    Multiple Vitamin (multivitamin) tablet   Yes No   Sig: Take 1 tablet by mouth daily     Patient not taking: Reported on 2/3/2022    albuterol (PROVENTIL HFA,VENTOLIN HFA) 90 mcg/act inhaler   No No   Sig: inhale 1 to 2 puffs by mouth and INTO THE LUNGS every 4 to 6 hours if needed   calcium carbonate (TUMS) 500 mg chewable tablet   No No   Sig: Chew 1 tablet (500 mg total) daily as needed for indigestion or heartburn   clonazePAM (KlonoPIN) 0 5 mg tablet   No No   Sig: Take 1 tablet (0 5 mg total) by mouth 2 (two) times a day as needed (prn)   disulfiram (ANTABUSE) 250 mg tablet   No No   Sig: Take 1 tablet (250 mg total) by mouth daily   fluticasone (FLONASE) 50 mcg/act nasal spray   No No   Si spray into each nostril daily   fluticasone-vilanterol (BREO ELLIPTA) 200-25 MCG/INH inhaler   No No   Sig: Inhale 1 puff daily Rinse mouth after use  Patient not taking: Reported on 2/3/2022    levothyroxine 150 mcg tablet   No No   Sig: Take 1 tablet (150 mcg total) by mouth every morning   montelukast (SINGULAIR) 10 mg tablet   No No   Sig: Take 1 tablet (10 mg total) by mouth daily   Patient not taking: Reported on 2/3/2022    ondansetron (ZOFRAN) 4 mg tablet   No No   Sig: Take 1 tablet (4 mg total) by mouth every 8 (eight) hours as needed for nausea or vomiting   thiamine 250 MG tablet   No No   Sig: Take 1 tablet (250 mg total) by mouth daily   Patient not taking: Reported on 2/3/2022    umeclidinium bromide (INCRUSE ELLIPTA) 62 5 mcg/inh AEPB inhaler   No No   Sig: Inhale 1 puff daily   Patient not taking: Reported on 2/3/2022       Facility-Administered Medications: None       Allergies   Allergen Reactions    Penicillins Hives and Rash     HIVES, SWELLS, riley ancef x 1 dose         Objective   Vitals:Blood pressure 120/81, pulse (!) 108, temperature 98 3 °F (36 8 °C), temperature source Oral, resp  rate 18, last menstrual period 2022, SpO2 95 %, not currently breastfeeding  ,There is no height or weight on file to calculate BMI  No intake or output data in the 24 hours ending 22 1331    Invasive Devices: Invasive Devices  Report    Peripheral Intravenous Line            Peripheral IV 22 Left Antecubital <1 day                Physical Exam  Constitutional:       Appearance: Normal appearance  HENT:      Head: Normocephalic and atraumatic  Eyes:      Extraocular Movements: EOM normal       Pupils: Pupils are equal, round, and reactive to light  Cardiovascular:      Rate and Rhythm: Normal rate and regular rhythm  Pulmonary:      Effort: Pulmonary effort is normal       Breath sounds: Normal breath sounds  Abdominal:      General: There is no distension  Musculoskeletal:      Cervical back: Normal range of motion and neck supple  Skin:     General: Skin is warm and dry  Neurological:      Mental Status: She is alert and oriented to person, place, and time  Psychiatric:         Speech: Speech normal        Neurologic Exam     Mental Status   Oriented to person, place, and time  Follows 2 step commands  Attention: normal  Concentration: normal    Speech: speech is normal   Normal comprehension  Cranial Nerves     CN II   Visual fields full to confrontation  CN III, IV, VI   Pupils are equal, round, and reactive to light  Extraocular motions are normal      CN V   Facial sensation intact  CN VII   Facial expression full, symmetric  CN VIII   CN VIII normal      CN IX, X   CN IX normal    CN X normal      CN XI   CN XI normal      CN XII   CN XII normal      Motor Exam   Muscle bulk: normal  Overall muscle tone: normalFull strength throughout upper extremities, including  strength/interosseous hand muscles  Approximately 4+/5 with bilateral hip flexion, hip adduction and abduction is full  5-/5 knee flexion, full knee extension bilaterally  Full dorsiflexion/plantar flexion bilaterally  Sensory Exam     Patient sharp pinprick in the bilateral feet, but has absent pinprick sensation from the ankles up to the distal thigh bilaterally       Diminished vibration/proprioception in the distal LE, L worse than R    She sharp pinprick sensation throughout the abdomen/lower chest   She also has sharp pinprick throughout the length of the back; no appreciable sensory level       Gait, Coordination, and Reflexes 2+ biceps/brachioradialis DTRs    Absent patellar and Achilles DTRs bilaterally  Toes are downgoing, no ankle clonus  Finger-to-nose is fluent bilaterally, trembling/ataxia with heel-to-shin  Attempted to stand, required examiner his assistance/hand holding for stability  Appeared with trembling legs, imbalanced/ataxic appearing even standing still  After standing up, she became nauseous and vomited         Lab Results:   CBC:   Results from last 7 days   Lab Units 02/23/22  0941 02/22/22  1339   WBC Thousand/uL 8 37 8 80   RBC Million/uL 4 09 4 13   HEMOGLOBIN g/dL 15 3 15 9*   HEMATOCRIT % 43 4 45 2   MCV fL 106* 109*   PLATELETS Thousands/uL 374 525*   , BMP/CMP:   Results from last 7 days   Lab Units 02/23/22  0941 02/22/22  1339   SODIUM mmol/L 132* 132*   POTASSIUM mmol/L 3 6 3 5   CHLORIDE mmol/L 99* 98*   CO2 mmol/L 24 19*   BUN mg/dL 7 7   CREATININE mg/dL 0 68 0 57*   CALCIUM mg/dL 8 8 8 9   AST U/L 101* 138*   ALT U/L 141* 182*   ALK PHOS U/L 83 98   EGFR ml/min/1 73sq m 108 114   , Vitamin B12:   Results from last 7 days   Lab Units 02/22/22  1339   VITAMIN B 12 pg/mL 1,135*   , HgBA1C:   Results from last 7 days   Lab Units 02/22/22  1339   HEMOGLOBIN A1C % 5 6   , TSH:   Results from last 7 days   Lab Units 02/22/22  1339   TSH 3RD GENERATON uIU/mL 9 620*   , Coagulation:   , Lipid Profile:   Results from last 7 days   Lab Units 02/22/22  1339   HDL mg/dL 92   LDL CALC mg/dL 170*   TRIGLYCERIDES mg/dL 75   , Ammonia:   , Urinalysis:       Invalid input(s): URIBILINOGEN, Drug Screen:   , Medication Drug Levels:       Invalid input(s): CARBAMAZEPINE,  PHENOBARB, LACOSAMIDE, OXCARBAZEPINE  Imaging Studies: I have personally reviewed pertinent films in PACS   MRI lumbar spine w wo contrast   Final Result by Charles Pineda DO (02/23 1305)      Although there is no discrete disc herniation at the L5-S1 level there is mild right-sided facet degenerative change and disc material does abut the ventral aspect of the exiting nerve, unchanged from the prior examination  Normal signal within the distal cord and conus  The abnormal signal seen on prior examination is likely artifactual          Workstation performed: [id number]         MRI thoracic spine w wo contrast   Final Result by Charles Julio DO (02/23 1309)      No abnormal cord signal       Minor noncompressive mid thoracic degenerative change is stable  Workstation performed: [id number]         MRI cervical spine w wo contrast   Final Result by Charles Julio DO (02/23 1319)      Mild cervical degenerative change C4-5, C5-6 and C6-7  No canal stenosis or cord compression  There is right-sided foraminal narrowing at the C5-6 level  Normal cervical and thoracic cord  There is a well-circumscribed focus of abnormal hyperintense T2 and FLAIR signal noted within the posterior central aspect of the simona with mild associated enhancement  Findings are nonspecific and differential considerations would include demyelinating    lesion with active inflammation, subacute infarct or possibly brainstem neoplasm  Dedicated MRI of the brain with contrast recommended  I personally discussed this study with Ganesh Jaeger on 2/23/2022 at 1:19 PM                   Workstation performed: [id number]         MRI ED/Inpt Order    (Results Pending)         EKG, Pathology, and Other Studies: I have personally reviewed pertinent reports  VTE Prophylaxis: None, in ED    Code Status: Prior    Total time spent today 60 minutes  Discussed plan of care with patient and primary team: await MRI brain tonight, start IV Solumderol, possible LP/CSF analysis based on MRI results

## 2022-02-23 NOTE — ED PROVIDER NOTES
History  Chief Complaint   Patient presents with    Numbness     pt reports b/l foot numbness moving into calves, abdominal numbness, hand numbness, decreased appetite, and progressive weakness over the last 3 weeks     Patient is a 42year old female with PMHx asthma, Chron's disease, HTN, hypothyroidism, anxiety, presenting to the ED for evaluation of progressive ambulatory dysfunction and lower extremity weakness  Patient states that 4 weeks ago she tested positive for COVID  Just prior to this, patient had progressive ambulatory dysfunction for a few days described as "couldn't walk, couldn't lift my legs ", associated with decreased appetite and some dry heaving  Patient presented to [address] [place] [address] when the weakness progressed and was admitted  Her MRI at that time showed increased T2 uptake at the level of T11, attributed to artifact  Only the T and L spine were imaged  Patient had an LP done, which showed elevated CSF protein and she was positive for cryoglobulin  She was subsequently discharged for outpatient follow up  She followed up with her PCP and neuro was consulted; patient has an EMG scheduled for 4/27  She has been managing the weakness at home, stating that for the last 2 weeks she has been able to walk gingerly and carefully, able to "hobble to my car and drive " Since Monday 2/21/22, however, patient reports progression of her lower extremity weakness  States that she cannot feel her legs, "legs feel heavy" and cannot lift them to walk  She reports numbness to lower extremities as well, from the feet to the thigh area bilaterally  With this, she is experiencing "fiery, electric-like burning and throbbing of thighs bilaterally " Over the last few days, she's also noticed some numbness or her lower abdomen bilaterally as well as her perineal area  Also complains of numbness to her fingertips bilaterally, except the pinky of her R hand  No changes in strength of the upper extremities   She presents today because she was unable to ambulate at home secondary to weakness  She denies any history of back pain or back trauma, as well as IVDA  She denies urinary incontinence, bowel incontinence, fever, neck pain, headache, vision changes, difficulty swallowing, chest pain, shortness of breath, abdominal pain  She endorses some nausea and diarrhea but states this is typical of her Chrons  Denies dysura, hematuria, vaginal discharge or bleeding  Prior to Admission Medications   Prescriptions Last Dose Informant Patient Reported? Taking? MAGNESIUM OXIDE PO   Yes No   Sig: Take by mouth daily     Patient not taking: Reported on 2/3/2022    Multiple Vitamin (multivitamin) tablet   Yes No   Sig: Take 1 tablet by mouth daily     Patient not taking: Reported on 2/3/2022    albuterol (PROVENTIL HFA,VENTOLIN HFA) 90 mcg/act inhaler   No No   Sig: inhale 1 to 2 puffs by mouth and INTO THE LUNGS every 4 to 6 hours if needed   calcium carbonate (TUMS) 500 mg chewable tablet   No No   Sig: Chew 1 tablet (500 mg total) daily as needed for indigestion or heartburn   clonazePAM (KlonoPIN) 0 5 mg tablet   No No   Sig: Take 1 tablet (0 5 mg total) by mouth 2 (two) times a day as needed (prn)   disulfiram (ANTABUSE) 250 mg tablet   No No   Sig: Take 1 tablet (250 mg total) by mouth daily   fluticasone (FLONASE) 50 mcg/act nasal spray   No No   Si spray into each nostril daily   fluticasone-vilanterol (BREO ELLIPTA) 200-25 MCG/INH inhaler   No No   Sig: Inhale 1 puff daily Rinse mouth after use     Patient not taking: Reported on 2/3/2022    levothyroxine 150 mcg tablet   No No   Sig: Take 1 tablet (150 mcg total) by mouth every morning   montelukast (SINGULAIR) 10 mg tablet   No No   Sig: Take 1 tablet (10 mg total) by mouth daily   Patient not taking: Reported on 2/3/2022    ondansetron (ZOFRAN) 4 mg tablet   No No   Sig: Take 1 tablet (4 mg total) by mouth every 8 (eight) hours as needed for nausea or vomiting   thiamine 250 MG tablet   No No   Sig: Take 1 tablet (250 mg total) by mouth daily   Patient not taking: Reported on 2/3/2022    umeclidinium bromide (INCRUSE ELLIPTA) 62 5 mcg/inh AEPB inhaler   No No   Sig: Inhale 1 puff daily   Patient not taking: Reported on 2/3/2022       Facility-Administered Medications: None       Past Medical History:   Diagnosis Date    Anxiety     Asthma     Crohn disease ([address] )     Depression     Disease of thyroid gland     Hypertension     Hypothyroidism     Lower extremity numbness 02/11/2022    Psychiatric disorder        Past Surgical History:   Procedure Laterality Date    IR LUMBAR PUNCTURE  2/8/2022    NO PAST SURGERIES         Family History   Problem Relation Age of Onset    Colon cancer Mother     Kidney cancer Mother     Hypertension Mother     Colon cancer Father     Kidney cancer Father     Hypertension Father     Colon cancer Family     Kidney cancer Family      I have reviewed and agree with the history as documented  E-Cigarette/Vaping    E-Cigarette Use Never User      E-Cigarette/Vaping Substances    Nicotine No     THC No     CBD No     Flavoring No     Other No     Unknown No      Social History     Tobacco Use    Smoking status: Former Smoker     Quit date: 7/13/2018     Years since quitting: 3 6    Smokeless tobacco: Never Used   Vaping Use    Vaping Use: Never used   Substance Use Topics    Alcohol use: Not Currently    Drug use: Never        Review of Systems   Constitutional: Positive for fatigue  Negative for chills and fever  HENT: Negative for congestion, ear pain, postnasal drip, sinus pressure, sinus pain, sore throat and trouble swallowing  Eyes: Negative for pain and visual disturbance  Respiratory: Negative for cough, chest tightness and shortness of breath  Cardiovascular: Negative for chest pain and palpitations  Gastrointestinal: Positive for diarrhea and nausea   Negative for abdominal pain, blood in stool and vomiting  Genitourinary: Negative for difficulty urinating, dysuria, flank pain, frequency, hematuria and urgency  Musculoskeletal: Positive for gait problem  Negative for arthralgias, back pain, myalgias, neck pain and neck stiffness  Skin: Negative for color change and rash  Neurological: Positive for weakness and numbness  Negative for dizziness, seizures, syncope and headaches  Psychiatric/Behavioral: The patient is nervous/anxious  All other systems reviewed and are negative  Physical Exam  ED Triage Vitals   Temperature Pulse Respirations Blood Pressure SpO2   02/23/22 0933 02/23/22 0845 02/23/22 0845 02/23/22 0845 02/23/22 0845   98 3 °F (36 8 °C) 104 20 153/61 95 %      Temp Source Heart Rate Source Patient Position - Orthostatic VS BP Location FiO2 (%)   02/23/22 0933 02/23/22 1300 -- -- --   Oral Monitor         Pain Score       02/23/22 0934       7             Orthostatic Vital Signs  Vitals:    02/23/22 0845 02/23/22 1300   BP: 153/61 120/81   Pulse: 104 (!) 108       Physical Exam  Vitals and nursing note reviewed  Constitutional:       General: She is not in acute distress  Appearance: Normal appearance  She is well-developed  She is obese  She is not ill-appearing or toxic-appearing  Comments: Patient is alert and awake, sitting supine on stretcher  HENT:      Head: Normocephalic and atraumatic  Right Ear: External ear normal       Left Ear: External ear normal       Nose: Nose normal  No congestion  Mouth/Throat:      Mouth: Mucous membranes are moist       Pharynx: Oropharynx is clear  No oropharyngeal exudate or posterior oropharyngeal erythema  Eyes:      General: No visual field deficit or scleral icterus  Extraocular Movements: Extraocular movements intact  Conjunctiva/sclera: Conjunctivae normal       Pupils: Pupils are equal, round, and reactive to light  Cardiovascular:      Rate and Rhythm: Normal rate and regular rhythm  Pulses: Normal pulses  Heart sounds: Normal heart sounds  No murmur heard  Pulmonary:      Effort: Pulmonary effort is normal  No respiratory distress  Breath sounds: Normal breath sounds  No wheezing, rhonchi or rales  Abdominal:      General: Abdomen is flat  Bowel sounds are normal       Palpations: Abdomen is soft  Tenderness: There is no abdominal tenderness  There is no guarding or rebound  Comments: Ecchymosis from prior Lovenox injections during recent hospitalization     Musculoskeletal:         General: No tenderness  Cervical back: Normal range of motion and neck supple  No rigidity or tenderness  Right lower leg: No edema  Left lower leg: No edema  Skin:     General: Skin is warm and dry  Capillary Refill: Capillary refill takes less than 2 seconds  Findings: No erythema or rash  Neurological:      Mental Status: She is alert and oriented to person, place, and time  GCS: GCS eye subscore is 4  GCS verbal subscore is 5  GCS motor subscore is 6  Cranial Nerves: Cranial nerves are intact  No cranial nerve deficit, dysarthria or facial asymmetry  Motor: No tremor, abnormal muscle tone, seizure activity or pronator drift  Coordination: Finger-Nose-Finger Test and Heel to [facility] Test normal       Comments: Variable 2 point discrimination noted in bilateral lower extremities; LLE >RLE, focal to the L3, L4-L5 dermatomes  There is also dullness to sensation at the umbilical U04 area  2 point discrimination intact for all dermatomes of the upper extremities bilaterally  Patient has 5/5 strength of upper extremities bilaterally and 3/5 strength of the lower extremities bilaterally  DTRs absent on lower extremities bilaterally, but 2+ bilaterally on upper extremities  There is no clonus of the lower extremities         Psychiatric:         Mood and Affect: Mood normal          ED Medications  Medications   morphine injection 2 mg (2 mg Intravenous Given 2/23/22 0934)   ondansetron (ZOFRAN) injection 4 mg (4 mg Intravenous Given 2/23/22 0937)   LORazepam (ATIVAN) injection 1 mg (1 mg Intravenous Given 2/23/22 1032)   Gadobutrol injection (SINGLE-DOSE) SOLN 9 mL (9 mL Intravenous Given 2/23/22 1227)   HYDROmorphone (DILAUDID) injection 1 mg (1 mg Intravenous Given 2/23/22 1313)   sodium chloride 0 9 % bolus 1,000 mL (1,000 mL Intravenous New Bag 2/23/22 1312)   ondansetron (ZOFRAN) injection 4 mg (4 mg Intravenous Given 2/23/22 1450)       Diagnostic Studies  Results Reviewed     Procedure Component Value Units Date/Time    MS Panel, CSF [[id number]]     Lab Status: No result Specimen: Cerebrospinal Fluid from Lumbar Puncture     Red Top-MS Panel [[id number]]     Lab Status: No result Specimen: Blood     CSF culture and Gram stain [[id number]]     Lab Status: No result Specimen: Cerebrospinal Fluid from Lumbar Puncture     CSF white cell count with differential [[id number]]     Lab Status: No result Specimen: Cerebrospinal Fluid     Glucose, CSF [[id number]]     Lab Status: No result Specimen: Cerebrospinal Fluid     STAT Gram Stain [[id number]]     Lab Status: No result Specimen: Other from Lumbar Puncture     IgG Synthesis/Index Rate, CSF [[id number]]     Lab Status: No result Specimen: Cerebrospinal Fluid     Leukemia/Lymphoma flow cytometry [[id number]]     Lab Status: No result Specimen: Cerebrospinal Fluid from Lumbar Puncture     Lyme disease, PCR [[id number]]     Lab Status: No result Specimen: Cerebrospinal Fluid     Meningitis/Encephalitis (ME) Panel [[id number]]     Lab Status: No result Specimen: Cerebrospinal Fluid from Lumbar Puncture     RBC count,CSF [[id number]]     Lab Status: No result Specimen: Cerebrospinal Fluid from Lumbar Puncture     Total Protein, CSF [[id number]]     Lab Status: No result Specimen: Cerebrospinal Fluid     West Nile virus, CSF [[id number]]     Lab Status: No result Specimen: Cerebrospinal Fluid from Lumbar Puncture CKMB [[id number]]  (Normal) Collected: 02/23/22 0941    Lab Status: Final result Specimen: Blood from Arm, Left Updated: 02/23/22 1102     CK-MB Index <1 0 %      CK-MB 1 7 ng/mL     CK (with reflex to MB) [[id number]]  (Abnormal) Collected: 02/23/22 0941    Lab Status: Final result Specimen: Blood from Arm, Left Updated: 02/23/22 1038     Total  U/L     Sedimentation rate, automated [[id number]]  (Normal) Collected: 02/23/22 0941    Lab Status: Final result Specimen: Blood from Arm, Left Updated: 02/23/22 1028     Sed Rate 18 mm/hour     Comprehensive metabolic panel [[id number]]  (Abnormal) Collected: 02/23/22 0941    Lab Status: Final result Specimen: Blood from Arm, Left Updated: 02/23/22 1022     Sodium 132 mmol/L      Potassium 3 6 mmol/L      Chloride 99 mmol/L      CO2 24 mmol/L      ANION GAP 9 mmol/L      BUN 7 mg/dL      Creatinine 0 68 mg/dL      Glucose 90 mg/dL      Calcium 8 8 mg/dL       U/L       U/L      Alkaline Phosphatase 83 U/L      Total Protein 8 1 g/dL      Albumin 3 8 g/dL      Total Bilirubin 3 55 mg/dL      eGFR 108 ml/min/1 73sq m     Narrative:      [place] guidelines for Chronic Kidney Disease (CKD):     Stage 1 with normal or high GFR (GFR > 90 mL/min/1 73 square meters)    Stage 2 Mild CKD (GFR = 60-89 mL/min/1 73 square meters)    Stage 3A Moderate CKD (GFR = 45-59 mL/min/1 73 square meters)    Stage 3B Moderate CKD (GFR = 30-44 mL/min/1 73 square meters)    Stage 4 Severe CKD (GFR = 15-29 mL/min/1 73 square meters)    Stage 5 End Stage CKD (GFR <15 mL/min/1 73 square meters)  Note: GFR calculation is accurate only with a steady state creatinine    C-reactive protein [[id number]]  (Abnormal) Collected: 02/23/22 0941    Lab Status: Final result Specimen: Blood from Arm, Left Updated: 02/23/22 1022     CRP 8 8 mg/L     CBC and differential [[id number]]  (Abnormal) Collected: 02/23/22 0941    Lab Status: Final result Specimen: Blood from Arm, Left Updated: 02/23/22 1000     WBC 8 37 Thousand/uL      RBC 4 09 Million/uL      Hemoglobin 15 3 g/dL      Hematocrit 43 4 %       fL      MCH 37 4 pg      MCHC 35 3 g/dL      RDW 12 0 %      MPV 9 0 fL      Platelets 797 Thousands/uL      nRBC 0 /100 WBCs      Neutrophils Relative 84 %      Immat GRANS % 0 %      Lymphocytes Relative 9 %      Monocytes Relative 5 %      Eosinophils Relative 1 %      Basophils Relative 1 %      Neutrophils Absolute 7 10 Thousands/µL      Immature Grans Absolute 0 02 Thousand/uL      Lymphocytes Absolute 0 71 Thousands/µL      Monocytes Absolute 0 39 Thousand/µL      Eosinophils Absolute 0 09 Thousand/µL      Basophils Absolute 0 06 Thousands/µL                  MRI lumbar spine w wo contrast   Final Result by Gene Mitra Score, DO (02/23 1305)      Although there is no discrete disc herniation at the L5-S1 level there is mild right-sided facet degenerative change and disc material does abut the ventral aspect of the exiting nerve, unchanged from the prior examination  Normal signal within the distal cord and conus  The abnormal signal seen on prior examination is likely artifactual          Workstation performed: [id number]         MRI thoracic spine w wo contrast   Final Result by Gene Mitra Score, DO (02/23 1309)      No abnormal cord signal       Minor noncompressive mid thoracic degenerative change is stable  Workstation performed: [id number]         MRI cervical spine w wo contrast   Final Result by Gene Mitra Score, DO (02/23 1319)      Mild cervical degenerative change C4-5, C5-6 and C6-7  No canal stenosis or cord compression  There is right-sided foraminal narrowing at the C5-6 level  Normal cervical and thoracic cord  There is a well-circumscribed focus of abnormal hyperintense T2 and FLAIR signal noted within the posterior central aspect of the simona with mild associated enhancement    Findings are nonspecific and differential considerations would include demyelinating    lesion with active inflammation, subacute infarct or possibly brainstem neoplasm  Dedicated MRI of the brain with contrast recommended  I personally discussed this study with Penny Dozier on 2/23/2022 at 1:19 PM                   Workstation performed: [id number]         MRI ED/Inpt Order    (Results Pending)         Procedures  Procedures      ED Course       Labs ordered, including CBC and CMP for electrolyte disturbance  Ordered CRP, CK, and ESR to trend inflammatory markers  MRI C,T,L spine ordered emergently, contacted Dr Lyn Myrick, radiology for approval     11:30 Discussed case with Dr Norm Burnette, Neurologist; he will evaluate patient and requests another LP to be performed  Will discuss with patient when she returns back from MRI  obtain patient consent  1310: patient arrives from MRI  She is complaining of increased pain in her legs and now her back  Given Dilaudid 1 mg IV and NS 1 L IVFB  Will wait until MRI is resulted before LP     1319: MRI c spine results discussed with radiologist  Dr Norm Burnette, Neurology, forwarded findings  Will determine if LP is necessary at this time given new pontine lesion  Neuro is going to review images to make sure there isn't mass effect that may pose danger for LP  Will call radiology to get patient scheduled for MRI brain  Patient informed of findings  She understands the need for MRI brain and agrees with plan for admission  Her pain in the legs and back have improved  Neurologically, she remains with lower extremity weakness  [phone]: Dr Randolph Ortiz, Neurologist on call, requests MRI brain with and without contrast  Requests hold off on LP until this imaging is complete  Spoke with radiology team  Will try to get the MRI brain at 7p-8p [facility]  Neuro AP is at bedside evaluating patient   Her neuro exam has remained grossly unchanged, but does have slight worsening of the numbness in the L hand  1450: patient treated with Zofran 4 mg IV for an episode of nausea  1515: on re-evaluation, patient resting comfortably  Case discussed with Dr Ángel Herrera, and arrangements made for admission  MDM    Disposition  Final diagnoses:   Pontine lesion     Time reflects when diagnosis was documented in both MDM as applicable and the Disposition within this note     Time User Action Codes Description Comment    2/23/2022  1:50 PM Chuy Chan Add [G93 9] Pontine lesion       ED Disposition     ED Disposition Condition Date/Time Comment    Admit Stable Wed Feb 23, 2022  3:14 PM Case was discussed with Dr Yves Strange and the patient's admission status was agreed to be Admission Status: inpatient status to the service of Dr Yves Strange   Follow-up Information    None         Patient's Medications   Discharge Prescriptions    No medications on file     No discharge procedures on file  PDMP Review       Value Time User    PDMP Reviewed  Yes 2/4/2022  9:04 PM Taylor Liriano DO           ED Provider  Attending physically available and evaluated [facility] ELOISE MONTILLA managed the patient along with the ED Attending      Electronically Signed by         Annabel Hdez DO  02/23/22 6056

## 2022-02-23 NOTE — ASSESSMENT & PLAN NOTE
Patient presented with progressive lower extremity weakness, lower extremity as well as abdominal/truncal and finger tip numbness, and significant gait instability/difficulty at home since mid January    -pending  MRI brain   -pending repeat LP/CSF analysis:   Monitor (NIF/VC) every shift to start and update primary team/neurology if abnormal or concerning values:  -MRI cervical spine today noting well-circumscribed T2/FLAIR signal abnormality within the posterior central simona with mild associated enhancement  Neurology following:  Plan for LP and possible IV steroids

## 2022-02-23 NOTE — ASSESSMENT & PLAN NOTE
42year old female with Crohn's disease, anxiety/depression, HTN, hypothyroidism who presented with progressive lower extremity weakness, lower extremity as well as abdominal/truncal and finger tip numbness and significant gait instability  MRI brain with and without contrast completed and demonstrates enhancing central simona signal abnormality  Unclear etiology of symptoms, suspect ETOH use as well as nutritional deficiencies likely playing role in symptoms  Cannot exclude underlying peripheral neuropathy secondary to ETOH use contributing  Plan:  - Continue with SoluMedrol 1 g QD x 5 total doses (today is day 3/5)  - LP with CSF analysis completed  - CSF results thus far: RBCS 13, WBCs 1, protein 40, glucose 101, ME panel negative  - CSF results pending: West Nile Virus, Anti-MOG, autoimmune/paraneoplastic panel, VDRL, ACE, NMO, cytology, MS panel, Lyme PCR, flow cytometry, culture  - Serum studies thus far: ACE 52, DALLAS negative, Sjogrens SS-A <0 2, SS-B <0 2, protein electrophoresis with no monoclonal bands, Vit B12 758, folate 2 7, IgA 418, acute hepatitis panel negative, Hepatitis A antibody negative, QuantiFERON TB negative, HIV negative  Cryoglobulin positive  - Serum studies pending: RPR, ANCA, dsDNA, Lyme profile, Vit B1 level  - Continue with supportive care   - PT/OT  - Medical management per primary team, metabolic/infectious derangement monitoring

## 2022-02-24 ENCOUNTER — APPOINTMENT (INPATIENT)
Dept: RADIOLOGY | Facility: HOSPITAL | Age: 42
DRG: 071 | End: 2022-02-24
Payer: COMMERCIAL

## 2022-02-24 PROBLEM — E87.1 HYPONATREMIA: Status: ACTIVE | Noted: 2022-02-24

## 2022-02-24 PROBLEM — R74.01 TRANSAMINITIS: Status: ACTIVE | Noted: 2022-02-24

## 2022-02-24 PROBLEM — R11.2 NAUSEA AND VOMITING: Status: ACTIVE | Noted: 2022-02-24

## 2022-02-24 PROBLEM — E66.09 CLASS 2 OBESITY DUE TO EXCESS CALORIES WITH BODY MASS INDEX (BMI) OF 37.0 TO 37.9 IN ADULT: Status: ACTIVE | Noted: 2020-11-03

## 2022-02-24 PROBLEM — E66.812 CLASS 2 OBESITY DUE TO EXCESS CALORIES WITH BODY MASS INDEX (BMI) OF 37.0 TO 37.9 IN ADULT: Status: ACTIVE | Noted: 2020-11-03

## 2022-02-24 LAB
APPEARANCE CSF: CLEAR
C GATTII+NEOFOR DNA CSF QL NAA+NON-PROBE: NOT DETECTED
CK MB SERPL-MCNC: 1.2 % (ref 0–2.5)
CK MB SERPL-MCNC: 1.8 NG/ML (ref 0–5)
CK SERPL-CCNC: 155 U/L (ref 26–192)
CMV DNA CSF QL NAA+NON-PROBE: NOT DETECTED
E COLI K1 DNA CSF QL NAA+NON-PROBE: NOT DETECTED
EV RNA CSF QL NAA+NON-PROBE: NOT DETECTED
GAMMA INTERFERON BACKGROUND BLD IA-ACNC: 0.03 IU/ML
GLUCOSE CSF-MCNC: 101 MG/DL (ref 50–80)
GP B STREP DNA CSF QL NAA+NON-PROBE: NOT DETECTED
GRAM STN SPEC: NORMAL
GRAM STN SPEC: NORMAL
HAEM INFLU DNA CSF QL NAA+NON-PROBE: NOT DETECTED
HHV6 DNA CSF QL NAA+NON-PROBE: NOT DETECTED
HSV1 DNA CSF QL NAA+NON-PROBE: NOT DETECTED
HSV2 DNA CSF QL NAA+NON-PROBE: NOT DETECTED
L MONOCYTOG DNA CSF QL NAA+NON-PROBE: NOT DETECTED
LACTATE SERPL-SCNC: 0.9 MMOL/L (ref 0.5–2)
LYMPHOCYTES NFR CSF MANUAL: 100 %
M TB IFN-G BLD-IMP: NEGATIVE
M TB IFN-G CD4+ BCKGRND COR BLD-ACNC: 0.01 IU/ML
M TB IFN-G CD4+ BCKGRND COR BLD-ACNC: 0.01 IU/ML
MAGNESIUM SERPL-MCNC: 1.5 MG/DL (ref 1.6–2.6)
MITOGEN IGNF BCKGRD COR BLD-ACNC: 1.41 IU/ML
N MEN DNA CSF QL NAA+NON-PROBE: NOT DETECTED
PARECHOVIRUS A RNA CSF QL NAA+NON-PROBE: NOT DETECTED
PROT CSF-MCNC: 40 MG/DL (ref 15–45)
RBC # CSF MANUAL: 13 UL (ref 0–10)
S PNEUM DNA CSF QL NAA+NON-PROBE: NOT DETECTED
T4 FREE SERPL-MCNC: 1.24 NG/DL (ref 0.76–1.46)
TOTAL CELLS COUNTED BLD: NO
TOTAL CELLS COUNTED SPEC: 100
TUBE # CSF: 4
VZV DNA CSF QL NAA+NON-PROBE: NOT DETECTED
WBC # CSF AUTO: 1 /UL (ref 0–5)

## 2022-02-24 PROCEDURE — C9113 INJ PANTOPRAZOLE SODIUM, VIA: HCPCS | Performed by: NURSE PRACTITIONER

## 2022-02-24 PROCEDURE — 88185 FLOWCYTOMETRY/TC ADD-ON: CPT

## 2022-02-24 PROCEDURE — 82042 OTHER SOURCE ALBUMIN QUAN EA: CPT | Performed by: PSYCHIATRY & NEUROLOGY

## 2022-02-24 PROCEDURE — 87476 LYME DIS DNA AMP PROBE: CPT | Performed by: PSYCHIATRY & NEUROLOGY

## 2022-02-24 PROCEDURE — 83735 ASSAY OF MAGNESIUM: CPT | Performed by: NURSE PRACTITIONER

## 2022-02-24 PROCEDURE — 86255 FLUORESCENT ANTIBODY SCREEN: CPT | Performed by: PHYSICIAN ASSISTANT

## 2022-02-24 PROCEDURE — 82553 CREATINE MB FRACTION: CPT | Performed by: NURSE PRACTITIONER

## 2022-02-24 PROCEDURE — 82945 GLUCOSE OTHER FLUID: CPT | Performed by: PSYCHIATRY & NEUROLOGY

## 2022-02-24 PROCEDURE — 83605 ASSAY OF LACTIC ACID: CPT | Performed by: NURSE PRACTITIONER

## 2022-02-24 PROCEDURE — 70553 MRI BRAIN STEM W/O & W/DYE: CPT

## 2022-02-24 PROCEDURE — 84425 ASSAY OF VITAMIN B-1: CPT | Performed by: NURSE PRACTITIONER

## 2022-02-24 PROCEDURE — 97167 OT EVAL HIGH COMPLEX 60 MIN: CPT

## 2022-02-24 PROCEDURE — 84157 ASSAY OF PROTEIN OTHER: CPT | Performed by: PSYCHIATRY & NEUROLOGY

## 2022-02-24 PROCEDURE — 89050 BODY FLUID CELL COUNT: CPT | Performed by: PSYCHIATRY & NEUROLOGY

## 2022-02-24 PROCEDURE — 97163 PT EVAL HIGH COMPLEX 45 MIN: CPT

## 2022-02-24 PROCEDURE — 87070 CULTURE OTHR SPECIMN AEROBIC: CPT | Performed by: PSYCHIATRY & NEUROLOGY

## 2022-02-24 PROCEDURE — 88184 FLOWCYTOMETRY/ TC 1 MARKER: CPT | Performed by: PSYCHIATRY & NEUROLOGY

## 2022-02-24 PROCEDURE — 87483 CNS DNA AMP PROBE TYPE 12-25: CPT | Performed by: PSYCHIATRY & NEUROLOGY

## 2022-02-24 PROCEDURE — 89051 BODY FLUID CELL COUNT: CPT | Performed by: PSYCHIATRY & NEUROLOGY

## 2022-02-24 PROCEDURE — 82784 ASSAY IGA/IGD/IGG/IGM EACH: CPT | Performed by: PSYCHIATRY & NEUROLOGY

## 2022-02-24 PROCEDURE — 83916 OLIGOCLONAL BANDS: CPT | Performed by: PSYCHIATRY & NEUROLOGY

## 2022-02-24 PROCEDURE — 82164 ANGIOTENSIN I ENZYME TEST: CPT | Performed by: PHYSICIAN ASSISTANT

## 2022-02-24 PROCEDURE — 86592 SYPHILIS TEST NON-TREP QUAL: CPT | Performed by: PHYSICIAN ASSISTANT

## 2022-02-24 PROCEDURE — 82040 ASSAY OF SERUM ALBUMIN: CPT | Performed by: PSYCHIATRY & NEUROLOGY

## 2022-02-24 PROCEDURE — 87798 DETECT AGENT NOS DNA AMP: CPT | Performed by: PSYCHIATRY & NEUROLOGY

## 2022-02-24 PROCEDURE — 009U3ZX DRAINAGE OF SPINAL CANAL, PERCUTANEOUS APPROACH, DIAGNOSTIC: ICD-10-PCS | Performed by: INTERNAL MEDICINE

## 2022-02-24 PROCEDURE — 99232 SBSQ HOSP IP/OBS MODERATE 35: CPT | Performed by: PSYCHIATRY & NEUROLOGY

## 2022-02-24 PROCEDURE — 84439 ASSAY OF FREE THYROXINE: CPT | Performed by: NURSE PRACTITIONER

## 2022-02-24 PROCEDURE — 86341 ISLET CELL ANTIBODY: CPT

## 2022-02-24 PROCEDURE — 99233 SBSQ HOSP IP/OBS HIGH 50: CPT | Performed by: NURSE PRACTITIONER

## 2022-02-24 PROCEDURE — 86362 MOG-IGG1 ANTB CBA EACH: CPT | Performed by: PHYSICIAN ASSISTANT

## 2022-02-24 PROCEDURE — 62270 DX LMBR SPI PNXR: CPT | Performed by: PSYCHIATRY & NEUROLOGY

## 2022-02-24 PROCEDURE — 82550 ASSAY OF CK (CPK): CPT | Performed by: NURSE PRACTITIONER

## 2022-02-24 PROCEDURE — 83873 ASSAY OF CSF PROTEIN: CPT | Performed by: PSYCHIATRY & NEUROLOGY

## 2022-02-24 RX ORDER — PANTOPRAZOLE SODIUM 40 MG/1
40 INJECTION, POWDER, FOR SOLUTION INTRAVENOUS EVERY 12 HOURS SCHEDULED
Status: DISCONTINUED | OUTPATIENT
Start: 2022-02-24 | End: 2022-02-25

## 2022-02-24 RX ORDER — MAGNESIUM SULFATE HEPTAHYDRATE 40 MG/ML
2 INJECTION, SOLUTION INTRAVENOUS ONCE
Status: COMPLETED | OUTPATIENT
Start: 2022-02-24 | End: 2022-02-24

## 2022-02-24 RX ORDER — FOLIC ACID 1 MG/1
1 TABLET ORAL DAILY
Status: DISCONTINUED | OUTPATIENT
Start: 2022-02-24 | End: 2022-02-27 | Stop reason: HOSPADM

## 2022-02-24 RX ORDER — ALBUTEROL SULFATE 90 UG/1
2 AEROSOL, METERED RESPIRATORY (INHALATION) 4 TIMES DAILY
Status: DISCONTINUED | OUTPATIENT
Start: 2022-02-24 | End: 2022-02-27 | Stop reason: HOSPADM

## 2022-02-24 RX ADMIN — ALBUTEROL SULFATE 2 PUFF: 90 AEROSOL, METERED RESPIRATORY (INHALATION) at 13:53

## 2022-02-24 RX ADMIN — THIAMINE HYDROCHLORIDE 100 MG: 100 INJECTION, SOLUTION INTRAMUSCULAR; INTRAVENOUS at 10:51

## 2022-02-24 RX ADMIN — OXYCODONE HYDROCHLORIDE AND ACETAMINOPHEN 1 TABLET: 5; 325 TABLET ORAL at 05:04

## 2022-02-24 RX ADMIN — OXYCODONE HYDROCHLORIDE AND ACETAMINOPHEN 1 TABLET: 5; 325 TABLET ORAL at 17:46

## 2022-02-24 RX ADMIN — CLONAZEPAM 0.5 MG: 0.5 TABLET ORAL at 16:18

## 2022-02-24 RX ADMIN — MAGNESIUM SULFATE HEPTAHYDRATE 2 G: 40 INJECTION, SOLUTION INTRAVENOUS at 15:13

## 2022-02-24 RX ADMIN — FOLIC ACID 1 MG: 1 TABLET ORAL at 10:39

## 2022-02-24 RX ADMIN — LEVOTHYROXINE SODIUM 150 MCG: 75 TABLET ORAL at 05:01

## 2022-02-24 RX ADMIN — SODIUM CHLORIDE 1000 MG: 9 INJECTION, SOLUTION INTRAVENOUS at 08:37

## 2022-02-24 RX ADMIN — Medication 1 TABLET: at 10:39

## 2022-02-24 RX ADMIN — OXYCODONE HYDROCHLORIDE AND ACETAMINOPHEN 1 TABLET: 5; 325 TABLET ORAL at 22:33

## 2022-02-24 RX ADMIN — SODIUM CHLORIDE 125 ML/HR: 0.9 INJECTION, SOLUTION INTRAVENOUS at 10:35

## 2022-02-24 RX ADMIN — ALBUTEROL SULFATE 2 PUFF: 90 AEROSOL, METERED RESPIRATORY (INHALATION) at 22:33

## 2022-02-24 RX ADMIN — SODIUM CHLORIDE 125 ML/HR: 0.9 INJECTION, SOLUTION INTRAVENOUS at 19:09

## 2022-02-24 RX ADMIN — OXYCODONE HYDROCHLORIDE AND ACETAMINOPHEN 1 TABLET: 5; 325 TABLET ORAL at 13:53

## 2022-02-24 RX ADMIN — PANTOPRAZOLE SODIUM 40 MG: 40 INJECTION, POWDER, FOR SOLUTION INTRAVENOUS at 10:55

## 2022-02-24 RX ADMIN — ALBUTEROL SULFATE 2 PUFF: 90 AEROSOL, METERED RESPIRATORY (INHALATION) at 17:46

## 2022-02-24 RX ADMIN — PANTOPRAZOLE SODIUM 40 MG: 40 INJECTION, POWDER, FOR SOLUTION INTRAVENOUS at 22:32

## 2022-02-24 RX ADMIN — FLUTICASONE PROPIONATE 1 SPRAY: 50 SPRAY, METERED NASAL at 08:37

## 2022-02-24 RX ADMIN — SODIUM CHLORIDE 125 ML/HR: 0.9 INJECTION, SOLUTION INTRAVENOUS at 04:21

## 2022-02-24 RX ADMIN — OXYCODONE HYDROCHLORIDE AND ACETAMINOPHEN 1 TABLET: 5; 325 TABLET ORAL at 01:16

## 2022-02-24 RX ADMIN — OXYCODONE HYDROCHLORIDE AND ACETAMINOPHEN 1 TABLET: 5; 325 TABLET ORAL at 09:05

## 2022-02-24 RX ADMIN — SODIUM CHLORIDE 1000 MG: 9 INJECTION, SOLUTION INTRAVENOUS at 01:29

## 2022-02-24 NOTE — PLAN OF CARE
Problem: PHYSICAL THERAPY ADULT  Goal: Performs mobility at highest level of function for planned discharge setting  See evaluation for individualized goals  Description: Treatment/Interventions: Functional transfer training,LE strengthening/ROM,Elevations,Therapeutic exercise,Endurance training,Patient/family training,Equipment eval/education,Bed mobility,Gait training,Spoke to nursing,OT  Equipment Recommended: Estrada Parada       See flowsheet documentation for full assessment, interventions and recommendations  2/24/2022 1302 by Warner Read, PT  Note: Prognosis: Good  Problem List: Decreased strength,Decreased endurance,Impaired balance,Decreased mobility,Pain  Assessment: Pt is 42 y o  female seen for PT evaluation s/p admit to [facility] on 2/23/2022  Two pt identifiers were used to confirm  Pt presented w/ increased bilateral lower extremity weakness for approximately the last 4 weeks  Pt was admitted with a primary dx of:  Lower extremity numbness, and other active problems including alcohol dependence, nausea vomiting, transaminitis, hyponatremia, class 2 obesity, Crohn's disease of colon with complication, hypothyroidism, moderate persistent asthma without complication  PT now consulted for assessment of mobility and d/c needs  Pt with Up with assistance orders  Pts current co morbidities affecting treatment include: Anxiety, asthma, Crohn disease, depression, disease of thyroid gland, HTN, hypothyroidism, psychiatric disorder, and other active problems including steps to manage at home  Pts current clinical presentation is Unstable/ Unpredictable (high complexity) due to Ongoing medical management for primary dx, Increased reliance on more restrictive AD compared to baseline, Decreased activity tolerance compared to baseline, Fall risk, Increased assistance needed from caregiver at current time, Continuous pulse oximetry monitoring      Upon evaluation, pt currently is requiring Supervision for bed mobility; Min Ax1 for transfers and Min Ax1 for ambulation w/ RW  Pt presents at PT eval functioning below baseline and currently w/ overall mobility deficits 2* to: BLE weakness, impaired balance, decreased endurance, gait deviations, pain, decreased activity tolerance compared to baseline, fall risk  Pt currently at a fall risk 2* to impairments listed above  Based on the aforementioned PT evaluation, pt will continue to benefit from skilled Acute PT interventions to address stated impairments; to maximize functional mobility; for ongoing pt/ family training; and DME needs  At conclusion of PT session pt returned back in chair with phone and call bell within reach  Pt denies any further questions at this time  PT is currently recommending Rehab pending progress  PT will continue to follow during hospital stay  Barriers to Discharge: Inaccessible home environment        PT Discharge Recommendation: Post acute rehabilitation services (pending progress )          See flowsheet documentation for full assessment

## 2022-02-24 NOTE — ASSESSMENT & PLAN NOTE
· Reported by the patient for the past 6 weeks  · Trial PPI BID   · Consider carafate if this doesn't improve  · +hx of ETOH abuse   · Will need outpatient EGD after discharge

## 2022-02-24 NOTE — PROCEDURES
Lumbar puncture procedure note:    Patient identified  Informed consent obtained  Time out and procedural anatomy confirmed  Area sterilized with Iodine sol  1% lidocaine sol administered for local anesthetic  L3-4 interspace identified and entered with 22G 3 5" spinal needle  10-12cc Clear CSF collected  No complications  Patient instructed to lay flat for >1hr

## 2022-02-24 NOTE — PLAN OF CARE
Problem: Nutrition/Hydration-ADULT  Goal: Nutrient/Hydration intake appropriate for improving, restoring or maintaining nutritional needs  Description: Monitor and assess patient's nutrition/hydration status for malnutrition  Collaborate with interdisciplinary team and initiate plan and interventions as ordered  Monitor patient's weight and dietary intake as ordered or per policy  Utilize nutrition screening tool and intervene as necessary  Determine patient's food preferences and provide high-protein, high-caloric foods as appropriate       INTERVENTIONS:  - Monitor oral intake, urinary output, labs, and treatment plans  - Assess nutrition and hydration status and recommend course of action  - Evaluate amount of meals eaten  - Assist patient with eating if necessary   - Allow adequate time for meals  - Recommend/ encourage appropriate diets, oral nutritional supplements, and vitamin/mineral supplements  - Order, calculate, and assess calorie counts as needed  - Recommend, monitor, and adjust tube feedings and TPN/PPN based on assessed needs  - Assess need for intravenous fluids  - Provide specific nutrition/hydration education as appropriate  - Include patient/family/caregiver in decisions related to nutrition  Outcome: Progressing     Problem: MOBILITY - ADULT  Goal: Maintain or return to baseline ADL function  Description: INTERVENTIONS:  -  Assess patient's ability to carry out ADLs; assess patient's baseline for ADL function and identify physical deficits which impact ability to perform ADLs (bathing, care of mouth/teeth, toileting, grooming, dressing, etc )  - Assess/evaluate cause of self-care deficits   - Assess range of motion  - Assess patient's mobility; develop plan if impaired  - Assess patient's need for assistive devices and provide as appropriate  - Encourage maximum independence but intervene and supervise when necessary  - Involve family in performance of ADLs  - Assess for home care needs following discharge   - Consider OT consult to assist with ADL evaluation and planning for discharge  - Provide patient education as appropriate  Outcome: Progressing  Goal: Maintains/Returns to pre admission functional level  Description: INTERVENTIONS:  - Perform BMAT or MOVE assessment daily    - Set and communicate daily mobility goal to care team and patient/family/caregiver     - Collaborate with rehabilitation services on mobility goals if consulted  - Out of bed for toileting  - Record patient progress and toleration of activity level   Outcome: Progressing     Problem: PAIN - ADULT  Goal: Verbalizes/displays adequate comfort level or baseline comfort level  Description: Interventions:  - Encourage patient to monitor pain and request assistance  - Assess pain using appropriate pain scale  - Administer analgesics based on type and severity of pain and evaluate response  - Implement non-pharmacological measures as appropriate and evaluate response  - Consider cultural and social influences on pain and pain management  - Notify physician/advanced practitioner if interventions unsuccessful or patient reports new pain  Outcome: Progressing     Problem: SAFETY ADULT  Goal: Maintain or return to baseline ADL function  Description: INTERVENTIONS:  -  Assess patient's ability to carry out ADLs; assess patient's baseline for ADL function and identify physical deficits which impact ability to perform ADLs (bathing, care of mouth/teeth, toileting, grooming, dressing, etc )  - Assess/evaluate cause of self-care deficits   - Assess range of motion  - Assess patient's mobility; develop plan if impaired  - Assess patient's need for assistive devices and provide as appropriate  - Encourage maximum independence but intervene and supervise when necessary  - Involve family in performance of ADLs  - Assess for home care needs following discharge   - Consider OT consult to assist with ADL evaluation and planning for discharge  - Provide patient education as appropriate  Outcome: Progressing  Goal: Maintains/Returns to pre admission functional level  Description: INTERVENTIONS:  - Perform BMAT or MOVE assessment daily    - Set and communicate daily mobility goal to care team and patient/family/caregiver     - Collaborate with rehabilitation services on mobility goals if consulted  - Out of bed for toileting  - Record patient progress and toleration of activity level   Outcome: Progressing  Goal: Patient will remain free of falls  Description: INTERVENTIONS:  - Educate patient/family on patient safety including physical limitations  - Instruct patient to call for assistance with activity   - Consult OT/PT to assist with strengthening/mobility   - Keep Call bell within reach  - Keep bed low and locked with side rails adjusted as appropriate  - Keep care items and personal belongings within reach  - Initiate and maintain comfort rounds  - Make Fall Risk Sign visible to staff  - Offer Toileting every 2 Hours, in advance of need  - Initiate/Maintain bed alarm  - Obtain necessary fall risk management equipment  - Apply yellow socks and bracelet for high fall risk patients  - Consider moving patient to room near nurses station  Outcome: Progressing     Problem: DISCHARGE PLANNING  Goal: Discharge to home or other facility with appropriate resources  Description: INTERVENTIONS:  - Identify barriers to discharge w/patient and caregiver  - Arrange for needed discharge resources and transportation as appropriate  - Identify discharge learning needs (meds, wound care, etc )  - Arrange for interpretive services to assist at discharge as needed  - Refer to Case Management Department for coordinating discharge planning if the patient needs post-hospital services based on physician/advanced practitioner order or complex needs related to functional status, cognitive ability, or social support system  Outcome: Progressing     Problem: Knowledge Deficit  Goal: Patient/family/caregiver demonstrates understanding of disease process, treatment plan, medications, and discharge instructions  Description: Complete learning assessment and assess knowledge base    Interventions:  - Provide teaching at level of understanding  - Provide teaching via preferred learning methods  Outcome: Progressing

## 2022-02-24 NOTE — OCCUPATIONAL THERAPY NOTE
Occupational Therapy Evaluation     Patient Name: Eusebio Mcfarlane  RCTWS'F Date: 2/24/2022  Problem List  Principal Problem:    Lower extremity numbness  Active Problems:    Alcohol dependence (HCC)    Moderate persistent asthma without complication    Hypothyroidism    Crohn's disease of colon with complication (HCC)    Class 2 obesity due to excess calories with body mass index (BMI) of 37 0 to 37 9 in adult    Hyponatremia    Transaminitis    Nausea and vomiting    Past Medical History  Past Medical History:   Diagnosis Date    Anxiety     Asthma     Crohn disease ([address] )     Depression     Disease of thyroid gland     Hypertension     Hypothyroidism     Lower extremity numbness 02/11/2022    Psychiatric disorder      Past Surgical History  Past Surgical History:   Procedure Laterality Date    IR LUMBAR PUNCTURE  2/8/2022    NO PAST SURGERIES           02/24/22 0928   OT Last Visit   OT Visit Date 02/24/22   Note Type   Note type Evaluation   Restrictions/Precautions   Weight Bearing Precautions Per Order No   Other Precautions Droplet precautions;Multiple lines; Fall Risk;Pain   Pain Assessment   Pain Assessment Tool 0-10   Pain Score 8   Pain Location/Orientation Orientation: Bilateral;Location: Leg   Hospital Pain Intervention(s) Repositioned; Ambulation/increased activity   Home Living   Type of [address] [place] [address] Two level;Stairs to enter with rails  (2 TORI)   Additional Comments Pt reports no DME use pta   Prior Function   Level of [place] Independent with ADLs and functional mobility   Lives With Daughter   Receives Help From Family   ADL Assistance Independent   IADLs Independent   Falls in the last 6 months 0   Vocational Full time employment   Comments Pt reports living with 15year old daughter and having supportive parents who can assist prn   Lifestyle   Autonomy Independent with ADLs, IADLs, and functional mobility pta   Reciprocal Relationships Supportive family   Service to Others Employed full time   Intrinsic Gratification Cooking   ADL   Eating Assistance 7  Independent   Grooming Assistance 5  Supervision/Setup   UB Bathing Assistance 5  Supervision/Setup   LB Bathing Assistance 3  Moderate Assistance   UB Dressing Assistance 5  Supervision/Setup   LB Dressing Assistance 3  Moderate [address]  3  Moderate Assistance   Bed Mobility   Supine to Sit 5  Supervision   Additional items Increased time required   Additional Comments Pt left seated in bedside recliner at conclusion of session with all needs met   Transfers   Sit to Stand 4  Minimal assistance   Additional items Assist x 1; Increased time required;Verbal cues   Stand to Sit 4  Minimal assistance   Additional items Assist x 1; Increased time required;Verbal cues   Functional Mobility   Functional Mobility 4  Minimal assistance   Additional Comments Increased time required   Additional items Rolling walker   Activity Tolerance   Activity Tolerance Patient limited by fatigue;Patient limited by pain   Medical Staff Made Aware DPT Francesco   Nurse Made Aware RN cleared for therapy   RUE Assessment   RUE Assessment WFL   LUE Assessment   LUE Assessment WFL   Cognition   Overall Cognitive Status WFL   Arousal/Participation Alert; Responsive; Cooperative   Attention Within functional limits   Orientation Level Oriented X4   Memory Within functional limits   Following Commands Follows all commands and directions without difficulty   Assessment   Limitation Decreased ADL status; Decreased self-care trans;Decreased high-level ADLs   Prognosis Good   Assessment Pt  is 42 y o  female admitted to [facility] 2/23/2022 s/p Lower extremity numbness  PMH includes obesity and hypothyroidism, crohn's disease, asthma  Pt  currently resides with her daughter in a two story home with 2 TORI  Prior to admission pt  was independent in ADLs, IADLs, and functional mobility   At baseline, pt  does not require the use of AD for functional mobility  Currently, pt  requires min A for transfers and functional mobility  Pt requires mod A for LB ADLs and toileting and supervision for bed mobility and UB ADLs  Pt  demonstrates deficits in bathing, dressing, toileting, functional mobility/transfers, laundry , driving, house maintenance, meal prep, cleaning, work/volunteer work , care of children and leisure activities  due to pain, standing balance/tolerance and limited home support, difficulty performing ADLS and difficulty performing IADLS   OT d/c recommendations include post acute rehabilitation services due to current functional status and limited home support  Pt  would benefit from continued inpatient OT services focusing on the goals below  Goals   Patient Goals To go home   LTG Time Frame 10-14   Long Term Goal #1 Please see below   Plan   Treatment Interventions ADL retraining;Functional transfer training;Patient/family training;Equipment evaluation/education;Continued evaluation; Activityengagement   Goal Expiration Date 03/10/22   OT Treatment Day 0   OT Frequency 3-5x/wk   Recommendation   OT Discharge Recommendation Post acute rehabilitation services   OT - OK to Discharge Yes   AM-PAC Daily Activity Inpatient   Lower Body Dressing 2   Bathing 2   Toileting 2   Upper Body Dressing 3   Grooming 3   Eating 4   Daily Activity Raw Score 16   Daily Activity Standardized Score (Calc for Raw Score >=11) 35 96   AM-PAC Applied Cognition Inpatient   Following a Speech/Presentation 4   Understanding Ordinary Conversation 4   Taking Medications 4   Remembering Where Things Are Placed or Put Away 4   Remembering List of 4-5 Errands 4   Taking Care of Complicated Tasks 4   Applied Cognition Raw Score 24   Applied Cognition Standardized Score 62 21     The patient's raw score on the AM-PAC Daily Activity inpatient short form is 16, standardized score is 35 96, less than 39 4   Patients at this level are likely to benefit from discharge to post-acute rehabilitation services  Please refer to the recommendation of the Occupational Therapist for safe discharge planning      Patient Goals  Pt  will complete UB and LB ADL tasks with mod I  Pt  will complete grooming tasks with mod I  Pt  will complete toilet transfers using DME with mod I  Pt  will demonstrate good safety and judgement when completing functional transfers  Pt  will complete IADL tasks with mod I  Pt  will use appropriate AD during functional mobility with mod I    MIK Lanza

## 2022-02-24 NOTE — ASSESSMENT & PLAN NOTE
· Currently without exacerbation   · Pt reports recent exacerbation approximately 1 month ago- treated with IV abxs at that time   · Follows Dr Jag Perez with GI as outpatient    · Pt was scheduled to have a cscope yesterday but had to cancel due to mobility     · F/u outpatient to discuss restarting humira outpatient

## 2022-02-24 NOTE — ED ATTENDING ATTESTATION
2/23/2022  ISvetlana MD, saw and evaluated the patient  I have discussed the patient with the resident/non-physician practitioner and agree with the resident's/non-physician practitioner's findings, Plan of Care, and MDM as documented in the resident's/non-physician practitioner's note, except where noted  All available labs and Radiology studies were reviewed  I was present for key portions of any procedure(s) performed by the resident/non-physician practitioner and I was immediately available to provide assistance  At this point I agree with the current assessment done in the Emergency Department  I have conducted an independent evaluation of this patient a history and physical is as follows:    ED Course     Patient is a 18-year-old female with progressive lower extremity numbness and weakness difficulty ambulating  Patient is unable to transfer with assistance at home now with family  Patient had recent hospitalization including MRIs of her spine lumbar puncture to rule at [facility] given history of COVID  Additional laboratory work history provided by patient's primary care provider prior to patient arrival this morning  Vital signs reviewed patient evaluated patient states that she now has upper extremity numbness in bilateral hands no focal motor weakness patient states that she feels that her legs are heavy denies any bowel or bladder incontinence or saddle anesthesia at this time  Patient states her vision is blurred at times  Impression: Focal weakness numbness ambulatory dysfunction  Given progression of patient's symptoms will reimage patient's cervical thoracic and lumbar spine to evaluate for demyelinating disorder,MS,  transverse myelitis, space occupying lesion  Pain control  IV fluids  Check screening labs will consult Neurology    Patient will be admitted to Medicine for further evaluation management including possible rehab placement as requested by her primary care provider  MRI results reviewed with Radiology patient has a lesion in her simona which is new  Case discussed with neurology-initially plan to perform lumbar puncture however given findings in the simona will defer until dedicated MRI brain is performed  Patient will be admitted to Medicine Service with Neurology consult         Critical Care Time  Procedures

## 2022-02-24 NOTE — ASSESSMENT & PLAN NOTE
· Patient presented with progressive lower extremity weakness, lower extremity as well as abdominal/truncal and finger tip numbness, and significant gait instability/difficulty at home since mid January  · MRI cspine 2/23- There is a well-circumscribed focus of abnormal hyperintense T2 and FLAIR signal noted within the posterior central aspect of the simona with mild associated enhancement   Findings are nonspecific and differential considerations would include demyelinating  lesion with active inflammation, subacute infarct or possibly brainstem neoplasm   Dedicated MRI of the brain with contrast recommended  · MRI brain 2/24- As seen on the MRI of the cervical spine performed yesterday, there is a focus of abnormal increased T2 and FLAIR signal within the posterior central aspect of the simona   Only minimal diffusion is seen on postcontrast imaging, less so than yesterday's examination of the cervical spine   This lesion is nonspecific and differential considerations would include solitary demyelinating focus, or other infectious/inflammatory etiologies including focal vasculopathy   Neoplasm such as brainstem glioma or metastasis less likely  · Neurology consult appreciated   · pending repeat LP/CSF analysis- per neurology likely plan to do this today   · Pt has a hx of ETOH abuse and reported last drink was 2 weeks ago  Reported drinking 5 glasses of wine a day   Will start thiamine 100mg IV daily and check thiamine level   · Hepatitis, HIV negative  · Folate 2 5- replete   · b12 1135  · Sed rate 18; CRP 8 8;

## 2022-02-24 NOTE — UTILIZATION REVIEW
Initial Clinical Review    Admission: Date/Time/Statement:   Admission Orders (From admission, onward)     Ordered        02/23/22 1515  Inpatient Admission  Once                      Orders Placed This Encounter   Procedures    Inpatient Admission     Standing Status:   Standing     Number of Occurrences:   1     Order Specific Question:   Level of Care     Answer:   Med Surg [16]     Order Specific Question:   Estimated length of stay     Answer:   More than 2 Midnights     Order Specific Question:   Certification     Answer:   I certify that inpatient services are medically necessary for this patient for a duration of greater than two midnights  See H&P and MD Progress Notes for additional information about the patient's course of treatment  ED Arrival Information     Expected Arrival Acuity    - 2/23/2022 08:28 Urgent         Means of arrival Escorted by Service Admission type    Walk-In Self Hospitalist Urgent         Arrival complaint    unable to walk        Chief Complaint   Patient presents with    Numbness     pt reports b/l foot numbness moving into calves, abdominal numbness, hand numbness, decreased appetite, and progressive weakness over the last 3 weeks     Initial Presentation:   40y Female to ED presents with bilateral lower extremity weakness for approx 4 wks  Pt with difficulties ambulating at home secondary to profound weakness  Also paresthesia of her legs and fingers  Recent hospitalization from 2/4 to 2/11 Lower extremity numbness, similar presentation and Covid infection  At that point lP showed elevated protein a cryoglobulin  PMH for Crohn's disease, Hypothyroid and Asthma  Admit Inpatient level of care for Lower extremity numbness  Progressive lower extremity weakness, lower extremity as well as abdominal/truncal and finger tip numbness, and significant gait instability/difficulty at home since mid January  MRI Brain pending   Pending repeat LP/ CSF analysis; MRI cervical spine today noting well-circumscribed T2/FLAIR signal abnormality within the posterior central simona with mild associated enhancement  Neurology consult  Plan for LP and possible Iv steriod  2/23   Neurology cons; Following MRI Brain, will start Iv Solumedrol 1g daily (likely for 5 days)  Pending MRI Brain results, may pursue repeat LP/ CSF studies  Check RPR, Lyme, ANCA, dsDNA, other serum workup recently: cryoglobulin was positive, TSH greater than 8, T4 1 48, low folate yesterday at 2 5  IgA on 02/07 elevated at 418 (reference range )  -MRI cervical spine today noting well-circumscribed T2/FLAIR signal abnormality within the posterior central simona with mild associated enhancement  Date: 2/24   Day 2:   Per Neurology; MRI confirms central aspect of simona signal abnormality with minimal diffusion is seen on post contrast imaging  Continue Iv Solumedrol  Check thiamine level  Progress notes; Nausea/vomiting, Transminitis  history of ETOH abuse and reported last drink was 2 weeks ago  Reported drinking 5 glasses of wine a day  Will start thiamine 100mg IV daily and check thiamine level  Folate 2 5, replete  Sed rate 18; CPR 8 8;   Continues on Iv Solumedrol 1000 mg daily day #2  Continue thiamine 100 mg Iv daily  MVI/ Folic acid  Trial PPI Bid  Transaminitis proved with IVFs  N/V for past 6 wks  CMP in am 2/25  Hyponatremia; 132  Check urine na, urine osmo, serum osmo, cortisol, uric acid  On exam; sensory deficit (b/l lower extremities and fingertips b/l subjectively reported numbness and burning sensation  Generalized weakness 3/5 upper extremities 2/5 lower extremities       ED Triage Vitals   Temperature Pulse Respirations Blood Pressure SpO2   02/23/22 0933 02/23/22 0845 02/23/22 0845 02/23/22 0845 02/23/22 0845   98 3 °F (36 8 °C) 104 20 153/61 95 %      Temp Source Heart Rate Source Patient Position - Orthostatic VS BP Location FiO2 (%)   02/23/22 0933 02/23/22 1300 -- -- --   Oral Monitor Pain Score       02/23/22 0934       7          Wt Readings from Last 1 Encounters:   02/23/22 92 5 kg (204 lb)     Additional Vital Signs:   02/24/22 08:41:57 98 2 °F (36 8 °C) 82 -- 137/97 110 95 % --   02/23/22 16:24:28 97 6 °F (36 4 °C) 84 -- 139/99 112 89 % Abnormal  --   02/23/22 1338 -- -- -- -- -- -- None (Room air)   02/23/22 1300 -- 108 Abnormal  18 120/81 96 95 % None (Room air)     Pertinent Labs/Diagnostic Test Results:   MRI brain w wo contrast   Final Result by Charles Dutton DO (02/24 0800)      As seen on the MRI of the cervical spine performed yesterday, there is a focus of abnormal increased T2 and FLAIR signal within the posterior central aspect of the simona  Only minimal diffusion is seen on postcontrast imaging, less so than yesterday's    examination of the cervical spine  This lesion is nonspecific and differential considerations would include solitary demyelinating focus, or other infectious/inflammatory etiologies including focal vasculopathy  Neoplasm such as brainstem glioma or    metastasis less likely  MRI lumbar spine w wo contrast   Final Result by Gene Shanel Dutton DO (02/23 1305)      Although there is no discrete disc herniation at the L5-S1 level there is mild right-sided facet degenerative change and disc material does abut the ventral aspect of the exiting nerve, unchanged from the prior examination  Normal signal within the distal cord and conus  The abnormal signal seen on prior examination is likely artifactual             MRI thoracic spine w wo contrast   Final Result by Gene Shanel Dutton DO (02/23 1309)      No abnormal cord signal       Minor noncompressive mid thoracic degenerative change is stable  MRI cervical spine w wo contrast   Final Result by Gene Devjie Safer DO (02/23 1319)      Mild cervical degenerative change C4-5, C5-6 and C6-7  No canal stenosis or cord compression    There is right-sided foraminal narrowing at the C5-6 level  Normal cervical and thoracic cord  There is a well-circumscribed focus of abnormal hyperintense T2 and FLAIR signal noted within the posterior central aspect of the simona with mild associated enhancement  Findings are nonspecific and differential considerations would include demyelinating    lesion with active inflammation, subacute infarct or possibly brainstem neoplasm  Dedicated MRI of the brain with contrast recommended              Results from last 7 days   Lab Units 02/23/22  0941 02/22/22  1339   WBC Thousand/uL 8 37 8 80   HEMOGLOBIN g/dL 15 3 15 9*   HEMATOCRIT % 43 4 45 2   PLATELETS Thousands/uL 374 525*   NEUTROS ABS Thousands/µL 7 10 7 22         Lab Units 02/24/22  1057 02/23/22  0941   SODIUM mmol/L  --  132*   POTASSIUM mmol/L  --  3 6   CHLORIDE mmol/L  --  99*   CO2 mmol/L  --  24   ANION GAP mmol/L  --  9   BUN mg/dL  --  7   CREATININE mg/dL  --  0 68   EGFR ml/min/1 73sq m  --  108   CALCIUM mg/dL  --  8 8   MAGNESIUM mg/dL 1 5*  --      Lab Units 02/23/22  0941   AST U/L 101*   ALT U/L 141*   ALK PHOS U/L 83   TOTAL PROTEIN g/dL 8 1   ALBUMIN g/dL 3 8   TOTAL BILIRUBIN mg/dL 3 55*         Results from last 7 days   Lab Units 02/23/22  0941   GLUCOSE RANDOM mg/dL 90       Results from last 7 days   Lab Units 02/24/22  1057 02/23/22  0941   CK TOTAL U/L 155 222*   CK MB INDEX %  --  <1 0   CK MB ng/mL  --  1 7         Results from last 7 days   Lab Units 02/22/22  1339   TSH 3RD GENERATON uIU/mL 9 620*         Results from last 7 days   Lab Units 02/24/22  1057   LACTIC ACID mmol/L 0 9         Lab Units 02/23/22  0941   CRP mg/L 8 8*   SED RATE mm/hour 18     ED Treatment:   Medication Administration from 02/23/2022 0828 to 02/23/2022 1609       Date/Time Order Dose Route Action     02/23/2022 0934 morphine injection 2 mg 2 mg Intravenous Given     02/23/2022 0937 ondansetron (ZOFRAN) injection 4 mg 4 mg Intravenous Given     02/23/2022 1032 LORazepam (ATIVAN) injection 1 mg 1 mg Intravenous Given     02/23/2022 1227 Gadobutrol injection (SINGLE-DOSE) SOLN 9 mL 9 mL Intravenous Given     02/23/2022 1313 HYDROmorphone (DILAUDID) injection 1 mg 1 mg Intravenous Given     02/23/2022 1312 sodium chloride 0 9 % bolus 1,000 mL 1,000 mL Intravenous New Bag     02/23/2022 1450 ondansetron (ZOFRAN) injection 4 mg 4 mg Intravenous Given        Past Medical History:   Diagnosis Date    Anxiety     Asthma     Crohn disease [facility] )     Depression     Disease of thyroid gland     Hypertension     Hypothyroidism     Lower extremity numbness 02/11/2022    Psychiatric disorder      Present on Admission:   Lower extremity numbness   Hypothyroidism   Moderate persistent asthma without complication   Crohn's disease of colon with complication ([facility] )   Alcohol dependence ([facility] )      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]  Unable to walk on flat surface [R26 2]  Age/Sex: 42 y o  female     Admission Orders:  Scheduled Medications:  albuterol, 2 puff, Inhalation, 4x Daily  disulfiram, 250 mg, Oral, Daily  fluticasone, 1 spray, Nasal, Daily  folic acid, 1 mg, Oral, Daily  levothyroxine, 150 mcg, Oral, Early Morning  methylPREDNISolone sodium succinate, 1,000 mg, Intravenous, Daily  multivitamin-minerals, 1 tablet, Oral, Daily  pantoprazole, 40 mg, Intravenous, Q12H [address]  thiamine, 100 mg, Intravenous, Daily      Continuous IV Infusions:  sodium chloride, 125 mL/hr, Intravenous, Continuous      PRN Meds:  acetaminophen, 650 mg, Oral, Q6H PRN  calcium carbonate, 500 mg, Oral, Daily PRN  clonazePAM, 0 5 mg, Oral, BID PRN  Gadobutrol, 9 mL, Intravenous, Once in imaging  LORazepam, 0 5 mg, Oral, Q8H PRN  oxyCODONE-acetaminophen, 1 tablet, Oral, Q4H PRN      CIWA assess  Neuro checks 4 hrs  IP CONSULT TO NEUROLOGY    Network Utilization Review Department  ATTENTION: Please call with any questions or concerns to [phone] and carefully listen to the prompts so that you are directed to the right person  All voicemails are confidential   Holden Campa all requests for admission clinical reviews, approved or denied determinations and any other requests to dedicated fax number below belonging to the campus where the patient is receiving treatment   List of dedicated fax numbers for the Facilities:  [address] DENIALS (Administrative/Medical Necessity) [phone]   1000  [facility] (Maternity/NICU/Pediatrics) [phone]   [address]  [address] [facility] [place] [address] [address] [address] Pam Young 1481 [address] [facility] [phone]

## 2022-02-24 NOTE — ASSESSMENT & PLAN NOTE
· In the setting of alcohol abuse   · Improved today with IVFs  · ast 101; alt 141  · tbili 3 55 without significant improvement today   · Hepatitis panel negative   · Pt reports n/v for the past 6 weeks   · CT of the abd/pelvis performed in January- Hepatic steatosis and hepatomegaly    · Consider abdominal US if liver enzymes worsen in am   · cmp in am

## 2022-02-24 NOTE — CASE MANAGEMENT
Case Management Assessment & Discharge Planning Note    Patient name Eusebio Spicerter  [facility] [id number]  : 1980 Date 2022       Current Admission Date: 2022  Current Admission Diagnosis:Lower extremity numbness   Patient Active Problem List    Diagnosis Date Noted    Hyponatremia 2022    Transaminitis 2022    Nausea and vomiting 2022    Nausea 02/10/2022    Ambulatory dysfunction 2022    Lower extremity numbness 2022    COVID-19 2022    Severe protein-calorie malnutrition ([facility] ) 2020    Hypomagnesemia 2020    Class 2 obesity due to excess calories with body mass index (BMI) of 37 0 to 37 9 in adult 2020    Terminal ileitis without complication ([facility] )     Crohn's disease of colon with complication ([facility] )     Hypothyroidism 2020    Recurrent major depressive disorder, in partial remission ([place] [address] ) 10/04/2019    JOSE (generalized anxiety disorder) 10/04/2019    Moderate persistent asthma without complication     Umbilical hernia without obstruction and without gangrene 2019    Alcohol dependence ([facility] ) 2015      LOS (days): 1  Geometric Mean LOS (GMLOS) (days):   Days to GMLOS:     OBJECTIVE:  PATIENT READMITTED TO HOSPITAL  Risk of Unplanned Readmission Score: 11         Current admission status: Inpatient       Preferred Pharmacy:   [facility] [address]  [address] [place] [address]  Phone: [phone] Fax: [phone]    Primary Care Provider: Carleene Phalen, DO    Primary Insurance: DERICK  Secondary Insurance:     ASSESSMENT:  Asya1 Som Quinones, [facility] Phone: [phone] (Mobile)               Advance Directives  Does patient have a [address] [place] [address] Avenue?: No  Was patient offered paperwork?: Yes  Does patient currently have a Health Care decision maker?: No  Does patient have Advance Directives?: No  Was patient offered paperwork?: Yes    Readmission Root Cause  30 Day Readmission: Yes  Who directed you to return to the hospital?: PCP  Did you understand whom to contact if you had questions or problems?: Yes  Did you get your prescriptions before you left the hospital?: Yes  Were you able to get your prescriptions filled when you left the hospital?: Yes  Did you take your medications as prescribed?: Yes  Were you able to get to your follow-up appointments?: Yes  During previous admission, was a post-acute recommendation made?: No    Patient Information  Admitted from[de-identified] Home  Mental Status: Alert  During Assessment patient was accompanied by: Not accompanied during assessment  Assessment information provided by[de-identified] Patient  Primary Caregiver: Self  Support Systems: [facility] of Residence: [address] [place] [address] do you live in?: [address] entry access options   Select all that apply : Stairs  Number of steps to enter home : 2  Do the steps have railings?: Yes  Type of Current Residence: [address] [place] home  Upon entering residence, is there a bedroom on the main floor (no further steps)?: Yes  Upon entering residence, is there a bathroom on the main floor (no further steps)?: Yes  In the last 12 months, was there a time when you were not able to pay the mortgage or rent on time?: No  In the last 12 months, how many places have you lived?: 1  In the last 12 months, was there a time when you did not have a steady place to sleep or slept in a shelter (including now)?: No  Homeless/housing insecurity resource given?: N/A  Living Arrangements: Other (Comment) (18 yo daughter lives with patient)  Is patient a ?: No    Activities of Daily Living Prior to Admission  Functional Status: Assistance  Completes ADLs independently?: No  Level of ADL dependence: Assistance  Ambulates independently?: Yes  Does patient use assisted devices?: Yes  Assisted Devices (DME) used: Harpreet Anderson  Does patient currently own DME?: Yes  What DME does the patient currently own?: Harpreet Anderson  Does patient have a history of Outpatient Therapy (PT/OT)?: No  Does the patient have a history of Short-Term Rehab?: No  Does patient have a history of HHC?: No  Does patient currently have [facility]?: No         Patient Information Continued  Income Source: Employed  Does patient have prescription coverage?: Yes  Within the past 12 months, you worried that your food would run out before you got the money to buy more : Never true  Within the past 12 months, the food you bought just didnt last and you didnt have money to get more : Never true  Food insecurity resource given?: N/A  Does patient receive dialysis treatments?: No  Does patient have a history of substance abuse?: No  Does patient have a history of Mental Health Diagnosis?: No    PHQ 2/9 Screening   Reviewed PHQ 2/9 Depression Screening Score?: No    Means of Transportation  Means of Transport to Appts[de-identified] Drives Self  In the past 12 months, has lack of transportation kept you from medical appointments or from getting medications?: No  In the past 12 months, has lack of transportation kept you from meetings, work, or from getting things needed for daily living?: No  Was application for public transport provided?: N/A        DISCHARGE DETAILS:    Discharge planning discussed with[de-identified] Patient  Freedom of Choice: Yes  Comments - Freedom of Choice: Discussed FOC      CM reviewed d/c planning process including the following: identifying help at home, patient preference for d/c planning needs, Discharge Lounge, [facility] Meds to Bed program, availability of treatment team to discuss questions or concerns patient and/or family may have regarding understanding medications and recognizing signs and symptoms once discharged  CM also encouraged patient to follow up with all recommended appointments after discharge   Patient advised of importance for patient and family to participate in managing patients medical well being  Patient was vaccinated for covid with Luis Gomez in 2/21 and 4/21    Patient reports having covid in past

## 2022-02-24 NOTE — UTILIZATION REVIEW
Inpatient Admission Authorization Request   NOTIFICATION OF INPATIENT ADMISSION/INPATIENT AUTHORIZATION REQUEST   SERVICING FACILITY:   [facility]  Address: [address] [place] [address], [address] [place] [address]  Tax ID: [id number]  NPI: [id number]  Place of Service: Inpatient [address] [facility] Code: 24     ATTENDING PROVIDER:  Attending Name and NPI#: Anna Hanson Md [[id number]]  Address: [address] [place] [address], [address] [place] [address]  Phone: [phone]     UTILIZATION REVIEW CONTACT:  [facility] Utilization   Network Utilization Review Department  Phone: [phone]  Fax: [phone]  Email: [email or web address]     PHYSICIAN ADVISORY SERVICES:  FOR [facility] REVIEW - MEDICAL NECESSITY DENIAL  Phone: [phone]  Fax: [phone]  Email: [email or web address]     TYPE OF REQUEST:  Inpatient Status     ADMISSION INFORMATION:  ADMISSION DATE/TIME: 2/23/22  3:15 PM  PATIENT DIAGNOSIS CODE/DESCRIPTION:  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

## 2022-02-24 NOTE — PLAN OF CARE
Problem: OCCUPATIONAL THERAPY ADULT  Goal: Performs self-care activities at highest level of function for planned discharge setting  See evaluation for individualized goals  Description: Treatment Interventions: ADL retraining,Functional transfer training,Patient/family training,Equipment evaluation/education,Continued evaluation,Activityengagement     See flowsheet documentation for full assessment, interventions and recommendations  Note: Limitation: Decreased ADL status,Decreased self-care trans,Decreased high-level ADLs  Prognosis: Good  Assessment: Pt  is 42 y o  female admitted to [facility] on [facility]/23/2022 s/p Lower extremity numbness  PMH includes obesity and hypothyroidism, crohn's disease, asthma  Pt  currently resides with her daughter in a two story home with 2 TORI  Prior to admission pt  was independent in ADLs, IADLs, and functional mobility  At baseline, pt  does not require the use of AD for functional mobility  Currently, pt  requires min A for transfers and functional mobility  Pt requires mod A for LB ADLs and toileting and supervision for bed mobility and UB ADLs  Pt  demonstrates deficits in bathing, dressing, toileting, functional mobility/transfers, laundry , driving, house maintenance, meal prep, cleaning, work/volunteer work , care of children and leisure activities  due to pain, standing balance/tolerance and limited home support, difficulty performing ADLS and difficulty performing IADLS   OT d/c recommendations include post acute rehabilitation services due to current functional status and limited home support  Pt  would benefit from continued inpatient OT services focusing on the goals below  OT Discharge Recommendation: Post acute rehabilitation services  OT - OK to Discharge:  Yes

## 2022-02-24 NOTE — PLAN OF CARE
Problem: Nutrition/Hydration-ADULT  Goal: Nutrient/Hydration intake appropriate for improving, restoring or maintaining nutritional needs  Description: Monitor and assess patient's nutrition/hydration status for malnutrition  Collaborate with interdisciplinary team and initiate plan and interventions as ordered  Monitor patient's weight and dietary intake as ordered or per policy  Utilize nutrition screening tool and intervene as necessary  Determine patient's food preferences and provide high-protein, high-caloric foods as appropriate       INTERVENTIONS:  - Monitor oral intake, urinary output, labs, and treatment plans  - Assess nutrition and hydration status and recommend course of action  - Evaluate amount of meals eaten  - Assist patient with eating if necessary   - Allow adequate time for meals  - Recommend/ encourage appropriate diets, oral nutritional supplements, and vitamin/mineral supplements  - Order, calculate, and assess calorie counts as needed  - Recommend, monitor, and adjust tube feedings and TPN/PPN based on assessed needs  - Assess need for intravenous fluids  - Provide specific nutrition/hydration education as appropriate  - Include patient/family/caregiver in decisions related to nutrition  Outcome: Progressing     Problem: PAIN - ADULT  Goal: Verbalizes/displays adequate comfort level or baseline comfort level  Description: Interventions:  - Encourage patient to monitor pain and request assistance  - Assess pain using appropriate pain scale  - Administer analgesics based on type and severity of pain and evaluate response  - Implement non-pharmacological measures as appropriate and evaluate response  - Consider cultural and social influences on pain and pain management  - Notify physician/advanced practitioner if interventions unsuccessful or patient reports new pain  Outcome: Progressing     Problem: SAFETY ADULT  Goal: Maintain or return to baseline ADL function  Description: INTERVENTIONS:  -  Assess patient's ability to carry out ADLs; assess patient's baseline for ADL function and identify physical deficits which impact ability to perform ADLs (bathing, care of mouth/teeth, toileting, grooming, dressing, etc )  - Assess/evaluate cause of self-care deficits   - Assess range of motion  - Assess patient's mobility; develop plan if impaired  - Assess patient's need for assistive devices and provide as appropriate  - Encourage maximum independence but intervene and supervise when necessary  - Involve family in performance of ADLs  - Assess for home care needs following discharge   - Consider OT consult to assist with ADL evaluation and planning for discharge  - Provide patient education as appropriate  Outcome: Progressing  Goal: Maintains/Returns to pre admission functional level  Description: INTERVENTIONS:  - Perform BMAT or MOVE assessment daily    - Set and communicate daily mobility goal to care team and patient/family/caregiver     - Collaborate with rehabilitation services on mobility goals if consulted  - Out of bed for toileting  - Record patient progress and toleration of activity level   Outcome: Progressing  Goal: Patient will remain free of falls  Description: INTERVENTIONS:  - Educate patient/family on patient safety including physical limitations  - Instruct patient to call for assistance with activity   - Consult OT/PT to assist with strengthening/mobility   - Keep Call bell within reach  - Keep bed low and locked with side rails adjusted as appropriate  - Keep care items and personal belongings within reach  - Initiate and maintain comfort rounds  - Make Fall Risk Sign visible to staff  - Offer Toileting every 2 Hours, in advance of need  - Initiate/Maintain bed alarm  - Obtain necessary fall risk management equipment  - Apply yellow socks and bracelet for high fall risk patients  - Consider moving patient to room near nurses station  Outcome: Progressing

## 2022-02-24 NOTE — PROGRESS NOTES
Progress Note - Neurology   Natalie Duque 42 y o  female MRN: [id number]  Unit/Bed#: [facility] 620-01 Encounter: [id number]    Assessment/Plan    50-year-old female with history of Crohn's disease, anxiety/depression, hypertension, hypothyroidism, presenting following progressive lower extremity weakness, lower extremity as well as abdominal/truncal and finger tip numbness, and significant gait instability/difficulty at home      Symptoms initially began several weeks ago in January:  Evaluated in the ED in late January as well as recent admission from 02/04-2/11 at [address]: had workup done as mentioned below, has had progressive symptoms since (notable over the past several days)     Spinal imaging today notes enhancing central simona signal abnormality; MRI of the brain confirms central aspect of the simona signal abnormality, only minimal diffusion seen on post contrast imaging  Her exam reveals notable ataxia in the extremities/trunk with standing/ambulating   Inflammatory workup to be initiated causing the pontine lesion/signal change (demyelinating disease, infectious/autoimmmune/paraneoplastic screening)    * Lower extremity numbness  Assessment & Plan  -see above for additional info and below for workup/results thusfar    -obtaining MRI brain with/without completed - please see below for details, MRI confirms central aspect of simona signal abnormality with minimal diffusion is seen on post contrast imaging    -following MRI brain, will start IV Solumedrol 1 G daily (likely for 5 days)  -lp with csf analysis   -ordered CSF labs in case LP is needed: basic studies, IgG synthesis, cytometry/flow cytometry, Lyme, WNV, ME panel, MS panel, cytology, ENC2, NMO, ACE, VDRL, anti-MOG  -Check thiamine level, primary team will be placing on thiamine supplementation  -Underwent spinal MRI imaging   -MRI of brain as below, and above  -MRI cervical spine today noting well-circumscribed T2/FLAIR signal abnormality within the posterior central simona with mild associated enhancement  -MRI cervical spine negative for canal stenosis/cord compression  -MRI thoracic spine negative for abnormal cord signal/canal stenosis  -MRI lumbar spine with normal signal within the distal cord/conus, no significant cord pathology/narrowing    Will check RPR, Lyme, ANCA, dsDNA, other serum workup recently:    -Normal ACE, normal Sjogren's, normal DALLAS, normal RF, protein electrophoresis with no monoclonal bands,  -cryoglobulin was positive, TSH greater than 8, T4 1 48, low folate yesterday at 2 5  -IgA on 02/07 elevated at 418 (reference range )  -acute hepatitis panel negative, hepatitis-A antibody negative, QuantiFERON TB pending, HIV negative  -total CK today 222  -supportive care  -therapy evaluations  -medical management per primary team, metabolic/infectious derangement monitoring    Recommendations for outpatient neurological follow up have yet to be determined  Subjective: To summarize the patient presents with progressive lower extremity symptoms including weakness and numbness with ambulatory dysfunction to the point where she cannot ambulate independently anymore  This summarize the patient presented on 01/27, to [facility] the with nausea vomiting, abdominal pain, numbness and tingling in the lower abdomen, she was discharged from the ER  She had a positive COVID test on January 30th  She was admitted on 2-4 to 2-11 to [facility] with progressive bilateral leg pain, electric shocks and subsequent throbbing, numbness in the distal lower extremity with gait difficulty due to pain and weakness  She underwent neurological testing while she was [employer] to include  2/4-2/11: admission to [facility]: noted progressive bilateral leg pain (pain is confined to the thighs/does not radiate, electric-like shocks with subsequent throbbing), numbness in the distal LE, gait difficulty due to the pain/weakness   Was evaluated by neurology on 2/7: neurodiagnostics during that workup included:    "  MRI lumbar spine on 2/4: questionable T2 signal abnormality at T11  MRI thoracic spine 2/6: no cord signal change/pathology, no post-contrast enhancement  LP/CSF analysis: protein of 58, normal glucose, WBC 6  Had numerous autoimmune/inflammatory serum labs sent  Patient was discharged without treatment as GBS felt to be less likely; was arranged for outpatient neurology follow up (including EMG in late April)  "    It is documented by neurology notes from yesterday -  "  Since discharge on 2/11 until presently: patient was able to ambulate at home over past two weeks, but with significant difficulty; since few days ago, approx  Monday 2/21: progressive leg weakness, progressive leg numbness (feet to thighs bilaterally), unable to walk  Additionally, has noticed lower abdominal numbness, perineal area numbness and numbness to the fingertips    "    The patient was started in IV steriods  MRI of the brain was completed -" as seen on the MRI of the cervical spine performed yesterday, there is a focus of abnormal increased T2 and FLAIR signal within the posterior central aspect of the simona  Only minimal diffusion is seen on postcontrast imaging, less so than yesterday's examination of the cervical spine  "    Plan is for possible repeat LP today  The patient reports that her symptoms started about 1 month ago, starting with lower extremity numbness, weakness, and pain - this affected her walking, which slowly progressed - she believes when this started it affected her entire leg - did not describe ascending numbness and weakness  She reports numbness from her umbilicus down, with saddle anesthesia - no bowel or bladder incontinence  She reports over the last few days, she has had numbness in her fingertips, no weakness or pain in the uppers, no problems with her speech vision or facial droop        It was reported she has a hx of drinking, and her last drink of wine was 2 weeks ago, she can drink up to 5 glasses of wine a day, however, has not had a drink in five weeks  She reports her symptoms are similar to yesterday with no worsening or improvement, she is tolerating IV steroids  ROS:  As per HPI, other wise negative ros  No headache  No meningismus  No problems with vision  No problems with swallowing  No problems with memory  No facial droop  Numbness, pain and weakness as described above  No bowel or bladder incontinence  No problems with breathing, sob to conversation, sob to movement  No respiratory symptoms  No tremor reported  Vitals: Blood pressure 137/97, pulse 82, temperature 98 2 °F (36 8 °C), resp  rate 18, last menstrual period 02/01/2022, SpO2 95 %, not currently breastfeeding  ,There is no height or weight on file to calculate BMI       Current Facility-Administered Medications   Medication Dose Route Frequency Provider Last Rate    acetaminophen  650 mg Oral Q6H PRN Shea Cole MD      albuterol  1 puff Inhalation Q4H PRN Shea Cole MD      calcium carbonate  500 mg Oral Daily PRN Shea Cole MD      clonazePAM  0 5 mg Oral BID PRN Shea Cole MD      disulfiram  250 mg Oral Daily Shea Cole MD      fluticasone  1 spray Nasal Daily Shea Cole MD      Gadobutrol  9 mL Intravenous Once in imaging Alex Carey DO      levothyroxine  150 mcg Oral Early Morning Shea Cole MD      LORazepam  0 5 mg Oral Q8H PRN Shea Cole MD      methylPREDNISolone sodium succinate  1,000 mg Intravenous Daily Lashonda Naranjo PA-C 1,000 mg (02/24/22 3133)    oxyCODONE-acetaminophen  1 tablet Oral Q4H PRN Shea Cole MD      sodium chloride  125 mL/hr Intravenous Continuous Shea Cole  mL/hr (02/24/22 2748)    thiamine  100 mg Intravenous Daily NOAM Montes De Oca         Physical Exam:   Vital signs reviewed  Constitutional - in NAD, sitting in bed with legs elevated in no acute distress  HEENT - NC/AT, slightly flushed appearance  Cardiac - rate regular  Lungs - no respiratory distress noted  Abdomen - nondistended  Extremities - slight edema noted in the lowers bilaterally  Skin - no rashes noted    Neurological    Mental status - the patient is awake alert oriented x 3, with no evidence of aphasia or dysarthria, patient is able to follow simple commands, is able to follow complex commands  No paraphasic errors noted  She is able to read and repeat, she is able to recognize objects  She is able tell me current events, she has insight into current medical condition and is able to provide history  There is no evidence of paraphasic errors or cognitive slowing, normal attention and concentration  Cranial nerves 2 through 12 - pupils are equal and reactive, extra movements are intact without any gaze preference or palsy, V1 to V3 was intact to touch, slight decrease in right nasolabia fold but no weakness, tongue midline, VF intact to confrontation  Motor - 5/5 upper extremities nl tone and bulk, [facility] intact in the uppers, wrist extension/flexion, adduction/abduction 5/5  No fixed deficit, drift or tremor with outstretched hands  Lowers she was 4/5 proximal bilaterally with some pain to movement in the thigh region, knee flexion and extension 5/5, dorsi and plantar flexion 5/5  Toe tap equal bilaterally  Sensation - nonfocal to touch, in the uppers or lowers, she did appreciate equal temperature in the uppers and lowers, proprioception intact in the lowers    Coordination -  no ataxia noted on finger-to-nose or heel-to-shin (more difficult in the lowers secondary to proximal weakness, but able to perform with no ataxia noted)    Reflexes are equal - however trace, in the uppers and lowers  Toes are downgoing bilaterally    No evidence of clonus or myoclonus or tremor      No evidence of clinical seizure activity    No evidence of myelopathy  No evidence of parkinsonism  Negative meningismus  Lab, Imaging and other studies:   I have personally reviewed pertinent reports  , CBC:   Results from last 7 days   Lab Units 02/23/22  0941 02/22/22  1339   WBC Thousand/uL 8 37 8 80   RBC Million/uL 4 09 4 13   HEMOGLOBIN g/dL 15 3 15 9*   HEMATOCRIT % 43 4 45 2   MCV fL 106* 109*   PLATELETS Thousands/uL 374 525*   , BMP/CMP:   Results from last 7 days   Lab Units 02/23/22  0941 02/22/22  1339   SODIUM mmol/L 132* 132*   POTASSIUM mmol/L 3 6 3 5   CHLORIDE mmol/L 99* 98*   CO2 mmol/L 24 19*   BUN mg/dL 7 7   CREATININE mg/dL 0 68 0 57*   CALCIUM mg/dL 8 8 8 9   AST U/L 101* 138*   ALT U/L 141* 182*   ALK PHOS U/L 83 98   EGFR ml/min/1 73sq m 108 114   , Vitamin B12:   Results from last 7 days   Lab Units 02/22/22  1339   VITAMIN B 12 pg/mL 1,135*   , HgBA1C:   Results from last 7 days   Lab Units 02/22/22  1339   HEMOGLOBIN A1C % 5 6   , TSH:   Results from last 7 days   Lab Units 02/22/22  1339   TSH 3RD GENERATON uIU/mL 9 620*   , Coagulation:   , Lipid Profile:   Results from last 7 days   Lab Units 02/22/22  1339   HDL mg/dL 92   LDL CALC mg/dL 170*   TRIGLYCERIDES mg/dL 75   , Ammonia:   , Urinalysis:       Invalid input(s): URIBILINOGEN, Drug Screen:   , Medication Drug Levels:       Invalid input(s): CARBAMAZEPINE,  PHENOBARB, LACOSAMIDE, OXCARBAZEPINE     Procedure: MRI brain w wo contrast    Result Date: 2/24/2022  Narrative: MRI BRAIN WITH AND WITHOUT CONTRAST INDICATION: new pontine lesion visualized on MRI C spine today; patient having progressive bilateral lower extremity weakness and upper extremity fingertip numbness  COMPARISON:  2/14/2004, MRI brain  MRI cervical spine performed yesterday  TECHNIQUE: Sagittal T1, axial T2, axial FLAIR, axial T1, axial Seymour, axial diffusion  Sagittal, axial T1 postcontrast   Axial bravo postcontrast with coronal reconstructions    IV Contrast:  9 mL of Gadobutrol injection (SINGLE-DOSE)  IMAGE QUALITY:   Diagnostic  FINDINGS: BRAIN PARENCHYMA:  There is a small focus of hyperintense signal on T2 and FLAIR imaging within the central aspect of the simona, series 9 image 8  This is new compared to the prior examination measuring just under 1 cm  Although there is faint hyperintense signal on diffusion imaging this is not restricted on the ADC map and likely represents T2 shine through  No corresponding susceptibility  Postcontrast imaging demonstrates only a minimal focus of enhancement best seen on series 12 image 33  This is less enhancement than was seen on the MRI of the cervical spine performed yesterday  No other white matter lesions are seen within the brain parenchyma  Normal gray-white differentiation within the cerebral hemispheres  Normal basal ganglia  No restricted diffusion to suggest ischemia  No mass, mass effect or midline shift  There are no white matter changes in the cerebral hemispheres  Postcontrast imaging of the remainder of the brain parenchyma is unremarkable  VENTRICLES:  Normal for the patient's age  SELLA AND PITUITARY GLAND:  Normal  ORBITS:  Normal  PARANASAL SINUSES:  Mild mucosal thickening of the right maxillary sinus  Mild mucosal thickening of the ethmoid air cells  VASCULATURE:  Evaluation of the major intracranial vasculature demonstrates appropriate flow voids  CALVARIUM AND SKULL BASE:  Normal  EXTRACRANIAL SOFT TISSUES:  Normal      Impression: As seen on the MRI of the cervical spine performed yesterday, there is a focus of abnormal increased T2 and FLAIR signal within the posterior central aspect of the simona  Only minimal diffusion is seen on postcontrast imaging, less so than yesterday's examination of the cervical spine    This lesion is nonspecific and differential considerations would include solitary demyelinating focus, or other infectious/inflammatory etiologies including focal vasculopathy  Neoplasm such as brainstem glioma or metastasis less likely  Workstation performed: [id number]     Procedure: MRI cervical spine w wo contrast    Result Date: 2/23/2022  Narrative: MRI CERVICAL SPINE WITH AND WITHOUT CONTRAST INDICATION: progressive lower extremity weakness and numbness with numbness of the abdominal and perineal area  COMPARISON:  None  TECHNIQUE:  Sagittal T1, sagittal T2, sagittal inversion recovery, axial 2D merge and axial T2  Sagittal T1 and axial T1 postcontrast   IV Contrast:  9 mL of Gadobutrol injection (SINGLE-DOSE) IMAGE QUALITY:  Diagnostic  FINDINGS: ALIGNMENT:  Normal alignment of the cervical spine  No compression fracture  No subluxation  No scoliosis  MARROW SIGNAL:  Normal marrow signal is identified within the visualized bony structures  No discrete marrow lesion  CERVICAL AND VISUALIZED UPPER THORACIC CORD:  Normal signal within the visualized cord  PREVERTEBRAL AND PARASPINAL SOFT TISSUES:  Normal  VISUALIZED POSTERIOR FOSSA:  There is a focus of abnormal hyperintense signal on T2 and inversion recovery imaging within the posterior central aspect of the simona, see image 8  Postcontrast imaging demonstrates mild central enhancement, smaller than the  corresponding T2 lesion  CERVICAL DISC SPACES: C2-C3:  Normal  C3-C4:  Normal  C4-C5:  Small broad-based right paramedian disc herniation  There is no canal stenosis or foraminal nerve impingement  C5-C6:  Mild annular bulging and minor endplate/uncinate joint hypertrophic change right slightly greater than left  No canal stenosis  Mild right foraminal narrowing  C6-C7:  Mild annular bulging with a tiny central disc protrusion  No canal stenosis or foraminal narrowing  C7-T1:  Normal  UPPER THORACIC DISC SPACES:  Normal  POSTCONTRAST IMAGING:  Postcontrast imaging of the cervical cord and cervical spine is unremarkable  See above description of enhancement within the simona       Impression: Mild cervical degenerative change C4-5, C5-6 and C6-7  No canal stenosis or cord compression  There is right-sided foraminal narrowing at the C5-6 level  Normal cervical and thoracic cord  There is a well-circumscribed focus of abnormal hyperintense T2 and FLAIR signal noted within the posterior central aspect of the simona with mild associated enhancement  Findings are nonspecific and differential considerations would include demyelinating  lesion with active inflammation, subacute infarct or possibly brainstem neoplasm  Dedicated MRI of the brain with contrast recommended  I personally discussed this study with Jackie Smith on 2/23/2022 at 1:19 PM  Workstation performed: [id number]     Procedure: MRI thoracic spine w wo contrast    Result Date: 2/23/2022  Narrative: MRI THORACIC SPINE WITH AND WITHOUT CONTRAST INDICATION: Lower extremity weakness and numbness    COMPARISON:  2/6/2022 TECHNIQUE:  Sagittal T1, sagittal T2, sagittal inversion recovery, axial T2,  axial 2D MERGE  Sagittal and axial T1 postcontrast  IV Contrast:  9 mL of Gadobutrol injection (SINGLE-DOSE) IMAGE QUALITY:  Diagnostic  FINDINGS: ALIGNMENT:  Normal alignment of the thoracic spine  No compression fracture  No subluxation  No evidence of scoliosis  MARROW SIGNAL:  Stable small hemangioma within the lower thoracic spine  THORACIC CORD:  Normal signal within the thoracic cord  PREVERTEBRAL AND PARASPINAL SOFT TISSUES:   Normal  THORACIC DEGENERATIVE CHANGE:  Minor noncompressive mid thoracic degenerative change appears stable  No disc herniation, canal stenosis or foraminal narrowing  POSTCONTRAST:  No abnormal enhancement  Impression: No abnormal cord signal  Minor noncompressive mid thoracic degenerative change is stable  Workstation performed: [id number]     Procedure: MRI lumbar spine w wo contrast    Result Date: 2/23/2022  Narrative: MRI LUMBAR SPINE WITH AND WITHOUT CONTRAST INDICATION: Lower extremity weakness and numbness  COMPARISON:  2/4/2022 TECHNIQUE:  Sagittal T1, sagittal T2, sagittal inversion recovery, axial T1 and axial T2, coronal T2  Sagittal and axial T1 postcontrast  IV Contrast:  9 mL of Gadobutrol injection (SINGLE-DOSE) IMAGE QUALITY:  Diagnostic FINDINGS: VERTEBRAL BODIES:  There are 5 lumbar type vertebral bodies  Normal alignment of the lumbar spine  No spondylolysis or spondylolisthesis  No scoliosis  No compression fracture  Normal marrow signal is identified within the visualized bony structures  No discrete marrow lesion  SACRUM:  Normal signal within the sacrum  No evidence of insufficiency or stress fracture  DISTAL CORD AND CONUS:  Normal signal within the distal cord and conus  Previously seen increased T2 signal within the lower thoracic cord likely artifactual  PARASPINAL SOFT TISSUES:  Paraspinal soft tissues are unremarkable  LOWER THORACIC DISC SPACES:  Normal disc height and signal   No disc herniation, canal stenosis or foraminal narrowing  LUMBAR DISC SPACES: L1-L2:  Normal  L2-L3:  Normal  L3-L4:  Normal  L4-L5:  Normal  L5-S1:  No focal disc herniation  Mild right foraminal narrowing with disc material abutting the ventral aspect of the exiting nerve, unchanged  Mild facet degenerative change noted on the right  POSTCONTRAST IMAGING:  No abnormal enhancement  Impression: Although there is no discrete disc herniation at the L5-S1 level there is mild right-sided facet degenerative change and disc material does abut the ventral aspect of the exiting nerve, unchanged from the prior examination  Normal signal within the distal cord and conus  The abnormal signal seen on prior examination is likely artifactual  Workstation performed: [id number]     Counseling / Coordination of Care  Reviewed with attending plan of care, assessment and plan in coordination with attending physician

## 2022-02-24 NOTE — PROGRESS NOTES
[address] [facility]  Progress Note - Ghada Almeida 1980, 42 y o  female MRN: [id number]  Unit/Bed#: [facility] 620-01 Encounter: [id number]  Primary Care Provider: Barbara Osler, DO   Date and time admitted to hospital: 2/23/2022  8:39 AM    * Lower extremity numbness  Assessment & Plan  · Patient presented with progressive lower extremity weakness, lower extremity as well as abdominal/truncal and finger tip numbness, and significant gait instability/difficulty at home since mid January  · MRI cspine 2/23- There is a well-circumscribed focus of abnormal hyperintense T2 and FLAIR signal noted within the posterior central aspect of the simona with mild associated enhancement   Findings are nonspecific and differential considerations would include demyelinating  lesion with active inflammation, subacute infarct or possibly brainstem neoplasm   Dedicated MRI of the brain with contrast recommended  · MRI brain 2/24- As seen on the MRI of the cervical spine performed yesterday, there is a focus of abnormal increased T2 and FLAIR signal within the posterior central aspect of the simona   Only minimal diffusion is seen on postcontrast imaging, less so than yesterday's examination of the cervical spine   This lesion is nonspecific and differential considerations would include solitary demyelinating focus, or other infectious/inflammatory etiologies including focal vasculopathy   Neoplasm such as brainstem glioma or metastasis less likely  · Neurology consult appreciated   · pending repeat LP/CSF analysis- per neurology likely plan to do this today   · Pt has a hx of ETOH abuse and reported last drink was 2 weeks ago  Reported drinking 5 glasses of wine a day   Will start thiamine 100mg IV daily and check thiamine level   · Hepatitis, HIV negative  · Folate 2 5- replete   · b12 1135  · Sed rate 18; CRP 8 8;   · Solumedrol 1000mg IV daily day #2      Alcohol dependence ([facility] [address] )  Assessment & Plan  · Pt reports last drink was 2 weeks ago and reported drinking 5 glasses of wine/day  · Start thiamine 100mg IV daily   · C/w antabuse   · MVI/folic acid   · C/w encouraging cessation     Nausea and vomiting  Assessment & Plan  · Reported by the patient for the past 6 weeks  · Trial PPI BID   · Consider carafate if this doesn't improve  · +hx of ETOH abuse   · Will need outpatient EGD after discharge  Transaminitis  Assessment & Plan  · In the setting of alcohol abuse   · Improved today with IVFs  · ast 101; alt 141  · tbili 3 55 without significant improvement today   · Hepatitis panel negative   · Pt reports n/v for the past 6 weeks   · CT of the abd/pelvis performed in January- Hepatic steatosis and hepatomegaly  · Consider abdominal US if liver enzymes worsen in am   · cmp in am     Hyponatremia  Assessment & Plan  · Monitor-   · Check urine na, urine osmo, serum osmo, cortisol, uric acid   · TSH- 9; check t4    Class 2 obesity due to excess calories with body mass index (BMI) of 37 0 to 37 9 in adult  Assessment & Plan  · Diet and exercise counseling prior discharge  · BMI 37 3    Crohn's disease of colon with complication ([facility] [address] )  Assessment & Plan  · Currently without exacerbation   · Pt reports recent exacerbation approximately 1 month ago- treated with IV abxs at that time   · Follows Dr Jeff Campbell with GI as outpatient    · Pt was scheduled to have a cscope yesterday but had to cancel due to mobility  · F/u outpatient to discuss restarting humira outpatient     Hypothyroidism  Assessment & Plan  · Continue levothyroxine  · TSH 9- check t4    Moderate persistent asthma without complication  Assessment & Plan  · No exacerbation  · Continue home inhalers        VTE Pharmacologic Prophylaxis: VTE Score: 2 Low Risk (Score 0-2) - Encourage Ambulation  Patient Centered Rounds: I performed bedside rounds with nursing staff today    Discussions with Specialists or Other Care Team Provider: d/w RN, d/w Lilliana Oliver PAc     Education and Discussions with Family / Patient: Updated  (father) via phone  Time Spent for Care: 30 minutes  More than 50% of total time spent on counseling and coordination of care as described above  Current Length of Stay: 1 day(s)  Current Patient Status: Inpatient   Certification Statement: The patient will continue to require additional inpatient hospital stay due to continued workup, IV steroids   Discharge Plan: Anticipate discharge in >72 hrs to discharge location to be determined pending rehab evaluations  Code Status: Level 1 - Full Code    Subjective:   Pt reports that she had a recent crohns flare and was admitted to the hospital over a month ago and was treated with IV abxs and fluids at that time  She noted almost a week prior to that she developed lower abdominal pain and perineal numbness but admits that she does have feeling when she wipes after going to the bathroom  The patient reports that 2-3 weeks ago she developed pain and numbness in bilateral legs and has been difficult walking  The patient reports that her pain in her legs is intermittent throbbing with shooting nerve shocks that radiates from her hips down her legs she reports that she has numbness and a burning sensation in her legs and they feel heavy as if there were 100 lb when she goes to get up out of the bed to walk  She has had 2 falls last week and on Tuesday prior to admission she felt that she could even get off the couch and had to be a nearly carried to her car to make it to her family doctor appointment  The patient reports over the past few days she has noted she has had some blurred vision when she is trying reviewed small found on her phone  The patient reports that she has had nausea and vomiting for the past 6 weeks and that she was recently ordered Prilosec but did not start taking an outpatient    The patient followed up with her GI doctor last week and was scheduled to have a colonoscopy yesterday which she missed  The patient reports that she does not have much of an appetite and has not eaten much over the past month but does report that she tries to drink as much fluid as she can  She reports that she has trouble sleeping at night due to the pain in her legs but this is improved since being hospitalized and on pain medication  The patient was noted to have COVID on   Objective:     Vitals:   Temp (24hrs), Av 9 °F (36 6 °C), Min:97 6 °F (36 4 °C), Max:98 2 °F (36 8 °C)    Temp:  [97 6 °F (36 4 °C)-98 2 °F (36 8 °C)] 98 2 °F (36 8 °C)  HR:  [] 82  Resp:  [18] 18  BP: (120-139)/(81-99) 137/97  SpO2:  [89 %-95 %] 95 %  There is no height or weight on file to calculate BMI  Input and Output Summary (last 24 hours):   No intake or output data in the 24 hours ending 22 1041    Physical Exam:   Physical Exam  Vitals and nursing note reviewed  Constitutional:       General: She is not in acute distress  Appearance: She is obese  HENT:      Head: Normocephalic and atraumatic  Eyes:      Conjunctiva/sclera: Conjunctivae normal       Pupils: Pupils are equal, round, and reactive to light  Cardiovascular:      Rate and Rhythm: Normal rate and regular rhythm  Heart sounds: Normal heart sounds  No murmur heard  Pulmonary:      Effort: Pulmonary effort is normal  No respiratory distress  Breath sounds: Normal breath sounds  No wheezing or rales  Abdominal:      General: Bowel sounds are normal  There is no distension  Palpations: Abdomen is soft  Tenderness: There is no abdominal tenderness  Musculoskeletal:         General: Swelling (generalized trace edema ) present  Cervical back: Neck supple  Skin:     General: Skin is warm and dry  Neurological:      Mental Status: She is alert and oriented to person, place, and time        Sensory: Sensory deficit (b/l lower extremities and fingertips b/l subjectively reported numbness and burning sensation ) present  Motor: Weakness (generalized 3/5 upper extremities 2/5 lower extremities ) present     Psychiatric:         Mood and Affect: Mood normal           Additional Data:     Labs:  Results from last 7 days   Lab Units 02/23/22  0941   WBC Thousand/uL 8 37   HEMOGLOBIN g/dL 15 3   HEMATOCRIT % 43 4   PLATELETS Thousands/uL 374   NEUTROS PCT % 84*   LYMPHS PCT % 9*   MONOS PCT % 5   EOS PCT % 1     Results from last 7 days   Lab Units 02/23/22  0941   SODIUM mmol/L 132*   POTASSIUM mmol/L 3 6   CHLORIDE mmol/L 99*   CO2 mmol/L 24   BUN mg/dL 7   CREATININE mg/dL 0 68   ANION GAP mmol/L 9   CALCIUM mg/dL 8 8   ALBUMIN g/dL 3 8   TOTAL BILIRUBIN mg/dL 3 55*   ALK PHOS U/L 83   ALT U/L 141*   AST U/L 101*   GLUCOSE RANDOM mg/dL 90             Results from last 7 days   Lab Units 02/22/22  1339   HEMOGLOBIN A1C % 5 6           Lines/Drains:  Invasive Devices  Report    Peripheral Intravenous Line            Peripheral IV 02/23/22 Left Antecubital 1 day                      Imaging: Reviewed radiology reports from this admission including: MRI spine    Recent Cultures (last 7 days):         Last 24 Hours Medication List:   Current Facility-Administered Medications   Medication Dose Route Frequency Provider Last Rate    acetaminophen  650 mg Oral Q6H PRN Shazia Sevilla MD      albuterol  1 puff Inhalation Q4H PRN Shazia Sevilla MD      calcium carbonate  500 mg Oral Daily PRN Shazia Sevilla MD      clonazePAM  0 5 mg Oral BID PRN Shazia Sevilla MD      disulfiram  250 mg Oral Daily Shazia Sevilla MD      fluticasone  1 spray Nasal Daily Shazia Sevilla MD      folic acid  1 mg Oral Daily NOAM Miller      Gadobutrol  9 mL Intravenous Once in [address], DO      levothyroxine  150 mcg Oral Early Morning Shazia Sevilla MD      LORazepam  0 5 mg Oral Q8H PRN Shazia Sevilla MD      methylPREDNISolone sodium succinate  1,000 mg Intravenous Daily Lucila Naranjo PA-C 1,000 mg (02/24/22 0837)    multivitamin-minerals  1 tablet Oral Daily NOAM Kaufman      oxyCODONE-acetaminophen  1 tablet Oral Q4H PRN Moise Rico MD      sodium chloride  125 mL/hr Intravenous Continuous Moise Rico  mL/hr (02/24/22 1035)    thiamine  100 mg Intravenous Daily NOAM Kaufman          Today, Patient Was Seen By: NOAM Kaufman    **Please Note: This note may have been constructed using a voice recognition system  **

## 2022-02-24 NOTE — ASSESSMENT & PLAN NOTE
· Pt reports last drink was 2 weeks ago and reported drinking 5 glasses of wine/day  · Start thiamine 100mg IV daily   · C/w antabuse   · MVI/folic acid   · C/w encouraging cessation

## 2022-02-24 NOTE — PHYSICAL THERAPY NOTE
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]  Unable to walk on flat surface [R26 2]    Past Medical History:   Diagnosis Date    Anxiety     Asthma     Crohn disease ([address] )     Depression     Disease of thyroid gland     Hypertension     Hypothyroidism     Lower extremity numbness 02/11/2022    Psychiatric disorder        Past Surgical History:   Procedure Laterality Date    IR LUMBAR PUNCTURE  2/8/2022    NO PAST SURGERIES         Length Of Stay: 1    PHYSICAL THERAPY EVALUATION:        02/24/22 0930   Note Type   Note type Evaluation   Pain Assessment   Pain Assessment Tool 0-10   Pain Score 8   Pain Location/Orientation Orientation: Bilateral;Location: Leg   Pain Onset/Description Onset: Ongoing;Frequency: Constant/Continuous; Descriptor: Aching   Effect of Pain on Daily Activities Increased pain with activity   Patient's Stated Pain Goal No pain   Hospital Pain Intervention(s) Ambulation/increased activity;Repositioned   Restrictions/Precautions   Weight Bearing Precautions Per Order No   Other Precautions Multiple lines; Fall Risk;Pain;Droplet precautions   Home Living   Type of [address] [place] [address] Two level;Stairs to enter with rails  (2 TORI )   Home Equipment   (none as per pt )   Additional Comments Patient reports living with her 15year-old daughter    Patient states she has local parents who can assist as needed   Prior Function   Level of [place] Independent with ADLs and functional mobility   Lives With Daughter   Elan in the last 6 months 0   Comments Patient denies use of an assistive device for ambulation prior to admission   General   Family/Caregiver Present No   Cognition   Overall Cognitive Status WFL   Arousal/Participation Alert   Orientation Level Oriented X4   Memory Within functional limits   Following Commands Follows all commands and directions without difficulty   RUE Assessment   RUE Assessment WFL   LUE Assessment   LUE Assessment WFL   RLE Assessment   RLE Assessment WFL   Strength RLE   RLE Overall Strength 4/5   LLE Assessment   LLE Assessment WFL   Strength LLE   LLE Overall Strength 4/5   Bed Mobility   Supine to Sit 5  Supervision   Additional items Increased time required   Transfers   Sit to Stand 4  Minimal assistance   Additional items Assist x 1; Increased time required;Verbal cues   Stand to Sit 4  Minimal assistance   Additional items Assist x 1; Increased time required;Verbal cues   Ambulation/Elevation   Gait pattern Short stride; Foward flexed; Excessively slow   Gait Assistance 4  Minimal assist   Additional items Assist x 1   Assistive Device Rolling walker   Distance 15ft x 2    Balance   Static Sitting Fair   Static Standing Fair -   Ambulatory Poor +   Endurance Deficit   Endurance Deficit Yes   Endurance Deficit Description Fatigue, pain   Activity Tolerance   Activity Tolerance Patient limited by fatigue;Patient limited by pain   Medical Staff Made Aware Skyler Quiroz; Skyler Singh student; OT present for co evaluation due to pts current medical presentation and decreased activity tolerance which all impact pts overall physical performance    Nurse Made Aware Patient appropriate to be seen and mobilized per nursing   Assessment   Prognosis Good   Problem List Decreased strength;Decreased endurance; Impaired balance;Decreased mobility;Pain   Assessment Pt is 42 y o  female seen for PT evaluation s/p admit to [facility] on 2/23/2022  Two pt identifiers were used to confirm  Pt presented w/ increased bilateral lower extremity weakness for approximately the last 4 weeks    Pt was admitted with a primary dx of:  Lower extremity numbness, and other active problems including alcohol dependence, nausea vomiting, transaminitis, hyponatremia, class 2 obesity, Crohn's disease of colon with complication, hypothyroidism, moderate persistent asthma without complication  PT now consulted for assessment of mobility and d/c needs  Pt with Up with assistance orders  Pts current co morbidities affecting treatment include: Anxiety, asthma, Crohn disease, depression, disease of thyroid gland, HTN, hypothyroidism, psychiatric disorder, and other active problems including steps to manage at home  Pts current clinical presentation is Unstable/ Unpredictable (high complexity) due to Ongoing medical management for primary dx, Increased reliance on more restrictive AD compared to baseline, Decreased activity tolerance compared to baseline, Fall risk, Increased assistance needed from caregiver at current time, Continuous pulse oximetry monitoring     Upon evaluation, pt currently is requiring Supervision for bed mobility; Min Ax1 for transfers and Min Ax1 for ambulation w/ RW  Pt presents at PT eval functioning below baseline and currently w/ overall mobility deficits 2* to: BLE weakness, impaired balance, decreased endurance, gait deviations, pain, decreased activity tolerance compared to baseline, fall risk  Pt currently at a fall risk 2* to impairments listed above  Based on the aforementioned PT evaluation, pt will continue to benefit from skilled Acute PT interventions to address stated impairments; to maximize functional mobility; for ongoing pt/ family training; and DME needs  At conclusion of PT session pt returned back in chair with phone and call bell within reach  Pt denies any further questions at this time  PT is currently recommending Rehab pending progress  PT will continue to follow during hospital stay  Barriers to Discharge Inaccessible home environment   Goals   Patient Goals  "to get better"   STG Expiration Date 03/06/22   Short Term Goal #1 In 10 days pt will complete: 1) Bed mobility skills with mod  I to increase safety and independence as well as decrease caregiver burden   2) Functional transfers with mod I to promote increased independence, safety, and QOL  3) Ambulate 150' using least restrictive AD with mod I without LOB and stable vitals so that pt can negotiate previous living environment safely and promote independence with functional mobility and return to PLOF  4) Stair training up/ down 10 step/s using rail/s with mod I so that pt can enter/negotiate previous living environment safely and decrease fall risk  5) Improve balance grades by 1/2 grade to increase safety with all mobility and decrease fall risk  6) Improve BLE strength by 1/2 grade to help increase overall functional mobility and decrease fall risk  Plan   Treatment/Interventions Functional transfer training;LE strengthening/ROM; Elevations; Therapeutic exercise; Endurance training;Patient/family training;Equipment eval/education; Bed mobility;Gait training;Spoke to nursing;OT   PT Frequency 3-5x/wk   Recommendation   PT Discharge Recommendation Post acute rehabilitation services  (pending progress )   Equipment Recommended RegenaStem Package Recommended Wheeled walker   AM-PAC Basic Mobility Inpatient   Turning in Bed Without Bedrails 4   Lying on Back to Sitting on Edge of Flat Bed 3   Moving Bed to Chair 3   Standing Up From Chair 3   Walk in Room 3   Climb 3-5 Stairs 2   Basic Mobility Inpatient Raw Score 18   Basic Mobility Standardized Score 41 05   Highest Level Of Mobility   [facility] Goal 6: Walk 10 steps or more   Modified Bernard Scale   Modified Bernard Scale 4   Barthel Index   Feeding 10   Bathing 0   Grooming Score 5   Dressing Score 5   Bladder Score 10   Bowels Score 10   Toilet Use Score 10   Transfers (Bed/Chair) Score 10   Mobility (Level Surface) Score 0   Stairs Score 0   Barthel Index Score 60   Portions of the documentation may have been created using voice recognition software  Occasional wrong word or sound alike substitutions may have occurred due to the inherent limitations of the voice recognition software   Read the chart carefully and recognize, using context, where substitutions have occurred      Nancy Alves, PT, DPT

## 2022-02-25 PROBLEM — E53.8 FOLIC ACID DEFICIENCY: Status: ACTIVE | Noted: 2022-02-25

## 2022-02-25 PROBLEM — G93.9 PONTINE LESION: Status: ACTIVE | Noted: 2022-02-25

## 2022-02-25 PROBLEM — E87.1 HYPONATREMIA: Status: RESOLVED | Noted: 2022-02-24 | Resolved: 2022-02-25

## 2022-02-25 PROBLEM — Z86.16 HISTORY OF COVID-19: Status: ACTIVE | Noted: 2022-02-25

## 2022-02-25 PROBLEM — E87.6 HYPOKALEMIA: Status: ACTIVE | Noted: 2022-02-25

## 2022-02-25 LAB
ALBUMIN SERPL BCP-MCNC: 3 G/DL (ref 3.5–5)
ALP SERPL-CCNC: 74 U/L (ref 46–116)
ALT SERPL W P-5'-P-CCNC: 99 U/L (ref 12–78)
ANION GAP SERPL CALCULATED.3IONS-SCNC: 7 MMOL/L (ref 4–13)
AST SERPL W P-5'-P-CCNC: 52 U/L (ref 5–45)
BILIRUB SERPL-MCNC: 1.96 MG/DL (ref 0.2–1)
BUN SERPL-MCNC: 6 MG/DL (ref 5–25)
CALCIUM ALBUM COR SERPL-MCNC: 8.9 MG/DL (ref 8.3–10.1)
CALCIUM SERPL-MCNC: 8.1 MG/DL (ref 8.3–10.1)
CHLORIDE SERPL-SCNC: 106 MMOL/L (ref 100–108)
CO2 SERPL-SCNC: 24 MMOL/L (ref 21–32)
CREAT SERPL-MCNC: 0.46 MG/DL (ref 0.6–1.3)
ERYTHROCYTE [DISTWIDTH] IN BLOOD BY AUTOMATED COUNT: 11.5 % (ref 11.6–15.1)
GFR SERPL CREATININE-BSD FRML MDRD: 123 ML/MIN/1.73SQ M
GLUCOSE SERPL-MCNC: 172 MG/DL (ref 65–140)
HCT VFR BLD AUTO: 35.8 % (ref 34.8–46.1)
HGB BLD-MCNC: 12.9 G/DL (ref 11.5–15.4)
MAGNESIUM SERPL-MCNC: 2.1 MG/DL (ref 1.6–2.6)
MCH RBC QN AUTO: 38.6 PG (ref 26.8–34.3)
MCHC RBC AUTO-ENTMCNC: 36 G/DL (ref 31.4–37.4)
MCV RBC AUTO: 107 FL (ref 82–98)
PLATELET # BLD AUTO: 263 THOUSANDS/UL (ref 149–390)
PMV BLD AUTO: 9.7 FL (ref 8.9–12.7)
POTASSIUM SERPL-SCNC: 3 MMOL/L (ref 3.5–5.3)
PROT SERPL-MCNC: 6.7 G/DL (ref 6.4–8.2)
RBC # BLD AUTO: 3.34 MILLION/UL (ref 3.81–5.12)
SCAN RESULT: NORMAL
SODIUM SERPL-SCNC: 137 MMOL/L (ref 136–145)
WBC # BLD AUTO: 7.55 THOUSAND/UL (ref 4.31–10.16)

## 2022-02-25 PROCEDURE — 85027 COMPLETE CBC AUTOMATED: CPT | Performed by: NURSE PRACTITIONER

## 2022-02-25 PROCEDURE — 83735 ASSAY OF MAGNESIUM: CPT | Performed by: NURSE PRACTITIONER

## 2022-02-25 PROCEDURE — 80053 COMPREHEN METABOLIC PANEL: CPT | Performed by: NURSE PRACTITIONER

## 2022-02-25 PROCEDURE — 99232 SBSQ HOSP IP/OBS MODERATE 35: CPT | Performed by: NURSE PRACTITIONER

## 2022-02-25 PROCEDURE — 97116 GAIT TRAINING THERAPY: CPT

## 2022-02-25 PROCEDURE — 97530 THERAPEUTIC ACTIVITIES: CPT

## 2022-02-25 PROCEDURE — 99233 SBSQ HOSP IP/OBS HIGH 50: CPT | Performed by: PSYCHIATRY & NEUROLOGY

## 2022-02-25 RX ORDER — CYCLOBENZAPRINE HCL 10 MG
5 TABLET ORAL 3 TIMES DAILY
Status: DISCONTINUED | OUTPATIENT
Start: 2022-02-25 | End: 2022-02-27 | Stop reason: HOSPADM

## 2022-02-25 RX ORDER — POTASSIUM CHLORIDE 20 MEQ/1
40 TABLET, EXTENDED RELEASE ORAL ONCE
Status: COMPLETED | OUTPATIENT
Start: 2022-02-25 | End: 2022-02-25

## 2022-02-25 RX ORDER — ACETAMINOPHEN 325 MG/1
975 TABLET ORAL EVERY 8 HOURS SCHEDULED
Status: DISCONTINUED | OUTPATIENT
Start: 2022-02-25 | End: 2022-02-27 | Stop reason: HOSPADM

## 2022-02-25 RX ORDER — OXYCODONE HYDROCHLORIDE 10 MG/1
10 TABLET ORAL EVERY 4 HOURS PRN
Status: DISCONTINUED | OUTPATIENT
Start: 2022-02-25 | End: 2022-02-27 | Stop reason: HOSPADM

## 2022-02-25 RX ORDER — GABAPENTIN 100 MG/1
100 CAPSULE ORAL 3 TIMES DAILY
Status: DISCONTINUED | OUTPATIENT
Start: 2022-02-25 | End: 2022-02-27 | Stop reason: HOSPADM

## 2022-02-25 RX ORDER — OXYCODONE HYDROCHLORIDE 5 MG/1
5 TABLET ORAL EVERY 4 HOURS PRN
Status: DISCONTINUED | OUTPATIENT
Start: 2022-02-25 | End: 2022-02-27 | Stop reason: HOSPADM

## 2022-02-25 RX ORDER — PANTOPRAZOLE SODIUM 40 MG/1
40 TABLET, DELAYED RELEASE ORAL
Status: DISCONTINUED | OUTPATIENT
Start: 2022-02-25 | End: 2022-02-27 | Stop reason: HOSPADM

## 2022-02-25 RX ADMIN — ALBUTEROL SULFATE 2 PUFF: 90 AEROSOL, METERED RESPIRATORY (INHALATION) at 12:22

## 2022-02-25 RX ADMIN — OXYCODONE HYDROCHLORIDE 10 MG: 10 TABLET ORAL at 21:07

## 2022-02-25 RX ADMIN — GABAPENTIN 100 MG: 100 CAPSULE ORAL at 09:44

## 2022-02-25 RX ADMIN — POTASSIUM CHLORIDE 40 MEQ: 1500 TABLET, EXTENDED RELEASE ORAL at 09:46

## 2022-02-25 RX ADMIN — PANTOPRAZOLE SODIUM 40 MG: 40 TABLET, DELAYED RELEASE ORAL at 07:33

## 2022-02-25 RX ADMIN — CYCLOBENZAPRINE HYDROCHLORIDE 5 MG: 10 TABLET, FILM COATED ORAL at 21:06

## 2022-02-25 RX ADMIN — OXYCODONE HYDROCHLORIDE 10 MG: 10 TABLET ORAL at 12:22

## 2022-02-25 RX ADMIN — OXYCODONE HYDROCHLORIDE 10 MG: 10 TABLET ORAL at 16:20

## 2022-02-25 RX ADMIN — THIAMINE HYDROCHLORIDE 500 MG: 100 INJECTION, SOLUTION INTRAMUSCULAR; INTRAVENOUS at 16:20

## 2022-02-25 RX ADMIN — THIAMINE HYDROCHLORIDE 100 MG: 100 INJECTION, SOLUTION INTRAMUSCULAR; INTRAVENOUS at 08:53

## 2022-02-25 RX ADMIN — CYCLOBENZAPRINE HYDROCHLORIDE 5 MG: 10 TABLET, FILM COATED ORAL at 09:44

## 2022-02-25 RX ADMIN — FLUTICASONE PROPIONATE 1 SPRAY: 50 SPRAY, METERED NASAL at 08:44

## 2022-02-25 RX ADMIN — SODIUM CHLORIDE 125 ML/HR: 0.9 INJECTION, SOLUTION INTRAVENOUS at 02:55

## 2022-02-25 RX ADMIN — THIAMINE HYDROCHLORIDE 500 MG: 100 INJECTION, SOLUTION INTRAMUSCULAR; INTRAVENOUS at 21:08

## 2022-02-25 RX ADMIN — FOLIC ACID 1 MG: 1 TABLET ORAL at 08:50

## 2022-02-25 RX ADMIN — ENOXAPARIN SODIUM 40 MG: 40 INJECTION SUBCUTANEOUS at 09:44

## 2022-02-25 RX ADMIN — ALBUTEROL SULFATE 2 PUFF: 90 AEROSOL, METERED RESPIRATORY (INHALATION) at 17:25

## 2022-02-25 RX ADMIN — ALBUTEROL SULFATE 2 PUFF: 90 AEROSOL, METERED RESPIRATORY (INHALATION) at 08:44

## 2022-02-25 RX ADMIN — ACETAMINOPHEN 975 MG: 325 TABLET ORAL at 13:20

## 2022-02-25 RX ADMIN — Medication 1 TABLET: at 08:50

## 2022-02-25 RX ADMIN — PANTOPRAZOLE SODIUM 40 MG: 40 TABLET, DELAYED RELEASE ORAL at 16:20

## 2022-02-25 RX ADMIN — LEVOTHYROXINE SODIUM 150 MCG: 75 TABLET ORAL at 06:01

## 2022-02-25 RX ADMIN — SODIUM CHLORIDE 1000 MG: 9 INJECTION, SOLUTION INTRAVENOUS at 09:44

## 2022-02-25 RX ADMIN — CYCLOBENZAPRINE HYDROCHLORIDE 5 MG: 10 TABLET, FILM COATED ORAL at 16:20

## 2022-02-25 RX ADMIN — ACETAMINOPHEN 975 MG: 325 TABLET ORAL at 21:06

## 2022-02-25 RX ADMIN — ALBUTEROL SULFATE 2 PUFF: 90 AEROSOL, METERED RESPIRATORY (INHALATION) at 21:08

## 2022-02-25 RX ADMIN — OXYCODONE HYDROCHLORIDE AND ACETAMINOPHEN 1 TABLET: 5; 325 TABLET ORAL at 02:53

## 2022-02-25 RX ADMIN — GABAPENTIN 100 MG: 100 CAPSULE ORAL at 16:20

## 2022-02-25 RX ADMIN — OXYCODONE HYDROCHLORIDE AND ACETAMINOPHEN 1 TABLET: 5; 325 TABLET ORAL at 07:34

## 2022-02-25 RX ADMIN — GABAPENTIN 100 MG: 100 CAPSULE ORAL at 21:06

## 2022-02-25 NOTE — ASSESSMENT & PLAN NOTE
- MRI brain with and without contrast completed and demonstrates focus of abnormal increased T2 and FLAIR signal within the posterior central aspect of the simona  - Unclear etiology of imaging finding and unclear if this is related to presenting symptoms    - CSF studies pending as above  - Recommend repeat MRI brain with and without contrast in 4-6 weeks

## 2022-02-25 NOTE — ASSESSMENT & PLAN NOTE
· Patient presented with progressive lower extremity weakness, lower extremity as well as abdominal/truncal and finger tip numbness, and significant gait instability/difficulty at home since mid January  · MRI cspine 2/23- There is a well-circumscribed focus of abnormal hyperintense T2 and FLAIR signal noted within the posterior central aspect of the simona with mild associated enhancement   Findings are nonspecific and differential considerations would include demyelinating  lesion with active inflammation, subacute infarct or possibly brainstem neoplasm   Dedicated MRI of the brain with contrast recommended  · MRI brain 2/24- As seen on the MRI of the cervical spine performed yesterday, there is a focus of abnormal increased T2 and FLAIR signal within the posterior central aspect of the simona   Only minimal diffusion is seen on postcontrast imaging, less so than yesterday's examination of the cervical spine   This lesion is nonspecific and differential considerations would include solitary demyelinating focus, or other infectious/inflammatory etiologies including focal vasculopathy   Neoplasm such as brainstem glioma or metastasis less likely  · Neurology consult appreciated   · S/p LP 2/24- gram stain (-); meningitis and encephalitis panel (-), protein 40- pending remainder of cultures   · Pt has a hx of ETOH abuse and reported last drink was 2 weeks prior to admission  Reported drinking 5 glasses of wine a day  · c/w thiamine 100mg IV daily and thiamine level pending  · Hepatitis, HIV negative  · Folate 2 5- started on supplements  · b12 1135  · Sed rate 18; CRP 8 8;  on admission- CPK now resolved   · Add neurontin 100mg TID and flexeril scheduled 5mg TID for pain and spasms   PRN oxy 5/10 and schedule tylenol 975mg TID   · IV solumedrol 1000mg daily- Day #4

## 2022-02-25 NOTE — ASSESSMENT & PLAN NOTE
· Pt reports last drink was 2 weeks ago and reported drinking 5 glasses of wine/day  · Started thiamine 100mg IV daily   · D/c antabuse as pt reports she does not take this currently outpatient   · C/w MVI/folic acid- noted to be deficient in folic acid on admission   · C/w encouraging cessation

## 2022-02-25 NOTE — ASSESSMENT & PLAN NOTE
· Reported by the patient for the past 6 weeks- now resolved over the past 24 hours   · C/w PPI BID   · Consider carafate if this reoccurs  · +hx of ETOH abuse- likely underlying gastritis   · Will need outpatient EGD after discharge

## 2022-02-25 NOTE — ARC ADMISSION
Referral received for consideration of patient for inpatient acute rehab  However, PT is now recommending patient for discharge home with home health therapy  CM has been updated

## 2022-02-25 NOTE — PLAN OF CARE
Problem: PHYSICAL THERAPY ADULT  Goal: Performs mobility at highest level of function for planned discharge setting  See evaluation for individualized goals  Description: Treatment/Interventions: Functional transfer training,LE strengthening/ROM,Elevations,Therapeutic exercise,Endurance training,Patient/family training,Equipment eval/education,Bed mobility,Gait training,Spoke to nursing,OT  Equipment Recommended: Merlin Sovereign       See flowsheet documentation for full assessment, interventions and recommendations  Outcome: Progressing  Note: Prognosis: Good  Problem List: Decreased strength,Decreased endurance,Impaired balance,Decreased mobility,Pain  Assessment: Pt demonstrated improved endurance & activity tolerance today, performing all transfers & ambulatory tasks on levels without significant need for physical assist   Ambulated repeated household distances with standing & seated rests in between to recover  No complaints of significantly worsening pain or weakness noted  Pt instructed in & negotiated curb steps with RW to allow for access to home by way of 1+1 TORI  Pt did so without LOB, but did demonstrate LE weakness & unsteadiness when descending, requliring increased reliance on RW for safety  Pt reports incresed R knee soreness & weakness comapred to L at this time after completing this task  Upon return to room, discussion held with pt regarding d/c plan & accessibility of her home  Pt reports she owns DME & has support at home  Pt encouraged to continue participating in mobility tasks with staff outside of therapy to continue monitoring progress & effectiveness of treatment, which will also continue to determine discharge disposition  If pt continues to maintain & progress current level of mobilty, anticipate she will be able to d/c home with family support & home PT  follow up  If weakness increases & pt requires increased assist as a result, may require rehab at that time    PT will continue to follow and assess mobiilty to ensure safe mobiilty & d/c planning during hospital admit  Barriers to Discharge: Inaccessible home environment        PT Discharge Recommendation: Home with home health rehabilitation          See flowsheet documentation for full assessment

## 2022-02-25 NOTE — PHYSICAL THERAPY NOTE
Physical Therapy Progress Note     02/25/22 1030   PT Last Visit   PT Visit Date 02/25/22   Note Type   Note Type Treatment   Pain Assessment   Pain Assessment Tool FLACC   Pain Rating: FLACC (Rest) - Face 1   Pain Rating: FLACC (Rest) - Legs 0   Pain Rating: FLACC (Rest) - Activity 0   Pain Rating: FLACC (Rest) - Cry 1   Pain Rating: FLACC (Rest) - Consolability 0   Score: FLACC (Rest) 2   Pain Rating: FLACC (Activity) - Face 1   Pain Rating: FLACC (Activity) - Legs 1   Pain Rating: FLACC (Activity) - Activity 1   Pain Rating: FLACC (Activity) - Cry 1   Pain Rating: FLACC (Activity) - Consolability 0   Score: FLACC (Activity) 4   Restrictions/Precautions   Other Precautions Pain; Fall Risk;Multiple lines  (IV)   Subjective   Subjective Pt encountered exiting bathroom with RN present  Doing so under her own power  She reports feeling better overall since admit & feels stronger today compareed to yesterday  Does continue to endorse neuropathic pain in LEs, as well as numbness from knees down, along with possibly residing finger numbness & "arthritis like sensations in her knuckles "  Pt reports x2 TORI home & was getting home PT prior to recent worsening  Transfers   Sit to Stand 5  Supervision   Additional items Assist x 1; Armrests; Increased time required   Stand to Sit 5  Supervision   Additional items Assist x 1; Armrests; Increased time required   Ambulation/Elevation   Gait pattern Short stride; Inconsistent pat;Decreased foot clearance; Improper Weight shift   Gait Assistance 4  Minimal assist   Additional items Assist x 1  (progressed to close supervision)   Assistive Device Rolling walker   Distance 100', 60', 40'   Ambulation/Elevation Additional Comments x2 curb steps with RW & min A   Balance   Static Sitting Fair   Static Standing Fair   Ambulatory Fair -   Endurance Deficit   Endurance Deficit Yes   Endurance Deficit Description fatigue, LE weakness   Activity Tolerance   Activity Tolerance Patient tolerated treatment well;Patient limited by fatigue;Patient limited by pain   Nurse [address], RN   Assessment   Prognosis Good   Problem List Decreased strength;Decreased endurance; Impaired balance;Decreased mobility;Pain   Assessment Pt demonstrated improved endurance & activity tolerance today, performing all transfers & ambulatory tasks on levels without significant need for physical assist   Ambulated repeated household distances with standing & seated rests in between to recover  No complaints of significantly worsening pain or weakness noted  Pt instructed in & negotiated curb steps with RW to allow for access to home by way of 1+1 TORI  Pt did so without LOB, but did demonstrate LE weakness & unsteadiness when descending, requliring increased reliance on RW for safety  Pt reports incresed R knee soreness & weakness comapred to L at this time after completing this task  Upon return to room, discussion held with pt regarding d/c plan & accessibility of her home  Pt reports she owns DME & has support at home  Pt encouraged to continue participating in mobility tasks with staff outside of therapy to continue monitoring progress & effectiveness of treatment, which will also continue to determine discharge disposition  If pt continues to maintain & progress current level of mobilty, anticipate she will be able to d/c home with family support & home PT  follow up  If weakness increases & pt requires increased assist as a result, may require rehab at that time  PT will continue to follow and assess mobiilty to ensure safe mobiilty & d/c planning during hospital admit  Goals   Patient Goals to keep getting better   STG Expiration Date 03/06/22   PT Treatment Day 1   Plan   Treatment/Interventions Functional transfer training;LE strengthening/ROM; Elevations; Therapeutic exercise; Endurance training;Patient/family training;Equipment eval/education; Bed mobility;Gait training   Progress Progressing toward goals   PT Frequency 3-5x/wk   Recommendation   PT Discharge Recommendation Home with home health rehabilitation   Equipment Recommended   (pt owns DME)   AM-PAC Basic Mobility Inpatient   Turning in Bed Without Bedrails 4   Lying on Back to Sitting on Edge of Flat Bed 4   Moving Bed to Chair 4   Standing Up From Chair 4   Walk in Room 3   Climb 3-5 Stairs 3   Basic Mobility Inpatient Raw Score 22   Basic Mobility Standardized Score 47 4   Highest Level Of Mobility   [facility] Goal 7: Walk 25 feet or more   The patient's AM-PAC Basic Mobility Inpatient Short Form Raw Score is 22  A Raw score of less than or equal to 16 suggests the patient may benefit from discharge to post-acute rehabilitation services  Please also refer to the recommendation of the Physical Therapist for safe discharge planning            Dilma Rosales, PTA

## 2022-02-25 NOTE — ASSESSMENT & PLAN NOTE
- Continue with thiamine, folic acid  - Recommend thiamine supplementation with Thiamine 500 mg IV TID x 3 days and then would recommend Thiamine  mg QD   - Continue on Antabuse

## 2022-02-25 NOTE — ASSESSMENT & PLAN NOTE
· Patient presented with progressive lower extremity weakness, lower extremity as well as abdominal/truncal and finger tip numbness, and significant gait instability/difficulty at home since mid January  · MRI cspine 2/23- There is a well-circumscribed focus of abnormal hyperintense T2 and FLAIR signal noted within the posterior central aspect of the simona with mild associated enhancement   Findings are nonspecific and differential considerations would include demyelinating  lesion with active inflammation, subacute infarct or possibly brainstem neoplasm   Dedicated MRI of the brain with contrast recommended  · MRI brain 2/24- As seen on the MRI of the cervical spine performed yesterday, there is a focus of abnormal increased T2 and FLAIR signal within the posterior central aspect of the simona   Only minimal diffusion is seen on postcontrast imaging, less so than yesterday's examination of the cervical spine   This lesion is nonspecific and differential considerations would include solitary demyelinating focus, or other infectious/inflammatory etiologies including focal vasculopathy   Neoplasm such as brainstem glioma or metastasis less likely  · Neurology consult appreciated   · S/p LP 2/24- gram stain (-); meningitis and encephalitis panel (-), protein 40- pending remainder of cultures   · Pt has a hx of ETOH abuse and reported last drink was 2 weeks prior to admission  Reported drinking 5 glasses of wine a day     · c/w thiamine 100mg IV daily and thiamine level pending  · Hepatitis, HIV negative  · Folate 2 5- started on supplements  · b12 1135  · Sed rate 18; CRP 8 8;  on admission- CPK now resolved

## 2022-02-25 NOTE — PROGRESS NOTES
The pantoprazole has been converted to Oral per [facility] IV-to-PO Auto-Conversion Protocol for Adults as approved by the Pharmacy and Therapeutics Committee  The patient met all eligible criteria:  3 Age = 24years old   2) Received at least one dose of the IV form   3) Receiving at least one other scheduled oral/enteral medication   4) Tolerating an oral/enteral diet   and did not have any exclusions:   1) Critical care patient   2) Active GI bleed (IF assessing H2RAs or PPIs)   3) Continuous tube feeding (IF assessing cipro, doxycycline, levofloxacin, minocycline, rifampin, or voriconazole)   4) Receiving PO vancomycin (IF assessing metronidazole)   5) Persistent nausea and/or vomiting   6) Ileus or gastrointestinal obstruction   7) Edis/nasogastric tube set for continuous suction   8) Specific order not to automatically convert to PO (in the order's comments or if discussed in the most recent Infectious Disease or primary team's progress notes)

## 2022-02-25 NOTE — CASE MANAGEMENT
Case Management Discharge Planning Note    Patient name Pallavi Veloz  Location [facility]-62 MRN [id number]  : 1980 Date 2022       Current Admission Date: 2022  Current Admission Diagnosis:Lower extremity numbness   Patient Active Problem List    Diagnosis Date Noted    History of COVID-19 2022    Hypokalemia     Folic acid deficiency     Transaminitis 2022    Nausea and vomiting 2022    Nausea 02/10/2022    Ambulatory dysfunction 2022    Lower extremity numbness 2022    COVID-19 2022    Severe protein-calorie malnutrition ([facility] ) 2020    Hypomagnesemia 2020    Class 2 obesity due to excess calories with body mass index (BMI) of 37 0 to 37 9 in adult 2020    Terminal ileitis without complication ([facility] ) 6249    Crohn's disease of colon with complication ([facility] )     Hypothyroidism 2020    Recurrent major depressive disorder, in partial remission ([facility] ) 10/04/2019    JOSE (generalized anxiety disorder) 10/04/2019    Moderate persistent asthma without complication     Umbilical hernia without obstruction and without gangrene 2019    Alcohol dependence ([facility] ) 2015      LOS (days): 2  Geometric Mean LOS (GMLOS) (days):   Days to GMLOS:     OBJECTIVE:  Risk of Unplanned Readmission Score: 16         Current admission status: Inpatient   Preferred Pharmacy:   [facility]   [facility] [address]  Phone: [phone] Fax: [phone]    Primary Care Provider: Chris Patrick DO    Primary Insurance: DERICK  Secondary Insurance:     DISCHARGE DETAILS:    Discharge planning discussed with[de-identified] Patient  Freedom of Choice: Yes  Comments - Freedom of Choice: Discussed FOC     Other Referral/Resources/Interventions Provided:  Interventions: Short Term Rehab  Referral Comments: Patient requesting referral to [facility] and [facility], referrals entered in [facility]

## 2022-02-25 NOTE — ASSESSMENT & PLAN NOTE
· In the setting of alcohol abuse   · Significantly improved today since admission with IVFs- d/c ivfs today   · --> 101--> 52; --> 141--> 99  · tbili 3 33--> 3 55 --> 1 96   · Hepatitis panel negative   · Pt reports n/v for the past 6 weeks- improved now with starting PPI therapy   · CT of the abd/pelvis performed in January- Hepatic steatosis and hepatomegaly    · Consider abdominal US if liver enzymes worsen   · CMP in am

## 2022-02-25 NOTE — PROGRESS NOTES
Progress Note - Neurology   Michelle [place] 42 y o  female MRN: [id number]  Unit/Bed#: [facility] 620-01 Encounter: [id number]      Assessment/Plan     * Lower extremity numbness  Assessment & Plan  42year old female with Crohn's disease, anxiety/depression, HTN, hypothyroidism who presented with progressive lower extremity weakness, lower extremity as well as abdominal/truncal and finger tip numbness and significant gait instability  MRI brain with and without contrast completed and demonstrates enhancing central simona signal abnormality  Unclear etiology of symptoms, suspect ETOH use as well as nutritional deficiencies likely playing role in symptoms  Cannot exclude underlying peripheral neuropathy secondary to ETOH use contributing  Plan:  - Continue with SoluMedrol 1 g QD x 5 total doses (today is day 3/5)  - LP with CSF analysis completed  - CSF results thus far: RBCS 13, WBCs 1, protein 40, glucose 101, ME panel negative  - CSF results pending: West Nile Virus, Anti-MOG, autoimmune/paraneoplastic panel, VDRL, ACE, NMO, cytology, MS panel, Lyme PCR, flow cytometry, culture  - Serum studies thus far: ACE 52, DALLAS negative, Sjogrens SS-A <0 2, SS-B <0 2, protein electrophoresis with no monoclonal bands, Vit B12 758, folate 2 7, IgA 418, acute hepatitis panel negative, Hepatitis A antibody negative, QuantiFERON TB negative, HIV negative  Cryoglobulin positive  - Serum studies pending: RPR, ANCA, dsDNA, Lyme profile, Vit B1 level  - Continue with supportive care   - PT/OT  - Medical management per primary team, metabolic/infectious derangement monitoring  Pontine lesion  Assessment & Plan  - MRI brain with and without contrast completed and demonstrates focus of abnormal increased T2 and FLAIR signal within the posterior central aspect of the simona  - Unclear etiology of imaging finding and unclear if this is related to presenting symptoms    - CSF studies pending as above     - Recommend repeat MRI brain with and without contrast in 4-6 weeks  Alcohol dependence ([facility] [facility] )  Assessment & Plan  - Continue with thiamine, folic acid  - Recommend thiamine supplementation with Thiamine 500 mg IV TID x 3 days and then would recommend Thiamine  mg QD   - Continue on Antabuse  Hyponatremia-resolved as of 2/25/2022  Assessment & Plan  - Na improved today, 137    - Continue to trend  Transaminitis  Assessment & Plan  - Likely in setting of ETOH use  - LFTs improved today, AST 52, ALT 99  Crohn's disease of colon with complication [facility])  Assessment & Plan  - Follows with GI team as an outpatient  Ghada Almeida will need follow up in in 6 weeks with neuromuscular attending  She will require a repeat MRI within 6 weeks  Subjective:   Patient notes she is feeling slightly improved today and notes she was able to walk in the hallway with PT an a rolling walker and felt more steady than she had prior to coming to the hospital  She notes she continues to have numbness of her finger tips as well as below her knees B/L  She also continues to note pain in her legs  She denies any bowel/bladder incontinence  She notes that she has not vomited in 2 days  She notes she was able to eat and feels her appetite is improved      ROS:  History obtained from the patient  General ROS: negative for - chills, fatigue or fever  Ophthalmic ROS: negative for - blurry vision, decreased vision or double vision  Respiratory ROS: negative for - shortness of breath  Cardiovascular ROS: negative for - chest pain  Gastrointestinal ROS: negative for - abdominal pain, nausea/vomiting or stool incontinence  Musculoskeletal ROS: positive for - gait disturbance, muscle pain and muscular weakness  Neurological ROS: positive for - gait disturbance, numbness/tingling and weakness  negative for - confusion, dizziness, headaches, speech problems or visual changes    Medications  Scheduled Meds:  Current Facility-Administered Medications Medication Dose Route Frequency Provider Last Rate    acetaminophen  650 mg Oral Q6H PRN Yosef García MD      albuterol  2 puff Inhalation 4x Daily Sallie Trevino MD      calcium carbonate  500 mg Oral Daily PRN Yosef García MD      clonazePAM  0 5 mg Oral BID PRN Yosef García MD      disulfiram  250 mg Oral Daily Yosef García MD      fluticasone  1 spray Nasal Daily Yosef Gacría MD      folic acid  1 mg Oral Daily NOAM Green      Gadobutrol  9 mL Intravenous Once in imaging Dayron Healy DO      levothyroxine  150 mcg Oral Early Morning Yosef García MD      LORazepam  0 5 mg Oral Q8H PRN Yosef García MD      methylPREDNISolone sodium succinate  1,000 mg Intravenous Daily Ivone Rodriguez PA-C Stopped (02/24/22 6000)   [facility] multivitamin-minerals  1 tablet Oral Daily NOAM Green      oxyCODONE-acetaminophen  1 tablet Oral Q4H PRN Yosef García MD      pantoprazole  40 mg Oral BID AC NOAM Miller      sodium chloride  125 mL/hr Intravenous Continuous Yosef García  mL/hr (02/25/22 0255)    thiamine  100 mg Intravenous Daily NOAM Green Stopped (02/24/22 1121)     Continuous Infusions:sodium chloride, 125 mL/hr, Last Rate: 125 mL/hr (02/25/22 0255)      PRN Meds:   acetaminophen    calcium carbonate    clonazePAM    Gadobutrol    LORazepam    oxyCODONE-acetaminophen    Vitals: Blood pressure 137/87, pulse 90, temperature 98 4 °F (36 9 °C), resp  rate 18, last menstrual period 02/01/2022, SpO2 92 %, not currently breastfeeding  ,There is no height or weight on file to calculate BMI      Physical Exam: /86   Pulse 86   Temp 98 1 °F (36 7 °C)   Resp 18   LMP 02/01/2022 (Within Months)   SpO2 92%   General appearance: alert and oriented, in no acute distress  Head: Normocephalic, without obvious abnormality, atraumatic  Eyes: negative findings: lids and lashes normal, conjunctivae and sclerae normal, pupils equal, round, reactive to light and accomodation and visual fields full to confrontation  Lungs: clear to auscultation bilaterally  Heart: regular rate and rhythm  Abdomen: soft, non-tender; bowel sounds normal; no masses,  no organomegaly  Extremities: extremities normal, warm and well-perfused; no cyanosis, clubbing, or edema  Skin: Skin color, texture, turgor normal  No rashes or lesions  Neurologic: Mental status: Alert, oriented, thought content appropriate  Cranial nerves: II: visual field normal, II: pupils equal, round, reactive to light and accommodation, III,VII: ptosis no ptosis noted, III,IV,VI: extraocular muscles extra-ocular motions intact, V: facial light touch sensation normal bilaterally, VII: upper facial muscle function normal bilaterally, VII: lower facial muscle function normal bilaterally, XII: tongue strength normal  Sensory: Sensation intact to crude touch throughout, diminished to temperature below knee B/L, pinprick intact but diminished from knee on RLE and from just below the knee on LLE    Motor: Motor strength 5/5 B/L UE and LE except 5-/5 DF B/L  Reflexes: Reflexes trace throughout, toes downgoing B/L  Coordination: finger to nose normal bilaterally    Labs  Recent Results (from the past 24 hour(s))   Magnesium    Collection Time: 02/24/22 10:57 AM   Result Value Ref Range    Magnesium 1 5 (L) 1 6 - 2 6 mg/dL   T4, free    Collection Time: 02/24/22 10:57 AM   Result Value Ref Range    Free T4 1 24 0 76 - 1 46 ng/dL   Lactic acid, plasma    Collection Time: 02/24/22 10:57 AM   Result Value Ref Range    LACTIC ACID 0 9 0 5 - 2 0 mmol/L   CK (with reflex to MB)    Collection Time: 02/24/22 10:57 AM   Result Value Ref Range    Total  26 - 192 U/L   CKMB    Collection Time: 02/24/22 10:57 AM   Result Value Ref Range    CK-MB Index 1 2 0 0 - 2 5 %    CK-MB 1 8 0 0 - 5 0 ng/mL   Glucose, CSF    Collection Time: 02/24/22  3:26 PM   Result Value Ref Range Glucose,  (H) 50 - 80 mg/dL   Total Protein, CSF    Collection Time: 02/24/22  3:26 PM   Result Value Ref Range    Protein, CSF 40 15 - 45 mg/dL   RBC count,CSF    Collection Time: 02/24/22  4:18 PM   Result Value Ref Range    RBC, CSF 13 (H) 0 - 10 uL   Meningitis/Encephalitis (ME) Panel    Collection Time: 02/24/22  4:59 PM   Result Value Ref Range    C NEOFORMANS/GATTII Not Detected Not Detected    CYTOMEGALOVIRUS Not Detected Not Detected    ENTEROVIRUS Not Detected Not Detected    E COLI K1 Not Detected Not Detected    H INFLUENZAE Not Detected Not Detected    H SIMPLEX 1 Not Detected Not Detected    H SIMPLEX 2 Not Detected Not Detected    HERPES VIRUS 6 Not Detected Not Detected    PARECHOVIRUS Not Detected Not Detected    L  MONOCYTOGENES Not Detected Not Detected    N MENINGITIDIS Not Detected Not Detected    S AGALACTIAE Not Detected Not Detected    S  PNEUMONIAE Not Detected Not Detected    V ZOSTER Not Detected Not Detected   STAT Gram Stain    Collection Time: 02/24/22  5:05 PM    Specimen: Lumbar Puncture;  Other   Result Value Ref Range    Gram Stain Result No polys seen     Gram Stain Result No bacteria seen    CSF white cell count with differential    Collection Time: 02/24/22  5:26 PM   Result Value Ref Range    Appearance, CSF clear     Tube Number, CSF 4     WBC, CSF 1 0 - 5 /uL    Xanthochromia No No   CSF Diff    Collection Time: 02/24/22  5:27 PM   Result Value Ref Range    Total Counted 100     Lymphs %  %   Magnesium    Collection Time: 02/25/22  5:14 AM   Result Value Ref Range    Magnesium 2 1 1 6 - 2 6 mg/dL   Comprehensive metabolic panel    Collection Time: 02/25/22  5:14 AM   Result Value Ref Range    Sodium 137 136 - 145 mmol/L    Potassium 3 0 (L) 3 5 - 5 3 mmol/L    Chloride 106 100 - 108 mmol/L    CO2 24 21 - 32 mmol/L    ANION GAP 7 4 - 13 mmol/L    BUN 6 5 - 25 mg/dL    Creatinine 0 46 (L) 0 60 - 1 30 mg/dL    Glucose 172 (H) 65 - 140 mg/dL    Calcium 8 1 (L) 8 3 - 10 1 mg/dL    Corrected Calcium 8 9 8 3 - 10 1 mg/dL    AST 52 (H) 5 - 45 U/L    ALT 99 (H) 12 - 78 U/L    Alkaline Phosphatase 74 46 - 116 U/L    Total Protein 6 7 6 4 - 8 2 g/dL    Albumin 3 0 (L) 3 5 - 5 0 g/dL    Total Bilirubin 1 96 (H) 0 20 - 1 00 mg/dL    eGFR 123 ml/min/1 73sq m   CBC    Collection Time: 02/25/22  5:14 AM   Result Value Ref Range    WBC 7 55 4 31 - 10 16 Thousand/uL    RBC 3 34 (L) 3 81 - 5 12 Million/uL    Hemoglobin 12 9 11 5 - 15 4 g/dL    Hematocrit 35 8 34 8 - 46 1 %     (H) 82 - 98 fL    MCH 38 6 (H) 26 8 - 34 3 pg    MCHC 36 0 31 4 - 37 4 g/dL    RDW 11 5 (L) 11 6 - 15 1 %    Platelets 011 883 - 426 Thousands/uL    MPV 9 7 8 9 - 12 7 fL     Imaging   Personally reviewed images and reports in PACs  MRI brain with and without contrast- Focus of abnormal increased T2 and FLAIR signal within the posterior central aspect of the simona  Only minimal diffusion is seen on postcontrast imaging, less so than prior examination of the cervical spine  This lesion is nonspecific and differential considerations would include solitary demyelinating focus, or other infectious/inflammatory etiologies including focal vasculopathy  Neoplasm such as brainstem glioma or metastasis less likely  Counseling / Coordination of Care  Total time spent today 40 minutes  Greater than 50% of total time was spent with the patient and / or family counseling and / or coordination of care  A description of the counseling / coordination of care: Time spent reviewing imaging, results of CSF studies thus far and also discussing plan of care  Discussed nutritional deficiencies as well as ETOH use likely contributing to symptoms  MRI brain does demonstrate focus of abnormal T2 and FLAIR signal within posterior central simona and unclear if this is related to presentation  Currently treating with SoluMedrol 1000 mg QD x total of 5 days   Will recommend repeat MRI brain with and without contrast in 4-6 weeks to evaluate lesion  Encouraged alcohol cessation with patient and she expressed understanding and notes she is planning to return to [facility] and is motivated to do so

## 2022-02-25 NOTE — ASSESSMENT & PLAN NOTE
· Currently without exacerbation   · Pt reports recent exacerbation approximately 1 month ago- treated with IV abxs at that time   · Follows Dr Conrad James with GI as outpatient    · Pt was scheduled to have a cscope 2/23 but had to cancel due to mobility     · F/u outpatient to discuss restarting humira outpatient

## 2022-02-25 NOTE — ASSESSMENT & PLAN NOTE
· Noted on admission- 2 5  · Started on PO folic acid 1mg daily   · Encourage ETOH cessation   · MCV elevated- likely etiology

## 2022-02-25 NOTE — ASSESSMENT & PLAN NOTE
· Continue levothyroxine- recently increased outpatient a few days prior to admission to synthroid 150mcg/day from 125mcg/day  · TSH 9  · Repeat TSH in 6-8 weeks after discharge

## 2022-02-26 ENCOUNTER — APPOINTMENT (INPATIENT)
Dept: RADIOLOGY | Facility: HOSPITAL | Age: 42
DRG: 071 | End: 2022-02-26
Payer: COMMERCIAL

## 2022-02-26 LAB
ALBUMIN SERPL BCP-MCNC: 3.2 G/DL (ref 3.5–5)
ALP SERPL-CCNC: 77 U/L (ref 46–116)
ALT SERPL W P-5'-P-CCNC: 93 U/L (ref 12–78)
ANION GAP SERPL CALCULATED.3IONS-SCNC: 5 MMOL/L (ref 4–13)
AST SERPL W P-5'-P-CCNC: 42 U/L (ref 5–45)
BILIRUB SERPL-MCNC: 1.14 MG/DL (ref 0.2–1)
BUN SERPL-MCNC: 13 MG/DL (ref 5–25)
CALCIUM ALBUM COR SERPL-MCNC: 9.3 MG/DL (ref 8.3–10.1)
CALCIUM SERPL-MCNC: 8.7 MG/DL (ref 8.3–10.1)
CHLORIDE SERPL-SCNC: 103 MMOL/L (ref 100–108)
CO2 SERPL-SCNC: 28 MMOL/L (ref 21–32)
CREAT SERPL-MCNC: 0.5 MG/DL (ref 0.6–1.3)
ERYTHROCYTE [DISTWIDTH] IN BLOOD BY AUTOMATED COUNT: 11.5 % (ref 11.6–15.1)
GFR SERPL CREATININE-BSD FRML MDRD: 119 ML/MIN/1.73SQ M
GLUCOSE SERPL-MCNC: 167 MG/DL (ref 65–140)
HCT VFR BLD AUTO: 35.5 % (ref 34.8–46.1)
HGB BLD-MCNC: 12.5 G/DL (ref 11.5–15.4)
MCH RBC QN AUTO: 38 PG (ref 26.8–34.3)
MCHC RBC AUTO-ENTMCNC: 35.2 G/DL (ref 31.4–37.4)
MCV RBC AUTO: 108 FL (ref 82–98)
PLATELET # BLD AUTO: 271 THOUSANDS/UL (ref 149–390)
PMV BLD AUTO: 9.9 FL (ref 8.9–12.7)
POTASSIUM SERPL-SCNC: 3.3 MMOL/L (ref 3.5–5.3)
PROT SERPL-MCNC: 6.7 G/DL (ref 6.4–8.2)
RBC # BLD AUTO: 3.29 MILLION/UL (ref 3.81–5.12)
SODIUM SERPL-SCNC: 136 MMOL/L (ref 136–145)
WBC # BLD AUTO: 10.47 THOUSAND/UL (ref 4.31–10.16)

## 2022-02-26 PROCEDURE — 85027 COMPLETE CBC AUTOMATED: CPT | Performed by: NURSE PRACTITIONER

## 2022-02-26 PROCEDURE — 99232 SBSQ HOSP IP/OBS MODERATE 35: CPT | Performed by: PHYSICIAN ASSISTANT

## 2022-02-26 PROCEDURE — 74022 RADEX COMPL AQT ABD SERIES: CPT

## 2022-02-26 PROCEDURE — 80053 COMPREHEN METABOLIC PANEL: CPT | Performed by: NURSE PRACTITIONER

## 2022-02-26 RX ORDER — POTASSIUM CHLORIDE 20 MEQ/1
40 TABLET, EXTENDED RELEASE ORAL 2 TIMES DAILY
Status: COMPLETED | OUTPATIENT
Start: 2022-02-26 | End: 2022-02-26

## 2022-02-26 RX ORDER — POLYETHYLENE GLYCOL 3350 17 G/17G
17 POWDER, FOR SOLUTION ORAL DAILY
Status: DISCONTINUED | OUTPATIENT
Start: 2022-02-26 | End: 2022-02-27 | Stop reason: HOSPADM

## 2022-02-26 RX ADMIN — THIAMINE HYDROCHLORIDE 500 MG: 100 INJECTION, SOLUTION INTRAMUSCULAR; INTRAVENOUS at 22:14

## 2022-02-26 RX ADMIN — OXYCODONE HYDROCHLORIDE 10 MG: 10 TABLET ORAL at 05:57

## 2022-02-26 RX ADMIN — OXYCODONE HYDROCHLORIDE 10 MG: 10 TABLET ORAL at 01:52

## 2022-02-26 RX ADMIN — GABAPENTIN 100 MG: 100 CAPSULE ORAL at 20:34

## 2022-02-26 RX ADMIN — FLUTICASONE PROPIONATE 1 SPRAY: 50 SPRAY, METERED NASAL at 08:28

## 2022-02-26 RX ADMIN — CLONAZEPAM 0.5 MG: 0.5 TABLET ORAL at 18:02

## 2022-02-26 RX ADMIN — PANTOPRAZOLE SODIUM 40 MG: 40 TABLET, DELAYED RELEASE ORAL at 08:27

## 2022-02-26 RX ADMIN — ACETAMINOPHEN 975 MG: 325 TABLET ORAL at 05:57

## 2022-02-26 RX ADMIN — OXYCODONE HYDROCHLORIDE 10 MG: 10 TABLET ORAL at 17:57

## 2022-02-26 RX ADMIN — ENOXAPARIN SODIUM 40 MG: 40 INJECTION SUBCUTANEOUS at 08:27

## 2022-02-26 RX ADMIN — FOLIC ACID 1 MG: 1 TABLET ORAL at 08:27

## 2022-02-26 RX ADMIN — CYCLOBENZAPRINE HYDROCHLORIDE 5 MG: 10 TABLET, FILM COATED ORAL at 16:14

## 2022-02-26 RX ADMIN — PANTOPRAZOLE SODIUM 40 MG: 40 TABLET, DELAYED RELEASE ORAL at 16:14

## 2022-02-26 RX ADMIN — ALBUTEROL SULFATE 2 PUFF: 90 AEROSOL, METERED RESPIRATORY (INHALATION) at 12:06

## 2022-02-26 RX ADMIN — Medication 1 TABLET: at 08:27

## 2022-02-26 RX ADMIN — CYCLOBENZAPRINE HYDROCHLORIDE 5 MG: 10 TABLET, FILM COATED ORAL at 08:26

## 2022-02-26 RX ADMIN — ACETAMINOPHEN 975 MG: 325 TABLET ORAL at 22:13

## 2022-02-26 RX ADMIN — GABAPENTIN 100 MG: 100 CAPSULE ORAL at 16:14

## 2022-02-26 RX ADMIN — ALBUTEROL SULFATE 2 PUFF: 90 AEROSOL, METERED RESPIRATORY (INHALATION) at 22:12

## 2022-02-26 RX ADMIN — LEVOTHYROXINE SODIUM 150 MCG: 75 TABLET ORAL at 05:56

## 2022-02-26 RX ADMIN — POTASSIUM CHLORIDE 40 MEQ: 1500 TABLET, EXTENDED RELEASE ORAL at 08:27

## 2022-02-26 RX ADMIN — GABAPENTIN 100 MG: 100 CAPSULE ORAL at 08:27

## 2022-02-26 RX ADMIN — SODIUM CHLORIDE 1000 MG: 9 INJECTION, SOLUTION INTRAVENOUS at 12:06

## 2022-02-26 RX ADMIN — ALBUTEROL SULFATE 2 PUFF: 90 AEROSOL, METERED RESPIRATORY (INHALATION) at 18:04

## 2022-02-26 RX ADMIN — POLYETHYLENE GLYCOL 3350 17 G: 17 POWDER, FOR SOLUTION ORAL at 10:07

## 2022-02-26 RX ADMIN — OXYCODONE HYDROCHLORIDE 10 MG: 10 TABLET ORAL at 22:17

## 2022-02-26 RX ADMIN — THIAMINE HYDROCHLORIDE 500 MG: 100 INJECTION, SOLUTION INTRAMUSCULAR; INTRAVENOUS at 11:14

## 2022-02-26 RX ADMIN — ALBUTEROL SULFATE 2 PUFF: 90 AEROSOL, METERED RESPIRATORY (INHALATION) at 08:28

## 2022-02-26 RX ADMIN — OXYCODONE HYDROCHLORIDE 10 MG: 10 TABLET ORAL at 14:01

## 2022-02-26 RX ADMIN — THIAMINE HYDROCHLORIDE 500 MG: 100 INJECTION, SOLUTION INTRAMUSCULAR; INTRAVENOUS at 16:14

## 2022-02-26 RX ADMIN — POTASSIUM CHLORIDE 40 MEQ: 1500 TABLET, EXTENDED RELEASE ORAL at 17:57

## 2022-02-26 RX ADMIN — CYCLOBENZAPRINE HYDROCHLORIDE 5 MG: 10 TABLET, FILM COATED ORAL at 20:34

## 2022-02-26 RX ADMIN — ACETAMINOPHEN 975 MG: 325 TABLET ORAL at 14:01

## 2022-02-26 RX ADMIN — CLONAZEPAM 0.5 MG: 0.5 TABLET ORAL at 08:54

## 2022-02-26 RX ADMIN — OXYCODONE HYDROCHLORIDE 10 MG: 10 TABLET ORAL at 10:03

## 2022-02-26 NOTE — ASSESSMENT & PLAN NOTE
· In the setting of alcohol abuse   · Significantly improved since admission with IVFs- d/c ivfs   · --> 101--> 52; --> 141--> 99  · tbili 3 33--> 3 55 --> 1 96   · Hepatitis panel negative   · Pt reports n/v for the past 6 weeks- improved now with starting PPI therapy   · CT of the abd/pelvis performed in January- Hepatic steatosis and hepatomegaly    · Consider abdominal US if liver enzymes worsen   · CMP in am

## 2022-02-26 NOTE — ASSESSMENT & PLAN NOTE
· Reported by the patient for the past 6 weeks- now resolved over the past 24 hour  Patient now c/o of abdominal discomfort and bloating since last night  Only 1 BM this admission when she normally has diarrhea  · C/w PPI BID   · +hx of ETOH abuse- likely underlying gastritis   · Will need outpatient EGD after discharge  · Check obstruction series to r/o ileus  Encourage ambulation  Stool softener   Decrease narcotics as able

## 2022-02-26 NOTE — CASE MANAGEMENT
Case Management Discharge Planning Note    Patient name Kwame Hernandez  Location [facility]-14 MRN [id number]  : 1980 Date 2022       Current Admission Date: 2022  Current Admission Diagnosis:Lower extremity numbness   Patient Active Problem List    Diagnosis Date Noted    History of COVID-19 2022    Hypokalemia 1662    Folic acid deficiency     Pontine lesion 2022    Transaminitis 2022    Nausea and vomiting 2022    Nausea 02/10/2022    Ambulatory dysfunction 2022    Lower extremity numbness 2022    COVID-19 2022    Severe protein-calorie malnutrition ([facility] ) 2020    Hypomagnesemia 2020    Class 2 obesity due to excess calories with body mass index (BMI) of 37 0 to 37 9 in adult 2020    Terminal ileitis without complication ([facility] )     Cro[facility]'s disease of colon with complication ([facility] )     Hypothyroidism 2020    Recurrent major depressive disorder, in partial remission ([place] [address] ) 10/04/2019    JOSE (generalized anxiety disorder) 10/04/2019    Moderate persistent asthma without complication     Umbilical hernia without obstruction and without gangrene 2019    Alcohol dependence ([facility] ) 2015      LOS (days): 3  Geometric Mean LOS (GMLOS) (days):   Days to GMLOS:     OBJECTIVE:  Risk of Unplanned Readmission Score: 15         Current admission status: Inpatient   Preferred Pharmacy:   [facility]  [address] [place] [address]  Phone: [phone] Fax: [phone]    Primary Care Provider: Lizzy Thorpe DO    Primary Insurance: DERICK  Secondary Insurance:     DISCHARGE DETAILS:    Discharge planning discussed with[de-identified] pt  Freedom of Choice: Yes  Comments - Freedom of Choice: CM DISCUSSED HHC RECOMMENDATION  CM contacted family/caregiver?: No- see comments  Were Treatment Team discharge recommendations reviewed with patient/caregiver?: Yes  Did patient/caregiver verbalize understanding of patient care needs?: Yes  Were patient/caregiver advised of the risks associated with not following Treatment Team discharge recommendations?: Yes    Contacts  Reason/Outcome: Discharge [address]         Is the patient interested in [facility] at discharge?: Yes  [address] requested[de-identified] [facility] Agency Name[de-identified] Other ([facility])  [facility] Jun  Provider[de-identified] PCP  Home Health Services Needed[de-identified] Strengthening/Theraputic Exercises to Improve Function  Homebound Criteria Met[de-identified] Requires the Assistance of Another Person for Safe Ambulation or to Leave the Home  Supporting Clincal Findings[de-identified] Fatigues Easliy in Short Distances    Treatment Team Recommendation: Home with [facility]  Discharge Destination Plan[de-identified] Home with [address] wants to resume services with [facility]  Referral placed

## 2022-02-26 NOTE — PROGRESS NOTES
[facility]  Progress Note - Michelle [place] 1980, 42 y o  female MRN: [id number]  Unit/Bed#: [facility] 620-01 Encounter: [id number]  Primary Care Provider: Alea Nelson DO   Date and time admitted to hospital: 2/23/2022  8:39 AM    * Lower extremity numbness  Assessment & Plan  · Patient presented with progressive lower extremity weakness, lower extremity as well as abdominal/truncal and finger tip numbness, and significant gait instability/difficulty at home since mid January  · MRI cspine 2/23- There is a well-circumscribed focus of abnormal hyperintense T2 and FLAIR signal noted within the posterior central aspect of the simona with mild associated enhancement   Findings are nonspecific and differential considerations would include demyelinating  lesion with active inflammation, subacute infarct or possibly brainstem neoplasm   Dedicated MRI of the brain with contrast recommended  · MRI brain 2/24- As seen on the MRI of the cervical spine performed yesterday, there is a focus of abnormal increased T2 and FLAIR signal within the posterior central aspect of the simona   Only minimal diffusion is seen on postcontrast imaging, less so than yesterday's examination of the cervical spine   This lesion is nonspecific and differential considerations would include solitary demyelinating focus, or other infectious/inflammatory etiologies including focal vasculopathy   Neoplasm such as brainstem glioma or metastasis less likely  · Neurology consult appreciated   · S/p LP 2/24- gram stain (-); meningitis and encephalitis panel (-), protein 40- pending remainder of cultures   · Pt has a hx of ETOH abuse and reported last drink was 2 weeks prior to admission  Reported drinking 5 glasses of wine a day     · c/w thiamine 100mg IV daily and thiamine level pending  · Hepatitis, HIV negative  · Folate 2 5- started on supplements  · b12 1135  · Sed rate 18; CRP 8 8;  on admission- CPK now resolved   · Add neurontin 100mg TID and flexeril scheduled 5mg TID for pain and spasms  PRN oxy 5/10 and schedule tylenol 975mg TID   · IV solumedrol 1000mg daily- Day #4      Nausea and vomiting  Assessment & Plan  · Reported by the patient for the past 6 weeks- now resolved over the past 24 hour  Patient now c/o of abdominal discomfort and bloating since last night  Only 1 BM this admission when she normally has diarrhea  · C/w PPI BID   · +hx of ETOH abuse- likely underlying gastritis   · Will need outpatient EGD after discharge  · Check obstruction series to r/o ileus  Encourage ambulation  Stool softener  Decrease narcotics as able    Transaminitis  Assessment & Plan  · In the setting of alcohol abuse   · Significantly improved since admission with IVFs- d/c ivfs   · --> 101--> 52; --> 141--> 99  · tbili 3 33--> 3 55 --> 1 96   · Hepatitis panel negative   · Pt reports n/v for the past 6 weeks- improved now with starting PPI therapy   · CT of the abd/pelvis performed in January- Hepatic steatosis and hepatomegaly  · Consider abdominal US if liver enzymes worsen   · CMP in am     Crohn's disease of colon with complication ([facility] [address] )  Assessment & Plan  · Currently without exacerbation   · Pt reports recent exacerbation approximately 1 month ago- treated with IV abxs at that time   · Follows Dr Robert Brown with GI as outpatient    · Pt was scheduled to have a cscope 2/23 but had to cancel due to mobility     · F/u outpatient to discuss restarting humira outpatient     Folic acid deficiency  Assessment & Plan  · Noted on admission- 2 5  · Started on PO folic acid 1mg daily   · Encourage ETOH cessation   · MCV elevated- likely etiology     Alcohol dependence ([facility] )  Assessment & Plan  · Pt reports last drink was 2 weeks ago and reported drinking 5 glasses of wine/day  · Started thiamine 100mg IV daily   · D/c antabuse as pt reports she does not take this currently outpatient   · C/w MVI/folic acid- noted to be deficient in folic acid on admission   · C/w encouraging cessation     Moderate persistent asthma without complication  Assessment & Plan  · No exacerbation  · Continue home inhalers    Hypokalemia  Assessment & Plan  · k 3 0- replete   · Mag 2 1- s/p IV mag     Hypothyroidism  Assessment & Plan  · Continue levothyroxine- recently increased outpatient a few days prior to admission to synthroid 150mcg/day from 125mcg/day  · TSH 9  · Repeat TSH in 6-8 weeks after discharge     Class 2 obesity due to excess calories with body mass index (BMI) of 37 0 to 37 9 in adult  Assessment & Plan  · Diet and exercise counseling prior discharge  · BMI 37 3    History of COVID-19  Assessment & Plan  Hx of covid 19 infection           VTE Pharmacologic Prophylaxis: VTE Score: 4 Moderate Risk (Score 3-4) - Pharmacological DVT Prophylaxis Ordered: enoxaparin (Lovenox)  Patient Centered Rounds: I performed bedside rounds with nursing staff today  Discussions with Specialists or Other Care Team Provider: case management    Education and Discussions with Family / Patient: Patient declined call to   Time Spent for Care: 30 minutes  More than 50% of total time spent on counseling and coordination of care as described above  Current Length of Stay: 3 day(s)  Current Patient Status: Inpatient   Certification Statement: The patient will continue to require additional inpatient hospital stay due to IV steroids  Discharge Plan: Anticipate discharge in 24-48 hrs to home  Code Status: Level 1 - Full Code    Subjective:   C/O abdominal pain and bloating since last night  No N/V  Has had 1 BM this admission  Not really passing gas, though that helps with the pain       Objective:     Vitals:   Temp (24hrs), Av 3 °F (36 8 °C), Min:97 7 °F (36 5 °C), Max:98 7 °F (37 1 °C)    Temp:  [97 7 °F (36 5 °C)-98 7 °F (37 1 °C)] 98 3 °F (36 8 °C)  HR:  [61-89] 78  Resp:  [17-20] 20  BP: (102-126)/(74-92) 125/85  SpO2:  [90 %-94 %] 94 %  There is no height or weight on file to calculate BMI  Input and Output Summary (last 24 hours): Intake/Output Summary (Last 24 hours) at 2/26/2022 1057  Last data filed at 2/25/2022 1613  Gross per 24 hour   Intake 200 ml   Output 1725 ml   Net -1525 ml       Physical Exam:   Physical Exam  Constitutional:       Appearance: Normal appearance  Cardiovascular:      Rate and Rhythm: Normal rate and regular rhythm  Heart sounds: No murmur heard  Pulmonary:      Effort: Pulmonary effort is normal       Breath sounds: Normal breath sounds  Abdominal:      General: Bowel sounds are normal  There is distension  Palpations: Abdomen is soft  Tenderness: There is no abdominal tenderness  Skin:     General: Skin is warm and dry  Neurological:      General: No focal deficit present  Mental Status: She is alert and oriented to person, place, and time  Psychiatric:         Mood and Affect: Mood normal           Additional Data:     Labs:  Results from last 7 days   Lab Units 02/26/22  0509 02/25/22  0514 02/23/22  0941   WBC Thousand/uL 10 47*   < > 8 37   HEMOGLOBIN g/dL 12 5   < > 15 3   HEMATOCRIT % 35 5   < > 43 4   PLATELETS Thousands/uL 271   < > 374   NEUTROS PCT %  --   --  84*   LYMPHS PCT %  --   --  9*   MONOS PCT %  --   --  5   EOS PCT %  --   --  1    < > = values in this interval not displayed       Results from last 7 days   Lab Units 02/26/22  0509   SODIUM mmol/L 136   POTASSIUM mmol/L 3 3*   CHLORIDE mmol/L 103   CO2 mmol/L 28   BUN mg/dL 13   CREATININE mg/dL 0 50*   ANION GAP mmol/L 5   CALCIUM mg/dL 8 7   ALBUMIN g/dL 3 2*   TOTAL BILIRUBIN mg/dL 1 14*   ALK PHOS U/L 77   ALT U/L 93*   AST U/L 42   GLUCOSE RANDOM mg/dL 167*             Results from last 7 days   Lab Units 02/22/22  1339   HEMOGLOBIN A1C % 5 6     Results from last 7 days   Lab Units 02/24/22  1057   LACTIC ACID mmol/L 0 9       Lines/Drains:  Invasive Devices  Report Peripheral Intravenous Line            Peripheral IV 02/25/22 Right Antecubital 1 day                      Imaging: No pertinent imaging reviewed  Recent Cultures (last 7 days):   Results from last 7 days   Lab Units 02/24/22  1705   GRAM STAIN RESULT  No polys seen  No bacteria seen       Last 24 Hours Medication List:   Current Facility-Administered Medications   Medication Dose Route Frequency Provider Last Rate    acetaminophen  975 mg Oral Q8H [facility] NOAM Miller      albuterol  2 puff Inhalation 4x Daily Roland Maldonado MD      calcium carbonate  500 mg Oral Daily PRN Frank Cedeno MD      clonazePAM  0 5 mg Oral BID PRN Frank Cedeno MD      cyclobenzaprine  5 mg Oral TID Myrtle Lights, CRYURIY      enoxaparin  40 mg Subcutaneous Q24H [facility] NOAM Miller      fluticasone  1 spray Nasal Daily Frank Cedeno MD      folic acid  1 mg Oral Daily Myrtle Lights, CRNP      gabapentin  100 mg Oral TID Myrtle Lights, CRNP      levothyroxine  150 mcg Oral Early Morning Frank Cedeno MD      methylPREDNISolone sodium succinate  1,000 mg Intravenous Daily Niyah Naranjo PA-C Stopped (02/25/22 6814)    multivitamin-minerals  1 tablet Oral Daily Myrtle Lights, CRNP      oxyCODONE  10 mg Oral Q4H PRN Myrtle Lights, CRNP      oxyCODONE  5 mg Oral Q4H PRN Myrtle Lights, CRNP      pantoprazole  40 mg Oral BID AC NOAM Miller      polyethylene glycol  17 g Oral Daily Leonard SANDOVAL      potassium chloride  40 mEq Oral BID GLORIA SANDOVAL      thiamine  500 mg Intravenous TID Myrtle Lights, CRNP 500 mg (02/25/22 2973)        Today, Patient Was Seen By: GLORIA SANDOVAL    **Please Note: This note may have been constructed using a voice recognition system  **

## 2022-02-27 VITALS
OXYGEN SATURATION: 90 % | HEART RATE: 55 BPM | SYSTOLIC BLOOD PRESSURE: 137 MMHG | DIASTOLIC BLOOD PRESSURE: 88 MMHG | RESPIRATION RATE: 17 BRPM | TEMPERATURE: 98.3 F

## 2022-02-27 LAB
ALBUMIN SERPL BCP-MCNC: 3.1 G/DL (ref 3.5–5)
ALP SERPL-CCNC: 150 U/L (ref 46–116)
ALT SERPL W P-5'-P-CCNC: 126 U/L (ref 12–78)
ANION GAP SERPL CALCULATED.3IONS-SCNC: 6 MMOL/L (ref 4–13)
AST SERPL W P-5'-P-CCNC: 54 U/L (ref 5–45)
BACTERIA CSF CULT: NO GROWTH
BILIRUB SERPL-MCNC: 0.71 MG/DL (ref 0.2–1)
BUN SERPL-MCNC: 15 MG/DL (ref 5–25)
CALCIUM ALBUM COR SERPL-MCNC: 9.4 MG/DL (ref 8.3–10.1)
CALCIUM SERPL-MCNC: 8.7 MG/DL (ref 8.3–10.1)
CHLORIDE SERPL-SCNC: 103 MMOL/L (ref 100–108)
CO2 SERPL-SCNC: 27 MMOL/L (ref 21–32)
CREAT SERPL-MCNC: 0.54 MG/DL (ref 0.6–1.3)
GFR SERPL CREATININE-BSD FRML MDRD: 116 ML/MIN/1.73SQ M
GLUCOSE SERPL-MCNC: 229 MG/DL (ref 65–140)
POTASSIUM SERPL-SCNC: 3.2 MMOL/L (ref 3.5–5.3)
PROT SERPL-MCNC: 6.4 G/DL (ref 6.4–8.2)
SODIUM SERPL-SCNC: 136 MMOL/L (ref 136–145)

## 2022-02-27 PROCEDURE — 80053 COMPREHEN METABOLIC PANEL: CPT | Performed by: PHYSICIAN ASSISTANT

## 2022-02-27 PROCEDURE — 99239 HOSP IP/OBS DSCHRG MGMT >30: CPT | Performed by: PHYSICIAN ASSISTANT

## 2022-02-27 RX ORDER — OMEPRAZOLE 20 MG/1
20 TABLET, DELAYED RELEASE ORAL DAILY
Refills: 0
Start: 2022-02-27 | End: 2022-06-15

## 2022-02-27 RX ORDER — CYCLOBENZAPRINE HCL 5 MG
5 TABLET ORAL 3 TIMES DAILY PRN
Qty: 15 TABLET | Refills: 0 | Status: SHIPPED | OUTPATIENT
Start: 2022-02-27 | End: 2022-03-02 | Stop reason: SDUPTHER

## 2022-02-27 RX ORDER — POTASSIUM CHLORIDE 20 MEQ/1
40 TABLET, EXTENDED RELEASE ORAL 2 TIMES DAILY
Status: COMPLETED | OUTPATIENT
Start: 2022-02-27 | End: 2022-02-27

## 2022-02-27 RX ORDER — POLYETHYLENE GLYCOL 3350 17 G/17G
17 POWDER, FOR SOLUTION ORAL DAILY PRN
Refills: 0
Start: 2022-02-27 | End: 2022-04-01

## 2022-02-27 RX ORDER — ACETAMINOPHEN 325 MG/1
975 TABLET ORAL EVERY 8 HOURS SCHEDULED
Refills: 0
Start: 2022-02-27 | End: 2022-04-01

## 2022-02-27 RX ORDER — FOLIC ACID 1 MG/1
1 TABLET ORAL DAILY
Qty: 30 TABLET | Refills: 0 | Status: SHIPPED | OUTPATIENT
Start: 2022-02-28 | End: 2022-03-02 | Stop reason: SDUPTHER

## 2022-02-27 RX ORDER — POTASSIUM CHLORIDE 20 MEQ/1
40 TABLET, EXTENDED RELEASE ORAL DAILY
Qty: 10 TABLET | Refills: 0 | Status: SHIPPED | OUTPATIENT
Start: 2022-02-27 | End: 2022-04-01

## 2022-02-27 RX ORDER — LANOLIN ALCOHOL/MO/W.PET/CERES
100 CREAM (GRAM) TOPICAL DAILY
Qty: 30 TABLET
Start: 2022-02-27 | End: 2022-03-02 | Stop reason: SDUPTHER

## 2022-02-27 RX ORDER — GABAPENTIN 100 MG/1
100 CAPSULE ORAL 3 TIMES DAILY
Qty: 90 CAPSULE | Refills: 0 | Status: SHIPPED | OUTPATIENT
Start: 2022-02-27 | End: 2022-03-11 | Stop reason: SDUPTHER

## 2022-02-27 RX ORDER — OXYCODONE HYDROCHLORIDE 5 MG/1
5 TABLET ORAL EVERY 8 HOURS PRN
Qty: 20 TABLET | Refills: 0 | Status: SHIPPED | OUTPATIENT
Start: 2022-02-27 | End: 2022-03-02 | Stop reason: SDUPTHER

## 2022-02-27 RX ADMIN — POTASSIUM CHLORIDE 40 MEQ: 1500 TABLET, EXTENDED RELEASE ORAL at 10:07

## 2022-02-27 RX ADMIN — ACETAMINOPHEN 975 MG: 325 TABLET ORAL at 13:33

## 2022-02-27 RX ADMIN — GABAPENTIN 100 MG: 100 CAPSULE ORAL at 08:03

## 2022-02-27 RX ADMIN — OXYCODONE HYDROCHLORIDE 10 MG: 10 TABLET ORAL at 06:47

## 2022-02-27 RX ADMIN — THIAMINE HYDROCHLORIDE 500 MG: 100 INJECTION, SOLUTION INTRAMUSCULAR; INTRAVENOUS at 10:07

## 2022-02-27 RX ADMIN — ALBUTEROL SULFATE 2 PUFF: 90 AEROSOL, METERED RESPIRATORY (INHALATION) at 13:33

## 2022-02-27 RX ADMIN — ACETAMINOPHEN 975 MG: 325 TABLET ORAL at 05:05

## 2022-02-27 RX ADMIN — POLYETHYLENE GLYCOL 3350 17 G: 17 POWDER, FOR SOLUTION ORAL at 08:04

## 2022-02-27 RX ADMIN — PANTOPRAZOLE SODIUM 40 MG: 40 TABLET, DELAYED RELEASE ORAL at 06:00

## 2022-02-27 RX ADMIN — ENOXAPARIN SODIUM 40 MG: 40 INJECTION SUBCUTANEOUS at 08:04

## 2022-02-27 RX ADMIN — ALBUTEROL SULFATE 2 PUFF: 90 AEROSOL, METERED RESPIRATORY (INHALATION) at 08:03

## 2022-02-27 RX ADMIN — SODIUM CHLORIDE 1000 MG: 9 INJECTION, SOLUTION INTRAVENOUS at 10:50

## 2022-02-27 RX ADMIN — FLUTICASONE PROPIONATE 1 SPRAY: 50 SPRAY, METERED NASAL at 08:03

## 2022-02-27 RX ADMIN — CYCLOBENZAPRINE HYDROCHLORIDE 5 MG: 10 TABLET, FILM COATED ORAL at 08:03

## 2022-02-27 RX ADMIN — Medication 1 TABLET: at 08:03

## 2022-02-27 RX ADMIN — OXYCODONE HYDROCHLORIDE 5 MG: 5 TABLET ORAL at 10:56

## 2022-02-27 RX ADMIN — OXYCODONE HYDROCHLORIDE 10 MG: 10 TABLET ORAL at 02:08

## 2022-02-27 RX ADMIN — POTASSIUM CHLORIDE 40 MEQ: 1500 TABLET, EXTENDED RELEASE ORAL at 13:33

## 2022-02-27 RX ADMIN — LEVOTHYROXINE SODIUM 150 MCG: 75 TABLET ORAL at 05:05

## 2022-02-27 RX ADMIN — FOLIC ACID 1 MG: 1 TABLET ORAL at 08:03

## 2022-02-27 NOTE — ASSESSMENT & PLAN NOTE
· Pt reports last drink was 2 weeks ago and reported drinking 5 glasses of wine/day  · Started thiamine and folic acid   · Patient states she will restart her antabuse at home  · She will also restart AA meetings

## 2022-02-27 NOTE — ASSESSMENT & PLAN NOTE
· Currently without exacerbation   · Pt reports recent exacerbation approximately 1 month ago- treated with IV abxs at that time   · Follows Dr Deonna Guillen with GI as outpatient    · Pt was scheduled to have a cscope 2/23 but had to cancel due to mobility     · F/u outpatient to discuss restarting humira outpatient

## 2022-02-27 NOTE — ASSESSMENT & PLAN NOTE
· Reported by the patient for the past 6 weeks- now resolved  Patient now c/o of abdominal discomfort and bloating since last night  Only 1 BM this admission when she normally has diarrhea  Today she is feeling better and had a bowel movement  · C/w PPI    · +hx of ETOH abuse- likely underlying gastritis   · Will need outpatient EGD after discharge  · obstruction series negative  Encourage ambulation  Stool softener   Decrease narcotics as able

## 2022-02-27 NOTE — DISCHARGE SUMMARY
[facility]  Discharge- Tamiko Apo 1980, 42 y o  female MRN: [id number]  Unit/Bed#: [facility] 620-01 Encounter: [id number]  Primary Care Provider: Yadiel Odonnell DO   Date and time admitted to hospital: 2/23/2022  8:39 AM    * Lower extremity numbness  Assessment & Plan  · Patient presented with progressive lower extremity weakness, lower extremity as well as abdominal/truncal and finger tip numbness, and significant gait instability/difficulty at home since mid January  · MRI cspine 2/23- There is a well-circumscribed focus of abnormal hyperintense T2 and FLAIR signal noted within the posterior central aspect of the simona with mild associated enhancement   Findings are nonspecific and differential considerations would include demyelinating  lesion with active inflammation, subacute infarct or possibly brainstem neoplasm   Dedicated MRI of the brain with contrast recommended  · MRI brain 2/24- As seen on the MRI of the cervical spine performed yesterday, there is a focus of abnormal increased T2 and FLAIR signal within the posterior central aspect of the simona   Only minimal diffusion is seen on postcontrast imaging, less so than yesterday's examination of the cervical spine   This lesion is nonspecific and differential considerations would include solitary demyelinating focus, or other infectious/inflammatory etiologies including focal vasculopathy   Neoplasm such as brainstem glioma or metastasis less likely  · Neurology consult appreciated   · Etiology unclear, but suspect chronic heavy alcohol use, though there is a single pontine lesion  Therefore, IV steroids were started  Serology workup pending  Will need outpatient Neurology follow up with repeat MRI  · S/p LP 2/24- gram stain (-); meningitis and encephalitis panel (-)   · Started on thiamine 500mg IV TID and start thiamine 100mg daily at [address]   thiamine level pending  · Hepatitis, HIV negative  · Folate 2 5- started on supplements  · b12 1135  · Sed rate 18; CRP 8 8;  on admission- CPK now resolved   · Add neurontin 100mg TID and flexeril scheduled 5mg TID for pain and spasms  PRN oxy 5/10 and schedule tylenol 975mg TID - discussed quickly weaning off narcotic  · IV solumedrol 1000mg daily- Day #5/5  · Repeat MRI brain with and without contrast in 4-6 weeks  Nausea and vomiting  Assessment & Plan  · Reported by the patient for the past 6 weeks- now resolved  Patient now c/o of abdominal discomfort and bloating since last night  Only 1 BM this admission when she normally has diarrhea  Today she is feeling better and had a bowel movement  · C/w PPI    · +hx of ETOH abuse- likely underlying gastritis   · Will need outpatient EGD after discharge  · obstruction series negative  Encourage ambulation  Stool softener  Decrease narcotics as able    Transaminitis  Assessment & Plan  · In the setting of alcohol abuse   · Significantly improved since admission with IVFs- d/c ivfs   · --> 101--> 52; --> 141--> 99  · tbili 3 33--> 3 55 --> 1 96   · Hepatitis panel negative   · Pt reports n/v for the past 6 weeks- improved now with starting PPI therapy   · CT of the abd/pelvis performed in January- Hepatic steatosis and hepatomegaly  Crohn's disease of colon with complication [facility])  Assessment & Plan  · Currently without exacerbation   · Pt reports recent exacerbation approximately 1 month ago- treated with IV abxs at that time   · Follows Dr Gabrielle Combs with GI as outpatient    · Pt was scheduled to have a cscope 2/23 but had to cancel due to mobility     · F/u outpatient to discuss restarting humira outpatient     Folic acid deficiency  Assessment & Plan  · Noted on admission- 2 5  · Started on PO folic acid 1mg daily   · Encourage ETOH cessation   · MCV elevated- likely etiology     Alcohol dependence ([facility] [address] )  Assessment & Plan  · Pt reports last drink was 2 weeks ago and reported drinking 5 glasses of wine/day  · Started thiamine and folic acid   · Patient states she will restart her antabuse at home  · She will also restart AA meetings  Moderate persistent asthma without complication  Assessment & Plan  · No exacerbation  · Continue home inhalers    Hypokalemia  Assessment & Plan  · K 3 2- replete  - suspect total body depletion in the setting of alcohol use and diarrhea  · s/p IV mag 2/24  · Continue oral supplement at discharge  Check labs next week    Hypothyroidism  Assessment & Plan  · Continue levothyroxine- recently increased outpatient a few days prior to admission to synthroid 150mcg/day from 125mcg/day  · TSH 9  · Repeat TSH in 6-8 weeks after discharge     Class 2 obesity due to excess calories with body mass index (BMI) of 37 0 to 37 9 in adult  Assessment & Plan  · Diet and exercise counseling prior discharge  · BMI 37 3    History of COVID-19  Assessment & Plan  Hx of covid 19 infection 1/13    Pontine lesion  Assessment & Plan  · Repeat MRI brain with and without contrast in 4-6 weeks per Neurology        Medical Problems             Resolved Problems  Date Reviewed: 2/27/2022          Resolved    Hyponatremia 2/25/2022     Resolved by  NOAM Fung              Discharging Physician / Practitioner: Piter Rutherford PA-C  PCP: Yadiel Odonnell DO  Admission Date:   Admission Orders (From admission, onward)     Ordered        02/23/22 1515  Inpatient Admission  Once                      Discharge Date: 02/27/22    Consultations During Hospital Stay:  · Dr Kristopher Camacho    Procedures Performed:     OBS - Nonobstructive bowel gas pattern    2/24 MRI brain w/wo contrast -   As seen on the MRI of the cervical spine performed yesterday, there is a focus of abnormal increased T2 and FLAIR signal within the posterior central aspect of the simona  Only minimal diffusion is seen on postcontrast imaging, less so than yesterday's   examination of the cervical spine    This lesion is nonspecific and differential considerations would include solitary demyelinating focus, or other infectious/inflammatory etiologies including focal vasculopathy  Neoplasm such as brainstem glioma or   metastasis less likely  2/23 MRI lumbar spine -   Although there is no discrete disc herniation at the L5-S1 level there is mild right-sided facet degenerative change and disc material does abut the ventral aspect of the exiting nerve, unchanged from the prior examination      Normal signal within the distal cord and conus  The abnormal signal seen on prior examination is likely artifactual     2/23 MRI thoracic spine -   No abnormal cord signal      Minor noncompressive mid thoracic degenerative change is stable  2/23 MRI cervical spine -   Mild cervical degenerative change C4-5, C5-6 and C6-7  No canal stenosis or cord compression  There is right-sided foraminal narrowing at the C5-6 level      Normal cervical and thoracic cord      There is a well-circumscribed focus of abnormal hyperintense T2 and FLAIR signal noted within the posterior central aspect of the simona with mild associated enhancement  Findings are nonspecific and differential considerations would include demyelinating   lesion with active inflammation, subacute infarct or possibly brainstem neoplasm  Dedicated MRI of the brain with contrast recommended  Significant Findings / Test Results:   · See above    Incidental Findings:   · none     Test Results Pending at Discharge (will require follow up): · Thiamine  · CSF serologies     Outpatient Tests Requested:  · MRI brain w/wo contrast in 4-6 weeks per Neurology  · BMP in 4-5 days  · Thyroid function tests in 6-8 weeks    Complications:  none    Reason for Admission:  Bilateral lower extremity weakness    Hospital Course:   Aaliyah Durham is a 42 y o  female patient who originally presented to the hospital on 2/23/2022 due to about a 4 week history of worsening bilateral lower extremity weakness  She had a previous admission 2/4 at the [facility] with similar symptoms  Patient was discharged home with plan to follow up with Neurology as an outpatient  Unfortunately patient's weakness continued to worsen  Patient was seen in consultation by Neurology  Workup as above  Neurology suspects her symptoms are likely from chronic heavy alcohol use, but patient was noted to have a pontine lesion on MR  She was started on IV steroids for a 5 day course  She was also started on IV thigh min 3 times a day for 3 days  Patient's symptoms improved, though she still has some lower extremity weakness and numbness at discharge  She is able to ambulate with a walker  Patient will follow-up with neurology as an outpatient and have a repeat MRI in 4-6 weeks  She will continue on daily thiamin and folic acid supplements  Patient also presented with persistent nausea and vomiting  This improved with twice daily PPI  Suspect alcoholic gastritis  Could consider EGD as outpatient  Patient was also noted to be hypokalemic  She likely is total body depleted  Will continue potassium supplements of discharge and repeat a BMP in 1 week  Discussed alcohol cessation with the patient  She is going to restart outpatient AA meetings and Antabuse  Patient was evaluated by PT/OT  She initially needed short-term rehab, but improved over the course of her stay  She will be discharged home with home health services  Please see above list of diagnoses and related plan for additional information  Condition at Discharge: good    Discharge Day Visit / Exam:   Subjective:  Feels better today  Abdominal bloating improved  She was able to have a bowel movement  Patient complaining of some muscle spasms in her legs and bilateral knee soreness    Vitals: Blood Pressure: 136/89 (02/27/22 0716)  Pulse: 65 (02/27/22 0716)  Temperature: 98 4 °F (36 9 °C) (02/27/22 0716)  Temp Source: Oral (02/23/22 0933)  Respirations: 17 (02/27/22 0716)  SpO2: 95 % (02/27/22 0716)  Exam:   Physical Exam  Vitals and nursing note reviewed  Constitutional:       General: She is not in acute distress  Appearance: She is well-developed  HENT:      Head: Normocephalic and atraumatic  Eyes:      Conjunctiva/sclera: Conjunctivae normal    Cardiovascular:      Rate and Rhythm: Normal rate and regular rhythm  Heart sounds: No murmur heard  Pulmonary:      Effort: Pulmonary effort is normal  No respiratory distress  Breath sounds: Normal breath sounds  Abdominal:      Palpations: Abdomen is soft  Tenderness: There is no abdominal tenderness  Musculoskeletal:      Cervical back: Neck supple  Skin:     General: Skin is warm and dry  Neurological:      Mental Status: She is alert  Discussion with Family: Patient declined call to   Discharge instructions/Information to patient and family:   See after visit summary for information provided to patient and family  Provisions for Follow-Up Care:  See after visit summary for information related to follow-up care and any pertinent home health orders  Disposition:   Home with [facility] Services (Reminder: Complete face to face encounter)    Planned Readmission: none     Discharge Statement:  I spent 45 minutes discharging the patient  This time was spent on the day of discharge  I had direct contact with the patient on the day of discharge  Greater than 50% of the total time was spent examining patient, answering all patient questions, arranging and discussing plan of care with patient as well as directly providing post-discharge instructions  Additional time then spent on discharge activities  Discharge Medications:  See after visit summary for reconciled discharge medications provided to patient and/or family        **Please Note: This note may have been constructed using a voice recognition system**

## 2022-02-27 NOTE — ASSESSMENT & PLAN NOTE
· K 3 2- replete  - suspect total body depletion in the setting of alcohol use and diarrhea  · s/p IV mag 2/24  · Continue oral supplement at discharge   Check labs next week

## 2022-02-27 NOTE — ASSESSMENT & PLAN NOTE
· Patient presented with progressive lower extremity weakness, lower extremity as well as abdominal/truncal and finger tip numbness, and significant gait instability/difficulty at home since mid January  · MRI cspine 2/23- There is a well-circumscribed focus of abnormal hyperintense T2 and FLAIR signal noted within the posterior central aspect of the simona with mild associated enhancement   Findings are nonspecific and differential considerations would include demyelinating  lesion with active inflammation, subacute infarct or possibly brainstem neoplasm   Dedicated MRI of the brain with contrast recommended  · MRI brain 2/24- As seen on the MRI of the cervical spine performed yesterday, there is a focus of abnormal increased T2 and FLAIR signal within the posterior central aspect of the simona   Only minimal diffusion is seen on postcontrast imaging, less so than yesterday's examination of the cervical spine   This lesion is nonspecific and differential considerations would include solitary demyelinating focus, or other infectious/inflammatory etiologies including focal vasculopathy   Neoplasm such as brainstem glioma or metastasis less likely  · Neurology consult appreciated   · Etiology unclear, but suspect chronic heavy alcohol use, though there is a single pontine lesion  Therefore, IV steroids were started  Serology workup pending  Will need outpatient Neurology follow up with repeat MRI  · S/p LP 2/24- gram stain (-); meningitis and encephalitis panel (-)   · Started on thiamine 500mg IV TID and start thiamine 100mg daily at [address]  thiamine level pending  · Hepatitis, HIV negative  · Folate 2 5- started on supplements  · b12 1135  · Sed rate 18; CRP 8 8;  on admission- CPK now resolved   · Add neurontin 100mg TID and flexeril scheduled 5mg TID for pain and spasms   PRN oxy 5/10 and schedule tylenol 975mg TID - discussed quickly weaning off narcotic  · IV solumedrol 1000mg daily- Day #5/5  · Repeat MRI brain with and without contrast in 4-6 weeks

## 2022-02-27 NOTE — ASSESSMENT & PLAN NOTE
· In the setting of alcohol abuse   · Significantly improved since admission with IVFs- d/c ivfs   · --> 101--> 52; --> 141--> 99  · tbili 3 33--> 3 55 --> 1 96   · Hepatitis panel negative   · Pt reports n/v for the past 6 weeks- improved now with starting PPI therapy   · CT of the abd/pelvis performed in January- Hepatic steatosis and hepatomegaly

## 2022-02-28 ENCOUNTER — TELEPHONE (OUTPATIENT)
Dept: INTERNAL MEDICINE CLINIC | Facility: CLINIC | Age: 42
End: 2022-02-28

## 2022-02-28 ENCOUNTER — TRANSITIONAL CARE MANAGEMENT (OUTPATIENT)
Dept: INTERNAL MEDICINE CLINIC | Facility: CLINIC | Age: 42
End: 2022-02-28

## 2022-02-28 LAB
AQP4 H2O CHANNEL IGG CSF QL: NORMAL
REAGIN AB CSF QL: NON REACTIVE

## 2022-02-28 NOTE — UTILIZATION REVIEW
Notification of Discharge   This is a Notification of Discharge from our facility [address]  Please be advised that this patient has been discharge from our facility  Below you will find the admission and discharge date and time including the patients disposition  UTILIZATION REVIEW CONTACT:  Jaz Benoit  Utilization   Network Utilization Review Department  Phone: [phone] x carefully listen to the prompts  All voicemails are confidential   Email: [email or web address]     PHYSICIAN ADVISORY SERVICES:  FOR [id number] REVIEW - MEDICAL NECESSITY DENIAL  Phone: [phone]  Fax: [phone]  Email: [email or web address]     PRESENTATION DATE: 2/23/2022  8:39 AM  OBERVATION ADMISSION DATE:   INPATIENT ADMISSION DATE: 2/23/22  3:15 PM   DISCHARGE DATE: 2/27/2022  3:00 PM  DISPOSITION: Home with [facility] with [address] [place] [address] Road INFORMATION:  Send all requests for admission clinical reviews, approved or denied determinations and any other requests to dedicated fax number below belonging to the campus where the patient is receiving treatment   List of dedicated fax numbers:  [address] DENIALS (Administrative/Medical Necessity) [phone]   [address] [place] [address] (Maternity/NICU/Pediatrics) [phone]   Mauriliorenetta Jones [phone]   [address] [facility] [phone]   [address] [place] [address] [phone]   [address] [phone]   [facility]  [phone]   [address] [place] [address], [facility]  24[address] [facility] [address] [facility] [phone]

## 2022-02-28 NOTE — TELEPHONE ENCOUNTER
Pt called to scheduled [facility]  Pt was admiited to [facility] on 2/23/22, d/c- 2/27/22, dx- lower extremity weakness  Appt scheduled on 3/2/22 with Dr Kristofer Torrez

## 2022-03-01 LAB
ACE CSF-CCNC: <1.5 U/L (ref 0–2.5)
ALB CSF/SERPL: 6 {RATIO} (ref 0–8)
ALBUMIN CSF-MCNC: 24 MG/DL (ref 8–37)
ALBUMIN SERPL-MCNC: 4.1 G/DL (ref 3.8–4.8)
B BURGDOR DNA SPEC QL NAA+PROBE: NEGATIVE
IGG CSF-MCNC: 2.5 MG/DL (ref 0–6.7)
IGG SERPL-MCNC: 993 MG/DL (ref 586–1602)
IGG SYNTH RATE SER+CSF CALC-MRATE: -4.2 MG/DAY
IGG/ALB CLEAR SER+CSF-RTO: 0.4 (ref 0–0.7)
IGG/ALB CSF: 0.1 {RATIO} (ref 0–0.25)
MBP CSF-MCNC: 17.7 NG/ML (ref 0–3.7)
OLIGOCLONAL BANDS.IT SER+CSF QL: ABNORMAL

## 2022-03-02 ENCOUNTER — OFFICE VISIT (OUTPATIENT)
Dept: INTERNAL MEDICINE CLINIC | Facility: CLINIC | Age: 42
End: 2022-03-02
Payer: COMMERCIAL

## 2022-03-02 ENCOUNTER — TELEPHONE (OUTPATIENT)
Dept: NEUROLOGY | Facility: CLINIC | Age: 42
End: 2022-03-02

## 2022-03-02 ENCOUNTER — APPOINTMENT (OUTPATIENT)
Dept: LAB | Facility: CLINIC | Age: 42
End: 2022-03-02
Payer: COMMERCIAL

## 2022-03-02 VITALS
HEART RATE: 106 BPM | RESPIRATION RATE: 16 BRPM | HEIGHT: 62 IN | BODY MASS INDEX: 40.96 KG/M2 | SYSTOLIC BLOOD PRESSURE: 108 MMHG | TEMPERATURE: 100 F | DIASTOLIC BLOOD PRESSURE: 80 MMHG | WEIGHT: 222.6 LBS | OXYGEN SATURATION: 97 %

## 2022-03-02 DIAGNOSIS — E87.6 HYPOKALEMIA: ICD-10-CM

## 2022-03-02 DIAGNOSIS — F41.1 GAD (GENERALIZED ANXIETY DISORDER): ICD-10-CM

## 2022-03-02 DIAGNOSIS — R60.0 LOCALIZED EDEMA: ICD-10-CM

## 2022-03-02 DIAGNOSIS — K70.0 FATTY LIVER, ALCOHOLIC: ICD-10-CM

## 2022-03-02 DIAGNOSIS — J45.40 MODERATE PERSISTENT ASTHMA WITHOUT COMPLICATION: ICD-10-CM

## 2022-03-02 DIAGNOSIS — R20.2 PARESTHESIA AND PAIN OF BOTH UPPER EXTREMITIES: ICD-10-CM

## 2022-03-02 DIAGNOSIS — E83.51 HYPOCALCEMIA: ICD-10-CM

## 2022-03-02 DIAGNOSIS — D89.1 CRYOGLOBULINS PRESENT (HCC): ICD-10-CM

## 2022-03-02 DIAGNOSIS — E03.9 HYPOTHYROIDISM, UNSPECIFIED TYPE: ICD-10-CM

## 2022-03-02 DIAGNOSIS — M79.601 PARESTHESIA AND PAIN OF BOTH UPPER EXTREMITIES: ICD-10-CM

## 2022-03-02 DIAGNOSIS — J45.909 UNCOMPLICATED ASTHMA, UNSPECIFIED ASTHMA SEVERITY, UNSPECIFIED WHETHER PERSISTENT: ICD-10-CM

## 2022-03-02 DIAGNOSIS — D75.89 MACROCYTOSIS WITHOUT ANEMIA: ICD-10-CM

## 2022-03-02 DIAGNOSIS — R52 INTRACTABLE PAIN: ICD-10-CM

## 2022-03-02 DIAGNOSIS — E43 SEVERE PROTEIN-CALORIE MALNUTRITION (HCC): ICD-10-CM

## 2022-03-02 DIAGNOSIS — Z23 ENCOUNTER FOR VACCINATION: ICD-10-CM

## 2022-03-02 DIAGNOSIS — Z13.31 POSITIVE DEPRESSION SCREENING: ICD-10-CM

## 2022-03-02 DIAGNOSIS — R79.89 LACTATE BLOOD INCREASED: ICD-10-CM

## 2022-03-02 DIAGNOSIS — R93.0 ABNORMAL MRI OF HEAD: ICD-10-CM

## 2022-03-02 DIAGNOSIS — F10.20 UNCOMPLICATED ALCOHOL DEPENDENCE (HCC): ICD-10-CM

## 2022-03-02 DIAGNOSIS — Z12.31 ENCOUNTER FOR SCREENING MAMMOGRAM FOR MALIGNANT NEOPLASM OF BREAST: ICD-10-CM

## 2022-03-02 DIAGNOSIS — R73.09 ELEVATED GLUCOSE: ICD-10-CM

## 2022-03-02 DIAGNOSIS — R26.9 GAIT ABNORMALITY: ICD-10-CM

## 2022-03-02 DIAGNOSIS — F41.8 INSOMNIA SECONDARY TO DEPRESSION WITH ANXIETY: ICD-10-CM

## 2022-03-02 DIAGNOSIS — F51.05 INSOMNIA SECONDARY TO DEPRESSION WITH ANXIETY: ICD-10-CM

## 2022-03-02 DIAGNOSIS — K50.119 CROHN'S DISEASE OF COLON WITH COMPLICATION (HCC): ICD-10-CM

## 2022-03-02 DIAGNOSIS — R45.84 ANHEDONIA: ICD-10-CM

## 2022-03-02 DIAGNOSIS — M79.602 PARESTHESIA AND PAIN OF BOTH UPPER EXTREMITIES: ICD-10-CM

## 2022-03-02 DIAGNOSIS — F10.239 ALCOHOL WITHDRAWAL SYNDROME WITH COMPLICATION (HCC): ICD-10-CM

## 2022-03-02 DIAGNOSIS — R74.01 TRANSAMINITIS: ICD-10-CM

## 2022-03-02 DIAGNOSIS — F33.41 RECURRENT MAJOR DEPRESSIVE DISORDER, IN PARTIAL REMISSION (HCC): Primary | ICD-10-CM

## 2022-03-02 DIAGNOSIS — R20.0 LOWER EXTREMITY NUMBNESS: ICD-10-CM

## 2022-03-02 DIAGNOSIS — F32.1 MODERATE MAJOR DEPRESSION, SINGLE EPISODE (HCC): ICD-10-CM

## 2022-03-02 DIAGNOSIS — F41.8 ANXIETY WITH DEPRESSION: ICD-10-CM

## 2022-03-02 DIAGNOSIS — E83.42 HYPOMAGNESEMIA: ICD-10-CM

## 2022-03-02 PROBLEM — R11.2 NAUSEA AND VOMITING: Status: RESOLVED | Noted: 2022-02-24 | Resolved: 2022-03-02

## 2022-03-02 PROBLEM — R11.0 NAUSEA: Status: RESOLVED | Noted: 2022-02-10 | Resolved: 2022-03-02

## 2022-03-02 LAB
ANION GAP SERPL CALCULATED.3IONS-SCNC: 6 MMOL/L (ref 4–13)
BUN SERPL-MCNC: 11 MG/DL (ref 5–25)
CALCIUM SERPL-MCNC: 9.4 MG/DL (ref 8.3–10.1)
CHLORIDE SERPL-SCNC: 99 MMOL/L (ref 100–108)
CO2 SERPL-SCNC: 33 MMOL/L (ref 21–32)
CREAT SERPL-MCNC: 0.61 MG/DL (ref 0.6–1.3)
GFR SERPL CREATININE-BSD FRML MDRD: 112 ML/MIN/1.73SQ M
GLUCOSE P FAST SERPL-MCNC: 100 MG/DL (ref 65–99)
POTASSIUM SERPL-SCNC: 3.7 MMOL/L (ref 3.5–5.3)
SODIUM SERPL-SCNC: 138 MMOL/L (ref 136–145)

## 2022-03-02 PROCEDURE — 80048 BASIC METABOLIC PNL TOTAL CA: CPT

## 2022-03-02 PROCEDURE — 80307 DRUG TEST PRSMV CHEM ANLYZR: CPT | Performed by: INTERNAL MEDICINE

## 2022-03-02 PROCEDURE — 80307 DRUG TEST PRSMV CHEM ANLYZR: CPT

## 2022-03-02 PROCEDURE — 1111F DSCHRG MED/CURRENT MED MERGE: CPT | Performed by: INTERNAL MEDICINE

## 2022-03-02 PROCEDURE — 36415 COLL VENOUS BLD VENIPUNCTURE: CPT

## 2022-03-02 PROCEDURE — 99215 OFFICE O/P EST HI 40 MIN: CPT | Performed by: INTERNAL MEDICINE

## 2022-03-02 RX ORDER — FUROSEMIDE 20 MG/1
20 TABLET ORAL DAILY
Qty: 30 TABLET | Refills: 5 | Status: SHIPPED | OUTPATIENT
Start: 2022-03-02 | End: 2022-06-13

## 2022-03-02 RX ORDER — LANOLIN ALCOHOL/MO/W.PET/CERES
100 CREAM (GRAM) TOPICAL DAILY
Qty: 30 TABLET
Start: 2022-03-02 | End: 2022-04-01

## 2022-03-02 RX ORDER — OXYCODONE HYDROCHLORIDE 10 MG/1
10 TABLET ORAL EVERY 8 HOURS PRN
Qty: 30 TABLET | Refills: 0 | Status: SHIPPED | OUTPATIENT
Start: 2022-03-02 | End: 2022-03-12

## 2022-03-02 RX ORDER — NALOXONE HYDROCHLORIDE 4 MG/.1ML
1 SPRAY NASAL AS NEEDED
Qty: 1 EACH | Refills: 1 | Status: SHIPPED | OUTPATIENT
Start: 2022-03-02

## 2022-03-02 RX ORDER — FOLIC ACID 1 MG/1
1 TABLET ORAL DAILY
Qty: 30 TABLET | Refills: 5 | Status: SHIPPED | OUTPATIENT
Start: 2022-03-02 | End: 2022-07-27 | Stop reason: SDUPTHER

## 2022-03-02 RX ORDER — CYCLOBENZAPRINE HCL 5 MG
5 TABLET ORAL 3 TIMES DAILY PRN
Qty: 90 TABLET | Refills: 0 | Status: SHIPPED | OUTPATIENT
Start: 2022-03-02 | End: 2022-04-01 | Stop reason: SDUPTHER

## 2022-03-02 NOTE — PROGRESS NOTES
Assessment/Plan:     No problem-specific Assessment & Plan notes found for this encounter  Diagnoses and all orders for this visit:    Recurrent major depressive disorder, in partial remission ([facility], [address] )  -     thiamine 100 MG tablet; Take 1 tablet (100 mg total) by mouth daily    Lower extremity numbness  -     cyclobenzaprine (FLEXERIL) 5 mg tablet; Take 1 tablet (5 mg total) by mouth 3 (three) times a day as needed for muscle spasms  -     Ambulatory referral to Pain Management; Future  -     Ambulatory referral to Rheumatology; Future  -     Ambulatory Referral to Pain Management; Future  -     oxyCODONE (ROXICODONE) 10 MG TABS; Take 1 tablet (10 mg total) by mouth every 8 (eight) hours as needed for moderate pain for up to 10 days Max Daily Amount: 30 mg  -     Ambulatory Referral to Neurology; Future    Uncomplicated alcohol dependence (HCC)  -     folic acid (FOLVITE) 1 mg tablet; Take 1 tablet (1 mg total) by mouth daily  -     thiamine 50 MG tablet; Take 2 tablets (100 mg total) by mouth daily    JOSE (generalized anxiety disorder)    Gait abnormality    Paresthesia and pain of both upper extremities  -     Ambulatory referral to Rheumatology; Future  -     Ambulatory Referral to Neurology; Future    Intractable pain  -     Ambulatory referral to Pain Management; Future  -     Ambulatory referral to Rheumatology; Future  -     Ambulatory Referral to Pain Management; Future  -     naloxone (Narcan) 4 mg/0 1 mL nasal spray; 0 1 mL (4 mg total) into each nostril as needed (respiratory depression or possible OD)    Alcohol withdrawal syndrome with complication (HCC)  -     thiamine 100 MG tablet; Take 1 tablet (100 mg total) by mouth daily    Severe protein-calorie malnutrition (HCC)  -     thiamine 100 MG tablet; Take 1 tablet (100 mg total) by mouth daily    Lactate blood increased  -     thiamine 100 MG tablet; Take 1 tablet (100 mg total) by mouth daily    Hypomagnesemia  -     thiamine 100 MG tablet;  Take 1 tablet (100 mg total) by mouth daily    Macrocytosis without anemia  -     thiamine 100 MG tablet; Take 1 tablet (100 mg total) by mouth daily    Hypocalcemia  -     thiamine 100 MG tablet; Take 1 tablet (100 mg total) by mouth daily    Transaminitis  -     thiamine 100 MG tablet; Take 1 tablet (100 mg total) by mouth daily    Positive depression screening  -     thiamine 100 MG tablet; Take 1 tablet (100 mg total) by mouth daily    Elevated glucose  -     thiamine 100 MG tablet; Take 1 tablet (100 mg total) by mouth daily    Crohn's disease of colon with complication (HCC)  -     thiamine 100 MG tablet; Take 1 tablet (100 mg total) by mouth daily    Fatty liver, alcoholic  -     thiamine 551 MG tablet; Take 1 tablet (100 mg total) by mouth daily    Moderate major depression, single episode (HCC)  -     thiamine 100 MG tablet; Take 1 tablet (100 mg total) by mouth daily    Anxiety with depression  -     thiamine 100 MG tablet; Take 1 tablet (100 mg total) by mouth daily    Insomnia secondary to depression with anxiety  -     thiamine 100 MG tablet; Take 1 tablet (100 mg total) by mouth daily    Encounter for vaccination  -     thiamine 100 MG tablet; Take 1 tablet (100 mg total) by mouth daily    Hypothyroidism, unspecified type  -     thiamine 100 MG tablet; Take 1 tablet (100 mg total) by mouth daily    Moderate persistent asthma without complication  -     thiamine 100 MG tablet; Take 1 tablet (100 mg total) by mouth daily    Anhedonia  -     thiamine 100 MG tablet; Take 1 tablet (100 mg total) by mouth daily    Uncomplicated asthma, unspecified asthma severity, unspecified whether persistent  -     thiamine 100 MG tablet; Take 1 tablet (100 mg total) by mouth daily    Encounter for screening mammogram for malignant neoplasm of breast  -     thiamine 100 MG tablet; Take 1 tablet (100 mg total) by mouth daily    Localized edema  -     furosemide (LASIX) 20 mg tablet;  Take 1 tablet (20 mg total) by mouth daily    Cryoglobulins present [facility])  -     Ambulatory referral to Rheumatology; Future  -     Ambulatory Referral to Neurology; Future    Abnormal MRI of head  -     Ambulatory Referral to Neurology; Future     A/P: Clinically looks better than last visit when she had to be sent to the hospital  Walking, but with a lot of discomfort, effort, and slowly  Appreciate all specialist input  Unfortunately, a definitive dx continues to elude us  Pt r/o for GB and other causes  Neuro not sure if the [place] lesion playing a role or if a lot is related to her ETOH abuse in the past  Pt's cyroglobulins were elevated and ?if this is a factor  Will refer to rheum and neuro  Continue with home VNA  Continue to refrain from O2  Will continue folate and will start thiamine replacement  Pt wants better pain control and discussed the risks of narc use, especially given her past addictive history to ETOH  Discussed increasing oxy to 10mg q 8 hours prn, but not to 10mg q 4 hours prn  Will refer to pain management and pt aware meds are for short term use only  Will give narcan to have on hand and pt to not use ETOH  Continue current treatment otherwise and RTC in two weeks for f/u and will need repeat labs  Pt reminded to start the new thyroid replacement as well  Subjective:     Patient ID: Evangelina Ruiz is a 42 y o  female  WF presents for recent admission due to FTT due to worsening bilat LE and UE pain and tingling to the point where she was unable to walk and take care of her self  Pt admitted for the second time in the month  Seen by PT, neuro, etc  Repeat imaging of the spine and head along with labs continue to not point in a definitive dx  Pt with a [place] abnormality of ?signiificance on the MRI  Was started on IV steroids and narcotics  Start PT  Slow improvement to the point where she was able to walk with a walker  Dx not made  ?due to her past ETOH use   Coarse complicated by elevated cryoglobulins, low folate, and constipation  Folate replaced and started on thiamine  D/c to home with VNA and needs further OP eval by GI, neuro, pain management and rheum  Since d/c, no fever or chills  NO more N/V  Constipation slightly better  Remains of ETOH  Continues with severe pain and tingling of the LE along with edema  Continue with paresthesia of the hands  No CP, palpitations, or SOB  Pt reports was on oxy 10 q 4 hours in the hospital and now is on oxy 5 q 8 PRN and not doing anything for the pain  Review of Systems   Constitutional: Positive for activity change, appetite change and fatigue  Negative for chills, diaphoresis and fever  HENT: Negative  Eyes: Negative for visual disturbance  Respiratory: Negative for cough, chest tightness, shortness of breath and wheezing  Cardiovascular: Positive for leg swelling  Negative for chest pain and palpitations  Gastrointestinal: Positive for constipation  Negative for abdominal pain, diarrhea, nausea and vomiting  Endocrine: Negative for cold intolerance and heat intolerance  Genitourinary: Negative for difficulty urinating, dysuria and frequency  Musculoskeletal: Positive for arthralgias, gait problem and myalgias  Negative for joint swelling  Neurological: Positive for weakness and numbness  Negative for dizziness, tremors, seizures, syncope, light-headedness and headaches  Psychiatric/Behavioral: Negative for confusion and sleep disturbance  The patient is not nervous/anxious  Objective:     Physical Exam  Vitals and nursing note reviewed  Constitutional:       General: She is not in acute distress  Appearance: Normal appearance  She is not ill-appearing  HENT:      Head: Normocephalic and atraumatic  Mouth/Throat:      Mouth: Mucous membranes are moist    Eyes:      Extraocular Movements: Extraocular movements intact  Conjunctiva/sclera: Conjunctivae normal       Pupils: Pupils are equal, round, and reactive to light  Cardiovascular:      Rate and Rhythm: Normal rate and regular rhythm  Heart sounds: Normal heart sounds  Pulmonary:      Effort: Pulmonary effort is normal  No respiratory distress  Breath sounds: Normal breath sounds  No wheezing or rales  Abdominal:      General: Bowel sounds are normal  There is no distension  Palpations: Abdomen is soft  Tenderness: There is no abdominal tenderness  Musculoskeletal:         General: Tenderness present  Normal range of motion  Right lower leg: Edema present  Left lower leg: Edema present  Comments: Bilat LE edema 2/4  Multiple joints w/o any gross deformities, increase temp, erythema, or swelling  No crepitus  No effusions or ballotment  Joint integrity intact  No nodes  NO ulnar deviation  NO [place] neck deformities  ROM wnl  Tenderness diffuse tenderness over the bilat hands into the upper arms and bilat entire LE, especially the knees      Neurological:      General: No focal deficit present  Mental Status: She is alert and oriented to person, place, and time  Mental status is at baseline  Cranial Nerves: No cranial nerve deficit  Motor: Weakness present  Coordination: Coordination normal       Gait: Gait abnormal       Deep Tendon Reflexes: Reflexes normal    Psychiatric:         Mood and Affect: Mood normal          Behavior: Behavior normal          Thought Content: Thought content normal          Judgment: Judgment normal            Vitals:    03/02/22 1002   BP: 108/80   BP Location: Left arm   Patient Position: Sitting   Cuff Size: Large   Pulse: (!) 106   Resp: 16   Temp: 100 °F (37 8 °C)   SpO2: 97%   Weight: 101 kg (222 lb 9 6 oz)   Height: 5' 2" (1 575 m)       Transitional Care Management Review:  Lexi Pierre is a 42 y o  female here for TCM follow up       During the TCM phone call patient stated:    TCM Call (since 1/30/2022)     Date and time call was made  2/28/2022 12:18 PM    Hospital care reviewed  Records reviewed        Patient was hospitialized at  [facility]        Date of Admission  02/23/22    Date of discharge  02/27/22    Diagnosis  Lower extremity numbness    Disposition  Home    Were the patients medications reviewed and updated  Yes    Current Symptoms  None      TCM Call (since 1/30/2022)     Post hospital issues  None    Should patient be enrolled in antico monitoring? No    Scheduled for follow up? Yes    Did you obtain your prescribed medications  Yes    Do you need help managing your prescriptions or medications  No    Is transportation to your appointment needed  No    I have advised the patient to call PCP with any new or worsening symptoms  sony kelsey    Are you recieving any outpatient services  No    Are you recieving home care services  No    Are you using any community resources  No    Current waiver services  No    Have you fallen in the last 12 months  No    Interperter language line needed  No    Counseling  Patient    Counseling topics  Diagnostic results; Prognosis; Activities of daily living; Importance of RX compliance; patient and family education; instructions for management; Risk factor reduction          Linh Ponce, DO      BMI Counseling: Body mass index is 40 71 kg/m²  The BMI is above normal  Nutrition recommendations include reducing portion sizes, decreasing overall calorie intake, reducing intake of saturated fat and trans fat and reducing intake of cholesterol  Exercise recommendations include moderate aerobic physical activity for 150 minutes/week

## 2022-03-02 NOTE — TELEPHONE ENCOUNTER
1st Attempt LVM to [facility] HFU appt  HFU/SLBE/ATT/Lower extremity numbness/DC2/27    Note from Chart:     Alexia Sharpe will need follow up in in 6 weeks with neuromuscular attending  She will require a repeat MRI within 6 weeks

## 2022-03-02 NOTE — PATIENT INSTRUCTIONS
Obesity   AMBULATORY CARE:   Obesity  means your body mass index (BMI) is greater than 30  Your healthcare provider will use your height and weight to measure your BMI  The risks of obesity include  many health problems, including injuries or physical disability  · Diabetes (high blood sugar level)    · High blood pressure or high cholesterol    · Heart disease    · Stroke    · Gallbladder or liver disease    · Cancer of the colon, breast, prostate, liver, or kidney    · Sleep apnea    · Arthritis or gout    Screening  is done to check for health conditions before you have signs or symptoms  If you are 28to 79years old, your blood sugar level may be checked every 3 years for signs of prediabetes or diabetes  Your healthcare provider will check your blood pressure at each visit  High blood pressure can lead to a stroke or other problems  Your provider may check for signs of heart disease, cancer, or other health problems  Seek care immediately if:   · You have a severe headache, confusion, or difficulty speaking  · You have weakness on one side of your body  · You have chest pain, sweating, or shortness of breath  Call your doctor if:   · You have symptoms of gallbladder or liver disease, such as pain in your upper abdomen  · You have knee or hip pain and discomfort while walking  · You have symptoms of diabetes, such as intense hunger and thirst, and frequent urination  · You have symptoms of sleep apnea, such as snoring or daytime sleepiness  · You have questions or concerns about your condition or care  Treatment for obesity  focuses on helping you lose weight to improve your health  Even a small decrease in BMI can reduce the risk for many health problems  Your healthcare provider will help you set a weight-loss goal   · Lifestyle changes  are the first step in treating obesity  These include making healthy food choices and getting regular physical activity   Your healthcare provider may suggest a weight-loss program that involves coaching, education, and therapy  · Medicine  may help you lose weight when it is used with a healthy foods and physical activity  · Surgery  can help you lose weight if you are very obese and have other health problems  There are several types of weight-loss surgery  Ask your healthcare provider for more information  Tips for safe weight loss:   · Set small, realistic goals  An example of a small goal is to walk for 20 minutes 5 days a week  Anther goal is to lose 5% of your body weight  · Tell friends, family members, and coworkers about your goals  and ask for their support  Ask a friend to lose weight with you, or join a weight-loss support group  · Identify foods or triggers that may cause you to overeat , and find ways to avoid them  Remove tempting high-calorie foods from your home and workplace  Place a bowl of fresh fruit on your kitchen counter  If stress causes you to eat, then find other ways to cope with stress  A counselor or therapist may be able to help you  · Keep a diary to track what you eat and drink  Also write down how many minutes of physical activity you do each day  Weigh yourself once a week and record it in your diary  Eating changes: You will need to eat 500 to 1,000 fewer calories each day than you currently eat to lose 1 to 2 pounds a week  The following changes will help you cut calories:  · Eat smaller portions  Use small plates, no larger than 9 inches in diameter  Fill your plate half full of fruits and vegetables  Measure your food using measuring cups until you know what a serving size looks like  · Eat 3 meals and 1 or 2 snacks each day  Plan your meals in advance  [place] and eat at home most of the time  Eat slowly  Do not skip meals  Skipping meals can lead to overeating later in the day  This can make it harder for you to lose weight   Talk with a dietitian to help you make a meal plan and schedule that is right for you  · Eat fruits and vegetables at every meal   They are low in calories and high in fiber, which makes you feel full  Do not add butter, margarine, or cream sauce to vegetables  Use herbs to season steamed vegetables  · Eat less fat and fewer fried foods  Eat more baked or grilled chicken and fish  These protein sources are lower in calories and fat than red meat  Limit fast food  Dress your salads with olive oil and vinegar instead of bottled dressing  · Limit the amount of sugar you eat  Do not drink sugary beverages  Limit alcohol  Activity changes:  Physical activity is good for your body in many ways  It helps you burn calories and build strong muscles  It decreases stress and depression, and improves your mood  It can also help you sleep better  Talk to your healthcare provider before you begin an exercise program   · Exercise for at least 30 minutes 5 days a week  Start slowly  Set aside time each day for physical activity that you enjoy and that is convenient for you  It is best to do both weight training and an activity that increases your heart rate, such as walking, bicycling, or swimming  · Find ways to be more active  Do yard work and housecleaning  Walk up the stairs instead of using elevators  Spend your leisure time going to events that require walking, such as outdoor festivals or fairs  This extra physical activity can help you lose weight and keep it off  Follow up with your doctor as directed: You may need to meet with a dietitian  Write down your questions so you remember to ask them during your visits  © Copyright [employer] 2022 Information is for End User's use only and may not be sold, redistributed or otherwise used for commercial purposes  All illustrations and images included in CareNotes® are the copyrighted property of A D A M , Inc  or Omi Mack   The above information is an  only   It is not intended as medical advice for individual conditions or treatments  Talk to your doctor, nurse or pharmacist before following any medical regimen to see if it is safe and effective for you  Low Fat Diet   AMBULATORY CARE:   A low-fat diet  is an eating plan that is low in total fat, unhealthy fat, and cholesterol  You may need to follow a low-fat diet if you have trouble digesting or absorbing fat  You may also need to follow this diet if you have high cholesterol  You can also lower your cholesterol by increasing the amount of fiber in your diet  Soluble fiber is a type of fiber that helps to decrease cholesterol levels  Different types of fat in food:   · Limit unhealthy fats  A diet that is high in cholesterol, saturated fat, and trans fat may cause unhealthy cholesterol levels  Unhealthy cholesterol levels increase your risk of heart disease  ? Cholesterol:  Limit intake of cholesterol to less than 200 mg per day  Cholesterol is found in meat, eggs, and dairy  ? Saturated fat:  Limit saturated fat to less than 7% of your total daily calories  Ask your dietitian how many calories you need each day  Saturated fat is found in butter, cheese, ice cream, whole milk, and palm oil  Saturated fat is also found in meat, such as beef, pork, chicken skin, and processed meats  Processed meats include sausage, hot dogs, and bologna  ? Trans fat:  Avoid trans fat as much as possible  Trans fat is used in fried and baked foods  Foods that say trans fat free on the label may still have up to 0 5 grams of trans fat per serving  · Include healthy fats  Replace foods that are high in saturated and trans fat with foods high in healthy fats  This may help to decrease high cholesterol levels  ? Monounsaturated fats: These are found in avocados, nuts, and vegetable oils, such as olive, canola, and sunflower oil  ? Polyunsaturated fats: These can be found in vegetable oils, such as soybean or corn oil   Omega-3 fats can help to decrease the risk of heart disease  Omega-3 fats are found in fish, such as salmon, herring, trout, and tuna  Omega-3 fats can also be found in plant foods, such as walnuts, flaxseed, soybeans, and canola oil  Foods to limit or avoid:   · Grains:      ? Snacks that are made with partially hydrogenated oils, such as chips, regular crackers, and butter-flavored popcorn    ? High-fat baked goods, such as biscuits, croissants, doughnuts, pies, cookies, and pastries    · Dairy:      ? Whole milk, 2% milk, and yogurt and ice cream made with whole milk    ? Half and half creamer, heavy cream, and whipping cream    ? Cheese, cream cheese, and sour cream    · Meats and proteins:      ? High-fat cuts of meat (T-bone steak, regular hamburger, and ribs)    ? Fried meat, poultry (turkey and chicken), and fish    ? Poultry (chicken and turkey) with skin    ? Cold cuts (salami or bologna), hot dogs, terrazas, and sausage    ? Whole eggs and egg yolks    · Vegetables and fruits with added fat:      ? Fried vegetables or vegetables in butter or high-fat sauces, such as cream or cheese sauces    ? Fried fruit or fruit served with butter or cream    · Fats:      ? Butter, stick margarine, and shortening    ? Coconut, palm oil, and palm kernel oil    Foods to include:   · Grains:      ? Whole-grain breads, cereals, pasta, and brown rice    ? Low-fat crackers and pretzels    · Vegetables and fruits:      ? Fresh, frozen, or canned vegetables (no salt or low-sodium)    ? Fresh, frozen, dried, or canned fruit (canned in light syrup or fruit juice)    ? Avocado    · Low-fat dairy products:      ? Nonfat (skim) or 1% milk    ? Nonfat or low-fat cheese, yogurt, and cottage cheese    · Meats and proteins:      ? Chicken or turkey with no skin    ? Baked or broiled fish    ? Lean beef and pork (loin, round, extra lean hamburger)    ? Beans and peas, unsalted nuts, soy products    ? Egg whites and substitutes    ?  Seeds and nuts    · Fats:      ? Unsaturated oil, such as canola, olive, peanut, soybean, or sunflower oil    ? Soft or liquid margarine and vegetable oil spread    ? Low-fat salad dressing    Other ways to decrease fat:   · Read food labels before you buy foods  Choose foods that have less than 30% of calories from fat  Choose low-fat or fat-free dairy products  Remember that fat free does not mean calorie free  These foods still contain calories, and too many calories can lead to weight gain  · Trim fat from meat and avoid fried food  Trim all visible fat from meat before you cook it  Remove the skin from poultry  Do not storey meat, fish, or poultry  Bake, roast, boil, or broil these foods instead  Avoid fried foods  Eat a baked potato instead of [place] fries  Steam vegetables instead of sautéing them in butter  · Add less fat to foods  Use imitation terrazas bits on salads and baked potatoes instead of regular terrazas bits  Use fat-free or low-fat salad dressings instead of regular dressings  Use low-fat or nonfat butter-flavored topping instead of regular butter or margarine on popcorn and other foods  Ways to decrease fat in recipes:  Replace high-fat ingredients with low-fat or nonfat ones  This may cause baked goods to be drier than usual  You may need to use nonfat cooking spray on pans to prevent food from sticking  You also may need to change the amount of other ingredients, such as water, in the recipe  Try the following:  · Use low-fat or light margarine instead of regular margarine or shortening  · Use lean ground turkey breast or chicken, or lean ground beef (less than 5% fat) instead of hamburger  · Add 1 teaspoon of canola oil to 8 ounces of skim milk instead of using cream or half and half  · Use grated zucchini, carrots, or apples in breads instead of coconut  · Use blenderized, low-fat cottage cheese, plain tofu, or low-fat ricotta cheese instead of cream cheese       · Use 1 egg white and 1 teaspoon of canola oil, or use ¼ cup (2 ounces) of fat-free egg substitute instead of a whole egg  · Replace half of the oil that is called for in a recipe with applesauce when you bake  Use 3 tablespoons of cocoa powder and 1 tablespoon of canola oil instead of a square of baking chocolate  How to increase fiber:  Eat enough high-fiber foods to get 20 to 30 grams of fiber every day  Slowly increase your fiber intake to avoid stomach cramps, gas, and other problems  · Eat 3 ounces of whole-grain foods each day  An ounce is about 1 slice of bread  Eat whole-grain breads, such as whole-wheat bread  Whole wheat, whole-wheat flour, or other whole grains should be listed as the first ingredient on the food label  Replace white flour with whole-grain flour or use half of each in recipes  Whole-grain flour is heavier than white flour, so you may have to add more yeast or baking powder  · Eat a high-fiber cereal for breakfast   Oatmeal is a good source of soluble fiber  Look for cereals that have bran or fiber in the name  Choose whole-grain products, such as brown rice, barley, and whole-wheat pasta  · Eat more beans, peas, and lentils  For example, add beans to soups or salads  Eat at least 5 cups of fruits and vegetables each day  Eat fruits and vegetables with the peel because the peel is high in fiber  © Copyright [employer] 2022 Information is for End User's use only and may not be sold, redistributed or otherwise used for commercial purposes  All illustrations and images included in CareNotes® are the copyrighted property of A D A M , Inc  or [address] [place], [place] [address]cody estephania   The above information is an  only  It is not intended as medical advice for individual conditions or treatments  Talk to your doctor, nurse or pharmacist before following any medical regimen to see if it is safe and effective for you      Heart Healthy Diet   AMBULATORY CARE:   A heart healthy diet  is an eating plan low in unhealthy fats and sodium (salt)  The plan is high in healthy fats and fiber  A heart healthy diet helps improve your cholesterol levels and lowers your risk for heart disease and stroke  A dietitian will teach you how to read and understand food labels  Heart healthy diet guidelines to follow:   · Choose foods that contain healthy fats  ? Unsaturated fats  include monounsaturated and polyunsaturated fats  Unsaturated fat is found in foods such as soybean, canola, olive, corn, and safflower oils  It is also found in soft tub margarine that is made with liquid vegetable oil  ? Omega-3 fat  is found in certain fish, such as salmon, tuna, and trout, and in walnuts and flaxseed  Eat fish high in omega-3 fats at least 2 times a week  · Get 20 to 30 grams of fiber each day  Fruits, vegetables, whole-grain foods, and legumes (cooked beans) are good sources of fiber  · Limit or do not have unhealthy fats  ? Cholesterol  is found in animal foods, such as eggs and lobster, and in dairy products made from whole milk  Limit cholesterol to less than 200 mg each day  ? Saturated fat  is found in meats, such as terrazas and hamburger  It is also found in chicken or turkey skin, whole milk, and butter  Limit saturated fat to less than 7% of your total daily calories  ? Trans fat  is found in packaged foods, such as potato chips and cookies  It is also in hard margarine, some fried foods, and shortening  Do not eat foods that contain trans fats  · Limit sodium as directed  You may be told to limit sodium to 2,000 to 2,300 mg each day  Choose low-sodium or no-salt-added foods  Add little or no salt to food you prepare  Use herbs and spices in place of salt  Include the following in your heart healthy plan:  Ask your dietitian or healthcare provider how many servings to have from each of the following food groups:  · Grains:      ?  Whole-wheat breads, cereals, and pastas, and Margaret Marcellus rice    ? Low-fat, low-sodium crackers and chips    · Vegetables:      ? Broccoli, green beans, green peas, and spinach    ? Collards, kale, and lima beans    ? Carrots, sweet potatoes, tomatoes, and peppers    ? Canned vegetables with no salt added    · Fruits:      ? Bananas, peaches, pears, and pineapple    ? Grapes, raisins, and dates    ? Oranges, tangerines, grapefruit, orange juice, and grapefruit juice    ? Apricots, mangoes, melons, and papaya    ? Raspberries and strawberries    ? Canned fruit with no added sugar    · Low-fat dairy:      ? Nonfat (skim) milk, 1% milk, and low-fat almond, cashew, or soy milks fortified with calcium    ? Low-fat cheese, regular or frozen yogurt, and cottage cheese    · Meats and proteins:      ? Lean cuts of beef and pork (loin, leg, round), skinless chicken and turkey    ? Legumes, soy products, egg whites, or nuts    Limit or do not include the following in your heart healthy plan:   · Unhealthy fats and oils:      ? Whole or 2% milk, cream cheese, sour cream, or cheese    ? High-fat cuts of beef (T-bone steaks, ribs), chicken or turkey with skin, and organ meats such as liver    ? Butter, stick margarine, shortening, and cooking oils such as coconut or palm oil    · Foods and liquids high in sodium:      ? Packaged foods, such as frozen dinners, cookies, macaroni and cheese, and cereals with more than 300 mg of sodium per serving    ? Vegetables with added sodium, such as instant potatoes, vegetables with added sauces, or regular canned vegetables    ? Cured or smoked meats, such as hot dogs, terrazas, and sausage    ? High-sodium ketchup, barbecue sauce, salad dressing, pickles, olives, soy sauce, or miso    · Foods and liquids high in sugar:      ? Candy, cake, cookies, pies, or doughnuts    ? Soft drinks (soda), sports drinks, or sweetened tea    ? Canned or dry mixes for cakes, soups, sauces, or gravies    Other healthy heart guidelines:   · Do not smoke    Nicotine and other chemicals in cigarettes and cigars can cause lung and heart damage  Ask your healthcare provider for information if you currently smoke and need help to quit  E-cigarettes or smokeless tobacco still contain nicotine  Talk to your healthcare provider before you use these products  · Limit or do not drink alcohol as directed  Alcohol can damage your heart and raise your blood pressure  Your healthcare provider may give you specific daily and weekly limits  The general recommended limit is 1 drink a day for women 21 or older and for men 72 or older  Do not have more than 3 drinks in a day or 7 in a week  The recommended limit is 2 drinks a day for men 24to 59years of age  Do not have more than 4 drinks in a day or 14 in a week  A drink of alcohol is 12 ounces of beer, 5 ounces of wine, or 1½ ounces of liquor  · Exercise regularly  Exercise can help you maintain a healthy weight and improve your blood pressure and cholesterol levels  Regular exercise can also decrease your risk for heart problems  Ask your healthcare provider about the best exercise plan for you  Do not start an exercise program without asking your healthcare provider  Follow up with your doctor or cardiologist as directed:  Write down your questions so you remember to ask them during your visits  © Copyright [employer] 2022 Information is for End User's use only and may not be sold, redistributed or otherwise used for commercial purposes  All illustrations and images included in CareNotes® are the copyrighted property of A [employer] A M , Inc  or [facility] Elyssa Mack   The above information is an  only  It is not intended as medical advice for individual conditions or treatments  Talk to your doctor, nurse or pharmacist before following any medical regimen to see if it is safe and effective for you  Obesity   AMBULATORY CARE:   Obesity  means your body mass index (BMI) is greater than 30   Your healthcare provider will use your height and weight to measure your BMI  The risks of obesity include  many health problems, including injuries or physical disability  · Diabetes (high blood sugar level)    · High blood pressure or high cholesterol    · Heart disease    · Stroke    · Gallbladder or liver disease    · Cancer of the colon, breast, prostate, liver, or kidney    · Sleep apnea    · Arthritis or gout    Screening  is done to check for health conditions before you have signs or symptoms  If you are 28to 79years old, your blood sugar level may be checked every 3 years for signs of prediabetes or diabetes  Your healthcare provider will check your blood pressure at each visit  High blood pressure can lead to a stroke or other problems  Your provider may check for signs of heart disease, cancer, or other health problems  Seek care immediately if:   · You have a severe headache, confusion, or difficulty speaking  · You have weakness on one side of your body  · You have chest pain, sweating, or shortness of breath  Call your doctor if:   · You have symptoms of gallbladder or liver disease, such as pain in your upper abdomen  · You have knee or hip pain and discomfort while walking  · You have symptoms of diabetes, such as intense hunger and thirst, and frequent urination  · You have symptoms of sleep apnea, such as snoring or daytime sleepiness  · You have questions or concerns about your condition or care  Treatment for obesity  focuses on helping you lose weight to improve your health  Even a small decrease in BMI can reduce the risk for many health problems  Your healthcare provider will help you set a weight-loss goal   · Lifestyle changes  are the first step in treating obesity  These include making healthy food choices and getting regular physical activity  Your healthcare provider may suggest a weight-loss program that involves coaching, education, and therapy      · Medicine  may help you lose weight when it is used with a healthy foods and physical activity  · Surgery  can help you lose weight if you are very obese and have other health problems  There are several types of weight-loss surgery  Ask your healthcare provider for more information  Tips for safe weight loss:   · Set small, realistic goals  An example of a small goal is to walk for 20 minutes 5 days a week  Anther goal is to lose 5% of your body weight  · Tell friends, family members, and coworkers about your goals  and ask for their support  Ask a friend to lose weight with you, or join a weight-loss support group  · Identify foods or triggers that may cause you to overeat , and find ways to avoid them  Remove tempting high-calorie foods from your home and workplace  Place a bowl of fresh fruit on your kitchen counter  If stress causes you to eat, then find other ways to cope with stress  A counselor or therapist may be able to help you  · Keep a diary to track what you eat and drink  Also write down how many minutes of physical activity you do each day  Weigh yourself once a week and record it in your diary  Eating changes: You will need to eat 500 to 1,000 fewer calories each day than you currently eat to lose 1 to 2 pounds a week  The following changes will help you cut calories:  · Eat smaller portions  Use small plates, no larger than 9 inches in diameter  Fill your plate half full of fruits and vegetables  Measure your food using measuring cups until you know what a serving size looks like  · Eat 3 meals and 1 or 2 snacks each day  Plan your meals in advance  [place] and eat at home most of the time  Eat slowly  Do not skip meals  Skipping meals can lead to overeating later in the day  This can make it harder for you to lose weight  Talk with a dietitian to help you make a meal plan and schedule that is right for you      · Eat fruits and vegetables at every meal   They are low in calories and high in fiber, which makes you feel full  Do not add butter, margarine, or cream sauce to vegetables  Use herbs to season steamed vegetables  · Eat less fat and fewer fried foods  Eat more baked or grilled chicken and fish  These protein sources are lower in calories and fat than red meat  Limit fast food  Dress your salads with olive oil and vinegar instead of bottled dressing  · Limit the amount of sugar you eat  Do not drink sugary beverages  Limit alcohol  Activity changes:  Physical activity is good for your body in many ways  It helps you burn calories and build strong muscles  It decreases stress and depression, and improves your mood  It can also help you sleep better  Talk to your healthcare provider before you begin an exercise program   · Exercise for at least 30 minutes 5 days a week  Start slowly  Set aside time each day for physical activity that you enjoy and that is convenient for you  It is best to do both weight training and an activity that increases your heart rate, such as walking, bicycling, or swimming  · Find ways to be more active  Do yard work and housecleaning  Walk up the stairs instead of using elevators  Spend your leisure time going to events that require walking, such as outdoor festivals or fairs  This extra physical activity can help you lose weight and keep it off  Follow up with your doctor as directed: You may need to meet with a dietitian  Write down your questions so you remember to ask them during your visits  © Copyright [employer] 2022 Information is for End User's use only and may not be sold, redistributed or otherwise used for commercial purposes  All illustrations and images included in CareNotes® are the copyrighted property of [employer]  or Omi Mack   The above information is an  only  It is not intended as medical advice for individual conditions or treatments   Talk to your doctor, nurse or pharmacist before following any medical regimen to see if it is safe and effective for you  Low Fat Diet   AMBULATORY CARE:   A low-fat diet  is an eating plan that is low in total fat, unhealthy fat, and cholesterol  You may need to follow a low-fat diet if you have trouble digesting or absorbing fat  You may also need to follow this diet if you have high cholesterol  You can also lower your cholesterol by increasing the amount of fiber in your diet  Soluble fiber is a type of fiber that helps to decrease cholesterol levels  Different types of fat in food:   · Limit unhealthy fats  A diet that is high in cholesterol, saturated fat, and trans fat may cause unhealthy cholesterol levels  Unhealthy cholesterol levels increase your risk of heart disease  ? Cholesterol:  Limit intake of cholesterol to less than 200 mg per day  Cholesterol is found in meat, eggs, and dairy  ? Saturated fat:  Limit saturated fat to less than 7% of your total daily calories  Ask your dietitian how many calories you need each day  Saturated fat is found in butter, cheese, ice cream, whole milk, and palm oil  Saturated fat is also found in meat, such as beef, pork, chicken skin, and processed meats  Processed meats include sausage, hot dogs, and bologna  ? Trans fat:  Avoid trans fat as much as possible  Trans fat is used in fried and baked foods  Foods that say trans fat free on the label may still have up to 0 5 grams of trans fat per serving  · Include healthy fats  Replace foods that are high in saturated and trans fat with foods high in healthy fats  This may help to decrease high cholesterol levels  ? Monounsaturated fats: These are found in avocados, nuts, and vegetable oils, such as olive, canola, and sunflower oil  ? Polyunsaturated fats: These can be found in vegetable oils, such as soybean or corn oil  Omega-3 fats can help to decrease the risk of heart disease  Omega-3 fats are found in fish, such as salmon, herring, trout, and tuna  Omega-3 fats can also be found in plant foods, such as walnuts, flaxseed, soybeans, and canola oil  Foods to limit or avoid:   · Grains:      ? Snacks that are made with partially hydrogenated oils, such as chips, regular crackers, and butter-flavored popcorn    ? High-fat baked goods, such as biscuits, croissants, doughnuts, pies, cookies, and pastries    · Dairy:      ? Whole milk, 2% milk, and yogurt and ice cream made with whole milk    ? Half and half creamer, heavy cream, and whipping cream    ? Cheese, cream cheese, and sour cream    · Meats and proteins:      ? High-fat cuts of meat (T-bone steak, regular hamburger, and ribs)    ? Fried meat, poultry (turkey and chicken), and fish    ? Poultry (chicken and turkey) with skin    ? Cold cuts (salami or bologna), hot dogs, terrazas, and sausage    ? Whole eggs and egg yolks    · Vegetables and fruits with added fat:      ? Fried vegetables or vegetables in butter or high-fat sauces, such as cream or cheese sauces    ? Fried fruit or fruit served with butter or cream    · Fats:      ? Butter, stick margarine, and shortening    ? Coconut, palm oil, and palm kernel oil    Foods to include:   · Grains:      ? Whole-grain breads, cereals, pasta, and brown rice    ? Low-fat crackers and pretzels    · Vegetables and fruits:      ? Fresh, frozen, or canned vegetables (no salt or low-sodium)    ? Fresh, frozen, dried, or canned fruit (canned in light syrup or fruit juice)    ? Avocado    · Low-fat dairy products:      ? Nonfat (skim) or 1% milk    ? Nonfat or low-fat cheese, yogurt, and cottage cheese    · Meats and proteins:      ? Chicken or turkey with no skin    ? Baked or broiled fish    ? Lean beef and pork (loin, round, extra lean hamburger)    ? Beans and peas, unsalted nuts, soy products    ? Egg whites and substitutes    ? Seeds and nuts    · Fats:      ? Unsaturated oil, such as canola, olive, peanut, soybean, or sunflower oil    ?  Soft or liquid margarine and vegetable oil spread    ? Low-fat salad dressing    Other ways to decrease fat:   · Read food labels before you buy foods  Choose foods that have less than 30% of calories from fat  Choose low-fat or fat-free dairy products  Remember that fat free does not mean calorie free  These foods still contain calories, and too many calories can lead to weight gain  · Trim fat from meat and avoid fried food  Trim all visible fat from meat before you cook it  Remove the skin from poultry  Do not storey meat, fish, or poultry  Bake, roast, boil, or broil these foods instead  Avoid fried foods  Eat a baked potato instead of [place] fries  Steam vegetables instead of sautéing them in butter  · Add less fat to foods  Use imitation terrazas bits on salads and baked potatoes instead of regular terrazas bits  Use fat-free or low-fat salad dressings instead of regular dressings  Use low-fat or nonfat butter-flavored topping instead of regular butter or margarine on popcorn and other foods  Ways to decrease fat in recipes:  Replace high-fat ingredients with low-fat or nonfat ones  This may cause baked goods to be drier than usual  You may need to use nonfat cooking spray on pans to prevent food from sticking  You also may need to change the amount of other ingredients, such as water, in the recipe  Try the following:  · Use low-fat or light margarine instead of regular margarine or shortening  · Use lean ground turkey breast or chicken, or lean ground beef (less than 5% fat) instead of hamburger  · Add 1 teaspoon of canola oil to 8 ounces of skim milk instead of using cream or half and half  · Use grated zucchini, carrots, or apples in breads instead of coconut  · Use blenderized, low-fat cottage cheese, plain tofu, or low-fat ricotta cheese instead of cream cheese  · Use 1 egg white and 1 teaspoon of canola oil, or use ¼ cup (2 ounces) of fat-free egg substitute instead of a whole egg       · Replace half of the oil that is called for in a recipe with applesauce when you bake  Use 3 tablespoons of cocoa powder and 1 tablespoon of canola oil instead of a square of baking chocolate  How to increase fiber:  Eat enough high-fiber foods to get 20 to 30 grams of fiber every day  Slowly increase your fiber intake to avoid stomach cramps, gas, and other problems  · Eat 3 ounces of whole-grain foods each day  An ounce is about 1 slice of bread  Eat whole-grain breads, such as whole-wheat bread  Whole wheat, whole-wheat flour, or other whole grains should be listed as the first ingredient on the food label  Replace white flour with whole-grain flour or use half of each in recipes  Whole-grain flour is heavier than white flour, so you may have to add more yeast or baking powder  · Eat a high-fiber cereal for breakfast   Oatmeal is a good source of soluble fiber  Look for cereals that have bran or fiber in the name  Choose whole-grain products, such as brown rice, barley, and whole-wheat pasta  · Eat more beans, peas, and lentils  For example, add beans to soups or salads  Eat at least 5 cups of fruits and vegetables each day  Eat fruits and vegetables with the peel because the peel is high in fiber  © Copyright [employer] 2022 Information is for End User's use only and may not be sold, redistributed or otherwise used for commercial purposes  All illustrations and images included in CareNotes® are the copyrighted property of [employer]  or [address] [place] [address]  The above information is an  only  It is not intended as medical advice for individual conditions or treatments  Talk to your doctor, nurse or pharmacist before following any medical regimen to see if it is safe and effective for you  Heart Healthy Diet   AMBULATORY CARE:   A heart healthy diet  is an eating plan low in unhealthy fats and sodium (salt)  The plan is high in healthy fats and fiber   A heart healthy diet helps improve your cholesterol levels and lowers your risk for heart disease and stroke  A dietitian will teach you how to read and understand food labels  Heart healthy diet guidelines to follow:   · Choose foods that contain healthy fats  ? Unsaturated fats  include monounsaturated and polyunsaturated fats  Unsaturated fat is found in foods such as soybean, canola, olive, corn, and safflower oils  It is also found in soft tub margarine that is made with liquid vegetable oil  ? Omega-3 fat  is found in certain fish, such as salmon, tuna, and trout, and in walnuts and flaxseed  Eat fish high in omega-3 fats at least 2 times a week  · Get 20 to 30 grams of fiber each day  Fruits, vegetables, whole-grain foods, and legumes (cooked beans) are good sources of fiber  · Limit or do not have unhealthy fats  ? Cholesterol  is found in animal foods, such as eggs and lobster, and in dairy products made from whole milk  Limit cholesterol to less than 200 mg each day  ? Saturated fat  is found in meats, such as terrazas and hamburger  It is also found in chicken or turkey skin, whole milk, and butter  Limit saturated fat to less than 7% of your total daily calories  ? Trans fat  is found in packaged foods, such as potato chips and cookies  It is also in hard margarine, some fried foods, and shortening  Do not eat foods that contain trans fats  · Limit sodium as directed  You may be told to limit sodium to 2,000 to 2,300 mg each day  Choose low-sodium or no-salt-added foods  Add little or no salt to food you prepare  Use herbs and spices in place of salt  Include the following in your heart healthy plan:  Ask your dietitian or healthcare provider how many servings to have from each of the following food groups:  · Grains:      ? Whole-wheat breads, cereals, and pastas, and brown rice    ?  Low-fat, low-sodium crackers and chips    · Vegetables:      ? Broccoli, green beans, green peas, and spinach    ? Collards, kale, and lima beans    ? Carrots, sweet potatoes, tomatoes, and peppers    ? Canned vegetables with no salt added    · Fruits:      ? Bananas, peaches, pears, and pineapple    ? Grapes, raisins, and dates    ? Oranges, tangerines, grapefruit, orange juice, and grapefruit juice    ? Apricots, mangoes, melons, and papaya    ? Raspberries and strawberries    ? Canned fruit with no added sugar    · Low-fat dairy:      ? Nonfat (skim) milk, 1% milk, and low-fat almond, cashew, or soy milks fortified with calcium    ? Low-fat cheese, regular or frozen yogurt, and cottage cheese    · Meats and proteins:      ? Lean cuts of beef and pork (loin, leg, round), skinless chicken and turkey    ? Legumes, soy products, egg whites, or nuts    Limit or do not include the following in your heart healthy plan:   · Unhealthy fats and oils:      ? Whole or 2% milk, cream cheese, sour cream, or cheese    ? High-fat cuts of beef (T-bone steaks, ribs), chicken or turkey with skin, and organ meats such as liver    ? Butter, stick margarine, shortening, and cooking oils such as coconut or palm oil    · Foods and liquids high in sodium:      ? Packaged foods, such as frozen dinners, cookies, macaroni and cheese, and cereals with more than 300 mg of sodium per serving    ? Vegetables with added sodium, such as instant potatoes, vegetables with added sauces, or regular canned vegetables    ? Cured or smoked meats, such as hot dogs, terrazas, and sausage    ? High-sodium ketchup, barbecue sauce, salad dressing, pickles, olives, soy sauce, or miso    · Foods and liquids high in sugar:      ? Candy, cake, cookies, pies, or doughnuts    ? Soft drinks (soda), sports drinks, or sweetened tea    ? Canned or dry mixes for cakes, soups, sauces, or gravies    Other healthy heart guidelines:   · Do not smoke  Nicotine and other chemicals in cigarettes and cigars can cause lung and heart damage   Ask your healthcare provider for information if you currently smoke and need help to quit  E-cigarettes or smokeless tobacco still contain nicotine  Talk to your healthcare provider before you use these products  · Limit or do not drink alcohol as directed  Alcohol can damage your heart and raise your blood pressure  Your healthcare provider may give you specific daily and weekly limits  The general recommended limit is 1 drink a day for women 21 or older and for men 72 or older  Do not have more than 3 drinks in a day or 7 in a week  The recommended limit is 2 drinks a day for men 24to 59years of age  Do not have more than 4 drinks in a day or 14 in a week  A drink of alcohol is 12 ounces of beer, 5 ounces of wine, or 1½ ounces of liquor  · Exercise regularly  Exercise can help you maintain a healthy weight and improve your blood pressure and cholesterol levels  Regular exercise can also decrease your risk for heart problems  Ask your healthcare provider about the best exercise plan for you  Do not start an exercise program without asking your healthcare provider  Follow up with your doctor or cardiologist as directed:  Write down your questions so you remember to ask them during your visits  © Copyright [employer] 2022 Information is for End User's use only and may not be sold, redistributed or otherwise used for commercial purposes  All illustrations and images included in CareNotes® are the copyrighted property of [employer]  or Omi Farmer  The above information is an  only  It is not intended as medical advice for individual conditions or treatments  Talk to your doctor, nurse or pharmacist before following any medical regimen to see if it is safe and effective for you

## 2022-03-03 ENCOUNTER — TELEPHONE (OUTPATIENT)
Dept: NEUROLOGY | Facility: CLINIC | Age: 42
End: 2022-03-03

## 2022-03-03 LAB
AMPHETAMINES UR QL SCN: NEGATIVE NG/ML
BARBITURATES UR QL SCN: NEGATIVE NG/ML
BENZODIAZ UR QL: NEGATIVE NG/ML
BZE UR QL: NEGATIVE NG/ML
CANNABINOIDS UR QL SCN: NEGATIVE NG/ML
METHADONE UR QL SCN: NEGATIVE NG/ML
MISCELLANEOUS LAB TEST RESULT: NORMAL
OPIATES UR QL: NEGATIVE NG/ML
PCP UR QL: NEGATIVE NG/ML
PROPOXYPH UR QL SCN: NEGATIVE NG/ML

## 2022-03-03 NOTE — TELEPHONE ENCOUNTER
Patient called to make a HFU appt  With referral from PCP and being seen in ED  Made appt   For 6/8 @ 11:00 in [facility] with Dr Evelyn Lehman

## 2022-03-09 ENCOUNTER — OFFICE VISIT (OUTPATIENT)
Dept: URGENT CARE | Facility: CLINIC | Age: 42
End: 2022-03-09
Payer: COMMERCIAL

## 2022-03-09 VITALS
TEMPERATURE: 98.4 F | WEIGHT: 210 LBS | OXYGEN SATURATION: 95 % | HEART RATE: 98 BPM | BODY MASS INDEX: 38.64 KG/M2 | DIASTOLIC BLOOD PRESSURE: 77 MMHG | SYSTOLIC BLOOD PRESSURE: 113 MMHG | RESPIRATION RATE: 18 BRPM | HEIGHT: 62 IN

## 2022-03-09 DIAGNOSIS — M79.89 LEG SWELLING: Primary | ICD-10-CM

## 2022-03-09 DIAGNOSIS — L03.115 CELLULITIS OF RIGHT LOWER EXTREMITY: ICD-10-CM

## 2022-03-09 LAB — VIT B1 BLD-SCNC: 42.5 NMOL/L (ref 66.5–200)

## 2022-03-09 PROCEDURE — G0382 LEV 3 HOSP TYPE B ED VISIT: HCPCS

## 2022-03-09 RX ORDER — CEPHALEXIN 500 MG/1
500 CAPSULE ORAL EVERY 6 HOURS SCHEDULED
Qty: 28 CAPSULE | Refills: 0 | Status: SHIPPED | OUTPATIENT
Start: 2022-03-09 | End: 2022-03-16

## 2022-03-09 NOTE — PROGRESS NOTES
[id number]        NAME: Lucius Ramos is a 36 y o  female  : 1980    MRN: [id number]  DATE: 2022  TIME: 8:57 AM    Assessment and Plan   Leg swelling [M79 89]  1  Leg swelling     2  Cellulitis of right lower extremity  cephalexin (KEFLEX) 500 mg capsule         Patient Instructions     Patient Instructions     Take antibiotics as prescribed  Elevate legs as much as possible, apply compression stockings, and continue taking your Lasix as prescribed by your PCP  If you develop any new or worsening symptoms such as fevers, chills, increased swelling/pain/redness of lower extremity, SOB, or chest pain proceed to ER for further evaluation  Follow up with PCP and your specialists  Cellulitis   AMBULATORY CARE:   Cellulitis  is a skin infection caused by bacteria  Cellulitis is common and can become severe  Cellulitis usually appears on the lower legs  It can also appear on the arms, face, and other areas  Cellulitis develops when bacteria enter a crack or break in your skin, such as a scratch, bite, or cut  Common signs and symptoms:  Signs and symptoms usually appear on one side of your body  You may have any of the following:  · A fever    · A red, warm, swollen area on your skin    · Pain when the area is touched    · Red spots, bumps, or blisters that may drain pus    · Bumpy, raised skin that feels like an orange peel    Seek care immediately if:   · Your wound gets larger and more painful  · You feel a crackling under your skin when you touch it  · You have purple dots or bumps on your skin, or you see bleeding under your skin  · You see red streaks coming from the infected area  Call your doctor if:   · The red, warm, swollen area gets larger  · Your fever or pain does not go away or gets worse  · The area does not get smaller after 3 days of antibiotics  · You have questions or concerns about your condition or care      Treatment:  You should start to see improvement in 3 days  If your cellulitis is severe, you may need IV antibiotics in the hospital  If cellulitis is not treated, the infection can spread through your body and become life-threatening  You may need any of the following medicines:  · Antibiotics  help treat the bacterial infection  · Acetaminophen  decreases pain and fever  It is available without a doctor's order  Ask how much to take and how often to take it  Follow directions  Read the labels of all other medicines you are using to see if they also contain acetaminophen, or ask your doctor or pharmacist  Acetaminophen can cause liver damage if not taken correctly  Do not use more than 4 grams (4,000 milligrams) total of acetaminophen in one day  · NSAIDs , such as ibuprofen, help decrease swelling, pain, and fever  This medicine is available with or without a doctor's order  NSAIDs can cause stomach bleeding or kidney problems in certain people  If you take blood thinner medicine, always ask your healthcare provider if NSAIDs are safe for you  Always read the medicine label and follow directions  · Take your medicine as directed  Contact your healthcare provider if you think your medicine is not helping or if you have side effects  Tell him or her if you are allergic to any medicine  Keep a list of the medicines, vitamins, and herbs you take  Include the amounts, and when and why you take them  Bring the list or the pill bottles to follow-up visits  Carry your medicine list with you in case of an emergency  Self-care:   · Wash the area with soap and water every day  Gently pat dry  Use bandages if directed by your healthcare provider  · Elevate the area above the level of your heart  as often as you can  This will help decrease swelling and pain  Prop the area on pillows or blankets to keep it elevated comfortably  · Place a cool, damp cloth on the area  Use clean cloths and clean water   You can do this as often as you need to  Cool, damp cloths may help decrease pain  · Apply cream or ointment as directed  These help protect the area  Most over-the-counter products, such as petroleum jelly, are good to use  Ask your healthcare provider about specific creams or ointments you should use  Prevent cellulitis:   · Do not scratch bug bites or areas of injury  You increase your risk for cellulitis by scratching these areas  · Do not share personal items, such as towels, clothing, and razors  · Clean exercise equipment  with germ-killing  before and after you use it  · Treat athlete's foot  This can help prevent the spread of a bacterial skin infection  · Wash your hands often  Use soap and water  Wash your hands after you use the bathroom, change a child's diapers, or sneeze  Wash your hands before you prepare or eat food  Use lotion to prevent dry, cracked skin  Follow up with your doctor within 3 days, or as directed:  He or she will check if your cellulitis is getting better  Write down your questions so you remember to ask them during your visits  © Copyright [email or web address] 2022 Information is for End User's use only and may not be sold, redistributed or otherwise used for commercial purposes  All illustrations and images included in CareNotes® are the copyrighted property of A D A M , Inc  or [address] [place], [place] [address]estephania   The above information is an  only  It is not intended as medical advice for individual conditions or treatments  Talk to your doctor, nurse or pharmacist before following any medical regimen to see if it is safe and effective for you  Follow up with PCP in 3-5 days  Proceed to  ER if symptoms worsen  Chief Complaint     Chief Complaint   Patient presents with    Leg Swelling     Patient c/o BLE swelling that started four days ago   Leg Pain     x 1 month            History of Present Illness       HPI   Darío Theodore is a 36 y o  female who presents today for evaluation of increasing lower extremity swelling, pain, and redness  She reports the swelling has gotten slightly worse over the past 4 days  She noticed redness of both of her shins starting last night  She denies fevers, chills, shortness of breath, or chest pain  She has been taking her Lasix as prescribed  She reports she has not been elevating her legs as much as she should be  She is not wearing any compression stockings either  Review of Systems   Review of Systems   Constitutional: Negative for chills and fever  HENT: Negative  Respiratory: Negative for cough and shortness of breath  Cardiovascular: Positive for leg swelling  Negative for chest pain and palpitations  Gastrointestinal: Negative for abdominal pain, diarrhea, nausea and vomiting  Musculoskeletal: Positive for arthralgias  Negative for joint swelling  Skin: Positive for color change  Negative for rash  Neurological: Positive for numbness (lower extremity, not new)  Negative for dizziness, weakness and headaches           Current Medications       Current Outpatient Medications:     acetaminophen (TYLENOL) 325 mg tablet, Take 3 tablets (975 mg total) by mouth every 8 (eight) hours, Disp: , Rfl: 0    albuterol (PROVENTIL HFA,VENTOLIN HFA) 90 mcg/act inhaler, inhale 1 to 2 puffs by mouth and INTO THE LUNGS every 4 to 6 hours if needed, Disp: 18 g, Rfl: 3    clonazePAM (KlonoPIN) 0 5 mg tablet, Take 1 tablet (0 5 mg total) by mouth 2 (two) times a day as needed (prn), Disp: 60 tablet, Rfl: 0    cyclobenzaprine (FLEXERIL) 5 mg tablet, Take 1 tablet (5 mg total) by mouth 3 (three) times a day as needed for muscle spasms, Disp: 90 tablet, Rfl: 0    Diclofenac Sodium (VOLTAREN) 1 %, Apply 2 g topically 4 (four) times a day, Disp: 150 g, Rfl: 0    disulfiram (ANTABUSE) 250 mg tablet, Take 1 tablet (250 mg total) by mouth daily, Disp: 90 tablet, Rfl: 0    fluticasone (FLONASE) 50 mcg/act nasal spray, 1 spray into each nostril daily, Disp: 16 g, Rfl: 0    folic acid (FOLVITE) 1 mg tablet, Take 1 tablet (1 mg total) by mouth daily, Disp: 30 tablet, Rfl: 5    furosemide (LASIX) 20 mg tablet, Take 1 tablet (20 mg total) by mouth daily, Disp: 30 tablet, Rfl: 5    gabapentin (NEURONTIN) 100 mg capsule, Take 1 capsule (100 mg total) by mouth 3 (three) times a day, Disp: 90 capsule, Rfl: 0    levothyroxine 150 mcg tablet, Take 1 tablet (150 mcg total) by mouth every morning, Disp: 90 tablet, Rfl: 1    Multiple Vitamin (multivitamin) tablet, Take 1 tablet by mouth daily  , Disp: , Rfl:     naloxone (Narcan) 4 mg/0 1 mL nasal spray, 0 1 mL (4 mg total) into each nostril as needed (respiratory depression or possible OD), Disp: 1 each, Rfl: 1    omeprazole (PriLOSEC OTC) 20 MG tablet, Take 1 tablet (20 mg total) by mouth daily, Disp: , Rfl: 0    oxyCODONE (ROXICODONE) 10 MG TABS, Take 1 tablet (10 mg total) by mouth every 8 (eight) hours as needed for moderate pain for up to 10 days Max Daily Amount: 30 mg, Disp: 30 tablet, Rfl: 0    polyethylene glycol (MIRALAX) 17 g packet, Take 17 g by mouth daily as needed (constipation), Disp: , Rfl: 0    potassium chloride (K-DUR,KLOR-CON) 20 mEq tablet, Take 2 tablets (40 mEq total) by mouth daily for 5 days, Disp: 10 tablet, Rfl: 0    thiamine 100 MG tablet, Take 1 tablet (100 mg total) by mouth daily, Disp: 30 tablet, Rfl:     thiamine 50 MG tablet, Take 2 tablets (100 mg total) by mouth daily, Disp: 180 tablet, Rfl: 1    cephalexin (KEFLEX) 500 mg capsule, Take 1 capsule (500 mg total) by mouth every 6 (six) hours for 7 days, Disp: 28 capsule, Rfl: 0    Current Allergies     Allergies as of 03/09/2022 - Reviewed 03/09/2022   Allergen Reaction Noted    Penicillins Hives and Rash 04/08/2007            The following portions of the patient's history were reviewed and updated as appropriate: allergies, current medications, past family history, past medical history, past social history, past surgical history and problem list      Past Medical History:   Diagnosis Date    Anxiety     Asthma     Crohn disease ([address] )     Depression     Disease of thyroid gland     Hypertension     Hypothyroidism     Lower extremity numbness 2022    Psychiatric disorder        Past Surgical History:   Procedure Laterality Date    IR LUMBAR PUNCTURE  2022    NO PAST SURGERIES         Family History   Problem Relation Age of Onset    Colon cancer Mother     Kidney cancer Mother     Hypertension Mother     Colon cancer Father     Kidney cancer Father     Hypertension Father     Colon cancer Family     Kidney cancer Family          Medications have been verified  Objective   /77   Pulse 98   Temp 98 4 °F (36 9 °C) (Temporal)   Resp 18   Ht 5' 2" (1 575 m)   Wt 95 3 kg (210 lb)   LMP 2022 (Exact Date)   SpO2 95%   BMI 38 41 kg/m²        Physical Exam     Physical Exam  Vitals and nursing note reviewed  Constitutional:       General: She is not in acute distress  Appearance: Normal appearance  Cardiovascular:      Rate and Rhythm: Normal rate and regular rhythm  Pulses:           Dorsalis pedis pulses are 1+ on the right side and 1+ on the left side  Heart sounds: Normal heart sounds, S1 normal and S2 normal    Pulmonary:      Effort: Pulmonary effort is normal       Breath sounds: Normal breath sounds  No wheezing, rhonchi or rales  Musculoskeletal:      Right lower le+ Pitting Edema present  Left lower le+ Pitting Edema present  Skin:     General: Skin is warm and dry  Findings: Erythema present  No bruising, rash or wound  Neurological:      General: No focal deficit present  Mental Status: She is alert  Psychiatric:         Behavior: Behavior normal  Behavior is cooperative

## 2022-03-09 NOTE — PATIENT INSTRUCTIONS
Take antibiotics as prescribed  Elevate legs as much as possible, apply compression stockings, and continue taking your Lasix as prescribed by your PCP  If you develop any new or worsening symptoms such as fevers, chills, increased swelling/pain/redness of lower extremity, SOB, or chest pain proceed to ER for further evaluation  Follow up with PCP and your specialists  Cellulitis   AMBULATORY CARE:   Cellulitis  is a skin infection caused by bacteria  Cellulitis is common and can become severe  Cellulitis usually appears on the lower legs  It can also appear on the arms, face, and other areas  Cellulitis develops when bacteria enter a crack or break in your skin, such as a scratch, bite, or cut  Common signs and symptoms:  Signs and symptoms usually appear on one side of your body  You may have any of the following:  · A fever    · A red, warm, swollen area on your skin    · Pain when the area is touched    · Red spots, bumps, or blisters that may drain pus    · Bumpy, raised skin that feels like an orange peel    Seek care immediately if:   · Your wound gets larger and more painful  · You feel a crackling under your skin when you touch it  · You have purple dots or bumps on your skin, or you see bleeding under your skin  · You see red streaks coming from the infected area  Call your doctor if:   · The red, warm, swollen area gets larger  · Your fever or pain does not go away or gets worse  · The area does not get smaller after 3 days of antibiotics  · You have questions or concerns about your condition or care  Treatment:  You should start to see improvement in 3 days  If your cellulitis is severe, you may need IV antibiotics in the hospital  If cellulitis is not treated, the infection can spread through your body and become life-threatening  You may need any of the following medicines:  · Antibiotics  help treat the bacterial infection      · Acetaminophen  decreases pain and fever  It is available without a doctor's order  Ask how much to take and how often to take it  Follow directions  Read the labels of all other medicines you are using to see if they also contain acetaminophen, or ask your doctor or pharmacist  Acetaminophen can cause liver damage if not taken correctly  Do not use more than 4 grams (4,000 milligrams) total of acetaminophen in one day  · NSAIDs , such as ibuprofen, help decrease swelling, pain, and fever  This medicine is available with or without a doctor's order  NSAIDs can cause stomach bleeding or kidney problems in certain people  If you take blood thinner medicine, always ask your healthcare provider if NSAIDs are safe for you  Always read the medicine label and follow directions  · Take your medicine as directed  Contact your healthcare provider if you think your medicine is not helping or if you have side effects  Tell him or her if you are allergic to any medicine  Keep a list of the medicines, vitamins, and herbs you take  Include the amounts, and when and why you take them  Bring the list or the pill bottles to follow-up visits  Carry your medicine list with you in case of an emergency  Self-care:   · Wash the area with soap and water every day  Gently pat dry  Use bandages if directed by your healthcare provider  · Elevate the area above the level of your heart  as often as you can  This will help decrease swelling and pain  Prop the area on pillows or blankets to keep it elevated comfortably  · Place a cool, damp cloth on the area  Use clean cloths and clean water  You can do this as often as you need to  Cool, damp cloths may help decrease pain  · Apply cream or ointment as directed  These help protect the area  Most over-the-counter products, such as petroleum jelly, are good to use  Ask your healthcare provider about specific creams or ointments you should use      Prevent cellulitis:   · Do not scratch bug bites or areas of injury  You increase your risk for cellulitis by scratching these areas  · Do not share personal items, such as towels, clothing, and razors  · Clean exercise equipment  with germ-killing  before and after you use it  · Treat athlete's foot  This can help prevent the spread of a bacterial skin infection  · Wash your hands often  Use soap and water  Wash your hands after you use the bathroom, change a child's diapers, or sneeze  Wash your hands before you prepare or eat food  Use lotion to prevent dry, cracked skin  Follow up with your doctor within 3 days, or as directed:  He or she will check if your cellulitis is getting better  Write down your questions so you remember to ask them during your visits  © Copyright [employer] 2022 Information is for End User's use only and may not be sold, redistributed or otherwise used for commercial purposes  All illustrations and images included in CareNotes® are the copyrighted property of [employer] M [employer] Inc  or [facility] Elyssa Mack   The above information is an  only  It is not intended as medical advice for individual conditions or treatments  Talk to your doctor, nurse or pharmacist before following any medical regimen to see if it is safe and effective for you

## 2022-03-10 LAB
MISCELLANEOUS LAB TEST RESULT: NORMAL
MISCELLANEOUS LAB TEST RESULT: NORMAL
WNV RNA SPEC QL NAA+PROBE: NORMAL

## 2022-03-11 ENCOUNTER — TELEPHONE (OUTPATIENT)
Dept: INTERNAL MEDICINE CLINIC | Facility: CLINIC | Age: 42
End: 2022-03-11

## 2022-03-11 DIAGNOSIS — R20.0 LOWER EXTREMITY NUMBNESS: ICD-10-CM

## 2022-03-11 RX ORDER — GABAPENTIN 300 MG/1
300 CAPSULE ORAL 3 TIMES DAILY
Qty: 270 CAPSULE | Refills: 1 | Status: SHIPPED | OUTPATIENT
Start: 2022-03-11 | End: 2022-04-01 | Stop reason: SDUPTHER

## 2022-03-11 RX ORDER — DULOXETIN HYDROCHLORIDE 30 MG/1
30 CAPSULE, DELAYED RELEASE ORAL DAILY
Qty: 90 CAPSULE | Refills: 3 | Status: SHIPPED | OUTPATIENT
Start: 2022-03-11 | End: 2022-04-01 | Stop reason: SDUPTHER

## 2022-03-11 NOTE — TELEPHONE ENCOUNTER
----- Message from Mirtha Quispe DO sent at 3/11/2022 10:48 AM EST -----  Call pt, sent in cymbalta and have her increase geovani to 300mg tid  Tell pt it will take time for these meds to start working    ----- Message -----  From: Teresa Rascon  Sent: 3/11/2022  10:38 AM EST  To: Mirtha Quispe     Marina called asking for something for pain but not a narcotic  She has cellulitis bilateral lower legs, pain in her thighs, knees and ankles

## 2022-03-11 NOTE — TELEPHONE ENCOUNTER
Pt was seen at urgent care on the 9th she was dx with cellulitis and given keflex, she wanted to know if wanted to see her sooner than the 16th

## 2022-03-23 ENCOUNTER — TRANSITIONAL CARE MANAGEMENT (OUTPATIENT)
Dept: INTERNAL MEDICINE CLINIC | Facility: CLINIC | Age: 42
End: 2022-03-23

## 2022-03-23 NOTE — PROGRESS NOTES
Tried to call to [facility] hospital f/u appt but the mailbox is full -js

## 2022-03-24 DIAGNOSIS — F41.1 GAD (GENERALIZED ANXIETY DISORDER): ICD-10-CM

## 2022-03-24 RX ORDER — CLONAZEPAM 0.5 MG/1
0.5 TABLET ORAL 2 TIMES DAILY PRN
Qty: 60 TABLET | Refills: 0 | Status: SHIPPED | OUTPATIENT
Start: 2022-03-24 | End: 2022-06-02 | Stop reason: SDUPTHER

## 2022-03-28 ENCOUNTER — CONSULT (OUTPATIENT)
Dept: PAIN MEDICINE | Facility: CLINIC | Age: 42
End: 2022-03-28
Payer: COMMERCIAL

## 2022-03-28 VITALS
HEART RATE: 106 BPM | SYSTOLIC BLOOD PRESSURE: 112 MMHG | DIASTOLIC BLOOD PRESSURE: 81 MMHG | BODY MASS INDEX: 37.91 KG/M2 | WEIGHT: 206 LBS | HEIGHT: 62 IN

## 2022-03-28 DIAGNOSIS — R20.0 LOWER EXTREMITY NUMBNESS: ICD-10-CM

## 2022-03-28 DIAGNOSIS — G89.4 CHRONIC PAIN SYNDROME: ICD-10-CM

## 2022-03-28 DIAGNOSIS — R20.0 BILATERAL HAND NUMBNESS: Primary | ICD-10-CM

## 2022-03-28 PROCEDURE — 99204 OFFICE O/P NEW MOD 45 MIN: CPT | Performed by: ANESTHESIOLOGY

## 2022-03-28 PROCEDURE — 1036F TOBACCO NON-USER: CPT | Performed by: ANESTHESIOLOGY

## 2022-03-28 PROCEDURE — 3008F BODY MASS INDEX DOCD: CPT | Performed by: ANESTHESIOLOGY

## 2022-03-28 RX ORDER — ONDANSETRON 4 MG/1
4 TABLET, FILM COATED ORAL
COMMUNITY
End: 2022-06-15

## 2022-03-28 RX ORDER — TRAMADOL HYDROCHLORIDE 50 MG/1
TABLET ORAL
COMMUNITY
Start: 2022-03-22 | End: 2022-04-01 | Stop reason: SDUPTHER

## 2022-03-28 NOTE — PATIENT INSTRUCTIONS
Gabapentin (By mouth)   Gabapentin (isauro-a-PEN-tin)  Treats seizures and pain caused by shingles  Brand Name(s): FusePaq Fanatrex, Neurontin   There may be other brand names for this medicine  When This Medicine Should Not Be Used: This medicine is not right for everyone  Do not use it if you had an allergic reaction to gabapentin  How to Use This Medicine:   Capsule, Liquid, Tablet  · Take your medicine as directed  Your dose may need to be changed several times to find what works best for you  If you have epilepsy, do not allow more than 12 hours to pass between doses  · Capsule: Swallow the capsule whole with plenty of water  Do not open, crush, or chew it  · Gralise® tablet: Swallow the tablet whole   Do not crush, break, or chew it  · Neurontin® tablet: If you break a tablet into 2 pieces, use the second half as your next dose  Do not use the half-tablet if the whole tablet has been cut or broken after 28 days  · Oral liquid: Measure the oral liquid medicine with a marked measuring spoon, oral syringe, or medicine cup  · This medicine should come with a Medication Guide  Ask your pharmacist for a copy if you do not have one  · Missed dose: Take a dose as soon as you remember  If it is almost time for your next dose, wait until then and take a regular dose  Do not take extra medicine to make up for a missed dose  · Store the medicine in a closed container at room temperature, away from heat, moisture, and direct light  Store the Neurontin® oral liquid in the refrigerator  Do not freeze  Drugs and Foods to Avoid:   Ask your doctor or pharmacist before using any other medicine, including over-the-counter medicines, vitamins, and herbal products  · Some medicines can affect how gabapentin works  Tell your doctor if you also using hydrocodone or morphine  · If you take an antacid, wait at least 2 hours before you take gabapentin  · Do not drink alcohol while you are using this medicine    · Tell your doctor if you use anything else that makes you sleepy  Some examples are allergy medicine, narcotic pain medicine, and alcohol  Tell your doctor if you are also using lorazepam, oxycodone, or zolpidem  Warnings While Using This Medicine:   · Tell your doctor if you are pregnant or breastfeeding, or if you have kidney problems (including patients receiving dialysis) or lung problems  Tell your doctor if you have a history of depression or mental health problems  · This medicine may cause the following problems:  ? Drug reaction with eosinophilia and systemic symptoms (DRESS) or multiorgan hypersensitivity, which may damage the liver, kidney, blood, heart, or muscles  ? Changes in mood or behavior, including suicidal thoughts or behavior  ? Respiratory depression (serious breathing problem that can be life-threatening), when used with narcotic pain medicines  · Do not stop using this medicine suddenly  Your doctor will need to slowly decrease your dose before you stop it completely  · This medicine may make you dizzy or drowsy  Do not drive or do anything else that could be dangerous until you know how this medicine affects you  · Tell any doctor or dentist who treats you that you are using this medicine  This medicine may affect certain medical test results  · Your doctor will check your progress and the effects of this medicine at regular visits  Keep all appointments  · Keep all medicine out of the reach of children  Never share your medicine with anyone    Possible Side Effects While Using This Medicine:   Call your doctor right away if you notice any of these side effects:  · Allergic reaction: Itching or hives, swelling in your face or hands, swelling or tingling in your mouth or throat, chest tightness, trouble breathing  · Behavior problems, aggression, restlessness, trouble concentrating, moodiness (especially in children)  · Blistering, peeling, red skin rash  · Blue lips, fingernails, or skin, chest pain, fast heartbeat, trouble breathing  · Change in how much or how often you urinate, bloody or cloudy urine  · Dark urine or pale stools, nausea, vomiting, loss of appetite, stomach pain, yellow skin or eyes  · Fever, chills, cough, sore throat, body aches  · Problems with coordination, shakiness, unsteadiness, unusual eye movement  · Rapid weight gain, swelling in your hands, ankles, or feet  · Rash, swollen or tender glands in the neck, armpit, or groin  · Unusual moods or behaviors, thoughts of hurting yourself, feeling depressed  If you notice these less serious side effects, talk with your doctor:   · Dizziness, drowsiness, sleepiness, tiredness  If you notice other side effects that you think are caused by this medicine, tell your doctor  Call your doctor for medical advice about side effects  You may report side effects to FDA at 3-522-FDA-6005    © Copyright [employer] [facility] 2022 Information is for End User's use only and may not be sold, redistributed or otherwise used for commercial purposes  The above information is an  only  It is not intended as medical advice for individual conditions or treatments  Talk to your doctor, nurse or pharmacist before following any medical regimen to see if it is safe and effective for you

## 2022-03-28 NOTE — PROGRESS NOTES
Assessment  1  Bilateral hand numbness    2  Lower extremity numbness    3  Chronic pain syndrome        Plan  23-year-old female with a history of alcoholism, referred by Dr Lucien Kaplan, presenting for initial consultation regarding a 2 month history of numbness and tingling in her hands as well as in her lower extremities from the thighs to the feet, predominantly in the anterior aspects  She denies any significant neck or low back pain  MRI of the lumbar spine was unremarkable without any central or foraminal stenosis noted  MRI of the thoracic spine did not show any significant central or foraminal stenosis  MRI of the cervical spine shows disc bulges from C4-5 to C6-7 with mild right foraminal stenosis at C5-6  MRI of the brain shows increased T2 and FLAIR signal in the posterior central aspect of the simona  Upon review of blood work, B1 level was noted to be low at 42 5  B12 was within normal limits  The patient does have an EMG of her lower extremities scheduled for the end of April  The patient's symptoms seem to be most consistent with peripheral neuropathy  The patient does have a history of alcoholism and a low B1 level which may be contributing factors  May be a component of carpal tunnel syndrome as well  No compressive pathology in the cervical or lumbar spine  1  I will order an EMG of the upper extremities in addition to her already scheduled EMG of her lower extremities  2  I advised the patient to continue on gabapentin 300 mg t i d   Will see how she does over the next week and may consider increasing this in the future for neuropathic complaints  3  Patient will continue with duloxetine as prescribed   4  The patient is not a good candidate for long-term opiate therapy secondary to history of alcoholism  She will be non opioid therapy from our practice secondary to above  Caution the patient with use of any opioid medications as she has set high risk for addiction    5  I will follow up the patient in 6 weeks       My impressions and treatment recommendations were discussed in detail with the patient who verbalized understanding and had no further questions  Discharge instructions were provided  I personally saw and examined the patient and I agree with the above discussed plan of care  No orders of the defined types were placed in this encounter  No orders of the defined types were placed in this encounter  History of Present Illness    Vanessa Price is a 36 y o  female with a history of alcoholism, referred by Dr Jaylon Spivey, presenting for initial consultation regarding a 2 month history of numbness and tingling in her hands as well as in her lower extremities from the thighs to the feet, predominantly in the anterior aspects of her legs  She denies any significant neck or low back pain  She denies any trauma or inciting event  MRI of the lumbar spine was unremarkable without any central or foraminal stenosis noted  MRI of the thoracic spine did not show any significant central or foraminal stenosis  MRI of the cervical spine shows disc bulges from C4-5 to C6-7 with mild right foraminal stenosis at C5-6  MRI of the brain shows increased T2 and FLAIR signal in the posterior central aspect of the simona  Upon review of blood work, B1 level was noted to be low at 42 5  B12 was within normal limits  The patient does have an EMG of her lower extremities scheduled for the end of April  The patient rates her pain as 7/10 the pain is constant  The pain is not follow any particular pattern throughout the day  The pain is described as burning, numbness, throbbing, dull, and aching  The pain is increased with walking and exercise  She denies any specific alleviating factors  Other than as stated above, the patient denies any interval changes in medications, medical condition, mental condition, symptoms, or allergies since the last office visit            I have personally reviewed and/or updated the patient's past medical history, past surgical history, family history, social history, current medications, allergies, and vital signs today  Review of Systems   Constitutional: Positive for unexpected weight change  Negative for fever  HENT: Negative for trouble swallowing  Eyes: Negative for visual disturbance  Respiratory: Negative for shortness of breath and wheezing  Cardiovascular: Positive for leg swelling  Negative for chest pain and palpitations  Gastrointestinal: Negative for constipation, diarrhea, nausea and vomiting  Endocrine: Positive for polydipsia  Negative for cold intolerance and heat intolerance  Genitourinary: Negative for difficulty urinating and frequency  Musculoskeletal: Positive for arthralgias and myalgias  Negative for gait problem and joint swelling  Skin: Negative for rash  Neurological: Negative for dizziness, seizures, syncope, weakness and headaches  Hematological: Does not bruise/bleed easily  Psychiatric/Behavioral: Negative for dysphoric mood  Anxiety, depression   All other systems reviewed and are negative        Patient Active Problem List   Diagnosis    Umbilical hernia without obstruction and without gangrene    Alcohol dependence (HCC)    Recurrent major depressive disorder, [facility] partial [facility]    JOSE (generalized anxiety disorder)    Moderate persistent asthma without complication    Hypothyroidism    Terminal ileitis without complication [facility] [facility]    Crohn's disease of colon with complication ([facility] [facility]    Hypomagnesemia    Class 2 obesity due to excess calories with body mass index (BMI) of 37 0 to 37 9 in adult    Severe protein-calorie malnutrition (HCC)    Lower extremity numbness    COVID-19    Ambulatory dysfunction    Transaminitis    History of COVID-19    Hypokalemia    Folic acid deficiency    Pontine lesion       Past Medical History:   Diagnosis Date    Anxiety     Asthma     Crohn disease [facility] [address] [facility]     Depression     Disease of thyroid gland     Hypertension     Hypothyroidism     Lower extremity numbness 02/11/2022    Psychiatric disorder        Past Surgical History:   Procedure Laterality Date    IR LUMBAR PUNCTURE  2/8/2022    NO PAST SURGERIES         Family History   Problem Relation Age of Onset    Colon cancer Mother     Kidney cancer Mother     Hypertension Mother     Colon cancer Father     Kidney cancer Father     Hypertension Father     Colon cancer Family     Kidney cancer Family        Social History     Occupational History    Occupation: EMPLOYED   Tobacco Use    Smoking status: Former Smoker     Quit date: 7/13/2018     Years since quitting: 3 7    Smokeless tobacco: Never Used   Vaping Use    Vaping Use: Never used   Substance and Sexual Activity    Alcohol use: Not Currently    Drug use: Never    Sexual activity: Not Currently       Current Outpatient Medications on File Prior to Visit   Medication Sig    acetaminophen (TYLENOL) 325 mg tablet Take 3 tablets (975 mg total) by mouth every 8 (eight) hours    albuterol (PROVENTIL HFA,VENTOLIN HFA) 90 mcg/act inhaler inhale 1 to 2 puffs by mouth and INTO THE LUNGS every 4 to 6 hours if needed    clonazePAM (KlonoPIN) 0 5 mg tablet Take 1 tablet (0 5 mg total) by mouth 2 (two) times a day as needed (prn)    cyclobenzaprine (FLEXERIL) 5 mg tablet Take 1 tablet (5 mg total) by mouth 3 (three) times a day as needed for muscle spasms    Diclofenac Sodium (VOLTAREN) 1 % Apply 2 g topically 4 (four) times a day    disulfiram (ANTABUSE) 250 mg tablet Take 1 tablet (250 mg total) by mouth daily    DULoxetine (Cymbalta) 30 mg delayed release capsule Take 1 capsule (30 mg total) by mouth daily    fluticasone (FLONASE) 50 mcg/act nasal spray 1 spray into each nostril daily    folic acid (FOLVITE) 1 mg tablet Take 1 tablet (1 mg total) by mouth daily    furosemide (LASIX) 20 mg tablet Take 1 tablet (20 mg total) by mouth daily    gabapentin (NEURONTIN) 300 mg capsule Take 1 capsule (300 mg total) by mouth 3 (three) times a day    levothyroxine 150 mcg tablet Take 1 tablet (150 mcg total) by mouth every morning    Multiple Vitamin (multivitamin) tablet Take 1 tablet by mouth daily      naloxone (Narcan) 4 mg/0 1 mL nasal spray 0 1 mL (4 mg total) into each nostril as needed (respiratory depression or possible OD)    omeprazole (PriLOSEC OTC) 20 MG tablet Take 1 tablet (20 mg total) by mouth daily    polyethylene glycol (MIRALAX) 17 g packet Take 17 g by mouth daily as needed (constipation)    potassium chloride (K-DUR,KLOR-CON) 20 mEq tablet Take 2 tablets (40 mEq total) by mouth daily for 5 days    thiamine 100 MG tablet Take 1 tablet (100 mg total) by mouth daily    thiamine 50 MG tablet Take 2 tablets (100 mg total) by mouth daily     No current facility-administered medications on file prior to visit  Allergies   Allergen Reactions    Penicillins Hives and Rash     HIVES, SWELLS, riley ancef x 1 dose         Physical Exam    Ht 5' 2" (1 575 m)   Wt 93 4 kg (206 lb)   BMI 37 68 kg/m²     Constitutional: normal, well developed, well nourished, alert, in no distress and non-toxic and no overt pain behavior  Eyes: anicteric  HEENT: grossly intact  Neck: supple, symmetric, trachea midline and no masses   Pulmonary:even and unlabored  Cardiovascular:No edema or pitting edema present  Skin:Normal without rashes or lesions and well hydrated  Psychiatric:Mood and affect appropriate  Neurologic:Cranial Nerves II-XII grossly intact  Musculoskeletal:normal gait  Bilateral cervical paraspinals and trapezii nontender to palpation  Full range of motion of cervical spine in all planes  Bilateral biceps, triceps, and brachioradialis reflexes were trace and symmetrical   Bilateral patellar and Achilles reflexes trace  Bilateral upper extremity strength 5/5 in all muscle groups    Sensation intact to light touch in C5 through T1 dermatomes bilaterally  Negative Spurling's bilaterally  Bilateral lumbar paraspinals nontender to palpation  Bilateral lower extremity strength 5/5 in all muscle groups  Sensation diminished to light touch in the anterior aspect of the shins from the feet to the ankles  Negative seated straight leg raise bilaterally  Imaging    Study Result    Narrative & Impression   MRI LUMBAR SPINE WITH AND WITHOUT CONTRAST     INDICATION: Lower extremity weakness and numbness      COMPARISON:  2/4/2022     TECHNIQUE:  Sagittal T1, sagittal T2, sagittal inversion recovery, axial T1 and axial T2, coronal T2  Sagittal and axial T1 postcontrast      IV Contrast:  9 mL of Gadobutrol injection (SINGLE-DOSE)      IMAGE QUALITY:  Diagnostic     FINDINGS:     VERTEBRAL BODIES:  There are 5 lumbar type vertebral bodies  Normal alignment of the lumbar spine  No spondylolysis or spondylolisthesis  No scoliosis  No compression fracture  Normal marrow signal is identified within the visualized bony   structures  No discrete marrow lesion      SACRUM:  Normal signal within the sacrum  No evidence of insufficiency or stress fracture      DISTAL CORD AND CONUS:  Normal signal within the distal cord and conus  Previously seen increased T2 signal within the lower thoracic cord likely artifactual      PARASPINAL SOFT TISSUES:  Paraspinal soft tissues are unremarkable      LOWER THORACIC DISC SPACES:  Normal disc height and signal   No disc herniation, canal stenosis or foraminal narrowing      LUMBAR DISC SPACES:     L1-L2:  Normal      L2-L3:  Normal      L3-L4:  Normal      L4-L5:  Normal      L5-S1:  No focal disc herniation  Mild right foraminal narrowing with disc material abutting the ventral aspect of the exiting nerve, unchanged    Mild facet degenerative change noted on the right      POSTCONTRAST IMAGING:  No abnormal enhancement      IMPRESSION:     Although there is no discrete disc herniation at the L5-S1 level there is mild right-sided facet degenerative change and disc material does abut the ventral aspect of the exiting nerve, unchanged from the prior examination      Normal signal within the distal cord and conus  The abnormal signal seen on prior examination is likely artifactual         Workstation performed: [id number]     Study Result    Narrative & Impression   MRI THORACIC SPINE WITH AND WITHOUT CONTRAST     INDICATION: Lower extremity weakness and numbness        COMPARISON:  2/6/2022     TECHNIQUE:  Sagittal T1, sagittal T2, sagittal inversion recovery, axial T2,  axial 2D MERGE  Sagittal and axial T1 postcontrast      IV Contrast:  9 mL of Gadobutrol injection (SINGLE-DOSE)      IMAGE QUALITY:  Diagnostic      FINDINGS:     ALIGNMENT:  Normal alignment of the thoracic spine  No compression fracture  No subluxation  No evidence of scoliosis      MARROW SIGNAL:  Stable small hemangioma within the lower thoracic spine      THORACIC CORD:  Normal signal within the thoracic cord      PREVERTEBRAL AND PARASPINAL SOFT TISSUES:   Normal      THORACIC DEGENERATIVE CHANGE:  Minor noncompressive mid thoracic degenerative change appears stable  No disc herniation, canal stenosis or foraminal narrowing      POSTCONTRAST:  No abnormal enhancement      IMPRESSION:     No abnormal cord signal      Minor noncompressive mid thoracic degenerative change is stable            Workstation performed: [id number]     PACS Images     Show images for XR foot (historical)    Study Result    Narrative & Impression   LEFT FOOT   INDICATION-  Left great toe pain  COMPARISON- None   VIEWS-  3    3 images   FINDINGS-   There is no acute fracture or dislocation  No degenerative changes  No lytic or blastic lesions are seen  Soft tissues are unremarkable  IMPRESSION-   No acute osseous abnormality     Transcribed on- [id number]           [address], RAD DO        Reading Radiologist- GUERITA Nielson DO        Electronically Signed- GUERITA Nielson DO        Released Date Time- 07/15/15 1339      ------------------------------------------------------------------------------   [address]

## 2022-04-01 ENCOUNTER — OFFICE VISIT (OUTPATIENT)
Dept: INTERNAL MEDICINE CLINIC | Facility: CLINIC | Age: 42
End: 2022-04-01
Payer: COMMERCIAL

## 2022-04-01 VITALS
HEART RATE: 122 BPM | TEMPERATURE: 97.9 F | DIASTOLIC BLOOD PRESSURE: 70 MMHG | WEIGHT: 201.38 LBS | SYSTOLIC BLOOD PRESSURE: 128 MMHG | HEIGHT: 62 IN | BODY MASS INDEX: 37.06 KG/M2 | OXYGEN SATURATION: 97 %

## 2022-04-01 DIAGNOSIS — F41.1 GAD (GENERALIZED ANXIETY DISORDER): ICD-10-CM

## 2022-04-01 DIAGNOSIS — K70.0 FATTY LIVER, ALCOHOLIC: ICD-10-CM

## 2022-04-01 DIAGNOSIS — E53.8 FOLIC ACID DEFICIENCY: ICD-10-CM

## 2022-04-01 DIAGNOSIS — R26.2 AMBULATORY DYSFUNCTION: ICD-10-CM

## 2022-04-01 DIAGNOSIS — R79.89 LACTATE BLOOD INCREASED: ICD-10-CM

## 2022-04-01 DIAGNOSIS — E83.42 HYPOMAGNESEMIA: ICD-10-CM

## 2022-04-01 DIAGNOSIS — E43 SEVERE PROTEIN-CALORIE MALNUTRITION (HCC): ICD-10-CM

## 2022-04-01 DIAGNOSIS — R74.01 TRANSAMINITIS: ICD-10-CM

## 2022-04-01 DIAGNOSIS — R45.84 ANHEDONIA: ICD-10-CM

## 2022-04-01 DIAGNOSIS — F10.239 ALCOHOL WITHDRAWAL SYNDROME WITH COMPLICATION (HCC): ICD-10-CM

## 2022-04-01 DIAGNOSIS — G62.1 NEUROPATHY, ALCOHOLIC (HCC): ICD-10-CM

## 2022-04-01 DIAGNOSIS — F33.41 RECURRENT MAJOR DEPRESSIVE DISORDER, IN PARTIAL REMISSION (HCC): ICD-10-CM

## 2022-04-01 DIAGNOSIS — R20.0 LOWER EXTREMITY NUMBNESS: ICD-10-CM

## 2022-04-01 DIAGNOSIS — E03.9 HYPOTHYROIDISM, UNSPECIFIED TYPE: ICD-10-CM

## 2022-04-01 DIAGNOSIS — J45.40 MODERATE PERSISTENT ASTHMA WITHOUT COMPLICATION: ICD-10-CM

## 2022-04-01 DIAGNOSIS — F10.939 ALCOHOL USE WITH WITHDRAWAL (HCC): Primary | ICD-10-CM

## 2022-04-01 DIAGNOSIS — F51.05 INSOMNIA SECONDARY TO DEPRESSION WITH ANXIETY: ICD-10-CM

## 2022-04-01 DIAGNOSIS — R73.09 ELEVATED GLUCOSE: ICD-10-CM

## 2022-04-01 DIAGNOSIS — F41.8 INSOMNIA SECONDARY TO DEPRESSION WITH ANXIETY: ICD-10-CM

## 2022-04-01 DIAGNOSIS — F10.20 UNCOMPLICATED ALCOHOL DEPENDENCE (HCC): ICD-10-CM

## 2022-04-01 DIAGNOSIS — Z13.31 POSITIVE DEPRESSION SCREENING: ICD-10-CM

## 2022-04-01 DIAGNOSIS — F41.8 ANXIETY WITH DEPRESSION: ICD-10-CM

## 2022-04-01 DIAGNOSIS — K50.119 CROHN'S DISEASE OF COLON WITH COMPLICATION (HCC): ICD-10-CM

## 2022-04-01 DIAGNOSIS — R20.0 BILATERAL HAND NUMBNESS: ICD-10-CM

## 2022-04-01 DIAGNOSIS — G93.9 PONTINE LESION: ICD-10-CM

## 2022-04-01 DIAGNOSIS — G89.4 CHRONIC PAIN SYNDROME: ICD-10-CM

## 2022-04-01 DIAGNOSIS — Z23 ENCOUNTER FOR VACCINATION: ICD-10-CM

## 2022-04-01 DIAGNOSIS — F32.1 MODERATE MAJOR DEPRESSION, SINGLE EPISODE (HCC): ICD-10-CM

## 2022-04-01 DIAGNOSIS — J45.909 UNCOMPLICATED ASTHMA, UNSPECIFIED ASTHMA SEVERITY, UNSPECIFIED WHETHER PERSISTENT: ICD-10-CM

## 2022-04-01 DIAGNOSIS — E83.51 HYPOCALCEMIA: ICD-10-CM

## 2022-04-01 DIAGNOSIS — Z12.31 ENCOUNTER FOR SCREENING MAMMOGRAM FOR MALIGNANT NEOPLASM OF BREAST: ICD-10-CM

## 2022-04-01 DIAGNOSIS — D75.89 MACROCYTOSIS WITHOUT ANEMIA: ICD-10-CM

## 2022-04-01 PROCEDURE — 99495 TRANSJ CARE MGMT MOD F2F 14D: CPT | Performed by: INTERNAL MEDICINE

## 2022-04-01 PROCEDURE — 3008F BODY MASS INDEX DOCD: CPT | Performed by: ANESTHESIOLOGY

## 2022-04-01 RX ORDER — DULOXETIN HYDROCHLORIDE 60 MG/1
60 CAPSULE, DELAYED RELEASE ORAL DAILY
Qty: 90 CAPSULE | Refills: 1 | Status: SHIPPED | OUTPATIENT
Start: 2022-04-01 | End: 2022-05-16 | Stop reason: SDUPTHER

## 2022-04-01 RX ORDER — CYCLOBENZAPRINE HCL 5 MG
5 TABLET ORAL 3 TIMES DAILY PRN
Qty: 90 TABLET | Refills: 0 | Status: SHIPPED | OUTPATIENT
Start: 2022-04-01

## 2022-04-01 RX ORDER — DISULFIRAM 250 MG/1
250 TABLET ORAL DAILY
Qty: 90 TABLET | Refills: 0 | Status: SHIPPED | OUTPATIENT
Start: 2022-04-01

## 2022-04-01 RX ORDER — TRAMADOL HYDROCHLORIDE 50 MG/1
50 TABLET ORAL EVERY 8 HOURS PRN
Qty: 90 TABLET | Refills: 0 | Status: SHIPPED | OUTPATIENT
Start: 2022-04-01

## 2022-04-01 RX ORDER — GABAPENTIN 400 MG/1
400 CAPSULE ORAL 3 TIMES DAILY
Qty: 270 CAPSULE | Refills: 1 | Status: SHIPPED | OUTPATIENT
Start: 2022-04-01 | End: 2022-05-05 | Stop reason: SDUPTHER

## 2022-04-01 NOTE — PROGRESS NOTES
Assessment/Plan:     No problem-specific Assessment & Plan notes found for this encounter  Diagnoses and all orders for this visit:    Alcohol use with withdrawal ([facility] [address] )    Uncomplicated [facility] dependence [facility] )    Recurrent major depressive disorder, in partial remission ([facility] )    JOSE (generalized anxiety disorder)    Hypomagnesemia    Severe protein-calorie malnutrition (HCC)    Bilateral hand numbness    Ambulatory dysfunction    Transaminitis    Folic acid deficiency    Pontine lesion    Chronic pain syndrome    Neuropathy, alcoholic (HCC)    Lower extremity numbness    Alcohol withdrawal syndrome with complication (HCC)    Lactate blood increased    Macrocytosis without anemia    Hypocalcemia    Positive depression screening    Elevated glucose    Crohn's disease of colon with complication (HCC)    Fatty liver, alcoholic    Moderate major depression, single episode ([facility] [address] )    Anxiety with depression    Insomnia secondary to depression with anxiety    Encounter for vaccination    Hypothyroidism, unspecified type    Moderate persistent asthma without complication    Anhedonia    Uncomplicated asthma, unspecified asthma severity, unspecified whether persistent    Encounter for screening mammogram for malignant neoplasm of breast     A/P: Doing ok and some improvement, but looks as if all her s/s due to ETOH use and end organ damage  Recent flare may be due to the biologic she received  Will increase her SNRI for pain, JOSE, and MDD  Will increase her BJORN  Continue the ultram for now, but cannot continue long term  VNA to restart her home PT/OT  Restart her thiamine  Keep f/u next week for f/u MRI of the pontine lesion  Keep going with NA  Continue disulfiram and discuss vivitrol next visit  Will recheck labs and need to get thyroid replacement back to therapeutic levels to make sure nothing else is contributing to her s/s  Appreciate GI input and keep f/u  Continue current treatment otherwise   RTC three weeks for f/u labs, pain, ETOH, and discuss vivitrol    Subjective:     Patient ID: Aryan Roberts is a 36 y o  female  WF seen for recent [facility] admission for ETOH withdraw, bilat UE/LE pain, tingling, and weakness, and depression/JOSE  Pt admitted and treated conservatively with meds only  Recommended to do in patient detox/rehab and refused  W/u revealed that most of her MS s/s were due to to heavy ETOH use  Continued on ultram, flexeril, disulfram, SNRI, geovani, and thiamine  Since d/c, doing a little better  No fever or chills  Gait slightly better, but continues with pain  Abstaining from ETOH  Appetite improving  Tolerating the meds  MDD/JOSE stable  Attending NA mtg  Just started biologicals for her Crohn's  Review of Systems   Constitutional: Positive for activity change, appetite change and fatigue  Negative for chills, diaphoresis and fever  HENT: Negative  Eyes: Negative for visual disturbance  Respiratory: Negative for cough, chest tightness, shortness of breath and wheezing  Cardiovascular: Negative for chest pain, palpitations and leg swelling  Gastrointestinal: Negative for abdominal pain, constipation, diarrhea, nausea and vomiting  Endocrine: Negative for cold intolerance and heat intolerance  Genitourinary: Negative for difficulty urinating, dysuria and frequency  Musculoskeletal: Positive for arthralgias, gait problem and myalgias  Negative for joint swelling  Neurological: Positive for weakness and numbness  Negative for dizziness, tremors, seizures, syncope, facial asymmetry, speech difficulty, light-headedness and headaches  Psychiatric/Behavioral: Positive for dysphoric mood and sleep disturbance  Negative for confusion, hallucinations and suicidal ideas  The patient is nervous/anxious  Objective:     Physical Exam  Vitals and nursing note reviewed  Constitutional:       General: She is not in acute distress  Appearance: Normal appearance   She is not ill-appearing  HENT:      Head: Normocephalic and atraumatic  Mouth/Throat:      Mouth: Mucous membranes are moist    Eyes:      Extraocular Movements: Extraocular movements intact  Conjunctiva/sclera: Conjunctivae normal       Pupils: Pupils are equal, round, and reactive to light  Neck:      Vascular: No carotid bruit  Cardiovascular:      Rate and Rhythm: Normal rate and regular rhythm  Heart sounds: Normal heart sounds  Pulmonary:      Effort: Pulmonary effort is normal  No respiratory distress  Breath sounds: Normal breath sounds  No wheezing or rales  Abdominal:      General: Bowel sounds are normal  There is no distension  Tenderness: There is no abdominal tenderness  Musculoskeletal:         General: Tenderness present  No swelling or deformity  Cervical back: Neck supple  Right lower leg: Edema present  Left lower leg: Edema present  Neurological:      General: No focal deficit present  Mental Status: She is alert and oriented to person, place, and time  Mental status is at baseline  Cranial Nerves: No cranial nerve deficit  Motor: Weakness present  Coordination: Coordination normal       Gait: Gait abnormal       Deep Tendon Reflexes: Reflexes normal    Psychiatric:         Mood and Affect: Mood normal          Behavior: Behavior normal          Thought Content: Thought content normal          Judgment: Judgment normal            Vitals:    04/01/22 1410   BP: 128/70   Pulse: (!) 122   Temp: 97 9 °F (36 6 °C)   SpO2: 97%   Weight: 91 3 kg (201 lb 6 oz)   Height: 5' 2" (1 575 m)       Transitional Care Management Review:  Wilbur Leon is a 36 y o  female here for TCM follow up       During the TCM phone call patient stated:    TCM Call (since 3/1/2022)     Date and time call was made  3/24/2022  8:11 AM    Hospital care reviewed  Records reviewed        Patient was hospitialized at  [facility]        Date of Admission 03/19/22    Date of discharge  03/22/22    Diagnosis  alcohol abuse    Disposition  Home    Were the patients medications reviewed and updated  Yes    Current Symptoms  None      TCM Call (since 3/1/2022)     Post hospital issues  None    Should patient be enrolled in anticoag monitoring? No    Scheduled for follow up? Yes    Did you obtain your prescribed medications  Yes    Do you need help managing your prescriptions or medications  No    Is transportation to your appointment needed  No    I have advised the patient to call PCP with any new or worsening symptoms  sony gina kelsey    Are you recieving any outpatient services  No    Are you recieving home care services  No    Are you using any community resources  No    Current waiver services  No    Have you fallen in the last 12 months  No    Interperter language line needed  No    Counseling  Patient    Counseling topics  Diagnostic results; Prognosis; Activities of daily living; Importance of RX compliance; patient and family education; instructions for management; Risk factor reduction        Scheduled Medication Review:  Pt's scheduled medication use was reviewed by myself/staff via the [employer] website  Pt's use has been found to be appropriate w/o any concerns for misuse by the patient  Pt's current conditions require continued scheduled medication use at this time  Future review for continued appropriate medication use and misuse will continue     Allan Thayer DO

## 2022-04-07 ENCOUNTER — HOSPITAL ENCOUNTER (OUTPATIENT)
Dept: MRI IMAGING | Facility: HOSPITAL | Age: 42
Discharge: HOME | End: 2022-04-07
Payer: COMMERCIAL

## 2022-04-07 DIAGNOSIS — G93.9 PONTINE LESION: ICD-10-CM

## 2022-04-07 DIAGNOSIS — R26.2 AMBULATORY DYSFUNCTION: ICD-10-CM

## 2022-04-07 DIAGNOSIS — J45.40 MODERATE PERSISTENT ASTHMA WITHOUT COMPLICATION: ICD-10-CM

## 2022-04-07 PROCEDURE — 70553 MRI BRAIN STEM W/O & W/DYE: CPT

## 2022-04-07 PROCEDURE — G1004 CDSM NDSC: HCPCS

## 2022-04-07 PROCEDURE — A9585 GADOBUTROL INJECTION: HCPCS | Performed by: PHYSICIAN ASSISTANT

## 2022-04-07 RX ORDER — ALBUTEROL SULFATE 90 UG/1
AEROSOL, METERED RESPIRATORY (INHALATION)
Qty: 18 G | Refills: 3 | Status: SHIPPED | OUTPATIENT
Start: 2022-04-07 | End: 2022-04-25 | Stop reason: SDUPTHER

## 2022-04-07 RX ADMIN — GADOBUTROL 10 ML: 604.72 INJECTION INTRAVENOUS at 18:08

## 2022-04-21 ENCOUNTER — TELEPHONE (OUTPATIENT)
Dept: PAIN MEDICINE | Facility: CLINIC | Age: 42
End: 2022-04-21

## 2022-04-21 ENCOUNTER — TELEPHONE (OUTPATIENT)
Dept: OTHER | Facility: HOSPITAL | Age: 42
End: 2022-04-21

## 2022-04-21 NOTE — TELEPHONE ENCOUNTER
Attempted to contact patient via telephone to review results of repeat MRI brain with and without contrast which demonstrated persistent nonenhancing rounded T2/FLAIR hyperintesity in the central simona likely in setting of chronic alcohol use and possible CPM but was unable to reach her and voicemail did not have identifying information so was unable to leave a message  Recommend patient follow-up with neurology team as an outpatient, scheduled to see Dr Lito Blum on June 8, 2022  She is also scheduled to have an EMG completed 4/27/2022

## 2022-04-21 NOTE — TELEPHONE ENCOUNTER
Patient called back and results reviewed with her via telephone  Discussed that MRI brain with and without contrast demonstrated stable non-enhancing rounded T2/FLAIR hyperintensity in the central simona likely in the setting of chronic alcohol use and possible CPM  Recommend continued alcohol cessation  Patient notes that she is currently in a rehab facility and doing much better and notes her walking is much improved since being admitted  She notes continued burning pain in her toes and feet  She notes she will plan to have EMG completed when she returns home and will follow-up with neurology at that time as well  Recommend repeat MRI brain with and without contrast in 6 months to ensure stability

## 2022-04-21 NOTE — TELEPHONE ENCOUNTER
Patient is requesting a Virtual with [facility] currently out of state having a lot pain and wants to talk about her medication       Please advise    Thank you    [phone]

## 2022-04-25 DIAGNOSIS — J45.40 MODERATE PERSISTENT ASTHMA WITHOUT COMPLICATION: ICD-10-CM

## 2022-04-26 RX ORDER — ALBUTEROL SULFATE 90 UG/1
2 AEROSOL, METERED RESPIRATORY (INHALATION) EVERY 4 HOURS PRN
Qty: 18 G | Refills: 3 | Status: SHIPPED | OUTPATIENT
Start: 2022-04-26 | End: 2022-05-16 | Stop reason: SDUPTHER

## 2022-05-05 DIAGNOSIS — F33.41 RECURRENT MAJOR DEPRESSIVE DISORDER, IN PARTIAL REMISSION (HCC): ICD-10-CM

## 2022-05-05 DIAGNOSIS — R20.0 LOWER EXTREMITY NUMBNESS: ICD-10-CM

## 2022-05-05 DIAGNOSIS — F51.05 INSOMNIA SECONDARY TO DEPRESSION WITH ANXIETY: ICD-10-CM

## 2022-05-05 DIAGNOSIS — J45.909 UNCOMPLICATED ASTHMA, UNSPECIFIED ASTHMA SEVERITY, UNSPECIFIED WHETHER PERSISTENT: ICD-10-CM

## 2022-05-05 DIAGNOSIS — F41.8 ANXIETY WITH DEPRESSION: ICD-10-CM

## 2022-05-05 DIAGNOSIS — R79.89 LACTATE BLOOD INCREASED: ICD-10-CM

## 2022-05-05 DIAGNOSIS — K50.119 CROHN'S DISEASE OF COLON WITH COMPLICATION (HCC): ICD-10-CM

## 2022-05-05 DIAGNOSIS — Z13.31 POSITIVE DEPRESSION SCREENING: ICD-10-CM

## 2022-05-05 DIAGNOSIS — Z12.31 ENCOUNTER FOR SCREENING MAMMOGRAM FOR MALIGNANT NEOPLASM OF BREAST: ICD-10-CM

## 2022-05-05 DIAGNOSIS — Z23 ENCOUNTER FOR VACCINATION: ICD-10-CM

## 2022-05-05 DIAGNOSIS — R74.01 TRANSAMINITIS: ICD-10-CM

## 2022-05-05 DIAGNOSIS — K70.0 FATTY LIVER, ALCOHOLIC: ICD-10-CM

## 2022-05-05 DIAGNOSIS — F10.239 ALCOHOL WITHDRAWAL SYNDROME WITH COMPLICATION (HCC): ICD-10-CM

## 2022-05-05 DIAGNOSIS — J45.40 MODERATE PERSISTENT ASTHMA WITHOUT COMPLICATION: ICD-10-CM

## 2022-05-05 DIAGNOSIS — D75.89 MACROCYTOSIS WITHOUT ANEMIA: ICD-10-CM

## 2022-05-05 DIAGNOSIS — F32.1 MODERATE MAJOR DEPRESSION, SINGLE EPISODE (HCC): ICD-10-CM

## 2022-05-05 DIAGNOSIS — E83.51 HYPOCALCEMIA: ICD-10-CM

## 2022-05-05 DIAGNOSIS — E43 SEVERE PROTEIN-CALORIE MALNUTRITION (HCC): ICD-10-CM

## 2022-05-05 DIAGNOSIS — E83.42 HYPOMAGNESEMIA: ICD-10-CM

## 2022-05-05 DIAGNOSIS — R73.09 ELEVATED GLUCOSE: ICD-10-CM

## 2022-05-05 DIAGNOSIS — E03.9 HYPOTHYROIDISM, UNSPECIFIED TYPE: ICD-10-CM

## 2022-05-05 DIAGNOSIS — R45.84 ANHEDONIA: ICD-10-CM

## 2022-05-05 DIAGNOSIS — F41.8 INSOMNIA SECONDARY TO DEPRESSION WITH ANXIETY: ICD-10-CM

## 2022-05-05 RX ORDER — LEVOTHYROXINE SODIUM 0.15 MG/1
150 TABLET ORAL EVERY MORNING
Qty: 90 TABLET | Refills: 1 | Status: SHIPPED | OUTPATIENT
Start: 2022-05-05

## 2022-05-05 RX ORDER — GABAPENTIN 400 MG/1
400 CAPSULE ORAL 3 TIMES DAILY
Qty: 270 CAPSULE | Refills: 1 | Status: SHIPPED | OUTPATIENT
Start: 2022-05-05 | End: 2022-05-12

## 2022-05-12 ENCOUNTER — TELEMEDICINE (OUTPATIENT)
Dept: PAIN MEDICINE | Facility: CLINIC | Age: 42
End: 2022-05-12
Payer: COMMERCIAL

## 2022-05-12 DIAGNOSIS — G62.9 PERIPHERAL POLYNEUROPATHY: ICD-10-CM

## 2022-05-12 DIAGNOSIS — G89.4 CHRONIC PAIN SYNDROME: Primary | ICD-10-CM

## 2022-05-12 PROCEDURE — 1036F TOBACCO NON-USER: CPT | Performed by: NURSE PRACTITIONER

## 2022-05-12 PROCEDURE — 99214 OFFICE O/P EST MOD 30 MIN: CPT | Performed by: NURSE PRACTITIONER

## 2022-05-12 RX ORDER — GABAPENTIN 600 MG/1
600 TABLET ORAL 3 TIMES DAILY
Qty: 90 TABLET | Refills: 1 | Status: SHIPPED | OUTPATIENT
Start: 2022-05-12

## 2022-05-12 NOTE — PROGRESS NOTES
Virtual Regular Visit    Assessment/Plan:    Problem List Items Addressed This Visit        Other    Chronic pain syndrome - Primary      Other Visit Diagnoses     Peripheral polyneuropathy        Relevant Medications    gabapentin (NEURONTIN) 600 MG tablet               Reason for visit for follow-up office visit    Chief Complaint   Patient presents with    Virtual Regular Visit        Encounter provider Pixie Schirmer, CRNP    Provider located at [address] [place] [id number]      Recent Visits  No visits were found meeting these conditions  Showing recent visits within past 7 days and meeting all other requirements  Future Appointments  No visits were found meeting these conditions  Showing future appointments within next 150 days and meeting all other requirements       The patient was identified by name and date of birth  Luci Lamar was informed that this is a telemedicine visit and that the visit is being conducted through [address] [place] [address] [facility] [employer] and patient was informed that this is a secure, HIPAA-compliant platform  She agrees to proceed     My office door was closed  No one else was in the room  She acknowledged consent and understanding of privacy and security of the video platform  The patient has agreed to participate and understands they can discontinue the visit at any time  Patient is aware this is a billable service  Subjective  Luci Lamar is a 36 y o  female with a history of alcoholism last seen on March 28, 2022 returns for follow-up visit regards to numbness and tingling in her hands and legs  She denies any significant neck or low back pain  MRI of the lumbar spine was unremarkable without any central or foraminal stenosis  MRI of the cervical spine reveals some disc bulges from C4-5 to C6-7 with mild right stenosis at C5-6  Upon review blood work, B1 level as noted to be low    She is scheduled for EMG is of the bilateral upper lower extremities in June and July  She is currently in a treatment facility for alcoholism and will be getting released in 2 weeks  She states that she will intermittently have shocks of pain in her toes and burning in her feet and fingers which is rather intermittent  She does feel that her thigh symptoms have improved since last office visit, however her distal extremities and finger numbness have slightly worsened  She has not been taking tramadol or cyclobenzaprine wall she has been in the treatment facility  She is taking gabapentin 400 mg 3 times a day without side effects  She currently rates her pain as 0/10, however states when she gets these intermittent shocks the pain, she will rate the pain at 10/10 on a numeric pain rating scale      HPI     Past Medical History:   Diagnosis Date    Anxiety     Asthma     Crohn disease ([address] )     Depression     Disease of thyroid gland     Hypertension     Hypothyroidism     Lower extremity numbness 02/11/2022    Psychiatric disorder        Past Surgical History:   Procedure Laterality Date    IR LUMBAR PUNCTURE  2/8/2022    NO PAST SURGERIES         Current Outpatient Medications   Medication Sig Dispense Refill    gabapentin (NEURONTIN) 600 MG tablet Take 1 tablet (600 mg total) by mouth in the morning and 1 tablet (600 mg total) in the evening and 1 tablet (600 mg total) before bedtime   90 tablet 1    albuterol (PROVENTIL HFA,VENTOLIN HFA) 90 mcg/act inhaler Inhale 2 puffs every 4 (four) hours as needed for wheezing 18 g 3    Cholecalciferol 25 MCG (1000 UT) CHEW Chew      clonazePAM (KlonoPIN) 0 5 mg tablet Take 1 tablet (0 5 mg total) by mouth 2 (two) times a day as needed (prn) 60 tablet 0    cyclobenzaprine (FLEXERIL) 5 mg tablet Take 1 tablet (5 mg total) by mouth 3 (three) times a day as needed for muscle spasms 90 tablet 0    Diclofenac Sodium (VOLTAREN) 1 % Apply 2 g topically 4 (four) times a day 150 g 0    disulfiram (ANTABUSE) 250 mg tablet Take 1 tablet (250 mg total) by mouth daily 90 tablet 0    DULoxetine (Cymbalta) 60 mg delayed release capsule Take 1 capsule (60 mg total) by mouth daily 90 capsule 1    fluticasone (FLONASE) 50 mcg/act nasal spray 1 spray into each nostril daily 16 g 0    folic acid (FOLVITE) 1 mg tablet Take 1 tablet (1 mg total) by mouth daily 30 tablet 5    furosemide (LASIX) 20 mg tablet Take 1 tablet (20 mg total) by mouth daily 30 tablet 5    levothyroxine 150 mcg tablet Take 1 tablet (150 mcg total) by mouth every morning 90 tablet 1    Multiple Vitamin (multivitamin) tablet Take 1 tablet by mouth daily        naloxone (Narcan) 4 mg/0 1 mL nasal spray 0 1 mL (4 mg total) into each nostril as needed (respiratory depression or possible OD) (Patient not taking: Reported on 3/28/2022 ) 1 each 1    omeprazole (PriLOSEC OTC) 20 MG tablet Take 1 tablet (20 mg total) by mouth daily  0    ondansetron (ZOFRAN) 4 mg tablet Take 4 mg by mouth      thiamine 50 MG tablet Take 2 tablets (100 mg total) by mouth daily 180 tablet 1    traMADol (ULTRAM) 50 mg tablet Take 1 tablet (50 mg total) by mouth every 8 (eight) hours as needed for moderate pain 90 tablet 0     No current facility-administered medications for this visit  Allergies   Allergen Reactions    Penicillins Hives and Rash     HIVES, SWELLS, riley ancef x 1 dose         Review of Systems    Video Exam    There were no vitals filed for this visit  Physical Exam     General:  Alert and oriented x3  In no acute distress  Well-developed and well-nourished  HEENT:  Grossly intact  Eyes:  Anicteric  Respiratory:  Even and unlabored respirations  Psychiatric:  Normal affect  Musculoskeletal:  Patient able to move all 4 extremities  Normal gait  Plan:  1  I will increase gabapentin to 600 mg 3 times a day for neuropathic complaints    I advised the patient that if they experience any side effects or issues with the changes in their medication regiment, they should give our office a call to discuss  I also advised the patient not to drive or operate machinery until they see how the changes in the medication regimen affects them  The patient was agreeable and verbalized an understanding  2  Patient will move forward with EMG of the upper and lower extremities scheduled   3  Patient may continue duloxetine as prescribed  4  Patient is non opioid therapy from our practice secondary to history of alcoholism  5  The patient will follow-up in 4 weeks or sooner if needed    I spent 30 minutes directly with the patient during this visit    [facility] verbally agrees to participate in [employer]  Pt is aware that [employer] could be limited without vital signs or the ability to perform a full hands-on physical Claudiojaison Soto understands she or the provider may request at any time to terminate the video visit and request the patient to seek care or treatment in person

## 2022-05-16 DIAGNOSIS — R20.0 LOWER EXTREMITY NUMBNESS: ICD-10-CM

## 2022-05-16 DIAGNOSIS — J45.40 MODERATE PERSISTENT ASTHMA WITHOUT COMPLICATION: ICD-10-CM

## 2022-05-16 RX ORDER — ALBUTEROL SULFATE 90 UG/1
2 AEROSOL, METERED RESPIRATORY (INHALATION) EVERY 4 HOURS PRN
Qty: 18 G | Refills: 3 | Status: SHIPPED | OUTPATIENT
Start: 2022-05-16 | End: 2022-05-26 | Stop reason: SDUPTHER

## 2022-05-16 RX ORDER — DULOXETIN HYDROCHLORIDE 60 MG/1
60 CAPSULE, DELAYED RELEASE ORAL DAILY
Qty: 90 CAPSULE | Refills: 1 | Status: SHIPPED | OUTPATIENT
Start: 2022-05-16 | End: 2022-07-27 | Stop reason: SDUPTHER

## 2022-05-26 DIAGNOSIS — J45.40 MODERATE PERSISTENT ASTHMA WITHOUT COMPLICATION: ICD-10-CM

## 2022-05-26 RX ORDER — ALBUTEROL SULFATE 90 UG/1
2 AEROSOL, METERED RESPIRATORY (INHALATION) EVERY 4 HOURS PRN
Qty: 18 G | Refills: 3 | Status: SHIPPED | OUTPATIENT
Start: 2022-05-26 | End: 2022-07-12 | Stop reason: SDUPTHER

## 2022-06-02 DIAGNOSIS — F41.1 GAD (GENERALIZED ANXIETY DISORDER): ICD-10-CM

## 2022-06-02 RX ORDER — CLONAZEPAM 0.5 MG/1
0.5 TABLET ORAL 2 TIMES DAILY PRN
Qty: 60 TABLET | Refills: 0 | Status: SHIPPED | OUTPATIENT
Start: 2022-06-02 | End: 2022-07-12 | Stop reason: SDUPTHER

## 2022-06-13 DIAGNOSIS — R60.0 LOCALIZED EDEMA: ICD-10-CM

## 2022-06-13 RX ORDER — FUROSEMIDE 20 MG/1
TABLET ORAL
Qty: 30 TABLET | Refills: 5 | Status: SHIPPED | OUTPATIENT
Start: 2022-06-13

## 2022-06-15 ENCOUNTER — OFFICE VISIT (OUTPATIENT)
Dept: INTERNAL MEDICINE CLINIC | Facility: CLINIC | Age: 42
End: 2022-06-15
Payer: COMMERCIAL

## 2022-06-15 VITALS
DIASTOLIC BLOOD PRESSURE: 80 MMHG | SYSTOLIC BLOOD PRESSURE: 116 MMHG | WEIGHT: 210.4 LBS | BODY MASS INDEX: 38.72 KG/M2 | HEIGHT: 62 IN | HEART RATE: 110 BPM | OXYGEN SATURATION: 98 % | TEMPERATURE: 98.8 F

## 2022-06-15 DIAGNOSIS — G62.1 ALCOHOLIC PERIPHERAL NEUROPATHY (HCC): ICD-10-CM

## 2022-06-15 DIAGNOSIS — Z72.0 TOBACCO ABUSE: ICD-10-CM

## 2022-06-15 DIAGNOSIS — F10.20 UNCOMPLICATED ALCOHOL DEPENDENCE (HCC): ICD-10-CM

## 2022-06-15 DIAGNOSIS — E83.42 HYPOMAGNESEMIA: ICD-10-CM

## 2022-06-15 DIAGNOSIS — K50.119 CROHN'S DISEASE OF COLON WITH COMPLICATION (HCC): ICD-10-CM

## 2022-06-15 DIAGNOSIS — R60.0 LOCALIZED EDEMA: Primary | ICD-10-CM

## 2022-06-15 DIAGNOSIS — Z12.31 ENCOUNTER FOR SCREENING MAMMOGRAM FOR MALIGNANT NEOPLASM OF BREAST: ICD-10-CM

## 2022-06-15 DIAGNOSIS — J45.40 MODERATE PERSISTENT ASTHMA WITHOUT COMPLICATION: ICD-10-CM

## 2022-06-15 DIAGNOSIS — E03.9 HYPOTHYROIDISM, UNSPECIFIED TYPE: ICD-10-CM

## 2022-06-15 DIAGNOSIS — F41.1 GAD (GENERALIZED ANXIETY DISORDER): ICD-10-CM

## 2022-06-15 PROCEDURE — 1036F TOBACCO NON-USER: CPT | Performed by: INTERNAL MEDICINE

## 2022-06-15 PROCEDURE — 3008F BODY MASS INDEX DOCD: CPT | Performed by: INTERNAL MEDICINE

## 2022-06-15 PROCEDURE — 99214 OFFICE O/P EST MOD 30 MIN: CPT | Performed by: INTERNAL MEDICINE

## 2022-06-15 RX ORDER — BUSPIRONE HYDROCHLORIDE 5 MG/1
5 TABLET ORAL 3 TIMES DAILY
Qty: 270 TABLET | Refills: 1 | Status: SHIPPED | OUTPATIENT
Start: 2022-06-15 | End: 2022-07-27 | Stop reason: SDUPTHER

## 2022-06-15 NOTE — LETTER
Amairani 15, 2022     Patient: Jie Prasad  YOB: 1980  Date of Visit: 6/15/2022      To Whom it May Concern:    Jie Prasad is under my professional care  Berylmariaa Marcos was seen in my office on 6/15/2022  Mele Rodríguez has made good progress in her ongoing medical condition, but is still not capable of returning to work at this time  See continues to see various specialists and is undergoing treatment  She is to remain off work at this time and it is our hope she will be ready to return without restrictions after her next evaluation in six weeks  If you have any questions or concerns, please don't hesitate to call           Sincerely,          Darell Rather, DO        CC: No Recipients

## 2022-06-15 NOTE — PROGRESS NOTES
Assessment/Plan:  Problem List Items Addressed This Visit        Digestive    Crohn's disease of colon with complication ([facility] )    Relevant Orders    CBC and differential    Comprehensive metabolic panel       Endocrine    Hypothyroidism    Relevant Orders    TSH, 3rd generation       Respiratory    Moderate persistent asthma without complication    Relevant Medications    mometasone-formoterol (Dulera) 100-5 MCG/ACT inhaler       Nervous and Auditory    Alcoholic peripheral neuropathy (HCC)    Relevant Orders    CBC and differential    Comprehensive metabolic panel       Other    Hypomagnesemia    Relevant Orders    Magnesium    JOSE (generalized anxiety disorder)    Relevant Medications    busPIRone (BUSPAR) 5 mg tablet    Alcohol dependence ([facility] )      Other Visit Diagnoses     Localized edema    -  Primary    Relevant Orders    Comprehensive metabolic panel    Encounter for screening mammogram for malignant neoplasm of breast        Relevant Orders    Mammo screening bilateral w 3d & cad    Tobacco abuse               Diagnoses and all orders for this visit:    Localized edema  -     Comprehensive metabolic panel; Future    Alcoholic peripheral neuropathy (HCC)  -     CBC and differential; Future  -     Comprehensive metabolic panel; Future    Crohn's disease of colon with complication ([place] [address] )  -     CBC and differential; Future  -     Comprehensive metabolic panel; Future    Moderate persistent asthma without complication  -     mometasone-formoterol (Dulera) 100-5 MCG/ACT inhaler; Inhale 2 puffs 2 (two) times a day Rinse mouth after use  Hypomagnesemia  -     Magnesium; Future    Hypothyroidism, unspecified type  -     TSH, 3rd generation; Future    Encounter for screening mammogram for malignant neoplasm of breast  -     Mammo screening bilateral w 3d & cad; Future    Uncomplicated alcohol dependence (HCC)    Tobacco abuse    JOSE (generalized anxiety disorder)  -     busPIRone (BUSPAR) 5 mg tablet;  Take 1 tablet (5 mg total) by mouth 3 (three) times a day      No problem-specific Assessment & Plan notes found for this encounter  A/P: Doing better  Tolerating meds, diet, and activity level improving  Will check labs  Still not quite ready to RTW both physically and mentally  Will add LAMA/ICS for the asthma  Will start buspar for the JOSE  Continue current treatment and refrain from ETOH  Keep f/u with  Counseling and AA  RTC six weeks for f/u, but will call in two weeks for an update  Subjective:      Patient ID: Abhinav Olivier is a 36 y o  female  [facility] RTC for f/u muscle weakness/edema, Crohn's, etc   Pt admitted and w/u was negative for CNS event,but felt to be most likely to advanced ETOH disease  Upon d/c, pt went into rehab  Doing better and pain is better and mainly when standing still for a long time  Weakness improving  Activity level better  Crohn's and RAD are manageable, but using her MDI daily  Back to smoking  Zain [place] JOSE is good  Tolerating meds  Reports one relapse since d/c from rehab and missed her 60 day chip  Is one week clean  Seeing multiple counselors, has a sponsor, and is going to [facility] The following portions of the patient's history were reviewed and updated as appropriate:   She has a past medical history of Anxiety, Asthma, Crohn disease ([facility] ), Depression, Disease of thyroid gland, Hypertension, Hypothyroidism, Lower extremity numbness (02/11/2022), and Psychiatric disorder  ,  does not have any pertinent problems on file  ,   has a past surgical history that includes No past surgeries and IR lumbar puncture (2/8/2022)  ,  family history includes Colon cancer in her family, father, and mother; Hypertension in her father and mother; Kidney cancer in her family, father, and mother  ,   reports that she quit smoking about 3 years ago  She has never used smokeless tobacco  She reports previous alcohol use  She reports that she does not use drugs  ,  is allergic to penicillins     Current Outpatient Medications   Medication Sig Dispense Refill    albuterol (PROVENTIL HFA,VENTOLIN HFA) 90 mcg/act inhaler Inhale 2 puffs every 4 (four) hours as needed for wheezing 18 g 3    busPIRone (BUSPAR) 5 mg tablet Take 1 tablet (5 mg total) by mouth 3 (three) times a day 270 tablet 1    Cholecalciferol 25 MCG (1000 UT) CHEW Chew      clonazePAM (KlonoPIN) 0 5 mg tablet Take 1 tablet (0 5 mg total) by mouth 2 (two) times a day as needed (prn) 60 tablet 0    cyclobenzaprine (FLEXERIL) 5 mg tablet Take 1 tablet (5 mg total) by mouth 3 (three) times a day as needed for muscle spasms 90 tablet 0    Diclofenac Sodium (VOLTAREN) 1 % Apply 2 g topically 4 (four) times a day 150 g 0    disulfiram (ANTABUSE) 250 mg tablet Take 1 tablet (250 mg total) by mouth daily 90 tablet 0    DULoxetine (Cymbalta) 60 mg delayed release capsule Take 1 capsule (60 mg total) by mouth in the morning  90 capsule 1    fluticasone (FLONASE) 50 mcg/act nasal spray 1 spray into each nostril daily 16 g 0    folic acid (FOLVITE) 1 mg tablet Take 1 tablet (1 mg total) by mouth daily 30 tablet 5    furosemide (LASIX) 20 mg tablet TAKE 1 TABLET BY MOUTH ONCE DAILY (Patient taking differently: Take 20 mg by mouth as needed) 30 tablet 5    gabapentin (NEURONTIN) 600 MG tablet Take 1 tablet (600 mg total) by mouth in the morning and 1 tablet (600 mg total) in the evening and 1 tablet (600 mg total) before bedtime  90 tablet 1    levothyroxine 150 mcg tablet Take 1 tablet (150 mcg total) by mouth every morning 90 tablet 1    mometasone-formoterol (Dulera) 100-5 MCG/ACT inhaler Inhale 2 puffs 2 (two) times a day Rinse mouth after use   13 g 3    Multiple Vitamin (multivitamin) tablet Take 1 tablet by mouth daily        thiamine 50 MG tablet Take 2 tablets (100 mg total) by mouth daily 180 tablet 1    naloxone (Narcan) 4 mg/0 1 mL nasal spray 0 1 mL (4 mg total) into each nostril as needed (respiratory depression or possible OD) (Patient not taking: No sig reported) 1 each 1    traMADol (ULTRAM) 50 mg tablet Take 1 tablet (50 mg total) by mouth every 8 (eight) hours as needed for moderate pain (Patient not taking: Reported on 6/15/2022) 90 tablet 0     No current facility-administered medications for this visit  Review of Systems   Constitutional: Negative for activity change, chills, diaphoresis, fatigue and fever  HENT: Negative  Eyes: Negative for visual disturbance  Respiratory: Positive for wheezing  Negative for cough, chest tightness and shortness of breath  Cardiovascular: Negative for chest pain, palpitations and leg swelling  Gastrointestinal: Negative for abdominal pain, constipation, diarrhea, nausea and vomiting  Endocrine: Negative for cold intolerance and heat intolerance  Genitourinary: Negative for difficulty urinating, dysuria and frequency  Musculoskeletal: Negative for arthralgias, gait problem and myalgias  Neurological: Negative for dizziness, seizures, syncope, weakness, light-headedness and headaches  Psychiatric/Behavioral: Negative for confusion, dysphoric mood and sleep disturbance  The patient is not nervous/anxious  PHQ-2/9 Depression Screening          Objective:  Vitals:    06/15/22 1527   BP: 116/80   Pulse: (!) 110   Temp: 98 8 °F (37 1 °C)   TempSrc: Tympanic   SpO2: 98%   Weight: 95 4 kg (210 lb 6 4 oz)   Height: 5' 2" (1 575 m)     Body mass index is 38 48 kg/m²  Physical Exam  Vitals and nursing note reviewed  Constitutional:       General: She is not in acute distress  Appearance: Normal appearance  She is not ill-appearing  HENT:      Head: Normocephalic and atraumatic  Mouth/Throat:      Mouth: Mucous membranes are moist    Eyes:      Extraocular Movements: Extraocular movements intact  Conjunctiva/sclera: Conjunctivae normal       Pupils: Pupils are equal, round, and reactive to light  Neck:      Vascular: No carotid bruit     Cardiovascular: Rate and Rhythm: Normal rate and regular rhythm  Heart sounds: Normal heart sounds  Pulmonary:      Effort: Pulmonary effort is normal  No respiratory distress  Breath sounds: Normal breath sounds  No wheezing or rales  Abdominal:      General: Bowel sounds are normal  There is no distension  Palpations: Abdomen is soft  Tenderness: There is no abdominal tenderness  Musculoskeletal:      Cervical back: Neck supple  Right lower leg: No edema  Left lower leg: No edema  Neurological:      General: No focal deficit present  Mental Status: She is alert and oriented to person, place, and time  Mental status is at baseline  Psychiatric:         Mood and Affect: Mood normal          Behavior: Behavior normal          Thought Content:  Thought content normal          Judgment: Judgment normal

## 2022-07-07 ENCOUNTER — TELEPHONE (OUTPATIENT)
Dept: INTERNAL MEDICINE CLINIC | Facility: CLINIC | Age: 42
End: 2022-07-07

## 2022-07-07 NOTE — TELEPHONE ENCOUNTER
----- Message from Barbara Osler, DO sent at 6/15/2022  3:49 PM EDT -----  Call pt in two weeks for update on asthma and JOSE

## 2022-07-12 DIAGNOSIS — F41.1 GAD (GENERALIZED ANXIETY DISORDER): ICD-10-CM

## 2022-07-12 DIAGNOSIS — J45.40 MODERATE PERSISTENT ASTHMA WITHOUT COMPLICATION: ICD-10-CM

## 2022-07-12 RX ORDER — ALBUTEROL SULFATE 90 UG/1
2 AEROSOL, METERED RESPIRATORY (INHALATION) EVERY 4 HOURS PRN
Qty: 18 G | Refills: 3 | Status: SHIPPED | OUTPATIENT
Start: 2022-07-12 | End: 2022-09-12 | Stop reason: SDUPTHER

## 2022-07-12 RX ORDER — CLONAZEPAM 0.5 MG/1
0.5 TABLET ORAL 2 TIMES DAILY PRN
Qty: 60 TABLET | Refills: 0 | Status: SHIPPED | OUTPATIENT
Start: 2022-07-12 | End: 2022-09-12 | Stop reason: SDUPTHER

## 2022-07-27 ENCOUNTER — OFFICE VISIT (OUTPATIENT)
Dept: INTERNAL MEDICINE CLINIC | Facility: CLINIC | Age: 42
End: 2022-07-27
Payer: COMMERCIAL

## 2022-07-27 ENCOUNTER — TELEPHONE (OUTPATIENT)
Dept: RHEUMATOLOGY | Facility: CLINIC | Age: 42
End: 2022-07-27

## 2022-07-27 VITALS
TEMPERATURE: 99.3 F | OXYGEN SATURATION: 97 % | HEART RATE: 100 BPM | DIASTOLIC BLOOD PRESSURE: 74 MMHG | BODY MASS INDEX: 38.79 KG/M2 | HEIGHT: 62 IN | WEIGHT: 210.8 LBS | SYSTOLIC BLOOD PRESSURE: 130 MMHG

## 2022-07-27 DIAGNOSIS — G93.9 PONTINE LESION: ICD-10-CM

## 2022-07-27 DIAGNOSIS — J45.40 MODERATE PERSISTENT ASTHMA WITHOUT COMPLICATION: ICD-10-CM

## 2022-07-27 DIAGNOSIS — R20.0 LOWER EXTREMITY NUMBNESS: ICD-10-CM

## 2022-07-27 DIAGNOSIS — Z72.0 TOBACCO ABUSE: ICD-10-CM

## 2022-07-27 DIAGNOSIS — G62.1 ALCOHOLIC PERIPHERAL NEUROPATHY (HCC): ICD-10-CM

## 2022-07-27 DIAGNOSIS — F41.1 GAD (GENERALIZED ANXIETY DISORDER): ICD-10-CM

## 2022-07-27 DIAGNOSIS — E03.9 HYPOTHYROIDISM, UNSPECIFIED TYPE: ICD-10-CM

## 2022-07-27 DIAGNOSIS — F10.20 UNCOMPLICATED ALCOHOL DEPENDENCE (HCC): Primary | ICD-10-CM

## 2022-07-27 PROBLEM — R74.01 TRANSAMINITIS: Status: RESOLVED | Noted: 2022-02-24 | Resolved: 2022-07-27

## 2022-07-27 PROBLEM — F10.939 ALCOHOL USE WITH WITHDRAWAL (HCC): Status: RESOLVED | Noted: 2020-11-03 | Resolved: 2022-07-27

## 2022-07-27 PROCEDURE — 99214 OFFICE O/P EST MOD 30 MIN: CPT | Performed by: INTERNAL MEDICINE

## 2022-07-27 RX ORDER — FOLIC ACID 1 MG/1
1 TABLET ORAL DAILY
Qty: 90 TABLET | Refills: 1 | Status: SHIPPED | OUTPATIENT
Start: 2022-07-27

## 2022-07-27 RX ORDER — BUSPIRONE HYDROCHLORIDE 5 MG/1
5 TABLET ORAL 3 TIMES DAILY
Qty: 270 TABLET | Refills: 1 | Status: SHIPPED | OUTPATIENT
Start: 2022-07-27

## 2022-07-27 RX ORDER — DULOXETIN HYDROCHLORIDE 60 MG/1
60 CAPSULE, DELAYED RELEASE ORAL DAILY
Qty: 90 CAPSULE | Refills: 1 | Status: SHIPPED | OUTPATIENT
Start: 2022-07-27

## 2022-07-27 NOTE — LETTER
July 27, 2022     Patient: Lalit Morris  YOB: 1980  Date of Visit: 7/27/2022      To Whom it May Concern:    Lalit Morris is under my professional care  En Webber was seen in my office on 7/27/2022  Enangélica Webber may return to work on 7/28 without any restrictions  Patient has made an almost full recovery and has no mental or physical restrictions at this time  Ongoing evaluation and treatment will continue       If you have any questions or concerns, please don't hesitate to call           Sincerely,          Linda Rivera DO        CC: No Recipients

## 2022-07-27 NOTE — PROGRESS NOTES
Assessment/Plan:  Problem List Items Addressed This Visit        Endocrine    Hypothyroidism    Relevant Orders    TSH, 3rd generation       Respiratory    Moderate persistent asthma without complication    Relevant Medications    mometasone-formoterol (Dulera) 100-5 MCG/ACT inhaler       Nervous and Auditory    Alcoholic peripheral neuropathy (HCC)       Other    Pontine lesion    JOSE (generalized anxiety disorder)    Relevant Medications    busPIRone (BUSPAR) 5 mg tablet    DULoxetine (Cymbalta) 60 mg delayed release capsule    Alcohol dependence (HCC) - Primary    Relevant Medications    thiamine 50 MG tablet    folic acid (FOLVITE) 1 mg tablet      Other Visit Diagnoses     Lower extremity numbness        Relevant Medications    DULoxetine (Cymbalta) 60 mg delayed release capsule    Tobacco abuse               Diagnoses and all orders for this visit:    Uncomplicated alcohol dependence (HCC)  -     thiamine 50 MG tablet; Take 2 tablets (100 mg total) by mouth daily  -     folic acid (FOLVITE) 1 mg tablet; Take 1 tablet (1 mg total) by mouth daily    JOSE (generalized anxiety disorder)  -     busPIRone (BUSPAR) 5 mg tablet; Take 1 tablet (5 mg total) by mouth 3 (three) times a day    Moderate persistent asthma without complication  -     mometasone-formoterol (Dulera) 100-5 MCG/ACT inhaler; Inhale 2 puffs 2 (two) times a day Rinse mouth after use  Lower extremity numbness  -     DULoxetine (Cymbalta) 60 mg delayed release capsule; Take 1 capsule (60 mg total) by mouth daily    Alcoholic peripheral neuropathy (HCC)    Pontine lesion    Hypothyroidism, unspecified type  -     TSH, 3rd generation; Future    Tobacco abuse      No problem-specific Assessment & Plan notes found for this encounter      A/P: I have spent 25 minutes with Patient  today in which greater than 50% of this time was spent in counseling/coordination of care regarding Intructions for management, Importance of tx compliance and Risk factor reductions  Doing well  Looks better clinically  Mentally better and appears to now know how much work she has ahead of her to fight this disease  Ok to RTW  Continue with AA and other counseling  Needs to stop smoking, but recommend she wait until she gets back into work and further out in her sobriety  Continue current treatment and RTC two months for routine  Note provided for work  Subjective:      Patient ID: Cheryl Abraham is a 36 y o  female  WF RTC for f/u ETOH dependency complicated by severe neuropathic pain and muscle weakness  Ended up going into residential detox and recovery  Did well, but relapsed upon d/c  Was able to get clean and has been faithfully going to [place] [id number] and has a sponsor  Goes to daily meetings, speaks with her sponsor daily, and attends other forms of counseling  Is ready to receive her 30 day chip  Has a better relationship with friends, family, and especially with her daughter  Crohn's and asthma are good despite the fact that she started smoking again  No muscle weakness and no pain  Minimal tingling in the feet  No c/o's  Eating better  Wants to RTW  The following portions of the patient's history were reviewed and updated as appropriate:   She has a past medical history of Anxiety, Asthma, Crohn disease ([facility] [address] ), Depression, Disease of thyroid gland, Hypertension, Hypothyroidism, Lower extremity numbness (02/11/2022), and Psychiatric disorder  ,  does not have any pertinent problems on file  ,   has a past surgical history that includes No past surgeries and IR lumbar puncture (2/8/2022)  ,  family history includes Colon cancer in her family, father, and mother; Hypertension in her father and mother; Kidney cancer in her family, father, and mother  ,   reports that she has been smoking cigarettes  She has been smoking about 0 25 packs per day  She has never used smokeless tobacco  She reports previous alcohol use  She reports that she does not use drugs  ,  is allergic to penicillins     Current Outpatient Medications   Medication Sig Dispense Refill    albuterol (PROVENTIL HFA,VENTOLIN HFA) 90 mcg/act inhaler Inhale 2 puffs every 4 (four) hours as needed for wheezing 18 g 3    busPIRone (BUSPAR) 5 mg tablet Take 1 tablet (5 mg total) by mouth 3 (three) times a day 270 tablet 1    Cholecalciferol 25 MCG (1000 UT) CHEW Chew      clonazePAM (KlonoPIN) 0 5 mg tablet Take 1 tablet (0 5 mg total) by mouth 2 (two) times a day as needed (prn) 60 tablet 0    disulfiram (ANTABUSE) 250 mg tablet Take 1 tablet (250 mg total) by mouth daily 90 tablet 0    DULoxetine (Cymbalta) 60 mg delayed release capsule Take 1 capsule (60 mg total) by mouth daily 90 capsule 1    folic acid (FOLVITE) 1 mg tablet Take 1 tablet (1 mg total) by mouth daily 90 tablet 1    furosemide (LASIX) 20 mg tablet TAKE 1 TABLET BY MOUTH ONCE DAILY (Patient taking differently: Take 20 mg by mouth as needed) 30 tablet 5    gabapentin (NEURONTIN) 600 MG tablet Take 1 tablet (600 mg total) by mouth in the morning and 1 tablet (600 mg total) in the evening and 1 tablet (600 mg total) before bedtime  90 tablet 1    levothyroxine 150 mcg tablet Take 1 tablet (150 mcg total) by mouth every morning 90 tablet 1    mometasone-formoterol (Dulera) 100-5 MCG/ACT inhaler Inhale 2 puffs 2 (two) times a day Rinse mouth after use   13 g 3    Multiple Vitamin (multivitamin) tablet Take 1 tablet by mouth daily        thiamine 50 MG tablet Take 2 tablets (100 mg total) by mouth daily 180 tablet 1    cyclobenzaprine (FLEXERIL) 5 mg tablet Take 1 tablet (5 mg total) by mouth 3 (three) times a day as needed for muscle spasms (Patient not taking: Reported on 7/27/2022) 90 tablet 0    Diclofenac Sodium (VOLTAREN) 1 % Apply 2 g topically 4 (four) times a day (Patient not taking: Reported on 7/27/2022) 150 g 0    fluticasone (FLONASE) 50 mcg/act nasal spray 1 spray into each nostril daily (Patient not taking: Reported on 7/27/2022) 16 g 0  naloxone (Narcan) 4 mg/0 1 mL nasal spray 0 1 mL (4 mg total) into each nostril as needed (respiratory depression or possible OD) (Patient not taking: No sig reported) 1 each 1    traMADol (ULTRAM) 50 mg tablet Take 1 tablet (50 mg total) by mouth every 8 (eight) hours as needed for moderate pain (Patient not taking: No sig reported) 90 tablet 0     No current facility-administered medications for this visit  Review of Systems   Constitutional: Negative for activity change, chills, diaphoresis, fatigue and fever  Respiratory: Negative for cough, chest tightness, shortness of breath and wheezing  Cardiovascular: Negative for chest pain, palpitations and leg swelling  Gastrointestinal: Negative for abdominal pain, constipation, diarrhea, nausea and vomiting  Genitourinary: Negative for difficulty urinating, dysuria and frequency  Musculoskeletal: Negative for arthralgias, gait problem and myalgias  Neurological: Positive for numbness  Negative for dizziness, seizures, syncope, weakness, light-headedness and headaches  Psychiatric/Behavioral: Negative for confusion, dysphoric mood, self-injury, sleep disturbance and suicidal ideas  The patient is not nervous/anxious  PHQ-2/9 Depression Screening          Objective:  Vitals:    07/27/22 1330   BP: 130/74   Pulse: 100   Temp: 99 3 °F (37 4 °C)   TempSrc: Tympanic   SpO2: 97%   Weight: 95 6 kg (210 lb 12 8 oz)   Height: 5' 2" (1 575 m)     Body mass index is 38 56 kg/m²  Physical Exam  Vitals and nursing note reviewed  Constitutional:       General: She is not in acute distress  Appearance: Normal appearance  She is not ill-appearing  HENT:      Head: Normocephalic and atraumatic  Mouth/Throat:      Mouth: Mucous membranes are moist    Eyes:      General: No scleral icterus  Extraocular Movements: Extraocular movements intact        Conjunctiva/sclera: Conjunctivae normal       Pupils: Pupils are equal, round, and reactive to light  Neck:      Vascular: No carotid bruit  Cardiovascular:      Rate and Rhythm: Normal rate and regular rhythm  Heart sounds: Normal heart sounds  Pulmonary:      Effort: Pulmonary effort is normal  No respiratory distress  Breath sounds: Normal breath sounds  No wheezing or rales  Abdominal:      General: Bowel sounds are normal  There is no distension  Palpations: Abdomen is soft  Tenderness: There is no abdominal tenderness  Musculoskeletal:      Cervical back: Neck supple  Right lower leg: No edema  Left lower leg: No edema  Neurological:      General: No focal deficit present  Mental Status: She is alert and oriented to person, place, and time  Mental status is at baseline  Cranial Nerves: No cranial nerve deficit  Motor: No weakness  Coordination: Coordination normal       Gait: Gait normal       Deep Tendon Reflexes: Reflexes normal    Psychiatric:         Mood and Affect: Mood normal          Behavior: Behavior normal          Thought Content:  Thought content normal          Judgment: Judgment normal

## 2022-08-02 ENCOUNTER — TELEPHONE (OUTPATIENT)
Dept: RHEUMATOLOGY | Facility: CLINIC | Age: 42
End: 2022-08-02

## 2022-09-06 ENCOUNTER — TELEPHONE (OUTPATIENT)
Dept: NEUROLOGY | Facility: CLINIC | Age: 42
End: 2022-09-06

## 2022-09-07 ENCOUNTER — DOCUMENTATION (OUTPATIENT)
Dept: BEHAVIORAL/MENTAL HEALTH CLINIC | Facility: CLINIC | Age: 42
End: 2022-09-07

## 2022-09-07 DIAGNOSIS — F33.41 RECURRENT MAJOR DEPRESSIVE DISORDER, IN PARTIAL REMISSION (HCC): Primary | ICD-10-CM

## 2022-09-07 DIAGNOSIS — F41.1 GAD (GENERALIZED ANXIETY DISORDER): ICD-10-CM

## 2022-09-07 DIAGNOSIS — F10.29 ALCOHOL DEPENDENCE WITH UNSPECIFIED ALCOHOL-INDUCED DISORDER (HCC): ICD-10-CM

## 2022-09-07 NOTE — PROGRESS NOTES
Assessment/Plan:      Diagnoses and all orders for this visit:    Recurrent major depressive disorder, in partial remission ([facility] [address] )    JOSE (generalized anxiety disorder)    Alcohol dependence with unspecified alcohol-induced disorder (HCC)        Subjective:     Patient ID: Terrell Dia is a 36 y o  female  Outpatient Discharge Summary:   Admission Date: 02/12/2020  Mercedes Abraham was referred by self-referral  Discharge Date: 09/07/2022     Discharge Diagnosis:    1  Recurrent major depressive disorder, in partial remission ([facility] [address] )     2  JOSE (generalized anxiety disorder)     3  Alcohol dependence with unspecified alcohol-induced disorder [facility])         Treating Physician: no psychiatrist  Treatment Complications: covid pandemic and limited availability for scheduling  Presenting Problem: alcohol use and mood dysregulation  Course of treatment includes:    individual therapy   Treatment Progress: fair  Criteria for Discharge: no contact with behavioral health for over one year  Aftercare recommendations include may return for therapy in the future, if needed    Discharge Medications include:  Current Outpatient Medications:     albuterol (PROVENTIL HFA,VENTOLIN HFA) 90 mcg/act inhaler, Inhale 2 puffs every 4 (four) hours as needed for wheezing, Disp: 18 g, Rfl: 3    busPIRone (BUSPAR) 5 mg tablet, Take 1 tablet (5 mg total) by mouth 3 (three) times a day, Disp: 270 tablet, Rfl: 1    Cholecalciferol 25 MCG (1000 UT) CHEW, Chew, Disp: , Rfl:     clonazePAM (KlonoPIN) 0 5 mg tablet, Take 1 tablet (0 5 mg total) by mouth 2 (two) times a day as needed (prn), Disp: 60 tablet, Rfl: 0    cyclobenzaprine (FLEXERIL) 5 mg tablet, Take 1 tablet (5 mg total) by mouth 3 (three) times a day as needed for muscle spasms (Patient not taking: Reported on 7/27/2022), Disp: 90 tablet, Rfl: 0    Diclofenac Sodium (VOLTAREN) 1 %, Apply 2 g topically 4 (four) times a day (Patient not taking: Reported on 7/27/2022), Disp: 150 g, Rfl: 0   disulfiram (ANTABUSE) 250 mg tablet, Take 1 tablet (250 mg total) by mouth daily, Disp: 90 tablet, Rfl: 0    DULoxetine (Cymbalta) 60 mg delayed release capsule, Take 1 capsule (60 mg total) by mouth daily, Disp: 90 capsule, Rfl: 1    fluticasone (FLONASE) 50 mcg/act nasal spray, 1 spray into each nostril daily (Patient not taking: Reported on 7/27/2022), Disp: 16 g, Rfl: 0    folic acid (FOLVITE) 1 mg tablet, Take 1 tablet (1 mg total) by mouth daily, Disp: 90 tablet, Rfl: 1    furosemide (LASIX) 20 mg tablet, TAKE 1 TABLET BY MOUTH ONCE DAILY (Patient taking differently: Take 20 mg by mouth as needed), Disp: 30 tablet, Rfl: 5    gabapentin (NEURONTIN) 600 MG tablet, Take 1 tablet (600 mg total) by mouth in the morning and 1 tablet (600 mg total) in the evening and 1 tablet (600 mg total) before bedtime  , Disp: 90 tablet, Rfl: 1    levothyroxine 150 mcg tablet, Take 1 tablet (150 mcg total) by mouth every morning, Disp: 90 tablet, Rfl: 1    mometasone-formoterol (Dulera) 100-5 MCG/ACT inhaler, Inhale 2 puffs 2 (two) times a day Rinse mouth after use , Disp: 13 g, Rfl: 3    Multiple Vitamin (multivitamin) tablet, Take 1 tablet by mouth daily  , Disp: , Rfl:     naloxone (Narcan) 4 mg/0 1 mL nasal spray, 0 1 mL (4 mg total) into each nostril as needed (respiratory depression or possible OD) (Patient not taking: No sig reported), Disp: 1 each, Rfl: 1    thiamine 50 MG tablet, Take 2 tablets (100 mg total) by mouth daily, Disp: 180 tablet, Rfl: 1    traMADol (ULTRAM) 50 mg tablet, Take 1 tablet (50 mg total) by mouth every 8 (eight) hours as needed for moderate pain (Patient not taking: No sig reported), Disp: 90 tablet, Rfl: 0    Prognosis: fair

## 2022-09-12 DIAGNOSIS — J45.40 MODERATE PERSISTENT ASTHMA WITHOUT COMPLICATION: ICD-10-CM

## 2022-09-12 DIAGNOSIS — F41.1 GAD (GENERALIZED ANXIETY DISORDER): ICD-10-CM

## 2022-09-12 DIAGNOSIS — G62.9 PERIPHERAL POLYNEUROPATHY: ICD-10-CM

## 2022-09-12 RX ORDER — GABAPENTIN 600 MG/1
600 TABLET ORAL 3 TIMES DAILY
Qty: 90 TABLET | Refills: 1 | Status: SHIPPED | OUTPATIENT
Start: 2022-09-12

## 2022-09-12 RX ORDER — CLONAZEPAM 0.5 MG/1
0.5 TABLET ORAL 2 TIMES DAILY PRN
Qty: 60 TABLET | Refills: 0 | Status: SHIPPED | OUTPATIENT
Start: 2022-09-12 | End: 2022-09-13

## 2022-09-12 RX ORDER — ALBUTEROL SULFATE 90 UG/1
2 AEROSOL, METERED RESPIRATORY (INHALATION) EVERY 4 HOURS PRN
Qty: 18 G | Refills: 3 | Status: SHIPPED | OUTPATIENT
Start: 2022-09-12

## 2022-09-13 ENCOUNTER — OFFICE VISIT (OUTPATIENT)
Dept: NEUROLOGY | Facility: CLINIC | Age: 42
End: 2022-09-13
Payer: COMMERCIAL

## 2022-09-13 ENCOUNTER — TELEPHONE (OUTPATIENT)
Dept: NEUROLOGY | Facility: CLINIC | Age: 42
End: 2022-09-13

## 2022-09-13 ENCOUNTER — OFFICE VISIT (OUTPATIENT)
Dept: INTERNAL MEDICINE CLINIC | Facility: CLINIC | Age: 42
End: 2022-09-13
Payer: COMMERCIAL

## 2022-09-13 VITALS
SYSTOLIC BLOOD PRESSURE: 124 MMHG | DIASTOLIC BLOOD PRESSURE: 82 MMHG | HEIGHT: 62 IN | TEMPERATURE: 98.9 F | WEIGHT: 221.8 LBS | BODY MASS INDEX: 40.82 KG/M2 | HEART RATE: 97 BPM | OXYGEN SATURATION: 98 %

## 2022-09-13 VITALS
DIASTOLIC BLOOD PRESSURE: 88 MMHG | HEIGHT: 62 IN | WEIGHT: 218 LBS | BODY MASS INDEX: 40.12 KG/M2 | SYSTOLIC BLOOD PRESSURE: 110 MMHG | HEART RATE: 76 BPM

## 2022-09-13 DIAGNOSIS — F10.29 ALCOHOL DEPENDENCE WITH UNSPECIFIED ALCOHOL-INDUCED DISORDER (HCC): ICD-10-CM

## 2022-09-13 DIAGNOSIS — F41.1 GAD (GENERALIZED ANXIETY DISORDER): ICD-10-CM

## 2022-09-13 DIAGNOSIS — F33.41 RECURRENT MAJOR DEPRESSIVE DISORDER, IN PARTIAL REMISSION (HCC): ICD-10-CM

## 2022-09-13 DIAGNOSIS — G89.4 CHRONIC PAIN SYNDROME: ICD-10-CM

## 2022-09-13 DIAGNOSIS — K14.8 TONGUE LESION: ICD-10-CM

## 2022-09-13 DIAGNOSIS — E03.9 HYPOTHYROIDISM, UNSPECIFIED TYPE: ICD-10-CM

## 2022-09-13 DIAGNOSIS — Z13.220 SCREENING FOR LIPID DISORDERS: ICD-10-CM

## 2022-09-13 DIAGNOSIS — K50.119 CROHN'S DISEASE OF COLON WITH COMPLICATION (HCC): ICD-10-CM

## 2022-09-13 DIAGNOSIS — M79.602 PARESTHESIA AND PAIN OF BOTH UPPER EXTREMITIES: ICD-10-CM

## 2022-09-13 DIAGNOSIS — R20.2 PARESTHESIA AND PAIN OF BOTH UPPER EXTREMITIES: ICD-10-CM

## 2022-09-13 DIAGNOSIS — Z23 ENCOUNTER FOR VACCINATION: ICD-10-CM

## 2022-09-13 DIAGNOSIS — R20.0 LOWER EXTREMITY NUMBNESS: ICD-10-CM

## 2022-09-13 DIAGNOSIS — G62.1 ALCOHOLIC PERIPHERAL NEUROPATHY (HCC): ICD-10-CM

## 2022-09-13 DIAGNOSIS — R93.0 ABNORMAL MRI OF HEAD: ICD-10-CM

## 2022-09-13 DIAGNOSIS — J45.40 MODERATE PERSISTENT ASTHMA WITHOUT COMPLICATION: Primary | ICD-10-CM

## 2022-09-13 DIAGNOSIS — F40.240 CLAUSTROPHOBIA: Primary | ICD-10-CM

## 2022-09-13 DIAGNOSIS — E83.42 HYPOMAGNESEMIA: ICD-10-CM

## 2022-09-13 DIAGNOSIS — M79.601 PARESTHESIA AND PAIN OF BOTH UPPER EXTREMITIES: ICD-10-CM

## 2022-09-13 PROCEDURE — 99214 OFFICE O/P EST MOD 30 MIN: CPT | Performed by: INTERNAL MEDICINE

## 2022-09-13 PROCEDURE — 90686 IIV4 VACC NO PRSV 0.5 ML IM: CPT | Performed by: INTERNAL MEDICINE

## 2022-09-13 PROCEDURE — 90471 IMMUNIZATION ADMIN: CPT | Performed by: INTERNAL MEDICINE

## 2022-09-13 PROCEDURE — 99215 OFFICE O/P EST HI 40 MIN: CPT | Performed by: PSYCHIATRY & NEUROLOGY

## 2022-09-13 RX ORDER — LORAZEPAM 1 MG/1
TABLET ORAL
Qty: 3 TABLET | Refills: 0 | Status: SHIPPED | OUTPATIENT
Start: 2022-09-13

## 2022-09-13 RX ORDER — CLONAZEPAM 0.5 MG/1
0.5 TABLET ORAL 2 TIMES DAILY PRN
COMMUNITY

## 2022-09-13 NOTE — PROGRESS NOTES
Livan Jeronimo is a 36 y o  female numbness tingling dysesthesias in the lower extremity    Assessment:  1  Lower extremity numbness    2  Paresthesia and pain of both upper extremities    3  Abnormal MRI of head        Plan:  MRI brain with without contrast   EMG right upper lower extremity   Follow-up afterwards    Discussion:  Marina developed progressive numbness tingling and dysesthesias associated with weakness and gait difficulty this past winter  She had an extensive workup including lumbar puncture x2, MRI brain cervical thoracic and lumbar spines as well as extensive blood work  Brain MRI did demonstrate a small pontine lesion that initially enhanced in subsequent images has not enhanced  Etiology of this is unclear as MS workup was negative (myelin basic protein was elevated but all the other markers were negative and no other CNS lesions were noted)  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 which was determined to be the etiology of her neuropathy  She subsequent to this has stopped drinking alcohol since April  She reports that the painful dysesthesias have resolved and she is aware of some numbness and tingling predominantly in the feet and the fingertips  Her exam today demonstrates changes consistent with peripheral neuropathy predominantly sensory in etiology  Have recommended follow-up MRI of the brain with without contrast as recommended and an EMG of the right upper and lower extremity  Will continue to refrain from alcohol and 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  I will see her back in follow-up in a few months      Subjective:    CHERELLE  Mannie Turcios is a right-handed woman who presents with the above complaints  She states that in the winter of this past year she noted intermittent symptoms of numbness and tingling in her extremities    She states she woke up 1 morning with severe electric shock type pains in the feet as well as a pounding sensation in her thighs  She was having a severe difficult time ambulating  She was subsequently admitted to the hospital and had a thorough evaluation including MRI of the cervical thoracic and lumbar spines  The cervical MRI demonstrated an abnormality in the pontine region which resulted in on MRI of the brain with without contrast   Small lesion in the simona was noted  Her exam at that time demonstrated predominantly peripheral neuropathic findings  Initial lumbar puncture demonstrated elevation in CSF protein however follow-up lumbar punctures demonstrated a normal CSF protein with an elevated myelin basic protein but all other CSF markers for MS negative  Multiple other studies were done that were normal   Blood work including A10 and folic acid were normal however her thiamine level was low  She did admit that she was drinking heavily and it was felt that most of these findings could be related to alcohol neuropathy and possible central pontine myelinolysis secondary to alcohol use  She reports that since that time she has stopped drinking alcohol with the help of various programs and has been alcohol free since April  She states she no longer has the dysesthesias in her extremities but is aware of numbness and tingling of the feet and fingertips  She states that at times these symptoms are more prominent than others  She did have a gastrointestinal virus about a week and half ago and 2 days later was riding in a car that was very hot  She states she got out and suddenly became very lightheaded  She states her vision tunneled in and she had a brief syncopal episode  There was no tongue bite or incontinence and she did not have any postictal confusion    This has not recurred      Past Medical History:   Diagnosis Date    Anxiety     Asthma     Crohn disease ([address] )     Depression     Disease of thyroid gland     Hypertension     Hypothyroidism     Lower extremity numbness 02/11/2022    Psychiatric disorder        Family History:  Family History   Problem Relation Age of Onset    Colon cancer Mother     Kidney cancer Mother     Hypertension Mother     Colon cancer Father     Kidney cancer Father     Hypertension Father     Colon cancer Family     Kidney cancer Family        Past Surgical History:  Past Surgical History:   Procedure Laterality Date    IR LUMBAR PUNCTURE  2/8/2022    NO PAST SURGERIES         Social History:   reports that she has been smoking cigarettes  She has been smoking about 0 25 packs per day  She has never used smokeless tobacco  She reports previous alcohol use  She reports that she does not use drugs      Allergies:  Penicillins      Current Outpatient Medications:     albuterol (PROVENTIL HFA,VENTOLIN HFA) 90 mcg/act inhaler, Inhale 2 puffs every 4 (four) hours as needed for wheezing, Disp: 18 g, Rfl: 3    busPIRone (BUSPAR) 5 mg tablet, Take 1 tablet (5 mg total) by mouth 3 (three) times a day, Disp: 270 tablet, Rfl: 1    Cholecalciferol 25 MCG (1000 UT) CHEW, Chew, Disp: , Rfl:     DULoxetine (Cymbalta) 60 mg delayed release capsule, Take 1 capsule (60 mg total) by mouth daily, Disp: 90 capsule, Rfl: 1    folic acid (FOLVITE) 1 mg tablet, Take 1 tablet (1 mg total) by mouth daily, Disp: 90 tablet, Rfl: 1    gabapentin (NEURONTIN) 600 MG tablet, Take 1 tablet (600 mg total) by mouth 3 (three) times a day, Disp: 90 tablet, Rfl: 1    levothyroxine 150 mcg tablet, Take 1 tablet (150 mcg total) by mouth every morning, Disp: 90 tablet, Rfl: 1    Multiple Vitamin (multivitamin) tablet, Take 1 tablet by mouth daily  , Disp: , Rfl:     thiamine 50 MG tablet, Take 2 tablets (100 mg total) by mouth daily, Disp: 180 tablet, Rfl: 1    clonazePAM (KlonoPIN) 0 5 mg tablet, Take 1 tablet (0 5 mg total) by mouth 2 (two) times a day as needed (prn) (Patient not taking: Reported on 9/13/2022), Disp: 60 tablet, Rfl: 0    cyclobenzaprine (FLEXERIL) 5 mg tablet, Take 1 tablet (5 mg total) by mouth 3 (three) times a day as needed for muscle spasms (Patient not taking: No sig reported), Disp: 90 tablet, Rfl: 0    Diclofenac Sodium (VOLTAREN) 1 %, Apply 2 g topically 4 (four) times a day (Patient not taking: No sig reported), Disp: 150 g, Rfl: 0    disulfiram (ANTABUSE) 250 mg tablet, Take 1 tablet (250 mg total) by mouth daily (Patient not taking: Reported on 9/13/2022), Disp: 90 tablet, Rfl: 0    fluticasone (FLONASE) 50 mcg/act nasal spray, 1 spray into each nostril daily (Patient not taking: No sig reported), Disp: 16 g, Rfl: 0    furosemide (LASIX) 20 mg tablet, TAKE 1 TABLET BY MOUTH ONCE DAILY (Patient not taking: Reported on 9/13/2022), Disp: 30 tablet, Rfl: 5    mometasone-formoterol (Dulera) 100-5 MCG/ACT inhaler, Inhale 2 puffs 2 (two) times a day Rinse mouth after use  (Patient not taking: Reported on 9/13/2022), Disp: 13 g, Rfl: 3    naloxone (Narcan) 4 mg/0 1 mL nasal spray, 0 1 mL (4 mg total) into each nostril as needed (respiratory depression or possible OD) (Patient not taking: No sig reported), Disp: 1 each, Rfl: 1    traMADol (ULTRAM) 50 mg tablet, Take 1 tablet (50 mg total) by mouth every 8 (eight) hours as needed for moderate pain (Patient not taking: No sig reported), Disp: 90 tablet, Rfl: 0    I have reviewed the past medical, social and family history, current medications, allergies, vitals, review of systems and updated this information as appropriate today           Objective:    Vitals:  Blood pressure 110/88, pulse 76, height 5' 2" (1 575 m), weight 98 9 kg (218 lb), not currently breastfeeding  Physical Exam    Neurological Exam    GENERAL:  Cooperative in no acute distress  Well-developed and well-nourished    HEAD and NECK   Head is atraumatic normocephalic with no lesions or masses  Neck is supple with full range of motion    CARDIOVASCULAR  Carotid Arteries-no carotid bruits      NEUROLOGIC:  Mental Status-the patient is awake alert and oriented without aphasia or apraxia  Cranial Nerves: Visual fields are full to confrontation  Extraocular movements are full without nystagmus  Pupils are 2-1/2 mm and reactive  Face is symmetrical to light touch  Movements of facial expression move symmetrically  Hearing is normal to finger rub bilaterally  Soft palate lifts symmetrically  Shoulder shrug is symmetrical  Tongue is midline without atrophy  Motor: No drift is noted on arm extension  Strength is full in the upper and lower extremities with normal bulk and tone  Sensory:  Diminished temperature and vibratory sensation in the distal lower extremities bilaterally to the knees  Cortical function is intact  Coordination: Finger to nose testing is performed accurately  Romberg is remarkable for increased sway with eyes closed  Gait reveals a normal base with symmetrical arm swing  Tandem walk could not be performed without stepping off  Reflexes:  Trace to 1 in the biceps triceps and brachioradialis regions, absent at the knees and ankles  Toes are downgoing  Negative Tinel's at the wrists bilaterally            ROS:    Review of Systems   Constitutional: Negative  Negative for appetite change and fever  HENT: Negative  Negative for hearing loss, tinnitus, trouble swallowing and voice change  Eyes: Positive for visual disturbance (blurred vision periodically)  Negative for photophobia and pain  Respiratory: Negative  Negative for shortness of breath  Cardiovascular: Negative  Negative for palpitations  Gastrointestinal: Negative  Negative for nausea and vomiting  Endocrine: Negative  Negative for cold intolerance  Genitourinary: Negative  Negative for dysuria, frequency and urgency  Musculoskeletal: Negative  Negative for myalgias and neck pain  Skin: Negative  Negative for rash     Neurological: Positive for dizziness, syncope (9/9/22 while getting out of car, lasting only a couple of seconds), light-headedness (sx when syncope episode happened) and numbness (numbness in fingers in both hands and lower extremities)  Negative for tremors, seizures, facial asymmetry, speech difficulty, weakness and headaches  Hematological: Negative  Does not bruise/bleed easily  Psychiatric/Behavioral: Positive for sleep disturbance  Negative for confusion and hallucinations

## 2022-09-13 NOTE — PROGRESS NOTES
Tobacco Cessation Counseling: Tobacco cessation counseling was provided  The patient is sincerely urged to quit consumption of tobacco  She is not ready to quit tobacco  Medication options discussed  Side effects of medication not discussed  Patient refused medication  Assessment/Plan:  Problem List Items Addressed This Visit        Digestive    Crohn's disease of colon with complication ([facility] )    Relevant Orders    CBC and differential    Comprehensive metabolic panel       Endocrine    Hypothyroidism    Relevant Orders    TSH, 3rd generation with Free T4 reflex       Respiratory    Moderate persistent asthma without complication - Primary       Nervous and Auditory    Alcoholic peripheral neuropathy (HCC)       Other    Recurrent major depressive disorder, in partial remission ([facility] )    Hypomagnesemia    Relevant Orders    Magnesium    JOSE (generalized anxiety disorder)    Chronic pain syndrome    Alcohol dependence ([place] [id number] )      Other Visit Diagnoses     Encounter for vaccination        Relevant Orders    influenza vaccine, quadrivalent, 0 5 mL, preservative-free    Screening for lipid disorders        Relevant Orders    LDL cholesterol, direct    Triglycerides    Tongue lesion        Relevant Orders    Ambulatory Referral to Otolaryngology           Diagnoses and all orders for this visit:    Moderate persistent asthma without complication    Hypothyroidism, unspecified type  -     TSH, 3rd generation with Free T4 reflex; Future    Crohn's disease of colon with complication ([facility] )  -     CBC and differential; Future  -     Comprehensive metabolic panel; Future    Alcoholic peripheral neuropathy (HCC)    Recurrent major depressive disorder, in partial remission (HCC)    Hypomagnesemia  -     Magnesium;  Future    JOSE (generalized anxiety disorder)    Alcohol dependence with unspecified alcohol-induced disorder (HCC)    Chronic pain syndrome    Encounter for vaccination  -     influenza vaccine, quadrivalent, 0 5 mL, preservative-free    Screening for lipid disorders  -     LDL cholesterol, direct; Future  -     Triglycerides; Future    Tongue lesion  -     Ambulatory Referral to Otolaryngology; Future    Other orders  -     clonazePAM (KlonoPIN) 0 5 mg tablet; Take 0 5 mg by mouth 2 (two) times a day as needed for seizures      No problem-specific Assessment & Plan notes found for this encounter  A/P: Doing ok and will check labs  Discussed vaccine and will up date her flu vaccine    Continue to abstain from ETOH  Intensify counseling  Feel she had orthostatic syncope and also due to LE neuropathy not xavier the blood vessels  Wean tobacco  ?tongue lesion  Will refer to ENT  Continue with counseling  Continue current treatment and RTC four months for routine  Keep f/u with her specialists and for EMG ordered by neuro        Subjective:      Patient ID: Alice Munson is a 36 y o  female  WF RTC for f/u asthma, Crohn's, etc  Doing ok and no new issues  Remains acitve w/o difficulty and no falls, but passed out once on a hot day and stood up very fast  Admits to being dehydrated  No associated s/s    Waiting to go back to work  Relapsed on ETOH one day and then got sober  Did not call her sponsor  Abstaining from ETOH and attending counseling/AA now  Chronic pain is manageable  RAD is controlled with limited rescue MDI use  Crohn's is good and stools are formed and no blood  MDD/JOSE is slighlty elevated due to relapse and waiting to start her job  Attending counseling  Seen by neuro  ROS positive for several months right posterior tongue discomfort  ?biting her tongue at night  Due for labs and vaccines  The following portions of the patient's history were reviewed and updated as appropriate:   She has a past medical history of Anxiety, Asthma, Crohn disease ([facility] ), Depression, Disease of thyroid gland, Hypertension, Hypothyroidism, Lower extremity numbness (02/11/2022), and Psychiatric disorder  ,  does not have any pertinent problems on file  ,   has a past surgical history that includes No past surgeries and IR lumbar puncture (2/8/2022)  ,  family history includes Colon cancer in her family, father, and mother; Hypertension in her father and mother; Kidney cancer in her family, father, and mother  ,   reports that she has been smoking cigarettes  She has been smoking about 0 25 packs per day  She has never used smokeless tobacco  She reports previous alcohol use  She reports that she does not use drugs  ,  is allergic to penicillins     Current Outpatient Medications   Medication Sig Dispense Refill    albuterol (PROVENTIL HFA,VENTOLIN HFA) 90 mcg/act inhaler Inhale 2 puffs every 4 (four) hours as needed for wheezing 18 g 3    busPIRone (BUSPAR) 5 mg tablet Take 1 tablet (5 mg total) by mouth 3 (three) times a day 270 tablet 1    clonazePAM (KlonoPIN) 0 5 mg tablet Take 0 5 mg by mouth 2 (two) times a day as needed for seizures      DULoxetine (Cymbalta) 60 mg delayed release capsule Take 1 capsule (60 mg total) by mouth daily 90 capsule 1    folic acid (FOLVITE) 1 mg tablet Take 1 tablet (1 mg total) by mouth daily 90 tablet 1    gabapentin (NEURONTIN) 600 MG tablet Take 1 tablet (600 mg total) by mouth 3 (three) times a day 90 tablet 1    levothyroxine 150 mcg tablet Take 1 tablet (150 mcg total) by mouth every morning 90 tablet 1    LORazepam (ATIVAN) 1 mg tablet Two p o  1 hour before MRI, may repeat 1 after 1 hour p r n  3 tablet 0    Multiple Vitamin (multivitamin) tablet Take 1 tablet by mouth daily        thiamine 50 MG tablet Take 2 tablets (100 mg total) by mouth daily 180 tablet 1     No current facility-administered medications for this visit  Review of Systems   Constitutional: Negative for activity change, chills, diaphoresis, fatigue and fever  HENT:        Tongue lesion  Eyes: Negative for visual disturbance     Respiratory: Negative for cough, chest tightness, shortness of breath and wheezing  Cardiovascular: Negative for chest pain, palpitations and leg swelling  Gastrointestinal: Negative for abdominal pain, constipation, diarrhea, nausea and vomiting  Endocrine: Negative for cold intolerance and heat intolerance  Genitourinary: Negative for difficulty urinating, dysuria and frequency  Musculoskeletal: Negative for arthralgias, gait problem and myalgias  Neurological: Negative for dizziness, seizures, syncope, weakness, light-headedness and headaches  Psychiatric/Behavioral: Negative for confusion, dysphoric mood and sleep disturbance  The patient is not nervous/anxious  PHQ-2/9 Depression Screening          Objective:  Vitals:    09/13/22 1429   BP: 124/82   Pulse: 97   Temp: 98 9 °F (37 2 °C)   TempSrc: Tympanic   SpO2: 98%   Weight: 101 kg (221 lb 12 8 oz)   Height: 5' 2" (1 575 m)     Body mass index is 40 57 kg/m²  Physical Exam  Vitals and nursing note reviewed  Constitutional:       General: She is not in acute distress  Appearance: Normal appearance  She is not ill-appearing  HENT:      Head: Normocephalic and atraumatic  Mouth/Throat:      Mouth: Mucous membranes are moist       Pharynx: Posterior oropharyngeal erythema (along the right side tongue posteriorly  ) present  No oropharyngeal exudate  Eyes:      Extraocular Movements: Extraocular movements intact  Conjunctiva/sclera: Conjunctivae normal       Pupils: Pupils are equal, round, and reactive to light  Neck:      Vascular: No carotid bruit  Cardiovascular:      Rate and Rhythm: Normal rate and regular rhythm  Heart sounds: Normal heart sounds  Pulmonary:      Effort: Pulmonary effort is normal  No respiratory distress  Breath sounds: Normal breath sounds  No wheezing or rales  Abdominal:      General: Bowel sounds are normal  There is no distension  Palpations: Abdomen is soft  Tenderness: There is no abdominal tenderness     Musculoskeletal: Cervical back: Neck supple  Right lower leg: No edema  Left lower leg: No edema  Neurological:      General: No focal deficit present  Mental Status: She is alert and oriented to person, place, and time  Mental status is at baseline  Psychiatric:         Mood and Affect: Mood normal          Behavior: Behavior normal          Thought Content: Thought content normal          Judgment: Judgment normal        Tobacco Cessation Counseling: Tobacco cessation counseling and education was provided  The patient is sincerely urged to quit consumption of tobacco  She is not ready to quit tobacco  The numerous health risks of tobacco consumption were discussed  If she decides to quit, there are a number of helpful adjunctive aids, and she can see me to discuss nicotine replacement therapy, chantix, or bupropion anytime in the future

## 2022-09-13 NOTE — PATIENT INSTRUCTIONS
Cigarette Smoking and Your Health   AMBULATORY CARE:   Risks to your health if you smoke:  Nicotine and other chemicals found in tobacco and e-cigarettes can damage every cell in your body  Even if you are a light smoker, you have an increased risk for cancer, heart disease, and lung disease  If you are pregnant or have diabetes, smoking increases your risk for complications  Nicotine can affect an adolescent's developing brain  This can lead to trouble thinking, learning, or paying attention  Benefits to your health if you stop smoking: You decrease respiratory symptoms such as coughing, wheezing, and shortness of breath  You reduce your risk for cancers of the lung, mouth, throat, kidney, bladder, pancreas, stomach, and cervix  If you already have cancer, you increase the benefits of chemotherapy  You also reduce your risk for cancer returning or a second cancer from developing  You reduce your risk for heart disease, blood clots, heart attack, and stroke  You reduce your risk for lung infections, and diseases such as pneumonia, asthma, chronic bronchitis, and emphysema  Your circulation improves  More oxygen can be delivered to your body  If you have diabetes, you lower your risk for complications, such as kidney, artery, and eye diseases  You also lower your risk for nerve damage  Nerve damage can lead to amputations, poor vision, and blindness  You improve your body's ability to heal and to fight infections  An adolescent can help his or her brain and body develop in a healthy way  Talk to your adolescent about all the health risks of nicotine  If you can, start talking about nicotine when your child is younger than 12 years  This may make it easier for him or her not to start using nicotine as a teenager or adult  Explain to him or her that it is best never to start  It can be hard to try to quit later      Benefits to the health of others if you stop smoking:  Tobacco is harmful to nonsmokers who breathe in your secondhand smoke  The following are ways the health of others around you may improve when you stop smoking: You lower the risks for lung cancer and heart disease in nonsmoking adults  If you are pregnant, you lower the risk for miscarriage, early delivery, low birth weight, and stillbirth  You also lower your baby's risk for SIDS, obesity, developmental delay, and neurobehavioral problems, such as ADHD  If you have children, you lower their risk for ear infections, colds, pneumonia, bronchitis, and asthma  Follow up with your doctor as directed:  Write down your questions so you remember to ask them during your visits  For support and more information:   [employer]  [address] [place] [address],[address]  [address]  Phone: [facility] [address]  Phone: 1- 539 - [phone]  Web Address: Aibo    Smokefree  gov  Phone: 3- 730 - 814-2142  Web Address: [employer]wiki smokefree  [facility] 2022 Information is for End User's use only and may not be sold, redistributed or otherwise used for commercial purposes  All illustrations and images included in CareNotes® are the copyrighted property of A D A M , Inc  or [address] [place], [place] [address]estephania   The above information is an  only  It is not intended as medical advice for individual conditions or treatments  Talk to your doctor, nurse or pharmacist before following any medical regimen to see if it is safe and effective for you

## 2022-09-13 NOTE — TELEPHONE ENCOUNTER
[facility] [address] [facility] [employer] and  Was told that she can be seen because she is HFU, I didn't see this in the notes anywhere , stated that she will have new insurance starting 10/1

## 2022-09-14 ENCOUNTER — TELEPHONE (OUTPATIENT)
Dept: NEUROLOGY | Facility: CLINIC | Age: 42
End: 2022-09-14

## 2022-09-14 NOTE — TELEPHONE ENCOUNTER
Patient had a HFU on 09/13/22 w/ Dr Adriana Ackerman  Before appt, Patient was informed her insurance is okay for the [place], but may be rejected for subsequent f/u appts  Patient stated she was starting a new job and will be getting [employer] in October  Patient is scheduled for an EMG w/ Dr Adriana Ackerman on 09/23/22  I contacted patient and left a vm letting her know she may have to pay for the EMG out of pocket if her insurance is rejected  Let patient know we are able to move her appt to October when her [employer] is active  Informed patient she can keep the 09/23/22 EMG appt or we can move the appt to October  Gave call back number

## 2022-09-16 ENCOUNTER — TELEPHONE (OUTPATIENT)
Dept: INTERNAL MEDICINE CLINIC | Facility: CLINIC | Age: 42
End: 2022-09-16

## 2022-09-16 ENCOUNTER — OFFICE VISIT (OUTPATIENT)
Dept: URGENT CARE | Facility: CLINIC | Age: 42
End: 2022-09-16
Payer: COMMERCIAL

## 2022-09-16 VITALS
HEART RATE: 72 BPM | SYSTOLIC BLOOD PRESSURE: 116 MMHG | HEIGHT: 62 IN | OXYGEN SATURATION: 99 % | DIASTOLIC BLOOD PRESSURE: 71 MMHG | BODY MASS INDEX: 40.67 KG/M2 | RESPIRATION RATE: 18 BRPM | WEIGHT: 221 LBS

## 2022-09-16 DIAGNOSIS — K14.8 TONGUE LESION: Primary | ICD-10-CM

## 2022-09-16 PROCEDURE — 99213 OFFICE O/P EST LOW 20 MIN: CPT | Performed by: NURSE PRACTITIONER

## 2022-09-16 RX ORDER — LIDOCAINE HYDROCHLORIDE 20 MG/ML
15 SOLUTION OROPHARYNGEAL 4 TIMES DAILY PRN
Qty: 300 ML | Refills: 2 | Status: SHIPPED | OUTPATIENT
Start: 2022-09-16

## 2022-09-16 NOTE — PATIENT INSTRUCTIONS
Use mouthwash as directed  Avoid spicy or hot food   Follow up with PCP in 3-5 days  Proceed to the ER if symptoms worsen

## 2022-09-16 NOTE — PROGRESS NOTES
[facility]        NAME: Sean Leavitt is a 36 y o  female  : 1980    MRN: [id number]  DATE: 2022  TIME: 10:58 AM    Assessment and Plan   Tongue lesion [K14 8]  1  Tongue lesion  al mag oxide-diphenhydramine-lidocaine viscous (MAGIC MOUTHWASH) 1:1:1 suspension         Patient Instructions     Patient Instructions   Use mouthwash as directed  Avoid spicy or hot food   Follow up with PCP in 3-5 days  Proceed to the ER if symptoms worsen  Follow up with PCP in 3-5 days  Proceed to  ER if symptoms worsen  Chief Complaint     Chief Complaint   Patient presents with    Mouth Lesions     Patient presents with an ulcer on her tongue that is now causing a sore throat on her right side that started 3 days ago  History of Present Illness       Mouth Lesions   The current episode started 3 to 5 days ago  The onset was sudden  The problem occurs occasionally  The problem has been unchanged  The problem is mild  Relieved by: oral b gel  Nothing aggravates the symptoms  Associated symptoms include mouth sores and sore throat  Pertinent negatives include no fever, no photophobia, no abdominal pain, no diarrhea, no nausea, no vomiting, no congestion, no ear pain, no headaches, no rhinorrhea, no stridor, no swollen glands, no neck pain, no cough and no rash  Review of Systems   Review of Systems   Constitutional: Negative for chills, diaphoresis, fatigue and fever  HENT: Positive for mouth sores and sore throat  Negative for congestion, ear pain, facial swelling, postnasal drip, rhinorrhea, sinus pressure, sinus pain and trouble swallowing  Eyes: Negative for photophobia and visual disturbance  Respiratory: Negative for cough, chest tightness, shortness of breath and stridor  Cardiovascular: Negative for chest pain  Gastrointestinal: Negative for abdominal pain, diarrhea, nausea and vomiting  Genitourinary: Negative      Musculoskeletal: Negative for arthralgias, back pain, joint swelling, myalgias, neck pain and neck stiffness  Skin: Negative for rash  Neurological: Negative for dizziness, facial asymmetry, weakness, light-headedness, numbness and headaches           Current Medications       Current Outpatient Medications:     al mag oxide-diphenhydramine-lidocaine viscous (MAGIC MOUTHWASH) 1:1:1 suspension, Swish and spit 10 mL every 4 (four) hours as needed for mouth pain or discomfort, Disp: 90 mL, Rfl: 0    albuterol (PROVENTIL HFA,VENTOLIN HFA) 90 mcg/act inhaler, Inhale 2 puffs every 4 (four) hours as needed for wheezing, Disp: 18 g, Rfl: 3    busPIRone (BUSPAR) 5 mg tablet, Take 1 tablet (5 mg total) by mouth 3 (three) times a day, Disp: 270 tablet, Rfl: 1    clonazePAM (KlonoPIN) 0 5 mg tablet, Take 0 5 mg by mouth 2 (two) times a day as needed for seizures, Disp: , Rfl:     DULoxetine (Cymbalta) 60 mg delayed release capsule, Take 1 capsule (60 mg total) by mouth daily, Disp: 90 capsule, Rfl: 1    folic acid (FOLVITE) 1 mg tablet, Take 1 tablet (1 mg total) by mouth daily, Disp: 90 tablet, Rfl: 1    gabapentin (NEURONTIN) 600 MG tablet, Take 1 tablet (600 mg total) by mouth 3 (three) times a day, Disp: 90 tablet, Rfl: 1    levothyroxine 150 mcg tablet, Take 1 tablet (150 mcg total) by mouth every morning, Disp: 90 tablet, Rfl: 1    LORazepam (ATIVAN) 1 mg tablet, Two p o  1 hour before MRI, may repeat 1 after 1 hour p r n , Disp: 3 tablet, Rfl: 0    Multiple Vitamin (multivitamin) tablet, Take 1 tablet by mouth daily  , Disp: , Rfl:     thiamine 50 MG tablet, Take 2 tablets (100 mg total) by mouth daily, Disp: 180 tablet, Rfl: 1    Current Allergies     Allergies as of 09/16/2022 - Reviewed 09/16/2022   Allergen Reaction Noted    Penicillins Hives and Rash 04/08/2007            The following portions of the patient's history were reviewed and updated as appropriate: allergies, current medications, past family history, past medical history, past social history, past surgical history and problem list      Past Medical History:   Diagnosis Date    Anxiety     Asthma     Crohn disease ([address] )     Depression     Disease of thyroid gland     Hypertension     Hypothyroidism     Lower extremity numbness 02/11/2022    Psychiatric disorder        Past Surgical History:   Procedure Laterality Date    IR LUMBAR PUNCTURE  2/8/2022    NO PAST SURGERIES         Family History   Problem Relation Age of Onset    Colon cancer Mother     Kidney cancer Mother     Hypertension Mother     Colon cancer Father     Kidney cancer Father     Hypertension Father     Colon cancer Family     Kidney cancer Family          Medications have been verified  Objective   /71   Pulse 72   Resp 18   Ht 5' 2" (1 575 m)   Wt 100 kg (221 lb)   SpO2 99%   BMI 40 42 kg/m²   No LMP recorded  Physical Exam     Physical Exam  Constitutional:       General: She is not in acute distress  Appearance: She is well-developed  She is not diaphoretic  HENT:      Head: Normocephalic and atraumatic  Right Ear: Hearing, tympanic membrane, ear canal and external ear normal       Left Ear: Hearing, tympanic membrane, ear canal and external ear normal       Nose: Nose normal       Right Sinus: No maxillary sinus tenderness or frontal sinus tenderness  Left Sinus: No maxillary sinus tenderness or frontal sinus tenderness  Mouth/Throat:      Mouth: Mucous membranes are moist       Pharynx: Oropharynx is clear  Uvula midline  No oropharyngeal exudate or posterior oropharyngeal erythema  Cardiovascular:      Rate and Rhythm: Normal rate and regular rhythm  Heart sounds: Normal heart sounds, S1 normal and S2 normal    Pulmonary:      Effort: Pulmonary effort is normal       Breath sounds: Normal breath sounds and air entry  Lymphadenopathy:      Cervical: No cervical adenopathy  Skin:     General: Skin is warm and dry  Capillary Refill: Capillary refill takes less than 2 seconds  Neurological:      Mental Status: She is alert and oriented to person, place, and time

## 2022-09-16 NOTE — TELEPHONE ENCOUNTER
Pt called and was wondering if a lidocaine mouth wash can be called into her pharmacy  The magic mouth wash is to expensive  The pharmacy recommended the lidocaine  Its for the ulcer which is on her tongue which she said your aware of

## 2022-11-04 DIAGNOSIS — G62.9 PERIPHERAL POLYNEUROPATHY: ICD-10-CM

## 2022-11-04 RX ORDER — GABAPENTIN 600 MG/1
TABLET ORAL
Qty: 90 TABLET | Refills: 1 | Status: SHIPPED | OUTPATIENT
Start: 2022-11-04

## 2023-01-12 ENCOUNTER — TELEPHONE (OUTPATIENT)
Dept: NEUROLOGY | Facility: CLINIC | Age: 43
End: 2023-01-12

## 2023-01-12 NOTE — TELEPHONE ENCOUNTER
Attempted to contact patient to remind her of MRI and EMG needing to be done prior to 1/25 apt with Dr Concepcion Romero  Unable to leave  as mailbox is full

## 2023-03-10 ENCOUNTER — OFFICE VISIT (OUTPATIENT)
Dept: INTERNAL MEDICINE CLINIC | Facility: CLINIC | Age: 43
End: 2023-03-10

## 2023-03-10 ENCOUNTER — LAB (OUTPATIENT)
Dept: LAB | Facility: CLINIC | Age: 43
End: 2023-03-10

## 2023-03-10 VITALS
RESPIRATION RATE: 14 BRPM | HEIGHT: 62 IN | SYSTOLIC BLOOD PRESSURE: 118 MMHG | HEART RATE: 106 BPM | TEMPERATURE: 99.8 F | OXYGEN SATURATION: 98 % | DIASTOLIC BLOOD PRESSURE: 86 MMHG | BODY MASS INDEX: 41.22 KG/M2 | WEIGHT: 224 LBS

## 2023-03-10 DIAGNOSIS — Z12.31 ENCOUNTER FOR SCREENING MAMMOGRAM FOR MALIGNANT NEOPLASM OF BREAST: ICD-10-CM

## 2023-03-10 DIAGNOSIS — R19.7 DIARRHEA, UNSPECIFIED TYPE: ICD-10-CM

## 2023-03-10 DIAGNOSIS — R11.0 NAUSEA: ICD-10-CM

## 2023-03-10 DIAGNOSIS — R79.89 LACTATE BLOOD INCREASED: ICD-10-CM

## 2023-03-10 DIAGNOSIS — F41.1 GAD (GENERALIZED ANXIETY DISORDER): ICD-10-CM

## 2023-03-10 DIAGNOSIS — F10.939 ALCOHOL USE WITH WITHDRAWAL (HCC): ICD-10-CM

## 2023-03-10 DIAGNOSIS — E03.9 HYPOTHYROIDISM, UNSPECIFIED TYPE: ICD-10-CM

## 2023-03-10 DIAGNOSIS — F32.1 MODERATE MAJOR DEPRESSION, SINGLE EPISODE (HCC): ICD-10-CM

## 2023-03-10 DIAGNOSIS — R45.84 ANHEDONIA: ICD-10-CM

## 2023-03-10 DIAGNOSIS — K70.0 FATTY LIVER, ALCOHOLIC: ICD-10-CM

## 2023-03-10 DIAGNOSIS — D75.89 MACROCYTOSIS WITHOUT ANEMIA: ICD-10-CM

## 2023-03-10 DIAGNOSIS — E83.42 HYPOMAGNESEMIA: ICD-10-CM

## 2023-03-10 DIAGNOSIS — R74.01 TRANSAMINITIS: ICD-10-CM

## 2023-03-10 DIAGNOSIS — Z13.220 SCREENING FOR LIPID DISORDERS: ICD-10-CM

## 2023-03-10 DIAGNOSIS — F41.0 PANIC ATTACK: ICD-10-CM

## 2023-03-10 DIAGNOSIS — R60.0 LOCALIZED EDEMA: ICD-10-CM

## 2023-03-10 DIAGNOSIS — F33.41 RECURRENT MAJOR DEPRESSIVE DISORDER, IN PARTIAL REMISSION (HCC): ICD-10-CM

## 2023-03-10 DIAGNOSIS — G47.9 SLEEP DISTURBANCE: ICD-10-CM

## 2023-03-10 DIAGNOSIS — Z23 ENCOUNTER FOR VACCINATION: ICD-10-CM

## 2023-03-10 DIAGNOSIS — Z13.1 SCREENING FOR DIABETES MELLITUS (DM): ICD-10-CM

## 2023-03-10 DIAGNOSIS — R73.09 ELEVATED GLUCOSE: ICD-10-CM

## 2023-03-10 DIAGNOSIS — K50.119 CROHN'S DISEASE OF COLON WITH COMPLICATION (HCC): Primary | ICD-10-CM

## 2023-03-10 DIAGNOSIS — F51.05 INSOMNIA SECONDARY TO DEPRESSION WITH ANXIETY: ICD-10-CM

## 2023-03-10 DIAGNOSIS — F41.8 ANXIETY WITH DEPRESSION: ICD-10-CM

## 2023-03-10 DIAGNOSIS — Z13.31 POSITIVE DEPRESSION SCREENING: ICD-10-CM

## 2023-03-10 DIAGNOSIS — F10.939 ALCOHOL WITHDRAWAL SYNDROME WITH COMPLICATION (HCC): ICD-10-CM

## 2023-03-10 DIAGNOSIS — E83.51 HYPOCALCEMIA: ICD-10-CM

## 2023-03-10 DIAGNOSIS — J45.909 UNCOMPLICATED ASTHMA, UNSPECIFIED ASTHMA SEVERITY, UNSPECIFIED WHETHER PERSISTENT: ICD-10-CM

## 2023-03-10 DIAGNOSIS — E43 SEVERE PROTEIN-CALORIE MALNUTRITION (HCC): ICD-10-CM

## 2023-03-10 DIAGNOSIS — F41.8 INSOMNIA SECONDARY TO DEPRESSION WITH ANXIETY: ICD-10-CM

## 2023-03-10 DIAGNOSIS — G62.1 ALCOHOLIC PERIPHERAL NEUROPATHY (HCC): ICD-10-CM

## 2023-03-10 DIAGNOSIS — K50.119 CROHN'S DISEASE OF COLON WITH COMPLICATION (HCC): ICD-10-CM

## 2023-03-10 DIAGNOSIS — J45.40 MODERATE PERSISTENT ASTHMA WITHOUT COMPLICATION: ICD-10-CM

## 2023-03-10 LAB
ALBUMIN SERPL BCP-MCNC: 4.2 G/DL (ref 3.5–5)
ALP SERPL-CCNC: 96 U/L (ref 46–116)
ALT SERPL W P-5'-P-CCNC: 82 U/L (ref 12–78)
AMYLASE SERPL-CCNC: 43 IU/L (ref 25–115)
ANION GAP SERPL CALCULATED.3IONS-SCNC: 9 MMOL/L (ref 4–13)
AST SERPL W P-5'-P-CCNC: 181 U/L (ref 5–45)
BASOPHILS # BLD AUTO: 0.06 THOUSANDS/ÂΜL (ref 0–0.1)
BASOPHILS NFR BLD AUTO: 1 % (ref 0–1)
BILIRUB SERPL-MCNC: 2.89 MG/DL (ref 0.2–1)
BUN SERPL-MCNC: 8 MG/DL (ref 5–25)
CALCIUM SERPL-MCNC: 9 MG/DL (ref 8.3–10.1)
CHLORIDE SERPL-SCNC: 96 MMOL/L (ref 96–108)
CHOLEST SERPL-MCNC: 197 MG/DL
CO2 SERPL-SCNC: 27 MMOL/L (ref 21–32)
CREAT SERPL-MCNC: 0.76 MG/DL (ref 0.6–1.3)
EOSINOPHIL # BLD AUTO: 0.1 THOUSAND/ÂΜL (ref 0–0.61)
EOSINOPHIL NFR BLD AUTO: 1 % (ref 0–6)
ERYTHROCYTE [DISTWIDTH] IN BLOOD BY AUTOMATED COUNT: 11.5 % (ref 11.6–15.1)
EST. AVERAGE GLUCOSE BLD GHB EST-MCNC: 111 MG/DL
GFR SERPL CREATININE-BSD FRML MDRD: 95 ML/MIN/1.73SQ M
GLUCOSE P FAST SERPL-MCNC: 127 MG/DL (ref 65–99)
HBA1C MFR BLD: 5.5 %
HCT VFR BLD AUTO: 43.6 % (ref 34.8–46.1)
HDLC SERPL-MCNC: 41 MG/DL
HGB BLD-MCNC: 15.1 G/DL (ref 11.5–15.4)
IMM GRANULOCYTES # BLD AUTO: 0.04 THOUSAND/UL (ref 0–0.2)
IMM GRANULOCYTES NFR BLD AUTO: 1 % (ref 0–2)
LDLC SERPL CALC-MCNC: 119 MG/DL (ref 0–100)
LDLC SERPL DIRECT ASSAY-MCNC: 134 MG/DL (ref 0–100)
LIPASE SERPL-CCNC: 100 U/L (ref 73–393)
LYMPHOCYTES # BLD AUTO: 1.37 THOUSANDS/ÂΜL (ref 0.6–4.47)
LYMPHOCYTES NFR BLD AUTO: 16 % (ref 14–44)
MAGNESIUM SERPL-MCNC: 1.9 MG/DL (ref 1.6–2.6)
MCH RBC QN AUTO: 37.2 PG (ref 26.8–34.3)
MCHC RBC AUTO-ENTMCNC: 34.6 G/DL (ref 31.4–37.4)
MCV RBC AUTO: 107 FL (ref 82–98)
MONOCYTES # BLD AUTO: 0.51 THOUSAND/ÂΜL (ref 0.17–1.22)
MONOCYTES NFR BLD AUTO: 6 % (ref 4–12)
NEUTROPHILS # BLD AUTO: 6.27 THOUSANDS/ÂΜL (ref 1.85–7.62)
NEUTS SEG NFR BLD AUTO: 75 % (ref 43–75)
NRBC BLD AUTO-RTO: 0 /100 WBCS
PLATELET # BLD AUTO: 309 THOUSANDS/UL (ref 149–390)
PMV BLD AUTO: 9.1 FL (ref 8.9–12.7)
POTASSIUM SERPL-SCNC: 3.6 MMOL/L (ref 3.5–5.3)
PROT SERPL-MCNC: 7.8 G/DL (ref 6.4–8.4)
RBC # BLD AUTO: 4.06 MILLION/UL (ref 3.81–5.12)
SODIUM SERPL-SCNC: 132 MMOL/L (ref 135–147)
T4 FREE SERPL-MCNC: 0.73 NG/DL (ref 0.76–1.46)
TRIGL SERPL-MCNC: 187 MG/DL
TSH SERPL DL<=0.05 MIU/L-ACNC: 25.6 UIU/ML (ref 0.45–4.5)
WBC # BLD AUTO: 8.35 THOUSAND/UL (ref 4.31–10.16)

## 2023-03-10 RX ORDER — LEVOTHYROXINE SODIUM 0.15 MG/1
150 TABLET ORAL EVERY MORNING
Qty: 90 TABLET | Refills: 1 | Status: SHIPPED | OUTPATIENT
Start: 2023-03-10 | End: 2023-03-13 | Stop reason: SDUPTHER

## 2023-03-10 RX ORDER — BUSPIRONE HYDROCHLORIDE 10 MG/1
10 TABLET ORAL 3 TIMES DAILY
Qty: 90 TABLET | Refills: 1 | Status: SHIPPED | OUTPATIENT
Start: 2023-03-10

## 2023-03-10 RX ORDER — TRAZODONE HYDROCHLORIDE 50 MG/1
50 TABLET ORAL
Qty: 90 TABLET | Refills: 3 | Status: SHIPPED | OUTPATIENT
Start: 2023-03-10

## 2023-03-10 RX ORDER — PREDNISONE 10 MG/1
TABLET ORAL
Qty: 50 TABLET | Refills: 0 | Status: SHIPPED | OUTPATIENT
Start: 2023-03-10

## 2023-03-10 RX ORDER — HYDROXYZINE PAMOATE 25 MG/1
25 CAPSULE ORAL 3 TIMES DAILY PRN
Qty: 30 CAPSULE | Refills: 0 | Status: SHIPPED | OUTPATIENT
Start: 2023-03-10

## 2023-03-10 RX ORDER — LANOLIN ALCOHOL/MO/W.PET/CERES
100 CREAM (GRAM) TOPICAL DAILY
COMMUNITY
Start: 2023-01-11

## 2023-03-10 RX ORDER — DEXAMETHASONE SODIUM PHOSPHATE 4 MG/ML
4 INJECTION, SOLUTION INTRA-ARTICULAR; INTRALESIONAL; INTRAMUSCULAR; INTRAVENOUS; SOFT TISSUE ONCE
Status: COMPLETED | OUTPATIENT
Start: 2023-03-10 | End: 2023-03-10

## 2023-03-10 RX ADMIN — DEXAMETHASONE SODIUM PHOSPHATE 4 MG: 4 INJECTION, SOLUTION INTRA-ARTICULAR; INTRALESIONAL; INTRAMUSCULAR; INTRAVENOUS; SOFT TISSUE at 15:16

## 2023-03-10 NOTE — RESULT ENCOUNTER NOTE
Call pt, labs ok except LFT's are going up and TSH is very high  Is pt taking her thyroid med  Refill was sent in  Need to know last dosing to see if new dose needed  Recheck TSH and LFT in six weeks

## 2023-03-10 NOTE — PROGRESS NOTES
BMI Counseling: There is no height or weight on file to calculate BMI  The BMI is above normal  Nutrition recommendations include decreasing portion sizes and encouraging healthy choices of fruits and vegetables  Exercise recommendations include moderate physical activity 150 minutes/week  No pharmacotherapy was ordered  Rationale for BMI follow-up plan is due to patient being overweight or obese       Assessment/Plan:  Problem List Items Addressed This Visit        Digestive    Crohn's disease of colon with complication ([place] [address] ) - Primary    Relevant Medications    predniSONE 10 mg tablet    dexamethasone (DECADRON) injection 4 mg (Start on 3/10/2023  8:15 AM)       Endocrine    Hypothyroidism    Relevant Medications    levothyroxine 150 mcg tablet    predniSONE 10 mg tablet    dexamethasone (DECADRON) injection 4 mg (Start on 3/10/2023  8:15 AM)    Other Relevant Orders    TSH, 3rd generation with Free T4 reflex       Respiratory    Moderate persistent asthma without complication       Other    Severe protein-calorie malnutrition (HCC)    Recurrent major depressive disorder, in partial remission (HCC)    Relevant Medications    traZODone (DESYREL) 50 mg tablet    hydrOXYzine pamoate (VISTARIL) 25 mg capsule    busPIRone (BUSPAR) 10 mg tablet    Hypomagnesemia    JOSE (generalized anxiety disorder)    Relevant Medications    traZODone (DESYREL) 50 mg tablet    hydrOXYzine pamoate (VISTARIL) 25 mg capsule    busPIRone (BUSPAR) 10 mg tablet   Other Visit Diagnoses     Alcohol withdrawal syndrome with complication (HCC)        Relevant Medications    traZODone (DESYREL) 50 mg tablet    hydrOXYzine pamoate (VISTARIL) 25 mg capsule    busPIRone (BUSPAR) 10 mg tablet    Lactate blood increased        Macrocytosis without anemia        Hypocalcemia        Transaminitis        Positive depression screening        Elevated glucose        Fatty liver, alcoholic        Moderate major depression, single episode ([place] [address] ) Relevant Medications    traZODone (DESYREL) 50 mg tablet    hydrOXYzine pamoate (VISTARIL) 25 mg capsule    busPIRone (BUSPAR) 10 mg tablet    Anxiety with depression        Relevant Medications    traZODone (DESYREL) 50 mg tablet    hydrOXYzine pamoate (VISTARIL) 25 mg capsule    busPIRone (BUSPAR) 10 mg tablet    Insomnia secondary to depression with anxiety        Relevant Medications    traZODone (DESYREL) 50 mg tablet    hydrOXYzine pamoate (VISTARIL) 25 mg capsule    busPIRone (BUSPAR) 10 mg tablet    Encounter for vaccination        Anhedonia        Uncomplicated asthma, unspecified asthma severity, unspecified whether persistent        Encounter for screening mammogram for malignant neoplasm of breast        Panic attack        Relevant Medications    hydrOXYzine pamoate (VISTARIL) 25 mg capsule    Sleep disturbance        Relevant Medications    traZODone (DESYREL) 50 mg tablet    Nausea        Relevant Orders    Amylase    CBC and differential    Comprehensive metabolic panel    Magnesium    Lipase    Diarrhea, unspecified type        Relevant Orders    Amylase    CBC and differential    Comprehensive metabolic panel    Lipase    Screening for lipid disorders        Screening for diabetes mellitus (DM)        Relevant Orders    Hemoglobin A1C    Lipid Panel with Direct LDL reflex           Diagnoses and all orders for this visit:    Crohn's disease of colon with complication (HCC)  -     predniSONE 10 mg tablet; 40mg po q d x 5, then 30mg po q d x 5, then 20mg po q d x 5, then 10mg po q d x 5, then d/c   -     dexamethasone (DECADRON) injection 4 mg    JOSE (generalized anxiety disorder)  -     busPIRone (BUSPAR) 10 mg tablet; Take 1 tablet (10 mg total) by mouth 3 (three) times a day    Hypothyroidism, unspecified type  -     levothyroxine 150 mcg tablet; Take 1 tablet (150 mcg total) by mouth every morning  -     TSH, 3rd generation with Free T4 reflex;  Future    Moderate persistent asthma without complication    Alcohol withdrawal syndrome with complication (HCC)    Severe protein-calorie malnutrition (HCC)    Recurrent major depressive disorder, in partial remission (HCC)    Lactate blood increased    Hypomagnesemia    Macrocytosis without anemia    Hypocalcemia    Transaminitis    Positive depression screening    Elevated glucose    Fatty liver, alcoholic    Moderate major depression, single episode (HCC)    Anxiety with depression  -     hydrOXYzine pamoate (VISTARIL) 25 mg capsule; Take 1 capsule (25 mg total) by mouth 3 (three) times a day as needed for itching    Insomnia secondary to depression with anxiety    Encounter for vaccination    Anhedonia    Uncomplicated asthma, unspecified asthma severity, unspecified whether persistent    Encounter for screening mammogram for malignant neoplasm of breast    Panic attack  -     hydrOXYzine pamoate (VISTARIL) 25 mg capsule; Take 1 capsule (25 mg total) by mouth 3 (three) times a day as needed for itching    Sleep disturbance  -     traZODone (DESYREL) 50 mg tablet; Take 1 tablet (50 mg total) by mouth daily at bedtime    Nausea  -     Amylase; Future  -     CBC and differential; Future  -     Comprehensive metabolic panel; Future  -     Magnesium; Future  -     Lipase; Future    Diarrhea, unspecified type  -     Amylase; Future  -     CBC and differential; Future  -     Comprehensive metabolic panel; Future  -     Lipase; Future    Screening for lipid disorders    Screening for diabetes mellitus (DM)  -     Hemoglobin A1C; Future  -     Lipid Panel with Direct LDL reflex; Future    Other orders  -     thiamine 100 MG tablet; Take 100 mg by mouth daily      No problem-specific Assessment & Plan notes found for this encounter  A/P: Stable  Suspect JOSE playing a big role  Will increase the buspar  Vistaril PRN  Trazodone for sleep  May need to restart the seroquel  Consider changing the cymbalta to celexa   Will start a steroid wean for the Crohn's and check labs  Deferred an obstruction series  RTC two weeks for f/u and routine  Subjective:      Patient ID: Brent Rojas is a 39 y o  female  WF with PMH of Crohn's and JOSE presents one week h/o worsening Crohn's with diarrhea, abdominal pain, and dry heaves  No blood in the stool  No vomitting  NO fever or chills  Reports s/s started after JOSE went through the roof  Notes job   The following portions of the patient's history were reviewed and updated as appropriate:   She has a past medical history of Anxiety, Asthma, Crohn disease ([address] ), Depression, Disease of thyroid gland, Hypertension, Hypothyroidism, Lower extremity numbness (02/11/2022), and Psychiatric disorder  ,  does not have any pertinent problems on file  ,   has a past surgical history that includes No past surgeries and IR lumbar puncture (2/8/2022)  ,  family history includes Colon cancer in her family, father, and mother; Hypertension in her father and mother; Kidney cancer in her family, father, and mother  ,   reports that she has been smoking cigarettes  She has been smoking an average of  25 packs per day  She has never used smokeless tobacco  She reports that she does not currently use alcohol  She reports that she does not use drugs  ,  is allergic to penicillins     Current Outpatient Medications   Medication Sig Dispense Refill   • albuterol (PROVENTIL HFA,VENTOLIN HFA) 90 mcg/act inhaler inhale 2 puffs by mouth and INTO THE LUNGS every 4 hours if needed for wheezing 6 7 g 3   • busPIRone (BUSPAR) 10 mg tablet Take 1 tablet (10 mg total) by mouth 3 (three) times a day 90 tablet 1   • DULoxetine (Cymbalta) 60 mg delayed release capsule Take 1 capsule (60 mg total) by mouth daily 90 capsule 1   • folic acid (FOLVITE) 1 mg tablet Take 1 tablet (1 mg total) by mouth daily 90 tablet 1   • gabapentin (NEURONTIN) 600 MG tablet take 1 tablet by mouth three times a day 90 tablet 1   • hydrOXYzine pamoate (VISTARIL) 25 mg capsule Take 1 capsule (25 mg total) by mouth 3 (three) times a day as needed for itching 30 capsule 0   • levothyroxine 150 mcg tablet Take 1 tablet (150 mcg total) by mouth every morning 90 tablet 1   • predniSONE 10 mg tablet 40mg po q d x 5, then 30mg po q d x 5, then 20mg po q d x 5, then 10mg po q d x 5, then d/c  50 tablet 0   • thiamine 100 MG tablet Take 100 mg by mouth daily     • traZODone (DESYREL) 50 mg tablet Take 1 tablet (50 mg total) by mouth daily at bedtime 90 tablet 3     Current Facility-Administered Medications   Medication Dose Route Frequency Provider Last Rate Last Admin   • dexamethasone (DECADRON) injection 4 mg  4 mg Intramuscular Once [facility],            Review of Systems   Constitutional: Positive for activity change  Negative for chills, diaphoresis, fatigue and fever  Respiratory: Negative for cough, chest tightness, shortness of breath and wheezing  Cardiovascular: Negative for chest pain, palpitations and leg swelling  Gastrointestinal: Positive for abdominal pain, diarrhea and nausea  Negative for abdominal distention, anal bleeding, blood in stool, constipation, rectal pain and vomiting  Genitourinary: Negative for difficulty urinating, dysuria and frequency  Musculoskeletal: Negative for arthralgias, gait problem and myalgias  Neurological: Negative for dizziness, seizures, syncope, weakness, light-headedness and headaches  Psychiatric/Behavioral: Positive for dysphoric mood and sleep disturbance  Negative for confusion  The patient is nervous/anxious          PHQ-2/9 Depression Screening    Little interest or pleasure in doing things: 1 - several days  Feeling down, depressed, or hopeless: 1 - several days  Trouble falling or staying asleep, or sleeping too much: 0 - not at all  Feeling tired or having little energy: 0 - not at all  Poor appetite or overeatin - not at all  Feeling bad about yourself - or that you are a failure or have let yourself or your family down: 0 - not at all  Trouble concentrating on things, such as reading the newspaper or watching television: 0 - not at all  Moving or speaking so slowly that other people could have noticed  Or the opposite - being so fidgety or restless that you have been moving around a lot more than usual: 0 - not at all  Thoughts that you would be better off dead, or of hurting yourself in some way: 0 - not at all  PHQ-9 Score: 2   PHQ-9 Interpretation: No or Minimal depression         Objective:  Vitals:    03/10/23 0744   BP: 118/86   Pulse: (!) 106   Resp: 14   Temp: 99 8 °F (37 7 °C)   SpO2: 98%   Weight: 102 kg (224 lb)   Height: 5' 2" (1 575 m)     Body mass index is 40 97 kg/m²  Physical Exam  Vitals and nursing note reviewed  Constitutional:       General: She is not in acute distress  Appearance: Normal appearance  She is not ill-appearing  HENT:      Head: Normocephalic and atraumatic  Mouth/Throat:      Mouth: Mucous membranes are moist    Eyes:      Extraocular Movements: Extraocular movements intact  Conjunctiva/sclera: Conjunctivae normal       Pupils: Pupils are equal, round, and reactive to light  Cardiovascular:      Rate and Rhythm: Normal rate and regular rhythm  Heart sounds: Normal heart sounds  No murmur heard  Pulmonary:      Effort: Pulmonary effort is normal  No respiratory distress  Breath sounds: Normal breath sounds  No wheezing, rhonchi or rales  Abdominal:      General: Bowel sounds are normal  There is no distension  Palpations: Abdomen is soft  Tenderness: There is no abdominal tenderness  Neurological:      General: No focal deficit present  Mental Status: She is alert and oriented to person, place, and time  Mental status is at baseline  Psychiatric:         Mood and Affect: Mood normal          Behavior: Behavior normal          Thought Content:  Thought content normal          Judgment: Judgment normal

## 2023-03-13 DIAGNOSIS — E03.9 HYPOTHYROIDISM, UNSPECIFIED TYPE: Primary | ICD-10-CM

## 2023-03-13 DIAGNOSIS — E03.9 HYPOTHYROIDISM, UNSPECIFIED TYPE: ICD-10-CM

## 2023-03-13 RX ORDER — LEVOTHYROXINE SODIUM 0.12 MG/1
125 TABLET ORAL EVERY MORNING
Qty: 90 TABLET | Refills: 1 | Status: SHIPPED | OUTPATIENT
Start: 2023-03-13 | End: 2023-06-22

## 2023-03-15 DIAGNOSIS — G62.1 ALCOHOLIC PERIPHERAL NEUROPATHY (HCC): Primary | ICD-10-CM

## 2023-03-15 DIAGNOSIS — E03.9 HYPOTHYROIDISM, UNSPECIFIED TYPE: ICD-10-CM

## 2023-03-20 DIAGNOSIS — R20.0 LOWER EXTREMITY NUMBNESS: ICD-10-CM

## 2023-03-20 RX ORDER — DULOXETIN HYDROCHLORIDE 60 MG/1
60 CAPSULE, DELAYED RELEASE ORAL DAILY
Qty: 90 CAPSULE | Refills: 1 | Status: SHIPPED | OUTPATIENT
Start: 2023-03-20

## 2023-05-05 DIAGNOSIS — F41.1 GAD (GENERALIZED ANXIETY DISORDER): ICD-10-CM

## 2023-05-05 RX ORDER — BUSPIRONE HYDROCHLORIDE 10 MG/1
TABLET ORAL
Qty: 90 TABLET | Refills: 1 | Status: SHIPPED | OUTPATIENT
Start: 2023-05-05

## 2023-05-05 NOTE — TELEPHONE ENCOUNTER
Medication:Buspar 10mgs  [facility]  Please forward to your office staff for further review as this medication was reviewed by a [facility] RN

## 2023-06-15 DIAGNOSIS — F41.0 PANIC ATTACK: ICD-10-CM

## 2023-06-15 DIAGNOSIS — F41.8 ANXIETY WITH DEPRESSION: ICD-10-CM

## 2023-06-15 RX ORDER — HYDROXYZINE PAMOATE 25 MG/1
CAPSULE ORAL
Qty: 30 CAPSULE | Refills: 0 | Status: SHIPPED | OUTPATIENT
Start: 2023-06-15

## 2023-06-22 ENCOUNTER — APPOINTMENT (OUTPATIENT)
Dept: LAB | Facility: CLINIC | Age: 43
End: 2023-06-22
Payer: COMMERCIAL

## 2023-06-22 ENCOUNTER — OFFICE VISIT (OUTPATIENT)
Dept: URGENT CARE | Facility: CLINIC | Age: 43
End: 2023-06-22
Payer: COMMERCIAL

## 2023-06-22 VITALS
SYSTOLIC BLOOD PRESSURE: 140 MMHG | WEIGHT: 224 LBS | HEIGHT: 62 IN | TEMPERATURE: 98.2 F | HEART RATE: 82 BPM | DIASTOLIC BLOOD PRESSURE: 86 MMHG | OXYGEN SATURATION: 96 % | BODY MASS INDEX: 41.22 KG/M2 | RESPIRATION RATE: 16 BRPM

## 2023-06-22 DIAGNOSIS — E03.9 HYPOTHYROIDISM, UNSPECIFIED TYPE: Primary | ICD-10-CM

## 2023-06-22 DIAGNOSIS — N91.2 AMENORRHEA: Primary | ICD-10-CM

## 2023-06-22 DIAGNOSIS — R11.0 NAUSEA: Primary | ICD-10-CM

## 2023-06-22 DIAGNOSIS — G62.1 ALCOHOLIC PERIPHERAL NEUROPATHY (HCC): ICD-10-CM

## 2023-06-22 DIAGNOSIS — E03.9 HYPOTHYROIDISM, UNSPECIFIED TYPE: ICD-10-CM

## 2023-06-22 LAB
ALBUMIN SERPL BCP-MCNC: 4 G/DL (ref 3.5–5)
ALP SERPL-CCNC: 84 U/L (ref 46–116)
ALT SERPL W P-5'-P-CCNC: 86 U/L (ref 12–78)
ANION GAP SERPL CALCULATED.3IONS-SCNC: 10 MMOL/L
AST SERPL W P-5'-P-CCNC: 138 U/L (ref 5–45)
BILIRUB DIRECT SERPL-MCNC: 0.81 MG/DL (ref 0–0.2)
BILIRUB SERPL-MCNC: 3.91 MG/DL (ref 0.2–1)
BUN SERPL-MCNC: 8 MG/DL (ref 5–25)
CALCIUM SERPL-MCNC: 8.7 MG/DL (ref 8.3–10.1)
CHLORIDE SERPL-SCNC: 98 MMOL/L (ref 96–108)
CO2 SERPL-SCNC: 25 MMOL/L (ref 21–32)
CREAT SERPL-MCNC: 0.74 MG/DL (ref 0.6–1.3)
ERYTHROCYTE [DISTWIDTH] IN BLOOD BY AUTOMATED COUNT: 12.2 % (ref 11.6–15.1)
ESTRADIOL SERPL-MCNC: 179.5 PG/ML
FSH SERPL-ACNC: 9.7 MIU/ML
GFR SERPL CREATININE-BSD FRML MDRD: 98 ML/MIN/1.73SQ M
GLUCOSE P FAST SERPL-MCNC: 101 MG/DL (ref 65–99)
HCT VFR BLD AUTO: 46.7 % (ref 34.8–46.1)
HGB BLD-MCNC: 16.2 G/DL (ref 11.5–15.4)
LH SERPL-ACNC: 8.2 MIU/ML
MCH RBC QN AUTO: 36.2 PG (ref 26.8–34.3)
MCHC RBC AUTO-ENTMCNC: 34.7 G/DL (ref 31.4–37.4)
MCV RBC AUTO: 104 FL (ref 82–98)
PLATELET # BLD AUTO: 359 THOUSANDS/UL (ref 149–390)
PMV BLD AUTO: 9.3 FL (ref 8.9–12.7)
POTASSIUM SERPL-SCNC: 2.7 MMOL/L (ref 3.5–5.3)
PROT SERPL-MCNC: 7.7 G/DL (ref 6.4–8.4)
RBC # BLD AUTO: 4.48 MILLION/UL (ref 3.81–5.12)
SL AMB  POCT GLUCOSE, UA: NEGATIVE
SL AMB LEUKOCYTE ESTERASE,UA: NEGATIVE
SL AMB POCT BILIRUBIN,UA: ABNORMAL
SL AMB POCT BLOOD,UA: NEGATIVE
SL AMB POCT CLARITY,UA: CLEAR
SL AMB POCT COLOR,UA: YELLOW
SL AMB POCT KETONES,UA: NEGATIVE
SL AMB POCT NITRITE,UA: ABNORMAL
SL AMB POCT PH,UA: 7.5
SL AMB POCT SPECIFIC GRAVITY,UA: 1.01
SL AMB POCT URINE PROTEIN: NEGATIVE
SL AMB POCT UROBILINOGEN: 0.2
SODIUM SERPL-SCNC: 133 MMOL/L (ref 135–147)
TSH SERPL DL<=0.05 MIU/L-ACNC: 10.76 UIU/ML (ref 0.45–4.5)
WBC # BLD AUTO: 12.86 THOUSAND/UL (ref 4.31–10.16)

## 2023-06-22 PROCEDURE — 82670 ASSAY OF TOTAL ESTRADIOL: CPT

## 2023-06-22 PROCEDURE — 83002 ASSAY OF GONADOTROPIN (LH): CPT

## 2023-06-22 PROCEDURE — 36415 COLL VENOUS BLD VENIPUNCTURE: CPT

## 2023-06-22 PROCEDURE — 84443 ASSAY THYROID STIM HORMONE: CPT

## 2023-06-22 PROCEDURE — 82248 BILIRUBIN DIRECT: CPT

## 2023-06-22 PROCEDURE — 80053 COMPREHEN METABOLIC PANEL: CPT

## 2023-06-22 PROCEDURE — G0382 LEV 3 HOSP TYPE B ED VISIT: HCPCS | Performed by: NURSE PRACTITIONER

## 2023-06-22 PROCEDURE — 81002 URINALYSIS NONAUTO W/O SCOPE: CPT | Performed by: NURSE PRACTITIONER

## 2023-06-22 PROCEDURE — 85027 COMPLETE CBC AUTOMATED: CPT

## 2023-06-22 PROCEDURE — 83001 ASSAY OF GONADOTROPIN (FSH): CPT

## 2023-06-22 RX ORDER — LEVOTHYROXINE SODIUM 0.15 MG/1
150 TABLET ORAL
Qty: 90 TABLET | Refills: 3 | Status: SHIPPED | OUTPATIENT
Start: 2023-06-22

## 2023-06-22 NOTE — PATIENT INSTRUCTIONS
Please call pcp today to schedule follow up and get lab work done  If you develop any increased vomiting, abd pain, urinary symptoms  , decreased urination, fever, dizziness, syncope,chest pain or any new or concerning symptoms please return or proceed to ER    Follow up with pcp in 3-5 days

## 2023-06-22 NOTE — LETTER
June 22, 2023     Patient: Salomón Baker   YOB: 1980   Date of Visit: 6/22/2023       To Whom it May Concern:    Salomón Baker was seen in my clinic on 6/22/2023  She may return to work on 6/23/2023   If you have any questions or concerns, please don't hesitate to call           Sincerely,          NOAM Gorman        CC: No Recipients

## 2023-06-22 NOTE — PROGRESS NOTES
[address] [facility]        NAME: Vikash Lopez is a 39 y o  female  : 1980    MRN: [id number]  DATE: 2023  TIME: 10:26 AM    Assessment and Plan   Nausea [R11 0]  1  Nausea  POCT urine dip        Urine dip shows small bilirubin, will send culture    Patient Instructions     Patient Instructions   Please call pcp today to schedule follow up and get lab work done  If you develop any increased vomiting, abd pain, urinary symptoms  , decreased urination, fever, dizziness, syncope,chest pain or any new or concerning symptoms please return or proceed to ER  Follow up with pcp in 3-5 days        Chief Complaint     Chief Complaint   Patient presents with   • Nausea     Patient c/o nausea, vomiting, diarrhea and dizzy spells that started a few months ago  Tuesday it got worse  History of Present Illness       Nausea  This is a recurrent problem  The current episode started more than 1 month ago  The problem occurs intermittently  The problem has been resolved  Associated symptoms include chills, nausea and vomiting  Pertinent negatives include no abdominal pain, arthralgias, chest pain, congestion, coughing, diaphoresis, fatigue, fever, headaches, joint swelling, myalgias, neck pain, numbness, rash, sore throat, swollen glands, urinary symptoms or weakness  Nothing aggravates the symptoms  She has tried nothing for the symptoms  The treatment provided no relief  States that she has been experiencing intermittent nausea, hot flashes, dizziness, and vomiting over the past few months, concerned it is menopause symptoms  Received IV fluids at work 2 days ago with complete resolution of symptoms  Patient is requesting a note to return to work  Review of Systems   Review of Systems   Constitutional: Positive for chills  Negative for diaphoresis, fatigue and fever  HENT: Negative  Negative for congestion and sore throat  Eyes: Negative for photophobia and visual disturbance  Respiratory: Negative for cough, chest tightness, shortness of breath, wheezing and stridor  Cardiovascular: Negative for chest pain and palpitations  Gastrointestinal: Positive for nausea and vomiting  Negative for abdominal distention, abdominal pain, anal bleeding, blood in stool, constipation and diarrhea  Genitourinary: Negative for decreased urine volume, difficulty urinating, dysuria, flank pain, frequency, hematuria and urgency  Musculoskeletal: Negative for arthralgias, back pain, joint swelling, myalgias, neck pain and neck stiffness  Skin: Negative for rash  Neurological: Positive for dizziness  Negative for syncope, speech difficulty, weakness, light-headedness, numbness and headaches           Current Medications       Current Outpatient Medications:   •  albuterol (PROVENTIL HFA,VENTOLIN HFA) 90 mcg/act inhaler, inhale 2 puffs by mouth and INTO THE LUNGS every 4 hours if needed for wheezing, Disp: 6 7 g, Rfl: 3  •  busPIRone (BUSPAR) 10 mg tablet, take 1 tablet by mouth three times a day, Disp: 90 tablet, Rfl: 1  •  DULoxetine (Cymbalta) 60 mg delayed release capsule, Take 1 capsule (60 mg total) by mouth daily, Disp: 90 capsule, Rfl: 1  •  folic acid (FOLVITE) 1 mg tablet, Take 1 tablet (1 mg total) by mouth daily, Disp: 90 tablet, Rfl: 1  •  gabapentin (NEURONTIN) 600 MG tablet, take 1 tablet by mouth three times a day, Disp: 90 tablet, Rfl: 1  •  hydrOXYzine pamoate (VISTARIL) 25 mg capsule, take 1 capsule by mouth three times a day if needed for itching, Disp: 30 capsule, Rfl: 0  •  levothyroxine 125 mcg tablet, Take 1 tablet (125 mcg total) by mouth every morning, Disp: 90 tablet, Rfl: 1  •  predniSONE 10 mg tablet, 40mg po q d x 5, then 30mg po q d x 5, then 20mg po q d x 5, then 10mg po q d x 5, then d/c , Disp: 50 tablet, Rfl: 0  •  thiamine 100 MG tablet, Take 100 mg by mouth daily, Disp: , Rfl:   •  traZODone (DESYREL) 50 mg tablet, Take 1 tablet (50 mg total) by mouth daily at "bedtime, Disp: 90 tablet, Rfl: 3    Current Allergies     Allergies as of 06/22/2023 - Reviewed 06/22/2023   Allergen Reaction Noted   • Penicillins Hives and Rash 04/08/2007            The following portions of the patient's history were reviewed and updated as appropriate: allergies, current medications, past family history, past medical history, past social history, past surgical history and problem list      Past Medical History:   Diagnosis Date   • Anxiety    • Asthma    • Crohn disease ([address] )    • Depression    • Disease of thyroid gland    • Hypertension    • Hypothyroidism    • Lower extremity numbness 02/11/2022   • Psychiatric disorder        Past Surgical History:   Procedure Laterality Date   • IR LUMBAR PUNCTURE  2/8/2022   • NO PAST SURGERIES         Family History   Problem Relation Age of Onset   • Colon cancer Mother    • Kidney cancer Mother    • Hypertension Mother    • Colon cancer Father    • Kidney cancer Father    • Hypertension Father    • Colon cancer Family    • Kidney cancer Family          Medications have been verified  Objective   /86   Pulse 82   Temp 98 2 °F (36 8 °C) (Temporal)   Resp 16   Ht 5' 2\" (1 575 m)   Wt 102 kg (224 lb)   SpO2 96%   BMI 40 97 kg/m²   No LMP recorded  Physical Exam     Physical Exam  Constitutional:       General: She is not in acute distress  Appearance: Normal appearance  She is well-developed  She is not diaphoretic  Cardiovascular:      Rate and Rhythm: Normal rate and regular rhythm  Heart sounds: Normal heart sounds  Pulmonary:      Effort: Pulmonary effort is normal       Breath sounds: Normal breath sounds  Abdominal:      General: Bowel sounds are normal  There is no distension  Palpations: Abdomen is soft  Abdomen is not rigid  There is no mass  Tenderness: There is no abdominal tenderness  There is no right CVA tenderness, left CVA tenderness, guarding or rebound   Negative signs include Tran's " sign and McBurney's sign  Skin:     General: Skin is warm and dry  Neurological:      Mental Status: She is alert and oriented to person, place, and time

## 2023-06-26 ENCOUNTER — TELEPHONE (OUTPATIENT)
Dept: INTERNAL MEDICINE CLINIC | Facility: CLINIC | Age: 43
End: 2023-06-26

## 2023-06-28 NOTE — TELEPHONE ENCOUNTER
Her gyn ordered her labs and said to follow up with on the low potassium, she increase potassium in her diet and bought supplement till she sees you for an appt

## 2023-07-14 DIAGNOSIS — F10.20 UNCOMPLICATED ALCOHOL DEPENDENCE (HCC): ICD-10-CM

## 2023-07-14 RX ORDER — FOLIC ACID 1 MG/1
TABLET ORAL
Qty: 90 TABLET | Refills: 1 | Status: SHIPPED | OUTPATIENT
Start: 2023-07-14

## 2023-07-26 NOTE — NURSING NOTE
Requested pain med this shift, and og michelle, ordered norco, which was administered , and decreased abdominal pain, from 6, to a 2/10  Liquid brown stool obtained and sent to lab for r/o c diff  ivf continue as ordered  Call bell given   She voices no further c/o Female

## 2023-08-02 DIAGNOSIS — F41.8 ANXIETY WITH DEPRESSION: ICD-10-CM

## 2023-08-02 DIAGNOSIS — G62.9 PERIPHERAL POLYNEUROPATHY: ICD-10-CM

## 2023-08-02 DIAGNOSIS — J45.40 MODERATE PERSISTENT ASTHMA WITHOUT COMPLICATION: ICD-10-CM

## 2023-08-02 DIAGNOSIS — F41.0 PANIC ATTACK: ICD-10-CM

## 2023-08-02 RX ORDER — HYDROXYZINE PAMOATE 25 MG/1
CAPSULE ORAL
Qty: 30 CAPSULE | Refills: 0 | Status: SHIPPED | OUTPATIENT
Start: 2023-08-02

## 2023-08-02 RX ORDER — GABAPENTIN 600 MG/1
TABLET ORAL
Qty: 90 TABLET | Refills: 1 | Status: SHIPPED | OUTPATIENT
Start: 2023-08-02

## 2023-08-02 RX ORDER — ALBUTEROL SULFATE 90 UG/1
AEROSOL, METERED RESPIRATORY (INHALATION)
Qty: 6.7 G | Refills: 3 | Status: SHIPPED | OUTPATIENT
Start: 2023-08-02

## 2023-08-25 DIAGNOSIS — F41.1 GAD (GENERALIZED ANXIETY DISORDER): ICD-10-CM

## 2023-08-25 RX ORDER — BUSPIRONE HYDROCHLORIDE 10 MG/1
TABLET ORAL
Qty: 90 TABLET | Refills: 0 | Status: SHIPPED | OUTPATIENT
Start: 2023-08-25

## 2023-08-25 NOTE — TELEPHONE ENCOUNTER
----- Message from Morteza Workman sent at 8/25/2023  1:54 PM EDT -----    ----- Message -----  From: Livia Mixon DO  Sent: 8/25/2023  12:03 PM EDT  To: [address]    Given one month refill.  Pt over due for f/u

## 2023-10-18 ENCOUNTER — TRANSITIONAL CARE MANAGEMENT (OUTPATIENT)
Dept: INTERNAL MEDICINE CLINIC | Facility: CLINIC | Age: 43
End: 2023-10-18

## 2023-10-18 ENCOUNTER — TELEPHONE (OUTPATIENT)
Dept: INTERNAL MEDICINE CLINIC | Facility: CLINIC | Age: 43
End: 2023-10-18

## 2023-10-18 NOTE — TELEPHONE ENCOUNTER
Patient was admitted to [facility] (formerly [facility] on 10/15/23, d/c- 10/18/23, dx- alcohol withdrawal. Appt scheduled on 10/19/23 with Dr. Judi William.

## 2023-10-19 ENCOUNTER — OFFICE VISIT (OUTPATIENT)
Dept: INTERNAL MEDICINE CLINIC | Facility: CLINIC | Age: 43
End: 2023-10-19
Payer: COMMERCIAL

## 2023-10-19 VITALS
DIASTOLIC BLOOD PRESSURE: 80 MMHG | OXYGEN SATURATION: 98 % | HEIGHT: 62 IN | WEIGHT: 203 LBS | HEART RATE: 86 BPM | SYSTOLIC BLOOD PRESSURE: 140 MMHG | BODY MASS INDEX: 37.36 KG/M2 | TEMPERATURE: 98.3 F

## 2023-10-19 DIAGNOSIS — F41.1 GAD (GENERALIZED ANXIETY DISORDER): ICD-10-CM

## 2023-10-19 DIAGNOSIS — E03.9 HYPOTHYROIDISM, UNSPECIFIED TYPE: ICD-10-CM

## 2023-10-19 DIAGNOSIS — F10.29 ALCOHOL DEPENDENCE WITH UNSPECIFIED ALCOHOL-INDUCED DISORDER (HCC): ICD-10-CM

## 2023-10-19 DIAGNOSIS — Z02.89 ENCOUNTER FOR COMPLETION OF FORM WITH PATIENT: ICD-10-CM

## 2023-10-19 DIAGNOSIS — K50.119 CROHN'S DISEASE OF COLON WITH COMPLICATION (HCC): Primary | ICD-10-CM

## 2023-10-19 PROCEDURE — 99496 TRANSJ CARE MGMT HIGH F2F 7D: CPT | Performed by: INTERNAL MEDICINE

## 2023-10-19 RX ORDER — METOCLOPRAMIDE 10 MG/1
10 TABLET ORAL EVERY 6 HOURS PRN
COMMUNITY
Start: 2023-09-24

## 2023-10-19 RX ORDER — ONDANSETRON 4 MG/1
4 TABLET, ORALLY DISINTEGRATING ORAL EVERY 8 HOURS PRN
COMMUNITY
Start: 2023-09-23

## 2023-10-19 RX ORDER — CITALOPRAM 20 MG/1
20 TABLET ORAL
Qty: 90 TABLET | Refills: 3 | Status: SHIPPED | OUTPATIENT
Start: 2023-10-19

## 2023-10-19 NOTE — PROGRESS NOTES
Assessment & Plan     1. Crohn's disease of colon with complication [facility])  -     Ambulatory referral to Gastroenterology; Future    2. Alcohol dependence with unspecified alcohol-induced disorder ([address])  -     Ambulatory referral to [employer]; Future    3. JOSE (generalized anxiety disorder)  -     citalopram (CeleXA) 20 mg tablet; Take 1 tablet (20 mg total) by mouth daily at bedtime  -     Ambulatory referral to [employer]; Future    4. Hypothyroidism, unspecified type    5. Encounter for completion of form with patient    A/P: Stable and improving, but at high risk of relapse. Will start celexa for JOSE and refer to psych for both ETOH and JOSE. Will refer back to GI to get back on her UC meds and get f/u testing. Continue with naltrexone and antabuse and keep up with daily AA. Will need to recheck labs upon RTC in one month for f/u and routine with labs for thyroid. Papers filled out for [employer]. Subjective     Transitional Care Management Review:   Azeem Baldwin is a 39 y.o. female here for TCM follow up. During the TCM phone call patient stated:  TCM Call     Date and time call was made  10/18/2023 12:35 PM    Hospital care reviewed  Records reviewed    Patient was hospitialized at  [facility]    Date of Admission  10/15/23    Date of discharge  10/18/23    Diagnosis  alcohol withdraw w/prolonged Q-T on ekg    Disposition  Home    Were the patients medications reviewed and updated  Yes    Current Symptoms  None      TCM Call     Post hospital issues  None; Aftercare intervention    Should patient be enrolled in anticoag monitoring? No    Scheduled for follow up?   Yes    Did you obtain your prescribed medications  Yes    Do you need help managing your prescriptions or medications  No    Is transportation to your appointment needed  No    I have advised the patient to call PCP with any new or worsening symptoms  bonita fuentes ma    Are you recieving any outpatient services  No Are you recieving home care services  No    Are you using any community resources  No    Current waiver services  No    Have you fallen in the last 12 months  No    Interperter language line needed  No    Counseling  Patient    Counseling topics  Diagnostic results; Prognosis; Activities of daily living; Importance of RX compliance; patient and family education; instructions for management; Risk factor reduction        STU presents for f/u recent [facility] admission. H/o ETOH abuse and was in recovery when her stress level due to work went "through the roof" and then her UC flared. Pt then succumbed and started drinking. Reached a point where she check herself in and was detoxed. Just released. Doing a little better. UC improving. No fever or chills. Appetite improving and no more N/V. Stools starting to form. Remains off ETOH and attending AA daily along with a sponsor. Taking antabuse and naltrexone. JOSE still high due to missing work. No longer on meds. Stopped a lot of her UC meds as well or ran out of them. Overdue for colonoscopy and MRI. Recent labs with elevated TSH and meds just adjusted. Needs [place] papers. Review of Systems   Constitutional:  Positive for activity change, appetite change and fatigue. Negative for chills, diaphoresis and fever. HENT: Negative. Eyes:  Negative for visual disturbance. Respiratory:  Negative for cough, chest tightness, shortness of breath and wheezing. Cardiovascular:  Negative for chest pain, palpitations and leg swelling. Gastrointestinal:  Positive for diarrhea. Negative for abdominal distention, abdominal pain, anal bleeding, blood in stool, constipation, nausea, rectal pain and vomiting. Genitourinary:  Negative for difficulty urinating, dysuria and frequency. Musculoskeletal:  Negative for arthralgias, gait problem and myalgias. Neurological:  Negative for light-headedness and headaches. Psychiatric/Behavioral:  Positive for sleep disturbance. Negative for confusion, dysphoric mood and suicidal ideas. The patient is nervous/anxious. Objective     /80   Pulse 86   Temp 98.3 °F (36.8 °C)   Ht 5' 2" (1.575 m)   Wt 92.1 kg (203 lb)   SpO2 98%   BMI 37.13 kg/m²      Physical Exam  Vitals and nursing note reviewed. Constitutional:       General: She is not in acute distress. Appearance: Normal appearance. She is not ill-appearing. HENT:      Head: Normocephalic and atraumatic. Mouth/Throat:      Mouth: Mucous membranes are moist.   Eyes:      Extraocular Movements: Extraocular movements intact. Conjunctiva/sclera: Conjunctivae normal.      Pupils: Pupils are equal, round, and reactive to light. Cardiovascular:      Rate and Rhythm: Normal rate and regular rhythm. Heart sounds: Normal heart sounds. No murmur heard. Pulmonary:      Effort: No respiratory distress. Breath sounds: Normal breath sounds. No wheezing, rhonchi or rales. Abdominal:      General: Bowel sounds are normal. There is no distension. Palpations: Abdomen is soft. Tenderness: There is no abdominal tenderness. Musculoskeletal:      Right lower leg: No edema. Left lower leg: No edema. Neurological:      General: No focal deficit present. Mental Status: She is alert and oriented to person, place, and time. Mental status is at baseline. Psychiatric:         Mood and Affect: Mood normal.         Behavior: Behavior normal.         Thought Content:  Thought content normal.         Judgment: Judgment normal.       Medications have been reviewed by provider in current encounter    Clay Boyd DO

## 2023-10-19 NOTE — LETTER
October 19, 2023     Patient: Azeem Baldwin  YOB: 1980  Date of Visit: 10/19/2023      To Whom it May Concern:    Azeem Baldwin is under my professional care. Jenny Carter was seen in my office on 10/19/2023. Jenny Carter may return to school on as of 10/23. Will need additional evaluation and ongoing treatments of her medical conditions. .    If you have any questions or concerns, please don't hesitate to call.          Sincerely,          Gwen Powell,         CC: No Recipients

## 2023-11-29 DIAGNOSIS — F41.1 GAD (GENERALIZED ANXIETY DISORDER): ICD-10-CM

## 2023-11-29 RX ORDER — BUSPIRONE HYDROCHLORIDE 10 MG/1
TABLET ORAL
Qty: 90 TABLET | Refills: 0 | Status: SHIPPED | OUTPATIENT
Start: 2023-11-29

## 2023-12-22 DIAGNOSIS — F41.1 GAD (GENERALIZED ANXIETY DISORDER): ICD-10-CM

## 2023-12-22 RX ORDER — BUSPIRONE HYDROCHLORIDE 10 MG/1
TABLET ORAL
Qty: 90 TABLET | Refills: 0 | Status: SHIPPED | OUTPATIENT
Start: 2023-12-22

## 2024-01-10 DIAGNOSIS — R20.0 LOWER EXTREMITY NUMBNESS: ICD-10-CM

## 2024-01-10 RX ORDER — DULOXETIN HYDROCHLORIDE 60 MG/1
60 CAPSULE, DELAYED RELEASE ORAL DAILY
Qty: 90 CAPSULE | Refills: 0 | Status: SHIPPED | OUTPATIENT
Start: 2024-01-10

## 2024-01-10 NOTE — TELEPHONE ENCOUNTER
Patient needs refill on DULoxetine (Cymbalta) 60 mg delayed release capsule please send to [facility] in [place]

## 2024-01-17 DIAGNOSIS — J45.40 MODERATE PERSISTENT ASTHMA WITHOUT COMPLICATION: ICD-10-CM

## 2024-01-17 RX ORDER — ALBUTEROL SULFATE 90 UG/1
AEROSOL, METERED RESPIRATORY (INHALATION)
Qty: 8.5 G | Refills: 2 | Status: SHIPPED | OUTPATIENT
Start: 2024-01-17

## 2024-01-23 DIAGNOSIS — F41.1 GAD (GENERALIZED ANXIETY DISORDER): ICD-10-CM

## 2024-01-23 RX ORDER — BUSPIRONE HYDROCHLORIDE 10 MG/1
TABLET ORAL
Qty: 90 TABLET | Refills: 0 | Status: SHIPPED | OUTPATIENT
Start: 2024-01-23

## 2024-04-15 DIAGNOSIS — J45.40 MODERATE PERSISTENT ASTHMA WITHOUT COMPLICATION: ICD-10-CM

## 2024-04-15 RX ORDER — ALBUTEROL SULFATE 90 UG/1
AEROSOL, METERED RESPIRATORY (INHALATION)
Qty: 8.5 G | Refills: 2 | Status: SHIPPED | OUTPATIENT
Start: 2024-04-15

## 2024-04-25 NOTE — PROGRESS NOTES
History: Follow-up GERD/gastritis: Takes pantoprazole 40 mg every morning.  Denies heartburn, dysphagia, nausea, vomiting, diarrhea, early satiety, blood or changes in bowel movements.  Is trying to follow lifestyle changes by avoiding trigger foods and drinks.  Has had EGD or other GI testing in past:No  Assessment: established condition   Plan: Currently is controlled.  Advised importance of avoiding trigger foods and drinks, maintaining a healthy weight, elevating head of bed to sleep, waiting 3 hours after meal before laying down, avoiding use of tight clothing in the abdominal area.  Continue pantoprazole 40 mg every morning.  Reevaluate in a year.  Sooner if has any worsening of symptoms, GI bleed type symptoms.   Yoon 44  Progress Note - Jazz Bardales 1980, 42 y o  female MRN: [id number]  Unit/Bed#: -01 Encounter: [id number]  Primary Care Provider: Linh Ponce DO   Date and time admitted to hospital: 2/4/2022  1:39 PM    * Lower extremity numbness  Assessment & Plan  · Two week history of abdominal numbness, groin numbness, thigh and knee pain, calf numbness, and no abnormality of the feet  · Subjective change in sensation of her bilateral lower leg but with no complete sensory deficit or motor dysfunction  · MRI L Spine (2/4/2022): Questionable T2 increased signal abnormality within the lower spinal cord at T11  Follow-up contrast-enhanced thoracic spine MRI for further evaluation  Right L5 exiting nerve root touches the right L5 disc  · MRI findings were discussed with radiology and could just be artifact however follow-up MRI is still recommended- attempted to have the patient follow-up outpatient with MRI, however, she noted that she could not walk and therefore could not go home  · MRI T-spine with and without contrast no acute cord pathology, mild degenerative changes noted T8 through T9  · Neurology consulted, appreciate recommendations  · CSF protein elevated at 58, CSF WBCs 6, Gram stain negative  · Will discuss with Neurology for further recommendations  · PT recommending home with home healthcare      Ambulatory dysfunction  Assessment & Plan  · Due to knee/thigh pain and numbness as above  · Neurology consultation placed    COVID-19  Assessment & Plan  · Positive home test approximately 1 week ago  · Not requiring any supplemental oxygen at this time  · Continue further supportive care    Hypothyroidism  Assessment & Plan  · Continue pre-hospital levothyroxine 125 mcg p o  daily    JOSE (generalized anxiety disorder)  Assessment & Plan  · Continue pre-hospital Klonopin 0 5 mg p o  b i d  p r n      Alcohol dependence in remission [facility])  Assessment & Plan  · Patient reports that she has not drank in months  · continue pre-hospital Antabuse 250 mg p o  daily on discharge      VTE Pharmacologic Prophylaxis: VTE Score: 3 heparin    Patient Centered Rounds: I performed bedside rounds with nursing staff today  Discussions with Specialists or Other Care Team Provider:  Neurology    Education and Discussions with Family / Patient:  Discussed with patient    Time Spent for Care: 30 minutes  More than 50% of total time spent on counseling and coordination of care as described above  Current Length of Stay: 4 day(s)  Current Patient Status: Inpatient   Certification Statement: The patient will continue to require additional inpatient hospital stay due to Pending further neurological workup  Discharge Plan: Pending clinical course    Code Status: Level 1 - Full Code    Subjective:   Patient states he still feels weak in legs however is able to ambulate a little bit better today  Still has loss of sensation    Objective:     Vitals:   Temp (24hrs), Av 2 °F (36 8 °C), Min:98 2 °F (36 8 °C), Max:98 2 °F (36 8 °C)    Temp:  [98 2 °F (36 8 °C)] 98 2 °F (36 8 °C)  HR:  [64-74] 74  Resp:  [18-19] 19  BP: (106-119)/(66-81) 106/66  SpO2:  [92 %-93 %] 93 %  Body mass index is 39 73 kg/m²  Input and Output Summary (last 24 hours): Intake/Output Summary (Last 24 hours) at 2022 1614  Last data filed at 2022 1801  Gross per 24 hour   Intake 240 ml   Output --   Net 240 ml       Physical Exam:   Physical Exam  Constitutional:       Appearance: She is obese  Cardiovascular:      Rate and Rhythm: Normal rate and regular rhythm  Pulses: Normal pulses  Heart sounds: Normal heart sounds  Pulmonary:      Effort: Pulmonary effort is normal    Abdominal:      General: Abdomen is flat  Bowel sounds are normal       Palpations: Abdomen is soft  Musculoskeletal:         General: Normal range of motion  Cervical back: Normal range of motion     Skin:     General: Skin is warm and dry  Neurological:      Mental Status: She is alert  Sensory: Sensory deficit present  Motor: No weakness  Psychiatric:         Mood and Affect: Mood normal          Behavior: Behavior normal          Thought Content: Thought content normal          Judgment: Judgment normal           Additional Data:     Labs:  Results from last 7 days   Lab Units 02/07/22  0538 02/05/22  0428 02/04/22  1444   WBC Thousand/uL 6 51   < > 3 99*   HEMOGLOBIN g/dL 15 1   < > 16 0*   HEMATOCRIT % 43 6   < > 45 9   PLATELETS Thousands/uL 181   < > 214   NEUTROS PCT %  --   --  68   LYMPHS PCT %  --   --  22   MONOS PCT %  --   --  8   EOS PCT %  --   --  0    < > = values in this interval not displayed  Results from last 7 days   Lab Units 02/07/22  0538 02/05/22  0429 02/04/22  1444   SODIUM mmol/L 130*   < > 134*   POTASSIUM mmol/L 3 5   < > 3 3*   CHLORIDE mmol/L 95*   < > 92*   CO2 mmol/L 25   < > 26   BUN mg/dL 12   < > 5   CREATININE mg/dL 0 64   < > 0 68   ANION GAP mmol/L 10   < > 16*   CALCIUM mg/dL 8 9   < > 8 6   ALBUMIN g/dL  --   --  4 4   TOTAL BILIRUBIN mg/dL  --   --  2 51*   ALK PHOS U/L  --   --  88   ALT U/L  --   --  137*   AST U/L  --   --  156*   GLUCOSE RANDOM mg/dL 125   < > 108    < > = values in this interval not displayed  Results from last 7 days   Lab Units 02/04/22  1444   INR  1 02             Results from last 7 days   Lab Units 02/05/22  0429 02/04/22  1444   LACTIC ACID mmol/L  --  1 7   PROCALCITONIN ng/ml 0 07 0 06       Lines/Drains:  Invasive Devices  Report    Peripheral Intravenous Line            Peripheral IV 02/05/22 Right Antecubital 4 days                      Imaging: No pertinent imaging reviewed  Recent Cultures (last 7 days):   Results from last 7 days   Lab Units 02/08/22  1725 02/04/22  1444   BLOOD CULTURE   --  No Growth After 4 Days  No Growth After 4 Days     GRAM STAIN RESULT  No No Polys or Bacteria seen  --        Last 24 Hours Medication List:   Current Facility-Administered Medications   Medication Dose Route Frequency Provider Last Rate    albuterol  2 puff Inhalation Q4H PRN Lito Alves PA-C      clonazePAM  0 5 mg Oral BID PRN Lito Alves PA-C      fluticasone  1 spray Nasal Daily Lito Alves PA-C      guaiFENesin  600 mg Oral Q12H [address] Lito Alves PA-C      heparin (porcine)  5,000 Units Subcutaneous [facility] Ramón Augustin DO      ibuprofen  400 mg Oral Q6H PRN Lito Alves PA-C      levofloxacin  500 mg Oral Q24H Lito Alves PA-C      levothyroxine  125 mcg Oral QAM Lito Alves PA-C      LORazepam  2 mg Intravenous Once Hugh Lefort, DO      ondansetron  4 mg Intravenous Q6H PRN Lito Alves PA-C      oxyCODONE  5 mg Oral Q4H PRN Roland Avers Valladares, DO      potassium chloride  20 mEq Oral Daily Lito Alves PA-C          Today, Patient Was Seen By: Ramón Augustin DO    **Please Note: This note may have been constructed using a voice recognition system  **

## 2024-04-30 ENCOUNTER — APPOINTMENT (OUTPATIENT)
Dept: RADIOLOGY | Age: 44
End: 2024-04-30
Payer: COMMERCIAL

## 2024-04-30 ENCOUNTER — OFFICE VISIT (OUTPATIENT)
Dept: URGENT CARE | Age: 44
End: 2024-04-30
Payer: COMMERCIAL

## 2024-04-30 VITALS
HEART RATE: 80 BPM | DIASTOLIC BLOOD PRESSURE: 84 MMHG | TEMPERATURE: 97 F | OXYGEN SATURATION: 98 % | SYSTOLIC BLOOD PRESSURE: 124 MMHG | RESPIRATION RATE: 18 BRPM

## 2024-04-30 DIAGNOSIS — M79.671 PAIN OF RIGHT HEEL: Primary | ICD-10-CM

## 2024-04-30 DIAGNOSIS — M79.671 PAIN OF RIGHT HEEL: ICD-10-CM

## 2024-04-30 PROCEDURE — 73630 X-RAY EXAM OF FOOT: CPT

## 2024-04-30 PROCEDURE — G0382 LEV 3 HOSP TYPE B ED VISIT: HCPCS | Performed by: EMERGENCY MEDICINE

## 2024-04-30 RX ORDER — DISULFIRAM 250 MG/1
250 TABLET ORAL DAILY
COMMUNITY
Start: 2024-04-12

## 2024-04-30 NOTE — PROGRESS NOTES
[facility]        NAME: Marina Hickey is a 44 y.o. female  : 1980    MRN: [id number]  DATE: 2024  TIME: 1:39 PM    Assessment and Plan   Pain of right heel [M79.671]  1. Pain of right heel  XR foot 3+ vw right    Ambulatory Referral to Podiatry        Right foot x-ray: Abnormality to posterior calcaneus, possibly chronic. Radiology to determine final read.    Advised symptomatic treatment. Referral to podiatry placed.    Patient Instructions     Your finalized x-ray results will be available on [facility] when read by the radiologist.  Recommend rest, ice, elevation. Over the counter pain medication as directed on packaging as needed for pain (ex: Tylenol, ibuprofen).    Follow up with PCP in 3-5 days.  Proceed to  ER if symptoms worsen.    If tests are performed, our office will contact you with results only if changes need to made to the care plan discussed with you at the visit. You can review your full results on [facility].    Chief Complaint     Chief Complaint   Patient presents with    Foot Pain     Pt c/o right hell pain. Pt does kick boxing. Pain has been over the past week, rested for a few days but has gotten worse again today. Worse with applying pressure.          History of Present Illness       Patient presents with pain to her right heel for the past week or so. She has been going to kickboxing classes over the past 6 weeks, but denies any specific injury. She took a few days off of [employer], went back to class last night, and this morning woke up with worsening pain. At rest, she would rate her pain as a 1-2/10. The pain can go up to a 5/10 with weight-bearing. Denies bruising, swelling, redness. Has a history of neuropathy, notes numbness and tingling is no worse than normal. She has tried Aleve, which was helpful with the pain.        Review of Systems   Review of Systems   Musculoskeletal:  Positive for arthralgias and gait problem (limping). Negative for  joint swelling.   Skin:  Negative for color change.         Current Medications       Current Outpatient Medications:     albuterol (PROVENTIL HFA,VENTOLIN HFA) 90 mcg/act inhaler, inhale 2 puffs by mouth and INTO THE LUNGS every 4 hours if needed for wheezing, Disp: 8.5 g, Rfl: 2    citalopram (CeleXA) 20 mg tablet, Take 1 tablet (20 mg total) by mouth daily at bedtime, Disp: 90 tablet, Rfl: 3    disulfiram (ANTABUSE) 250 mg tablet, Take 250 mg by mouth daily, Disp: , Rfl:     DULoxetine (Cymbalta) 60 mg delayed release capsule, Take 1 capsule (60 mg total) by mouth daily, Disp: 90 capsule, Rfl: 0    gabapentin (NEURONTIN) 600 MG tablet, take 1 tablet by mouth three times a day, Disp: 90 tablet, Rfl: 1    hydrOXYzine pamoate (VISTARIL) 25 mg capsule, take 1 capsule by mouth three times a day if needed for itching, Disp: 30 capsule, Rfl: 0    levothyroxine (Synthroid) 150 mcg tablet, Take 1 tablet (150 mcg total) by mouth daily in the early morning, Disp: 90 tablet, Rfl: 3    thiamine 100 MG tablet, Take 100 mg by mouth daily, Disp: , Rfl:     traZODone (DESYREL) 50 mg tablet, Take 1 tablet (50 mg total) by mouth daily at bedtime, Disp: 90 tablet, Rfl: 3    busPIRone (BUSPAR) 10 mg tablet, take 1 tablet by mouth three times a day (Patient not taking: Reported on 4/30/2024), Disp: 90 tablet, Rfl: 0    folic acid (FOLVITE) 1 mg tablet, take 1 tablet by mouth once daily (Patient not taking: Reported on 4/30/2024), Disp: 90 tablet, Rfl: 1    metoclopramide (REGLAN) 10 mg tablet, Take 10 mg by mouth every 6 (six) hours as needed (Patient not taking: Reported on 4/30/2024), Disp: , Rfl:     ondansetron (ZOFRAN-ODT) 4 mg disintegrating tablet, Take 4 mg by mouth every 8 (eight) hours as needed for nausea or vomiting (Patient not taking: Reported on 4/30/2024), Disp: , Rfl:     predniSONE 10 mg tablet, 40mg po q d x 5, then 30mg po q d x 5, then 20mg po q d x 5, then 10mg po q d x 5, then d/c. (Patient not taking: Reported  on 4/30/2024), Disp: 50 tablet, Rfl: 0    Current Allergies     Allergies as of 04/30/2024 - Reviewed 04/30/2024   Allergen Reaction Noted    Penicillins Hives and Rash 04/08/2007            The following portions of the patient's history were reviewed and updated as appropriate: allergies, current medications, past family history, past medical history, past social history, past surgical history and problem list.     Past Medical History:   Diagnosis Date    Anxiety     Asthma     Crohn disease (HCC)     Depression     Disease of thyroid gland     Hypertension     Hypothyroidism     Lower extremity numbness 02/11/2022    Psychiatric disorder        Past Surgical History:   Procedure Laterality Date    IR LUMBAR PUNCTURE  2/8/2022    NO PAST SURGERIES         Family History   Problem Relation Age of Onset    Colon cancer Mother     Kidney cancer Mother     Hypertension Mother     Colon cancer Father     Kidney cancer Father     Hypertension Father     Colon cancer Family     Kidney cancer Family          Medications have been verified.        Objective   /84   Pulse 80   Temp (!) 97 °F (36.1 °C) (Tympanic)   Resp 18   SpO2 98%        Physical Exam     Physical Exam  Vitals and nursing note reviewed.   Constitutional:       General: She is not in acute distress.     Appearance: Normal appearance. She is not ill-appearing.   Cardiovascular:      Rate and Rhythm: Normal rate and regular rhythm.      Pulses: Normal pulses.      Heart sounds: Normal heart sounds.   Pulmonary:      Effort: Pulmonary effort is normal.      Breath sounds: Normal breath sounds.   Musculoskeletal:      Right foot: Normal range of motion and normal capillary refill. Tenderness present. No swelling or deformity. Normal pulse.        Feet:    Neurological:      Mental Status: She is alert.

## 2024-04-30 NOTE — PATIENT INSTRUCTIONS
Your finalized x-ray results will be available on [facility] when read by the radiologist.  Recommend rest, ice, elevation. Over the counter pain medication as directed on packaging as needed for pain (ex: Tylenol, ibuprofen).    Follow up with PCP in 3-5 days.  Proceed to  ER if symptoms worsen.    If tests are performed, our office will contact you with results only if changes need to made to the care plan discussed with you at the visit. You can review your full results on [facility].

## 2024-05-23 ENCOUNTER — OFFICE VISIT (OUTPATIENT)
Dept: URGENT CARE | Facility: CLINIC | Age: 44
End: 2024-05-23
Payer: COMMERCIAL

## 2024-05-23 VITALS
OXYGEN SATURATION: 98 % | WEIGHT: 204 LBS | HEIGHT: 62 IN | DIASTOLIC BLOOD PRESSURE: 80 MMHG | TEMPERATURE: 98 F | SYSTOLIC BLOOD PRESSURE: 117 MMHG | RESPIRATION RATE: 16 BRPM | HEART RATE: 86 BPM | BODY MASS INDEX: 37.54 KG/M2

## 2024-05-23 DIAGNOSIS — H65.03 NON-RECURRENT ACUTE SEROUS OTITIS MEDIA OF BOTH EARS: Primary | ICD-10-CM

## 2024-05-23 PROCEDURE — G0382 LEV 3 HOSP TYPE B ED VISIT: HCPCS | Performed by: NURSE PRACTITIONER

## 2024-05-23 RX ORDER — AZITHROMYCIN 250 MG/1
TABLET, FILM COATED ORAL
Qty: 6 TABLET | Refills: 0 | Status: SHIPPED | OUTPATIENT
Start: 2024-05-23 | End: 2024-05-27

## 2024-05-23 RX ORDER — PREDNISONE 20 MG/1
20 TABLET ORAL 2 TIMES DAILY WITH MEALS
Qty: 10 TABLET | Refills: 0 | Status: SHIPPED | OUTPATIENT
Start: 2024-05-23 | End: 2024-05-28

## 2024-05-23 NOTE — PROGRESS NOTES
[facility]        NAME: Marina Hickey is a 44 y.o. female  : 1980    MRN: [id number]  DATE: May 23, 2024  TIME: 5:19 PM    Assessment and Plan   Non-recurrent acute serous otitis media of both ears [H65.03]  1. Non-recurrent acute serous otitis media of both ears  azithromycin (ZITHROMAX) 250 mg tablet    predniSONE 20 mg tablet            Patient Instructions       Patient Instructions   Take medication as directed. Recommend over-the-counter Tylenol or Motrin as needed for pain.  If you develop any increased pain, swelling, prolonged high fever, dizziness, or any new or concerning symptoms please return or proceed ER.  Advised follow-up with PCP in 3 to 5 days.    If tests have been performed at [facility], our office will contact you with results if changes need to be made to the care plan discussed with you at the visit.  [facility]    Chief Complaint     Chief Complaint   Patient presents with    Earache     Right side earache, congestion, left Eye discharge , no voice started Monday          History of Present Illness       Earache   There is pain in the right ear. This is a new problem. Episode onset: 4 days ago. The problem occurs constantly. The problem has been unchanged. The pain is moderate. Associated symptoms include rhinorrhea. Pertinent negatives include no abdominal pain, coughing, diarrhea, ear discharge, headaches, hearing loss, neck pain, rash, sore throat or vomiting. She has tried nothing for the symptoms. The treatment provided no relief. There is no history of a chronic ear infection, hearing loss or a tympanostomy tube.       Review of Systems   Review of Systems   Constitutional:  Negative for chills, diaphoresis, fatigue and fever.   HENT:  Positive for congestion, ear pain, postnasal drip, rhinorrhea, sinus pressure, sinus pain and voice change. Negative for ear discharge, facial swelling, hearing loss, sore throat, tinnitus  and trouble swallowing.    Eyes: Negative.    Respiratory:  Negative for cough, chest tightness, shortness of breath, wheezing and stridor.    Cardiovascular:  Negative for chest pain and palpitations.   Gastrointestinal: Negative.  Negative for abdominal pain, diarrhea and vomiting.   Musculoskeletal:  Negative for arthralgias, back pain, joint swelling, myalgias, neck pain and neck stiffness.   Skin:  Negative for rash.   Neurological:  Negative for dizziness, facial asymmetry, weakness, light-headedness, numbness and headaches.         Current Medications       Current Outpatient Medications:     albuterol (PROVENTIL HFA,VENTOLIN HFA) 90 mcg/act inhaler, inhale 2 puffs by mouth and INTO THE LUNGS every 4 hours if needed for wheezing, Disp: 8.5 g, Rfl: 2    azithromycin (ZITHROMAX) 250 mg tablet, Take 2 tablets today then 1 tablet daily x 4 days, Disp: 6 tablet, Rfl: 0    citalopram (CeleXA) 20 mg tablet, Take 1 tablet (20 mg total) by mouth daily at bedtime, Disp: 90 tablet, Rfl: 3    disulfiram (ANTABUSE) 250 mg tablet, Take 250 mg by mouth daily, Disp: , Rfl:     gabapentin (NEURONTIN) 600 MG tablet, take 1 tablet by mouth three times a day, Disp: 90 tablet, Rfl: 1    levothyroxine (Synthroid) 150 mcg tablet, Take 1 tablet (150 mcg total) by mouth daily in the early morning, Disp: 90 tablet, Rfl: 3    predniSONE 20 mg tablet, Take 1 tablet (20 mg total) by mouth 2 (two) times a day with meals for 5 days, Disp: 10 tablet, Rfl: 0    thiamine 100 MG tablet, Take 100 mg by mouth daily, Disp: , Rfl:     traZODone (DESYREL) 50 mg tablet, Take 1 tablet (50 mg total) by mouth daily at bedtime, Disp: 90 tablet, Rfl: 3    busPIRone (BUSPAR) 10 mg tablet, take 1 tablet by mouth three times a day (Patient not taking: Reported on 4/30/2024), Disp: 90 tablet, Rfl: 0    DULoxetine (Cymbalta) 60 mg delayed release capsule, Take 1 capsule (60 mg total) by mouth daily, Disp: 90 capsule, Rfl: 0    folic acid (FOLVITE) 1 mg  "tablet, take 1 tablet by mouth once daily (Patient not taking: Reported on 4/30/2024), Disp: 90 tablet, Rfl: 1    hydrOXYzine pamoate (VISTARIL) 25 mg capsule, take 1 capsule by mouth three times a day if needed for itching, Disp: 30 capsule, Rfl: 0    metoclopramide (REGLAN) 10 mg tablet, Take 10 mg by mouth every 6 (six) hours as needed (Patient not taking: Reported on 4/30/2024), Disp: , Rfl:     ondansetron (ZOFRAN-ODT) 4 mg disintegrating tablet, Take 4 mg by mouth every 8 (eight) hours as needed for nausea or vomiting (Patient not taking: Reported on 4/30/2024), Disp: , Rfl:     predniSONE 10 mg tablet, 40mg po q d x 5, then 30mg po q d x 5, then 20mg po q d x 5, then 10mg po q d x 5, then d/c. (Patient not taking: Reported on 4/30/2024), Disp: 50 tablet, Rfl: 0    Current Allergies     Allergies as of 05/23/2024 - Reviewed 05/23/2024   Allergen Reaction Noted    Penicillins Hives and Rash 04/08/2007            The following portions of the patient's history were reviewed and updated as appropriate: allergies, current medications, past family history, past medical history, past social history, past surgical history and problem list.     Past Medical History:   Diagnosis Date    Anxiety     Asthma     Crohn disease (HCC)     Depression     Disease of thyroid gland     Hypertension     Hypothyroidism     Lower extremity numbness 02/11/2022    Psychiatric disorder        Past Surgical History:   Procedure Laterality Date    IR LUMBAR PUNCTURE  2/8/2022    NO PAST SURGERIES         Family History   Problem Relation Age of Onset    Colon cancer Mother     Kidney cancer Mother     Hypertension Mother     Colon cancer Father     Kidney cancer Father     Hypertension Father     Colon cancer Family     Kidney cancer Family          Medications have been verified.        Objective   /80   Pulse 86   Temp 98 °F (36.7 °C)   Resp 16   Ht 5' 2\" (1.575 m)   Wt 92.5 kg (204 lb)   SpO2 98%   BMI 37.31 kg/m²   No " LMP recorded.       Physical Exam     Physical Exam  Constitutional:       General: She is not in acute distress.     Appearance: She is well-developed. She is not diaphoretic.   HENT:      Head: Normocephalic and atraumatic.      Right Ear: Hearing, ear canal and external ear normal. Tympanic membrane is erythematous and bulging.      Left Ear: Hearing, ear canal and external ear normal. Tympanic membrane is erythematous and bulging.      Nose: Congestion and rhinorrhea present. No mucosal edema.      Right Sinus: No maxillary sinus tenderness or frontal sinus tenderness.      Left Sinus: No maxillary sinus tenderness or frontal sinus tenderness.      Mouth/Throat:      Mouth: Oropharynx is clear and moist and mucous membranes are normal.      Pharynx: Oropharynx is clear. Uvula midline.   Cardiovascular:      Rate and Rhythm: Normal rate and regular rhythm.      Heart sounds: Normal heart sounds, S1 normal and S2 normal.   Pulmonary:      Effort: Pulmonary effort is normal.      Breath sounds: Normal breath sounds and air entry.   Lymphadenopathy:      Cervical: No cervical adenopathy.   Skin:     General: Skin is warm and dry.      Capillary Refill: Capillary refill takes less than 2 seconds.   Neurological:      Mental Status: She is alert and oriented to person, place, and time.

## 2024-05-23 NOTE — PATIENT INSTRUCTIONS
Take medication as directed. Recommend over-the-counter Tylenol or Motrin as needed for pain.  If you develop any increased pain, swelling, prolonged high fever, dizziness, or any new or concerning symptoms please return or proceed ER.  Advised follow-up with PCP in 3 to 5 days.

## 2024-06-19 ENCOUNTER — OFFICE VISIT (OUTPATIENT)
Dept: INTERNAL MEDICINE CLINIC | Facility: CLINIC | Age: 44
End: 2024-06-19
Payer: COMMERCIAL

## 2024-06-19 VITALS
WEIGHT: 206.8 LBS | HEIGHT: 62 IN | SYSTOLIC BLOOD PRESSURE: 122 MMHG | OXYGEN SATURATION: 97 % | DIASTOLIC BLOOD PRESSURE: 74 MMHG | BODY MASS INDEX: 38.05 KG/M2 | HEART RATE: 90 BPM | TEMPERATURE: 97.6 F

## 2024-06-19 DIAGNOSIS — Z13.29 SCREENING FOR THYROID DISORDER: ICD-10-CM

## 2024-06-19 DIAGNOSIS — Z13.0 SCREENING FOR DEFICIENCY ANEMIA: ICD-10-CM

## 2024-06-19 DIAGNOSIS — Z13.220 SCREENING FOR LIPID DISORDERS: ICD-10-CM

## 2024-06-19 DIAGNOSIS — Z12.11 SCREENING FOR COLON CANCER: ICD-10-CM

## 2024-06-19 DIAGNOSIS — H60.63 CHRONIC NON-INFECTIVE OTITIS EXTERNA OF BOTH EARS, UNSPECIFIED TYPE: Primary | ICD-10-CM

## 2024-06-19 DIAGNOSIS — H92.03 EAR PAIN, BILATERAL: ICD-10-CM

## 2024-06-19 DIAGNOSIS — G89.4 CHRONIC PAIN SYNDROME: ICD-10-CM

## 2024-06-19 DIAGNOSIS — E03.9 HYPOTHYROIDISM, UNSPECIFIED TYPE: ICD-10-CM

## 2024-06-19 DIAGNOSIS — F41.1 GAD (GENERALIZED ANXIETY DISORDER): ICD-10-CM

## 2024-06-19 DIAGNOSIS — Z23 ENCOUNTER FOR IMMUNIZATION: ICD-10-CM

## 2024-06-19 DIAGNOSIS — E66.01 CLASS 2 SEVERE OBESITY DUE TO EXCESS CALORIES WITH SERIOUS COMORBIDITY AND BODY MASS INDEX (BMI) OF 37.0 TO 37.9 IN ADULT (HCC): ICD-10-CM

## 2024-06-19 DIAGNOSIS — Z12.4 SCREENING FOR CERVICAL CANCER: ICD-10-CM

## 2024-06-19 DIAGNOSIS — K50.119 CROHN'S DISEASE OF COLON WITH COMPLICATION (HCC): ICD-10-CM

## 2024-06-19 DIAGNOSIS — Z13.1 SCREENING FOR DIABETES MELLITUS (DM): ICD-10-CM

## 2024-06-19 DIAGNOSIS — E83.42 HYPOMAGNESEMIA: ICD-10-CM

## 2024-06-19 DIAGNOSIS — F33.41 RECURRENT MAJOR DEPRESSIVE DISORDER, IN PARTIAL REMISSION (HCC): ICD-10-CM

## 2024-06-19 DIAGNOSIS — J45.40 MODERATE PERSISTENT ASTHMA WITHOUT COMPLICATION: ICD-10-CM

## 2024-06-19 DIAGNOSIS — Z00.00 WELL ADULT EXAM: Primary | ICD-10-CM

## 2024-06-19 PROCEDURE — 99396 PREV VISIT EST AGE 40-64: CPT | Performed by: INTERNAL MEDICINE

## 2024-06-19 PROCEDURE — 90677 PCV20 VACCINE IM: CPT

## 2024-06-19 PROCEDURE — 90471 IMMUNIZATION ADMIN: CPT

## 2024-06-19 RX ORDER — DULOXETIN HYDROCHLORIDE 30 MG/1
30 CAPSULE, DELAYED RELEASE ORAL DAILY
COMMUNITY
Start: 2024-06-12

## 2024-06-19 RX ORDER — BUSPIRONE HYDROCHLORIDE 15 MG/1
15 TABLET ORAL 2 TIMES DAILY
COMMUNITY
Start: 2024-06-13

## 2024-06-19 NOTE — PROGRESS NOTES
Assessment/Plan:  Problem List Items Addressed This Visit          Respiratory    Moderate persistent asthma without complication       Digestive    Crohn's disease of colon with complication (HCC)    Relevant Orders    CBC and differential    Comprehensive metabolic panel    Hemoglobin A1C       Endocrine    Hypothyroidism    Relevant Orders    TSH, 3rd generation       Behavioral Health    Recurrent major depressive disorder, in partial remission (HCC)    Relevant Medications    busPIRone (BUSPAR) 15 mg tablet    DULoxetine (CYMBALTA) 30 mg delayed release capsule    JOSE (generalized anxiety disorder)    Relevant Medications    busPIRone (BUSPAR) 15 mg tablet    DULoxetine (CYMBALTA) 30 mg delayed release capsule       Surgery/Wound/Pain    Chronic pain syndrome       Other    Hypomagnesemia    Class 2 obesity due to excess calories with body mass index (BMI) of 37.0 to 37.9 in adult     Other Visit Diagnoses       Well adult exam    -  Primary    Relevant Orders    CBC and differential    Comprehensive metabolic panel    Hemoglobin A1C    Lipid Panel with Direct LDL reflex    TSH, 3rd generation    Encounter for immunization        Relevant Orders    Pneumococcal Conjugate Vaccine 20-valent (Pcv20)    Screening for colon cancer        Screening for cervical cancer        Ear pain, bilateral        Relevant Orders    Ambulatory Referral to Otolaryngology    Screening for lipid disorders        Relevant Orders    Lipid Panel with Direct LDL reflex    Screening for diabetes mellitus (DM)        Relevant Orders    Hemoglobin A1C    Screening for thyroid disorder        Screening for deficiency anemia                 Diagnoses and all orders for this visit:    Well adult exam  -     CBC and differential; Future  -     Comprehensive metabolic panel; Future  -     Hemoglobin A1C; Future  -     Lipid Panel with Direct LDL reflex; Future  -     TSH, 3rd generation; Future    Encounter for immunization  -     Pneumococcal  Conjugate Vaccine 20-valent (Pcv20)    Screening for colon cancer    Screening for cervical cancer    Hypothyroidism, unspecified type  -     TSH, 3rd generation; Future    Crohn's disease of colon with complication (HCC)  -     CBC and differential; Future  -     Comprehensive metabolic panel; Future  -     Hemoglobin A1C; Future    Moderate persistent asthma without complication    JOSE (generalized anxiety disorder)    Recurrent major depressive disorder, in partial remission (HCC)    Chronic pain syndrome    Hypomagnesemia    Class 2 severe obesity due to excess calories with serious comorbidity and body mass index (BMI) of 37.0 to 37.9 in adult (HCC)    Ear pain, bilateral  -     Ambulatory Referral to Otolaryngology; Future    Screening for lipid disorders  -     Lipid Panel with Direct LDL reflex; Future    Screening for diabetes mellitus (DM)  -     Hemoglobin A1C; Future    Screening for thyroid disorder    Screening for deficiency anemia    Other orders  -     busPIRone (BUSPAR) 15 mg tablet; Take 15 mg by mouth 2 (two) times a day  -     DULoxetine (CYMBALTA) 30 mg delayed release capsule; Take 30 mg by mouth daily        No problem-specific Assessment & Plan notes found for this encounter.    A/P: Doing great and  continues to be sober. Doing well and co-morbidities are stable.  Labs will be ordered. Will up date her vaccine. ?ear issues and will refer to ENT. . No mental or physical restrictions. No evidence of communicable diseases.Keep f/u with AA. Get into see an eye doc and DDS. Keep f/u with GI>  Continue current treatment and RTC one year for annual and six months for routine. .       Subjective:      Patient ID: Marina Hickey is a 44 y.o. female.    WF presents for a well exam. Doing ok and no c/o's.Is celebrating 8 months sobriety and attends AA. Only c/o is bilat ear discomfort. Seen in the UC and given abx, INS, and steroids. Still having issues. Asthma is well controlled and limited rescue  MDI use. Crohn's is controlled and sees GI. Chronic pain is improved and appears related to ETOH use.  Remains active w/o difficulty and no falls. Denies depression or JOSE. No suicidal or violent ideations. Working full time now. . No recent travel. No fever, chills, or sweats. No unexplained wt change. Denies CP, SOB, palpitations, edema, orthopnea, or PND. No sz or syncope. No changes in bowel or bladder habits. No trouble swallowing. Appetite and sleep are good. Overdue to see both  a DDS and an eye doctor. No change in PMH, PSH, ALL, OH, SH, or Fhx. Currently due for labs and vaccines. .                The following portions of the patient's history were reviewed and updated as appropriate:   She has a past medical history of Anxiety, Asthma, Crohn disease (HCC), Depression, Disease of thyroid gland, Hypertension, Hypothyroidism, Lower extremity numbness (02/11/2022), and Psychiatric disorder.,  does not have any pertinent problems on file.,   has a past surgical history that includes No past surgeries and IR lumbar puncture (2/8/2022).,  family history includes Colon cancer in her family, father, and mother; Hypertension in her father and mother; Kidney cancer in her family, father, and mother.,   reports that she has been smoking cigarettes. She has never used smokeless tobacco. She reports that she does not currently use alcohol. She reports that she does not use drugs.,  is allergic to penicillins..  Current Outpatient Medications   Medication Sig Dispense Refill    albuterol (PROVENTIL HFA,VENTOLIN HFA) 90 mcg/act inhaler inhale 2 puffs by mouth and INTO THE LUNGS every 4 hours if needed for wheezing 8.5 g 2    busPIRone (BUSPAR) 15 mg tablet Take 15 mg by mouth 2 (two) times a day      disulfiram (ANTABUSE) 250 mg tablet Take 250 mg by mouth daily      DULoxetine (CYMBALTA) 30 mg delayed release capsule Take 30 mg by mouth daily      DULoxetine (Cymbalta) 60 mg delayed release capsule Take 1 capsule (60 mg  total) by mouth daily 90 capsule 0    folic acid (FOLVITE) 1 mg tablet take 1 tablet by mouth once daily 90 tablet 1    gabapentin (NEURONTIN) 600 MG tablet take 1 tablet by mouth three times a day 90 tablet 1    levothyroxine (Synthroid) 150 mcg tablet Take 1 tablet (150 mcg total) by mouth daily in the early morning 90 tablet 3     No current facility-administered medications for this visit.       Review of Systems   Constitutional:  Negative for activity change, chills, diaphoresis, fatigue and fever.   HENT:  Positive for ear pain. Negative for congestion, ear discharge, facial swelling, postnasal drip, rhinorrhea, sinus pressure, sinus pain and sore throat.    Eyes:  Negative for visual disturbance.   Respiratory:  Negative for cough, chest tightness, shortness of breath and wheezing.    Cardiovascular:  Negative for chest pain, palpitations and leg swelling.   Gastrointestinal:  Negative for abdominal pain, constipation, diarrhea, nausea and vomiting.   Endocrine: Negative for cold intolerance and heat intolerance.   Genitourinary:  Negative for difficulty urinating, dysuria and frequency.   Musculoskeletal:  Negative for arthralgias, gait problem and myalgias.   Neurological:  Negative for dizziness, seizures, syncope, weakness, light-headedness and headaches.   Psychiatric/Behavioral:  Negative for confusion, dysphoric mood and sleep disturbance. The patient is not nervous/anxious.        PHQ-2/9 Depression Screening    Little interest or pleasure in doing things: 0 - not at all  Feeling down, depressed, or hopeless: 0 - not at all  Trouble falling or staying asleep, or sleeping too much: 0 - not at all  Feeling tired or having little energy: 0 - not at all  Poor appetite or overeatin - not at all  Feeling bad about yourself - or that you are a failure or have let yourself or your family down: 0 - not at all  Trouble concentrating on things, such as reading the newspaper or watching television: 0 - not  "at all  Moving or speaking so slowly that other people could have noticed. Or the opposite - being so fidgety or restless that you have been moving around a lot more than usual: 0 - not at all  Thoughts that you would be better off dead, or of hurting yourself in some way: 0 - not at all  PHQ-9 Score: 0  PHQ-9 Interpretation: No or Minimal depression        Objective:  Vitals:    06/19/24 1033   BP: 122/74   Pulse: 90   Temp: 97.6 °F (36.4 °C)   SpO2: 97%   Weight: 93.8 kg (206 lb 12.8 oz)   Height: 5' 2\" (1.575 m)     Body mass index is 37.82 kg/m².     Physical Exam  Vitals and nursing note reviewed.   Constitutional:       General: She is not in acute distress.     Appearance: Normal appearance. She is not ill-appearing.   HENT:      Head: Normocephalic and atraumatic.      Right Ear: Tympanic membrane, ear canal and external ear normal. There is no impacted cerumen.      Left Ear: Ear canal and external ear normal. There is no impacted cerumen.      Ears:      Comments: AS TM bulging with air fluid level.      Mouth/Throat:      Mouth: Mucous membranes are moist.   Eyes:      Extraocular Movements: Extraocular movements intact.      Conjunctiva/sclera: Conjunctivae normal.      Pupils: Pupils are equal, round, and reactive to light.   Neck:      Vascular: No carotid bruit.   Cardiovascular:      Rate and Rhythm: Normal rate and regular rhythm.      Heart sounds: Normal heart sounds. No murmur heard.  Pulmonary:      Effort: Pulmonary effort is normal. No respiratory distress.      Breath sounds: Normal breath sounds. No wheezing, rhonchi or rales.   Abdominal:      General: Bowel sounds are normal. There is no distension.      Palpations: Abdomen is soft.      Tenderness: There is no abdominal tenderness.   Musculoskeletal:      Cervical back: Neck supple.      Right lower leg: No edema.      Left lower leg: No edema.   Neurological:      General: No focal deficit present.      Mental Status: She is alert and " oriented to person, place, and time. Mental status is at baseline.   Psychiatric:         Mood and Affect: Mood normal.         Behavior: Behavior normal.         Thought Content: Thought content normal.         Judgment: Judgment normal.

## 2024-07-20 DIAGNOSIS — E03.9 HYPOTHYROIDISM, UNSPECIFIED TYPE: ICD-10-CM

## 2024-07-20 RX ORDER — LEVOTHYROXINE SODIUM 0.15 MG/1
TABLET ORAL
Qty: 100 TABLET | Refills: 1 | Status: SHIPPED | OUTPATIENT
Start: 2024-07-20

## 2024-07-21 ENCOUNTER — OFFICE VISIT (OUTPATIENT)
Dept: URGENT CARE | Facility: CLINIC | Age: 44
End: 2024-07-21
Payer: COMMERCIAL

## 2024-07-21 ENCOUNTER — APPOINTMENT (OUTPATIENT)
Dept: RADIOLOGY | Facility: CLINIC | Age: 44
End: 2024-07-21
Payer: COMMERCIAL

## 2024-07-21 VITALS
OXYGEN SATURATION: 97 % | DIASTOLIC BLOOD PRESSURE: 71 MMHG | HEART RATE: 80 BPM | WEIGHT: 216 LBS | RESPIRATION RATE: 18 BRPM | BODY MASS INDEX: 39.51 KG/M2 | SYSTOLIC BLOOD PRESSURE: 119 MMHG | TEMPERATURE: 97.7 F

## 2024-07-21 DIAGNOSIS — M79.672 ACUTE FOOT PAIN, LEFT: ICD-10-CM

## 2024-07-21 DIAGNOSIS — S96.912A MUSCLE STRAIN OF LEFT FOOT, INITIAL ENCOUNTER: Primary | ICD-10-CM

## 2024-07-21 PROCEDURE — 73630 X-RAY EXAM OF FOOT: CPT

## 2024-07-21 PROCEDURE — G0382 LEV 3 HOSP TYPE B ED VISIT: HCPCS | Performed by: NURSE PRACTITIONER

## 2024-07-21 NOTE — PROGRESS NOTES
[facility]        NAME: Marina Hickey is a 44 y.o. female  : 1980    MRN: [id number]  DATE: 2024  TIME: 12:24 PM    Assessment and Plan   Muscle strain of left foot, initial encounter [S96.912A]  1. Muscle strain of left foot, initial encounter  Ambulatory Referral to Podiatry      2. Acute foot pain, left  XR foot 3+ vw left    Ambulatory Referral to Podiatry            Patient Instructions       Follow up with PCP in 3-5 days.  Proceed to  ER if symptoms worsen.    If tests have been performed at [facility], our office will contact you with results if changes need to be made to the care plan discussed with you at the visit.  [facility]        Your xray was preliminarily read by your provider.  A radiologist will read the xray and you will be notified if it is abnormal.    Wear the ace wrap to help with edema.  Continue with tylenol and motrin.  See podiatry - you have been given a referral  Follow up with your PCP in 3-5 days  Go to the ED if symptoms worsen   Chief Complaint     Chief Complaint   Patient presents with    Foot Pain     Pt c/o left lateral foot pain that started after she rolled her foot last  when getting up from chair         History of Present Illness       This is a 44 year old female who states had rolled her left foot 1 week ago after trying to get up from sitting on a chair. She denies seeing anyone for this and denies having ortho or podiatry. She states she has applied ice and has been taking tylenol and motrin w/o much relief.   She states she is on her feet all day at work. PMH is listed.  Denies pregnancy.         Review of Systems   Review of Systems   Constitutional: Negative.    HENT: Negative.     Respiratory: Negative.     Endocrine: Negative.    Genitourinary: Negative.    Musculoskeletal:         Left foot swelling and pain    Skin: Negative.    Allergic/Immunologic: Negative.    Neurological: Negative.     Hematological: Negative.    Psychiatric/Behavioral: Negative.           Current Medications       Current Outpatient Medications:     albuterol (PROVENTIL HFA,VENTOLIN HFA) 90 mcg/act inhaler, inhale 2 puffs by mouth and INTO THE LUNGS every 4 hours if needed for wheezing, Disp: 8.5 g, Rfl: 2    busPIRone (BUSPAR) 15 mg tablet, Take 15 mg by mouth 2 (two) times a day, Disp: , Rfl:     disulfiram (ANTABUSE) 250 mg tablet, Take 250 mg by mouth daily, Disp: , Rfl:     DULoxetine (CYMBALTA) 30 mg delayed release capsule, Take 30 mg by mouth daily, Disp: , Rfl:     DULoxetine (Cymbalta) 60 mg delayed release capsule, Take 1 capsule (60 mg total) by mouth daily, Disp: 90 capsule, Rfl: 0    folic acid (FOLVITE) 1 mg tablet, take 1 tablet by mouth once daily, Disp: 90 tablet, Rfl: 1    gabapentin (NEURONTIN) 600 MG tablet, take 1 tablet by mouth three times a day, Disp: 90 tablet, Rfl: 1    levothyroxine 150 mcg tablet, take 1 tablet by mouth IN THE EARLY MORNING, Disp: 100 tablet, Rfl: 1    predniSONE 20 mg tablet, 3 tabs day 1-3, 2 tabs day 4-6, 1 tab day 7-9 (Patient not taking: Reported on 7/21/2024), Disp: 18 tablet, Rfl: 0    Current Allergies     Allergies as of 07/21/2024 - Reviewed 07/21/2024   Allergen Reaction Noted    Penicillins Hives and Rash 04/08/2007            The following portions of the patient's history were reviewed and updated as appropriate: allergies, current medications, past family history, past medical history, past social history, past surgical history and problem list.     Past Medical History:   Diagnosis Date    Anxiety     Asthma     Crohn disease (HCC)     Depression     Disease of thyroid gland     Hypertension     Hypothyroidism     Lower extremity numbness 02/11/2022    Psychiatric disorder        Past Surgical History:   Procedure Laterality Date    IR LUMBAR PUNCTURE  02/08/2022    WISDOM TOOTH EXTRACTION         Family History   Problem Relation Age of Onset    Colon cancer Mother      Kidney cancer Mother     Hypertension Mother     Colon cancer Father     Kidney cancer Father     Hypertension Father     Colon cancer Family     Kidney cancer Family          Medications have been verified.        Objective   /71   Pulse 80   Temp 97.7 °F (36.5 °C) (Temporal)   Resp 18   Wt 98 kg (216 lb)   SpO2 97%   BMI 39.51 kg/m²   No LMP recorded.       Physical Exam     Physical Exam  Vitals and nursing note reviewed.   Constitutional:       General: She is not in acute distress.     Appearance: Normal appearance. She is obese. She is not ill-appearing, toxic-appearing or diaphoretic.   HENT:      Head: Normocephalic and atraumatic.   Eyes:      Extraocular Movements: Extraocular movements intact.   Cardiovascular:      Rate and Rhythm: Normal rate.      Pulses: Normal pulses.   Pulmonary:      Effort: Pulmonary effort is normal.   Musculoskeletal:         General: Swelling, tenderness and signs of injury present. No deformity. Normal range of motion.      Cervical back: Normal range of motion.        Feet:    Skin:     General: Skin is warm and dry.      Capillary Refill: Capillary refill takes less than 2 seconds.   Neurological:      General: No focal deficit present.      Mental Status: She is alert and oriented to person, place, and time.   Psychiatric:         Mood and Affect: Mood normal.         Behavior: Behavior normal.         Thought Content: Thought content normal.         Judgment: Judgment normal.           Preliminary reading left foot xray  No acute process seen  Waiting on rad read    Ace wrap given to pt. She would not be able to put shoe on due to ace wrap.

## 2024-07-21 NOTE — PATIENT INSTRUCTIONS
Your xray was preliminarily read by your provider.  A radiologist will read the xray and you will be notified if it is abnormal.    Wear the ace wrap to help with edema.  Continue with tylenol and motrin.  See podiatry - you have been given a referral  Follow up with your PCP in 3-5 days  Go to the ED if symptoms worsen

## 2024-08-02 ENCOUNTER — TELEPHONE (OUTPATIENT)
Age: 44
End: 2024-08-02

## 2024-08-02 NOTE — TELEPHONE ENCOUNTER
Patient called asking to speak to [facility]'s Podiatry.  Warm transfer to [facility].

## 2024-08-07 DIAGNOSIS — J45.40 MODERATE PERSISTENT ASTHMA WITHOUT COMPLICATION: ICD-10-CM

## 2024-08-07 RX ORDER — ALBUTEROL SULFATE 90 UG/1
AEROSOL, METERED RESPIRATORY (INHALATION)
Qty: 8.5 G | Refills: 5 | Status: SHIPPED | OUTPATIENT
Start: 2024-08-07

## 2024-08-10 ENCOUNTER — HOSPITAL ENCOUNTER (OUTPATIENT)
Dept: MAMMOGRAPHY | Facility: HOSPITAL | Age: 44
Discharge: HOME | End: 2024-08-10
Attending: INTERNAL MEDICINE
Payer: COMMERCIAL

## 2024-08-10 ENCOUNTER — APPOINTMENT (OUTPATIENT)
Dept: LAB | Facility: CLINIC | Age: 44
End: 2024-08-10
Payer: COMMERCIAL

## 2024-08-10 DIAGNOSIS — Z13.220 SCREENING FOR LIPID DISORDERS: ICD-10-CM

## 2024-08-10 DIAGNOSIS — Z00.00 WELL ADULT EXAM: ICD-10-CM

## 2024-08-10 DIAGNOSIS — E03.9 HYPOTHYROIDISM, UNSPECIFIED TYPE: Primary | ICD-10-CM

## 2024-08-10 DIAGNOSIS — K50.119 CROHN'S DISEASE OF COLON WITH COMPLICATION (HCC): ICD-10-CM

## 2024-08-10 DIAGNOSIS — E03.9 HYPOTHYROIDISM, UNSPECIFIED TYPE: ICD-10-CM

## 2024-08-10 DIAGNOSIS — Z12.31 ENCOUNTER FOR SCREENING MAMMOGRAM FOR MALIGNANT NEOPLASM OF BREAST: ICD-10-CM

## 2024-08-10 DIAGNOSIS — Z13.1 SCREENING FOR DIABETES MELLITUS (DM): ICD-10-CM

## 2024-08-10 LAB
ALBUMIN SERPL BCG-MCNC: 4.2 G/DL (ref 3.5–5)
ALP SERPL-CCNC: 54 U/L (ref 34–104)
ALT SERPL W P-5'-P-CCNC: 20 U/L (ref 7–52)
ANION GAP SERPL CALCULATED.3IONS-SCNC: 11 MMOL/L (ref 4–13)
AST SERPL W P-5'-P-CCNC: 22 U/L (ref 13–39)
BASOPHILS # BLD AUTO: 0.06 THOUSANDS/ÂΜL (ref 0–0.1)
BASOPHILS NFR BLD AUTO: 1 % (ref 0–1)
BILIRUB SERPL-MCNC: 2.27 MG/DL (ref 0.2–1)
BUN SERPL-MCNC: 14 MG/DL (ref 5–25)
CALCIUM SERPL-MCNC: 9.7 MG/DL (ref 8.4–10.2)
CHLORIDE SERPL-SCNC: 100 MMOL/L (ref 96–108)
CHOLEST SERPL-MCNC: 238 MG/DL
CO2 SERPL-SCNC: 28 MMOL/L (ref 21–32)
CREAT SERPL-MCNC: 0.75 MG/DL (ref 0.6–1.3)
EOSINOPHIL # BLD AUTO: 0.19 THOUSAND/ÂΜL (ref 0–0.61)
EOSINOPHIL NFR BLD AUTO: 2 % (ref 0–6)
ERYTHROCYTE [DISTWIDTH] IN BLOOD BY AUTOMATED COUNT: 11.7 % (ref 11.6–15.1)
EST. AVERAGE GLUCOSE BLD GHB EST-MCNC: 128 MG/DL
GFR SERPL CREATININE-BSD FRML MDRD: 96 ML/MIN/1.73SQ M
GLUCOSE P FAST SERPL-MCNC: 99 MG/DL (ref 65–99)
HBA1C MFR BLD: 6.1 %
HCT VFR BLD AUTO: 44.9 % (ref 34.8–46.1)
HDLC SERPL-MCNC: 73 MG/DL
HGB BLD-MCNC: 15 G/DL (ref 11.5–15.4)
IMM GRANULOCYTES # BLD AUTO: 0.03 THOUSAND/UL (ref 0–0.2)
IMM GRANULOCYTES NFR BLD AUTO: 0 % (ref 0–2)
LDLC SERPL CALC-MCNC: 131 MG/DL (ref 0–100)
LYMPHOCYTES # BLD AUTO: 2.39 THOUSANDS/ÂΜL (ref 0.6–4.47)
LYMPHOCYTES NFR BLD AUTO: 26 % (ref 14–44)
MCH RBC QN AUTO: 30.7 PG (ref 26.8–34.3)
MCHC RBC AUTO-ENTMCNC: 33.4 G/DL (ref 31.4–37.4)
MCV RBC AUTO: 92 FL (ref 82–98)
MONOCYTES # BLD AUTO: 0.73 THOUSAND/ÂΜL (ref 0.17–1.22)
MONOCYTES NFR BLD AUTO: 8 % (ref 4–12)
NEUTROPHILS # BLD AUTO: 5.7 THOUSANDS/ÂΜL (ref 1.85–7.62)
NEUTS SEG NFR BLD AUTO: 63 % (ref 43–75)
NRBC BLD AUTO-RTO: 0 /100 WBCS
PLATELET # BLD AUTO: 343 THOUSANDS/UL (ref 149–390)
PMV BLD AUTO: 8.7 FL (ref 8.9–12.7)
POTASSIUM SERPL-SCNC: 3.8 MMOL/L (ref 3.5–5.3)
PROT SERPL-MCNC: 7.4 G/DL (ref 6.4–8.4)
RBC # BLD AUTO: 4.89 MILLION/UL (ref 3.81–5.12)
SODIUM SERPL-SCNC: 139 MMOL/L (ref 135–147)
TRIGL SERPL-MCNC: 171 MG/DL
TSH SERPL DL<=0.05 MIU/L-ACNC: 0.14 UIU/ML (ref 0.45–4.5)
WBC # BLD AUTO: 9.1 THOUSAND/UL (ref 4.31–10.16)

## 2024-08-10 PROCEDURE — 36415 COLL VENOUS BLD VENIPUNCTURE: CPT

## 2024-08-10 PROCEDURE — 77067 SCR MAMMO BI INCL CAD: CPT

## 2024-08-10 PROCEDURE — 84443 ASSAY THYROID STIM HORMONE: CPT

## 2024-08-10 PROCEDURE — 77063 BREAST TOMOSYNTHESIS BI: CPT

## 2024-08-10 PROCEDURE — 80061 LIPID PANEL: CPT

## 2024-08-10 PROCEDURE — 85025 COMPLETE CBC W/AUTO DIFF WBC: CPT

## 2024-08-10 PROCEDURE — 80053 COMPREHEN METABOLIC PANEL: CPT

## 2024-08-10 PROCEDURE — 83036 HEMOGLOBIN GLYCOSYLATED A1C: CPT

## 2024-08-10 RX ORDER — LEVOTHYROXINE SODIUM 0.12 MG/1
125 TABLET ORAL
Qty: 100 TABLET | Refills: 3 | Status: SHIPPED | OUTPATIENT
Start: 2024-08-10

## 2024-09-18 ENCOUNTER — TELEPHONE (OUTPATIENT)
Age: 44
End: 2024-09-18

## 2024-09-18 NOTE — TELEPHONE ENCOUNTER
Spoke with patient, she took a at home test and it is positive. Do you want virtual visit tomorrow?

## 2024-09-18 NOTE — TELEPHONE ENCOUNTER
Patient states she would like to be checked out and or tested for flu/covid, she is experiencing sinus congestion, sore throat, fever, please advise

## 2024-09-19 ENCOUNTER — TELEMEDICINE (OUTPATIENT)
Dept: INTERNAL MEDICINE CLINIC | Facility: CLINIC | Age: 44
End: 2024-09-19
Payer: COMMERCIAL

## 2024-09-19 ENCOUNTER — TELEPHONE (OUTPATIENT)
Age: 44
End: 2024-09-19

## 2024-09-19 ENCOUNTER — DOCUMENTATION (OUTPATIENT)
Dept: INTERNAL MEDICINE CLINIC | Facility: CLINIC | Age: 44
End: 2024-09-19

## 2024-09-19 DIAGNOSIS — U07.1 LAB TEST POSITIVE FOR DETECTION OF COVID-19 VIRUS: Primary | ICD-10-CM

## 2024-09-19 DIAGNOSIS — K50.119 CROHN'S DISEASE OF COLON WITH COMPLICATION (HCC): ICD-10-CM

## 2024-09-19 DIAGNOSIS — J45.40 MODERATE PERSISTENT ASTHMA WITHOUT COMPLICATION: ICD-10-CM

## 2024-09-19 PROCEDURE — 99213 OFFICE O/P EST LOW 20 MIN: CPT | Performed by: INTERNAL MEDICINE

## 2024-09-19 RX ORDER — CODEINE PHOSPHATE/GUAIFENESIN 10-100MG/5
5 LIQUID (ML) ORAL 3 TIMES DAILY PRN
Qty: 120 ML | Refills: 0 | Status: SHIPPED | OUTPATIENT
Start: 2024-09-19

## 2024-09-19 RX ORDER — GUAIFENESIN 600 MG/1
600 TABLET, EXTENDED RELEASE ORAL EVERY 12 HOURS SCHEDULED
Qty: 20 TABLET | Refills: 0 | Status: SHIPPED | OUTPATIENT
Start: 2024-09-19

## 2024-09-19 RX ORDER — FLUTICASONE PROPIONATE 50 MCG
1 SPRAY, SUSPENSION (ML) NASAL DAILY
Qty: 16 G | Refills: 0 | Status: SHIPPED | OUTPATIENT
Start: 2024-09-19

## 2024-09-19 NOTE — PROGRESS NOTES
COVID-19 Outpatient Progress Note  Name: Marina Hickey      : 1980      MRN: [id number]  Encounter Provider: Otf Galloway DO  Encounter Date: 2024   Encounter department: [facility]    Assessment & Plan  Lab test positive for detection of COVID-19 virus    Orders:    guaiFENesin (Mucinex) 600 mg 12 hr tablet; Take 1 tablet (600 mg total) by mouth every 12 (twelve) hours    fluticasone (FLONASE) 50 mcg/act nasal spray; 1 spray into each nostril daily    guaifenesin-codeine (GUAIFENESIN AC) 100-10 MG/5ML liquid; Take 5 mL by mouth 3 (three) times a day as needed for cough    Moderate persistent asthma without complication         Crohn's disease of colon with complication (HCC)         Disposition:     Discussed symptom directed medication options with patient. Discussed vitamin D, vitamin C, and/or zinc supplementation with patient.   A/P: Day # 3 since onset of COVID s/s. Covid test at home was positive.. Doing ok. Some fever and chills. Muscle aches with mild nasal congestion. Slight cough, but no SOB. Headache noted. . Continue isolation/quarantine, increase fluids, PRN motrin/tylenol, and breathing exercises. RTC Discussed pt meeting high risk criteria and discussed paxlovid. Defers meds. Will send in mucinex, INS, and cough med. RTC as scheduled  for f/u.        I have spent a total time of 25 minutes on the day of the encounter for this patient including discussing diagnostic results, discussing prognosis, risks and benefits of treatment options, instructions for management, patient and family education, importance of treatment compliance, risk factor reductions, impressions, counseling/coordination of care, documenting in the medical record, reviewing/ordering tests, medicine, procedures and obtaining or reviewing history.          Encounter provider: Otf Galloway DO     Provider located at: [address]  [id number]     Recent Visits  No visits were found meeting these conditions.  Showing recent visits within past 7 days and meeting all other requirements  Today's Visits  Date Type Provider Dept   09/19/24 Telemedicine Otf Galloway DO [facility]   Showing today's visits and meeting all other requirements  Future Appointments  No visits were found meeting these conditions.  Showing future appointments within next 150 days and meeting all other requirements    History of Present Illness      This virtual check-in was done via [employer] and patient was informed that this is a secure, HIPAA-compliant platform. She agrees to proceed.    Patient agrees to participate in a virtual check in via telephone or video visit instead of presenting to the office to address urgent/immediate medical needs. Patient is aware this is a billable service. She acknowledged consent and understanding of privacy and security of the video platform. The patient has agreed to participate and understands they can discontinue the visit at any time.    After connecting through [employer], the patient was identified by name and date of birth. Marina Hickey was informed that this was a telemedicine visit and that the exam was being conducted confidentially over secure lines. My office door was closed. No one else was in the room. Marina Hickey acknowledged consent and understanding of privacy and security of the telemedicine visit. I informed the patient that I have reviewed her record in Epic and presented the opportunity for her to ask any questions regarding the visit today. The patient agreed to participate.     Verification of patient location:  Patient is located in the following state in which I hold an active license: [place]    Subjective:   Marina Hickey is a 44 y.o. adult who has been screened for COVID-19. Symptom change since last report: unchanged. Patient's symptoms include fever, chills, fatigue, nasal congestion,  rhinorrhea, abdominal pain, myalgias and headache. Patient denies sore throat, anosmia, loss of taste, cough, shortness of breath, chest tightness, nausea, vomiting and diarrhea.     - Date of symptom onset: 9/16/2024  - Date of positive COVID-19 test: 9/18/2024. Type of test: Home antigen. Patient with typical symptoms of COVID-19 and they attest that they were positive on home rapid antigen testing. Image of positive result is not able to be uploaded into their chart.     COVID-19 vaccination status: Fully vaccinated (primary series)    Marina has been staying home and has isolated themselves in her home. She is taking care to not share personal items and is cleaning all surfaces that are touched often, like counters, tabletops, and doorknobs using household cleaning sprays or wipes. She is wearing a mask when she leaves her room.     Lab Results   Component Value Date    SARSCOV2 Negative 01/12/2022       Review of Systems   Constitutional:  Positive for activity change, chills, fatigue and fever. Negative for diaphoresis.   HENT:  Positive for congestion, postnasal drip and rhinorrhea. Negative for ear discharge, ear pain, facial swelling, sinus pressure, sinus pain and sore throat.         No loss of taste/smell.   Respiratory:  Negative for cough, chest tightness, shortness of breath and wheezing.    Cardiovascular:  Negative for chest pain, palpitations and leg swelling.   Gastrointestinal:  Positive for abdominal pain. Negative for constipation, diarrhea, nausea and vomiting.   Genitourinary:  Negative for difficulty urinating, dysuria and frequency.   Musculoskeletal:  Positive for myalgias. Negative for arthralgias and gait problem.   Neurological:  Positive for headaches. Negative for light-headedness.   Psychiatric/Behavioral:  Negative for confusion. The patient is not nervous/anxious.      Objective     There were no vitals taken for this visit.    Physical Exam  Vitals and nursing note reviewed.    Constitutional:       General: She is not in acute distress.     Appearance: Normal appearance. She is ill-appearing.   HENT:      Head: Normocephalic and atraumatic.      Mouth/Throat:      Mouth: Mucous membranes are moist.   Eyes:      Extraocular Movements: Extraocular movements intact.      Conjunctiva/sclera: Conjunctivae normal.      Pupils: Pupils are equal, round, and reactive to light.   Pulmonary:      Effort: Pulmonary effort is normal. No respiratory distress.   Neurological:      General: No focal deficit present.      Mental Status: She is alert and oriented to person, place, and time. Mental status is at baseline.   Psychiatric:         Mood and Affect: Mood normal.         Behavior: Behavior normal.         Thought Content: Thought content normal.         Judgment: Judgment normal.     Scheduled Medication Review:  Pt's scheduled medication use was reviewed by myself/staff via the PDMP website. Pt's use has been found to be appropriate w/o any concerns for misuse by the patient. Pt's current conditions require continued scheduled medication use at this time. Future review for continued appropriate medication use and misuse will continue.

## 2024-09-19 NOTE — TELEPHONE ENCOUNTER
Patient would like a back to work note uploaded to [employer] pt had a virtual visit with PCP today. Tried calling the patient back to get return to work date not able to reach patient. Covid +

## 2024-09-19 NOTE — PATIENT INSTRUCTIONS
Patient Education     COVID-19 ED   General Information   You came to the Emergency Department (ED) for signs of Coronavirus Disease 2019 (COVID-19). You may be waiting on test results. The staff will notify you if there are concerning results.  The virus that causes COVID-19 mainly spreads from person to person. This usually happens when an infected person coughs, sneezes, or talks near other people. A person can be infected, and spread the virus to others, even without having any symptoms.  What care is needed at home?   Call your regular doctor to let them know you were in the ED. Make a follow-up appointment if you were told to.  If the doctor prescribed medicines to treat COVID-19, be sure to follow all instructions for taking them.  Drink lots of water, juice, or broth to replace fluids lost from a fever.  You may want to take acetaminophen to help with fever. You can also try ibuprofen.  Use a cool mist humidifier. This might make it easier to breathe.  Do not smoke and do not drink beer, wine, and mixed drinks (alcohol).  To lower the chance of passing the infection to others:  Stay home while you are feeling sick or have a fever.  At home, try to limit close contact with other people. You can also help protect others by wearing a face mask.  Wash your hands often.  Cover your mouth and nose with the inside of your elbow when you cough or sneeze.  Do not go to work or school until your symptoms are improving and your fever has been gone for at least 24 hours without taking medicine such as acetaminophen.  When do I need to get emergency help?   Call for an ambulance right away if:   You are having so much trouble breathing that you can only say one or two words at a time  You need to sit upright at all times to be able to breathe and or cannot lie down.  You are very confused or cannot stay awake.  Your lips or skin start to turn blue.  You think you might be having a medical emergency. Some examples of  medical emergencies are:  Severe chest pain.  Not able to speak or move normally.  Return to the ED if:   You have trouble breathing when talking or sitting still.  When do I need to call the doctor?   You have new shortness of breath.  You become weak or dizzy.  You have very dark urine or do not pass urine for more than 8 hours.  You have new or worsening COVID symptoms like:  Fever  Cough  Feeling very tired  Shaking chills  Headache  Trouble swallowing  Throwing up  Loose stools  Reddish purple spots on your fingers or toes  You have other new or worsening symptoms.  Last Reviewed Date   2024-04-08  Consumer Information Use and Disclaimer   This generalized information is a limited summary of diagnosis, treatment, and/or medication information. It is not meant to be comprehensive and should be used as a tool to help the user understand and/or assess potential diagnostic and treatment options. It does NOT include all information about conditions, treatments, medications, side effects, or risks that may apply to a specific patient. It is not intended to be medical advice or a substitute for the medical advice, diagnosis, or treatment of a health care provider based on the health care provider's examination and assessment of a patient’s specific and unique circumstances. Patients must speak with a health care provider for complete information about their health, medical questions, and treatment options, including any risks or benefits regarding use of medications. This information does not endorse any treatments or medications as safe, effective, or approved for treating a specific patient. [employer] and its affiliates disclaim any warranty or liability relating to this information or the use thereof. The use of this information is governed by the Terms of Use, available at https://www.woltersSavisionuwer.com/en/know/clinical-effectiveness-terms   Copyright   Copyright © 2024 [employer] and its affiliates and/or  licensors. All rights reserved.

## 2024-09-19 NOTE — TELEPHONE ENCOUNTER
Pt returned call stating that PCP said she can return to work on 9/23/24 Monday.  Pt asking for letter to please be created and released to [employer]

## 2024-10-28 ENCOUNTER — TELEPHONE (OUTPATIENT)
Age: 44
End: 2024-10-28

## 2024-10-28 DIAGNOSIS — L30.9 ECZEMA, UNSPECIFIED TYPE: Primary | ICD-10-CM

## 2024-10-28 RX ORDER — MOMETASONE FUROATE 1 MG/G
CREAM TOPICAL DAILY
Qty: 45 G | Refills: 1 | Status: SHIPPED | OUTPATIENT
Start: 2024-10-28

## 2024-10-28 NOTE — TELEPHONE ENCOUNTER
Pt stated she has red and itchy and puffy around left eye has tried otc for eczema not helping pt stated many years ago was prescribe elocon has work for her. no available appt please advise.[phone]

## 2024-12-30 DIAGNOSIS — J45.40 MODERATE PERSISTENT ASTHMA WITHOUT COMPLICATION: ICD-10-CM

## 2024-12-31 RX ORDER — ALBUTEROL SULFATE 90 UG/1
INHALANT RESPIRATORY (INHALATION)
Qty: 8.5 G | Refills: 5 | Status: SHIPPED | OUTPATIENT
Start: 2024-12-31

## 2025-01-02 DIAGNOSIS — E03.9 HYPOTHYROIDISM, UNSPECIFIED TYPE: ICD-10-CM

## 2025-01-03 RX ORDER — LEVOTHYROXINE SODIUM 125 UG/1
125 TABLET ORAL
Qty: 100 TABLET | Refills: 1 | Status: SHIPPED | OUTPATIENT
Start: 2025-01-03

## 2025-03-13 ENCOUNTER — TELEPHONE (OUTPATIENT)
Age: 45
End: 2025-03-13

## 2025-03-13 NOTE — TELEPHONE ENCOUNTER
Pt called to schedule an appointment for a follow up with PCP.  Pt scheduled for Wednesday, 3/19/25 at 0830 am.      Pt will bring along photo ID an new insurance cards to appointment. She did not have them with her at time of scheduling.

## 2025-03-19 ENCOUNTER — OFFICE VISIT (OUTPATIENT)
Dept: INTERNAL MEDICINE CLINIC | Facility: CLINIC | Age: 45
End: 2025-03-19
Payer: COMMERCIAL

## 2025-03-19 VITALS
HEART RATE: 95 BPM | HEIGHT: 62 IN | OXYGEN SATURATION: 98 % | DIASTOLIC BLOOD PRESSURE: 82 MMHG | WEIGHT: 221 LBS | BODY MASS INDEX: 40.67 KG/M2 | SYSTOLIC BLOOD PRESSURE: 122 MMHG | TEMPERATURE: 98.4 F

## 2025-03-19 DIAGNOSIS — R73.03 PREDIABETES: ICD-10-CM

## 2025-03-19 DIAGNOSIS — G89.4 CHRONIC PAIN SYNDROME: ICD-10-CM

## 2025-03-19 DIAGNOSIS — Z13.220 SCREENING FOR HYPERLIPIDEMIA: ICD-10-CM

## 2025-03-19 DIAGNOSIS — R20.0 LOWER EXTREMITY NUMBNESS: ICD-10-CM

## 2025-03-19 DIAGNOSIS — Z23 ENCOUNTER FOR IMMUNIZATION: ICD-10-CM

## 2025-03-19 DIAGNOSIS — Z12.4 SCREENING FOR CERVICAL CANCER: ICD-10-CM

## 2025-03-19 DIAGNOSIS — Z72.0 TOBACCO ABUSE: ICD-10-CM

## 2025-03-19 DIAGNOSIS — K50.119 CROHN'S DISEASE OF COLON WITH COMPLICATION (HCC): ICD-10-CM

## 2025-03-19 DIAGNOSIS — E66.01 CLASS 2 SEVERE OBESITY DUE TO EXCESS CALORIES WITH SERIOUS COMORBIDITY AND BODY MASS INDEX (BMI) OF 37.0 TO 37.9 IN ADULT (HCC): ICD-10-CM

## 2025-03-19 DIAGNOSIS — E66.812 CLASS 2 SEVERE OBESITY DUE TO EXCESS CALORIES WITH SERIOUS COMORBIDITY AND BODY MASS INDEX (BMI) OF 37.0 TO 37.9 IN ADULT (HCC): ICD-10-CM

## 2025-03-19 DIAGNOSIS — J45.40 MODERATE PERSISTENT ASTHMA WITHOUT COMPLICATION: ICD-10-CM

## 2025-03-19 DIAGNOSIS — F33.41 RECURRENT MAJOR DEPRESSIVE DISORDER, IN PARTIAL REMISSION (HCC): ICD-10-CM

## 2025-03-19 DIAGNOSIS — F41.1 GAD (GENERALIZED ANXIETY DISORDER): ICD-10-CM

## 2025-03-19 DIAGNOSIS — E03.9 HYPOTHYROIDISM, UNSPECIFIED TYPE: Primary | ICD-10-CM

## 2025-03-19 DIAGNOSIS — F10.21 ALCOHOL DEPENDENCE IN REMISSION (HCC): ICD-10-CM

## 2025-03-19 PROCEDURE — 99214 OFFICE O/P EST MOD 30 MIN: CPT | Performed by: INTERNAL MEDICINE

## 2025-03-19 RX ORDER — DULOXETIN HYDROCHLORIDE 60 MG/1
60 CAPSULE, DELAYED RELEASE ORAL DAILY
Qty: 90 CAPSULE | Refills: 1 | Status: SHIPPED | OUTPATIENT
Start: 2025-03-19

## 2025-03-19 RX ORDER — DULOXETIN HYDROCHLORIDE 30 MG/1
30 CAPSULE, DELAYED RELEASE ORAL DAILY
Qty: 90 CAPSULE | Refills: 1 | Status: SHIPPED | OUTPATIENT
Start: 2025-03-19

## 2025-03-19 RX ORDER — NICOTINE 21 MG/24HR
1 PATCH, TRANSDERMAL 24 HOURS TRANSDERMAL EVERY 24 HOURS
Qty: 28 PATCH | Refills: 0 | Status: SHIPPED | OUTPATIENT
Start: 2025-03-19

## 2025-03-19 NOTE — PROGRESS NOTES
Name: Marina Hickey      : 1980      MRN: [id number]  Encounter Provider: Otf Galloway DO  Encounter Date: 3/19/2025   Encounter department: [facility]  :  Assessment & Plan  Screening for cervical cancer    Orders:    Ambulatory referral to Obstetrics / Gynecology; Future    Encounter for immunization         Hypothyroidism, unspecified type    Orders:    TSH, 3rd generation; Future    Crohn's disease of colon with complication (HCC)    Orders:    CBC and differential; Future    Comprehensive metabolic panel; Future    Moderate persistent asthma without complication         Alcohol dependence in remission (HCC)         JOSE (generalized anxiety disorder)    Orders:    DULoxetine (CYMBALTA) 30 mg delayed release capsule; Take 1 capsule (30 mg total) by mouth daily    Recurrent major depressive disorder, in partial remission (HCC)           Chronic pain syndrome         Class 2 severe obesity due to excess calories with serious comorbidity and body mass index (BMI) of 37.0 to 37.9 in adult ([facility])           Lower extremity numbness    Orders:    DULoxetine (Cymbalta) 60 mg delayed release capsule; Take 1 capsule (60 mg total) by mouth daily    Tobacco abuse    Orders:    nicotine (NICODERM CQ) 21 mg/24 hr TD 24 hr patch; Place 1 patch on the skin over 24 hours every 24 hours    Screening for hyperlipidemia    Orders:    LDL cholesterol, direct; Future    Triglycerides; Future    Prediabetes    Orders:    Hemoglobin A1C; Future      A/P: Doing ok and will check labs. Discussed vaccines and is up to date. Will start nicotine patches. Wean tobacco. Keep f/u with the specialists. Continue current treatment and RTC six months for routine.     History of Present Illness   [facility] RTC for f/u Hypothyroidism, asthma, etc. Doing well and no new issues. Back to smoking.  Remains active w/o difficulty and no falls. Working full time. Refraining from ETOH. Asthma and Crohn's are manageable. MDD/JOSE are  "controlled. Due labs      Review of Systems   Constitutional:  Negative for activity change, chills, diaphoresis, fatigue and fever.   HENT: Negative.     Eyes:  Negative for visual disturbance.   Respiratory:  Negative for cough, chest tightness, shortness of breath and wheezing.    Cardiovascular:  Negative for chest pain, palpitations and leg swelling.   Gastrointestinal:  Negative for abdominal pain, constipation, diarrhea, nausea and vomiting.   Endocrine: Negative for cold intolerance and heat intolerance.   Genitourinary:  Negative for difficulty urinating, dysuria and frequency.   Musculoskeletal:  Negative for arthralgias, gait problem and myalgias.   Neurological:  Negative for dizziness, seizures, syncope, weakness, light-headedness and headaches.   Psychiatric/Behavioral:  Negative for confusion, dysphoric mood and sleep disturbance. The patient is not nervous/anxious.        Objective   /82 (Patient Position: Sitting, Cuff Size: Large)   Pulse 95   Temp 98.4 °F (36.9 °C) (Tympanic)   Ht 5' 2\" (1.575 m)   Wt 100 kg (221 lb)   LMP  (LMP Unknown)   SpO2 98%   BMI 40.42 kg/m²      Physical Exam  Vitals and nursing note reviewed.   Constitutional:       General: She is not in acute distress.     Appearance: Normal appearance. She is not ill-appearing.   HENT:      Head: Normocephalic and atraumatic.   Eyes:      Extraocular Movements: Extraocular movements intact.      Conjunctiva/sclera: Conjunctivae normal.      Pupils: Pupils are equal, round, and reactive to light.   Neck:      Vascular: No carotid bruit.   Cardiovascular:      Rate and Rhythm: Normal rate and regular rhythm.      Heart sounds: Normal heart sounds. No murmur heard.  Pulmonary:      Effort: Pulmonary effort is normal. No respiratory distress.      Breath sounds: Normal breath sounds. No wheezing, rhonchi or rales.   Abdominal:      General: Bowel sounds are normal. There is no distension.      Palpations: Abdomen is soft.     "  Tenderness: There is no abdominal tenderness.   Musculoskeletal:      Cervical back: Neck supple.      Right lower leg: No edema.      Left lower leg: No edema.   Neurological:      General: No focal deficit present.      Mental Status: She is alert and oriented to person, place, and time. Mental status is at baseline.   Psychiatric:         Mood and Affect: Mood normal.         Behavior: Behavior normal.         Thought Content: Thought content normal.         Judgment: Judgment normal.

## 2025-06-08 DIAGNOSIS — J45.40 MODERATE PERSISTENT ASTHMA WITHOUT COMPLICATION: ICD-10-CM

## 2025-06-09 RX ORDER — ALBUTEROL SULFATE 90 UG/1
INHALANT RESPIRATORY (INHALATION)
Qty: 8.5 G | Refills: 5 | Status: SHIPPED | OUTPATIENT
Start: 2025-06-09

## 2025-08-04 ENCOUNTER — TELEPHONE (OUTPATIENT)
Age: 45
End: 2025-08-04

## 2025-08-06 ENCOUNTER — OFFICE VISIT (OUTPATIENT)
Dept: INTERNAL MEDICINE CLINIC | Facility: CLINIC | Age: 45
End: 2025-08-06
Payer: COMMERCIAL

## 2025-08-06 VITALS
DIASTOLIC BLOOD PRESSURE: 86 MMHG | BODY MASS INDEX: 39.82 KG/M2 | HEIGHT: 62 IN | TEMPERATURE: 97.6 F | SYSTOLIC BLOOD PRESSURE: 124 MMHG | HEART RATE: 96 BPM | WEIGHT: 216.4 LBS | OXYGEN SATURATION: 98 %

## 2025-08-06 DIAGNOSIS — R45.84 ANHEDONIA: ICD-10-CM

## 2025-08-06 DIAGNOSIS — F32.9 REACTIVE DEPRESSION: ICD-10-CM

## 2025-08-06 DIAGNOSIS — F32.2 SEVERE MAJOR DEPRESSIVE DISORDER (HCC): ICD-10-CM

## 2025-08-06 DIAGNOSIS — F41.0 PANIC ATTACKS: ICD-10-CM

## 2025-08-06 DIAGNOSIS — F41.1 GAD (GENERALIZED ANXIETY DISORDER): Primary | ICD-10-CM

## 2025-08-06 DIAGNOSIS — Z01.419 ENCOUNTER FOR GYNECOLOGICAL EXAMINATION: ICD-10-CM

## 2025-08-06 DIAGNOSIS — R20.0 LOWER EXTREMITY NUMBNESS: ICD-10-CM

## 2025-08-06 DIAGNOSIS — F33.41 RECURRENT MAJOR DEPRESSIVE DISORDER, IN PARTIAL REMISSION (HCC): ICD-10-CM

## 2025-08-06 DIAGNOSIS — N95.1 PERIMENOPAUSAL: ICD-10-CM

## 2025-08-06 DIAGNOSIS — Z12.31 ENCOUNTER FOR SCREENING MAMMOGRAM FOR BREAST CANCER: ICD-10-CM

## 2025-08-06 DIAGNOSIS — G47.9 SLEEP DISTURBANCE: ICD-10-CM

## 2025-08-06 DIAGNOSIS — R63.0 POOR APPETITE: ICD-10-CM

## 2025-08-06 PROCEDURE — 99214 OFFICE O/P EST MOD 30 MIN: CPT | Performed by: INTERNAL MEDICINE

## 2025-08-06 RX ORDER — DULOXETIN HYDROCHLORIDE 60 MG/1
120 CAPSULE, DELAYED RELEASE ORAL DAILY
Qty: 180 CAPSULE | Refills: 1 | Status: SHIPPED | OUTPATIENT
Start: 2025-08-06

## 2025-08-06 RX ORDER — ARIPIPRAZOLE 2 MG/1
2 TABLET ORAL EVERY MORNING
Qty: 90 TABLET | Refills: 1 | Status: SHIPPED | OUTPATIENT
Start: 2025-08-06

## 2025-08-06 RX ORDER — HYDROXYZINE PAMOATE 25 MG/1
25 CAPSULE ORAL 3 TIMES DAILY PRN
Qty: 90 CAPSULE | Refills: 1 | Status: SHIPPED | OUTPATIENT
Start: 2025-08-06

## 2025-08-14 ENCOUNTER — OFFICE VISIT (OUTPATIENT)
Dept: INTERNAL MEDICINE CLINIC | Facility: CLINIC | Age: 45
End: 2025-08-14
Payer: COMMERCIAL

## 2025-08-14 ENCOUNTER — TELEPHONE (OUTPATIENT)
Age: 45
End: 2025-08-14

## 2025-08-15 ENCOUNTER — TELEPHONE (OUTPATIENT)
Dept: INTERNAL MEDICINE CLINIC | Facility: CLINIC | Age: 45
End: 2025-08-15